# Patient Record
Sex: MALE | Race: WHITE | NOT HISPANIC OR LATINO | Employment: UNEMPLOYED | URBAN - METROPOLITAN AREA
[De-identification: names, ages, dates, MRNs, and addresses within clinical notes are randomized per-mention and may not be internally consistent; named-entity substitution may affect disease eponyms.]

---

## 2019-02-06 ENCOUNTER — OFFICE VISIT (OUTPATIENT)
Dept: FAMILY MEDICINE CLINIC | Facility: CLINIC | Age: 49
End: 2019-02-06

## 2019-02-06 VITALS
DIASTOLIC BLOOD PRESSURE: 80 MMHG | WEIGHT: 240 LBS | SYSTOLIC BLOOD PRESSURE: 142 MMHG | TEMPERATURE: 100.9 F | RESPIRATION RATE: 18 BRPM | HEART RATE: 80 BPM | BODY MASS INDEX: 37.67 KG/M2 | HEIGHT: 67 IN | OXYGEN SATURATION: 96 %

## 2019-02-06 DIAGNOSIS — R82.998 URINE COLOR APPEARS BRIGHT: ICD-10-CM

## 2019-02-06 DIAGNOSIS — R05.9 COUGH: Primary | ICD-10-CM

## 2019-02-06 DIAGNOSIS — A68.9 RECURRENT FEVER: ICD-10-CM

## 2019-02-06 DIAGNOSIS — R01.1 HEART MURMUR: ICD-10-CM

## 2019-02-06 LAB
BACTERIA UR QL AUTO: ABNORMAL /HPF
BILIRUB UR QL STRIP: NEGATIVE
CLARITY UR: ABNORMAL
COLOR UR: ABNORMAL
GLUCOSE UR STRIP-MCNC: NEGATIVE MG/DL
HGB UR QL STRIP.AUTO: NEGATIVE
KETONES UR STRIP-MCNC: ABNORMAL MG/DL
LEUKOCYTE ESTERASE UR QL STRIP: ABNORMAL
NITRITE UR QL STRIP: NEGATIVE
NON-SQ EPI CELLS URNS QL MICRO: ABNORMAL /HPF
PH UR STRIP.AUTO: 5.5 [PH] (ref 4.5–8)
PROT UR STRIP-MCNC: ABNORMAL MG/DL
RBC #/AREA URNS AUTO: ABNORMAL /HPF
SL AMB  POCT GLUCOSE, UA: NEGATIVE
SL AMB LEUKOCYTE ESTERASE,UA: NEGATIVE
SL AMB POCT BILIRUBIN,UA: NEGATIVE
SL AMB POCT BLOOD,UA: ABNORMAL
SL AMB POCT CLARITY,UA: ABNORMAL
SL AMB POCT COLOR,UA: ABNORMAL
SL AMB POCT KETONES,UA: NEGATIVE
SL AMB POCT NITRITE,UA: NEGATIVE
SL AMB POCT PH,UA: 6
SL AMB POCT SPECIFIC GRAVITY,UA: 1.02
SL AMB POCT URINE PROTEIN: ABNORMAL
SL AMB POCT UROBILINOGEN: 1
SP GR UR STRIP.AUTO: 1.03 (ref 1–1.03)
UROBILINOGEN UR QL STRIP.AUTO: 2 E.U./DL
WBC #/AREA URNS AUTO: ABNORMAL /HPF

## 2019-02-06 PROCEDURE — 87086 URINE CULTURE/COLONY COUNT: CPT | Performed by: FAMILY MEDICINE

## 2019-02-06 PROCEDURE — 81001 URINALYSIS AUTO W/SCOPE: CPT | Performed by: FAMILY MEDICINE

## 2019-02-06 NOTE — PROGRESS NOTES
Assessment/Plan:       Problem List Items Addressed This Visit        Other    Heart murmur    Relevant Orders    Echo complete with contrast if indicated    Cough - Primary     Check chest x-ray with persistent cough and mild shortness of breath         Relevant Orders    XR chest pa & lateral      Other Visit Diagnoses     Recurrent fever        Relevant Orders    CBC and differential    Comprehensive metabolic panel    Lyme Antibody Profile with reflex to WB    Urine color appears bright        Urine appears quite orange    Relevant Orders    POCT urine dip            Subjective:      Patient ID: Lorena Sanchez is a 53 y o  male  Patient presents today with chief complaint of not feeling well since Christmas Eve  He has had a intermittent cough  I actually called in a Z-Jer form on January 19th that seem to make him feel bit better  He has had some intermittent issues with upper gastric discomfort and a few episodes of diarrhea  He has felt fatigued and intermittently has had chills since December 24th  He has lost some weight but he is not sure how much  He notes his appetite has been poor  He is not currently experiencing nausea or vomiting but does still have a poor appetite  He has a small skin rash on his hand which he blames on washing glasses at work  The following portions of the patient's history were reviewed and updated as appropriate: allergies, current medications, past family history, past medical history, past social history, past surgical history and problem list     Review of Systems   Constitutional: Positive for appetite change, chills, fatigue, fever and unexpected weight change  Night sweats   HENT: Negative for sore throat and trouble swallowing  Eyes: Negative for visual disturbance  Respiratory: Positive for cough  Negative for chest tightness, shortness of breath and wheezing  Cardiovascular: Negative for chest pain     Gastrointestinal: Negative for abdominal distention, abdominal pain, blood in stool, constipation and diarrhea  Endocrine: Negative for polyuria  Genitourinary: Negative for difficulty urinating and flank pain  Musculoskeletal: Positive for myalgias  Negative for arthralgias, back pain and joint swelling  Skin: Negative for rash  Neurological: Negative for dizziness, tremors, syncope, weakness and light-headedness  Hematological: Negative for adenopathy  Does not bruise/bleed easily  Psychiatric/Behavioral: Negative for confusion, decreased concentration and sleep disturbance  Objective:      /80   Pulse 80   Temp (!) 100 9 °F (38 3 °C)   Resp 18   Ht 5' 7" (1 702 m)   Wt 109 kg (240 lb)   SpO2 96%   BMI 37 59 kg/m²          Physical Exam   Constitutional: He is oriented to person, place, and time  He appears well-developed and well-nourished  No distress  pale   HENT:   Head: Normocephalic  Eyes: Pupils are equal, round, and reactive to light  Right eye exhibits no discharge  Left eye exhibits no discharge  Neck: No tracheal deviation present  No thyromegaly present  Cardiovascular: Normal rate, regular rhythm and normal heart sounds  No murmur heard  Pulmonary/Chest: Effort normal  No respiratory distress  He has no wheezes  He has no rales  Abdominal: Soft  He exhibits no distension and no mass  There is no tenderness  There is no rebound and no guarding  Genitourinary: Rectum normal and prostate normal    Musculoskeletal: Normal range of motion  He exhibits no edema  Lymphadenopathy:     He has no cervical adenopathy  Neurological: He is alert and oriented to person, place, and time  No cranial nerve deficit  Skin: Skin is warm  He is not diaphoretic  No erythema  Psychiatric: He has a normal mood and affect   Judgment and thought content normal          Guillermo Collins MD

## 2019-02-07 LAB — BACTERIA UR CULT: NORMAL

## 2019-02-19 ENCOUNTER — APPOINTMENT (OUTPATIENT)
Dept: LAB | Facility: CLINIC | Age: 49
End: 2019-02-19
Payer: COMMERCIAL

## 2019-02-19 ENCOUNTER — APPOINTMENT (OUTPATIENT)
Dept: RADIOLOGY | Facility: MEDICAL CENTER | Age: 49
End: 2019-02-19
Payer: COMMERCIAL

## 2019-02-19 DIAGNOSIS — R05.9 COUGH: ICD-10-CM

## 2019-02-19 DIAGNOSIS — A68.9 RECURRENT FEVER: ICD-10-CM

## 2019-02-19 LAB
ALBUMIN SERPL BCP-MCNC: 2.4 G/DL (ref 3.5–5)
ALP SERPL-CCNC: 237 U/L (ref 46–116)
ALT SERPL W P-5'-P-CCNC: 25 U/L (ref 12–78)
ANION GAP SERPL CALCULATED.3IONS-SCNC: 8 MMOL/L (ref 4–13)
AST SERPL W P-5'-P-CCNC: 49 U/L (ref 5–45)
BASOPHILS # BLD MANUAL: 0 THOUSAND/UL (ref 0–0.1)
BASOPHILS NFR MAR MANUAL: 0 % (ref 0–1)
BILIRUB SERPL-MCNC: 2.03 MG/DL (ref 0.2–1)
BUN SERPL-MCNC: 13 MG/DL (ref 5–25)
CALCIUM SERPL-MCNC: 8.5 MG/DL (ref 8.3–10.1)
CHLORIDE SERPL-SCNC: 100 MMOL/L (ref 100–108)
CO2 SERPL-SCNC: 23 MMOL/L (ref 21–32)
CREAT SERPL-MCNC: 0.94 MG/DL (ref 0.6–1.3)
EOSINOPHIL # BLD MANUAL: 0 THOUSAND/UL (ref 0–0.4)
EOSINOPHIL NFR BLD MANUAL: 0 % (ref 0–6)
ERYTHROCYTE [DISTWIDTH] IN BLOOD BY AUTOMATED COUNT: 15.7 % (ref 11.6–15.1)
GFR SERPL CREATININE-BSD FRML MDRD: 96 ML/MIN/1.73SQ M
GLUCOSE SERPL-MCNC: 93 MG/DL (ref 65–140)
HCT VFR BLD AUTO: 32.5 % (ref 36.5–49.3)
HGB BLD-MCNC: 10.7 G/DL (ref 12–17)
LYMPHOCYTES # BLD AUTO: 0.18 THOUSAND/UL (ref 0.6–4.47)
LYMPHOCYTES # BLD AUTO: 2 % (ref 14–44)
MCH RBC QN AUTO: 30.3 PG (ref 26.8–34.3)
MCHC RBC AUTO-ENTMCNC: 32.9 G/DL (ref 31.4–37.4)
MCV RBC AUTO: 92 FL (ref 82–98)
METAMYELOCYTES NFR BLD MANUAL: 1 % (ref 0–1)
MONOCYTES # BLD AUTO: 0.36 THOUSAND/UL (ref 0–1.22)
MONOCYTES NFR BLD: 4 % (ref 4–12)
NEUTROPHILS # BLD MANUAL: 8.31 THOUSAND/UL (ref 1.85–7.62)
NEUTS BAND NFR BLD MANUAL: 5 % (ref 0–8)
NEUTS SEG NFR BLD AUTO: 87 % (ref 43–75)
NRBC BLD AUTO-RTO: 0 /100 WBCS
PLATELET # BLD AUTO: 136 THOUSANDS/UL (ref 149–390)
PLATELET BLD QL SMEAR: ABNORMAL
PMV BLD AUTO: 10.3 FL (ref 8.9–12.7)
POLYCHROMASIA BLD QL SMEAR: PRESENT
POTASSIUM SERPL-SCNC: 3.7 MMOL/L (ref 3.5–5.3)
PROT SERPL-MCNC: 9 G/DL (ref 6.4–8.2)
RBC # BLD AUTO: 3.53 MILLION/UL (ref 3.88–5.62)
RBC MORPH BLD: PRESENT
SODIUM SERPL-SCNC: 131 MMOL/L (ref 136–145)
VARIANT LYMPHS # BLD AUTO: 1 %
WBC # BLD AUTO: 9.03 THOUSAND/UL (ref 4.31–10.16)

## 2019-02-19 PROCEDURE — 36415 COLL VENOUS BLD VENIPUNCTURE: CPT

## 2019-02-19 PROCEDURE — 80053 COMPREHEN METABOLIC PANEL: CPT

## 2019-02-19 PROCEDURE — 85027 COMPLETE CBC AUTOMATED: CPT

## 2019-02-19 PROCEDURE — 86618 LYME DISEASE ANTIBODY: CPT

## 2019-02-19 PROCEDURE — 85007 BL SMEAR W/DIFF WBC COUNT: CPT

## 2019-02-19 PROCEDURE — 71046 X-RAY EXAM CHEST 2 VIEWS: CPT

## 2019-02-20 ENCOUNTER — TELEPHONE (OUTPATIENT)
Dept: FAMILY MEDICINE CLINIC | Facility: CLINIC | Age: 49
End: 2019-02-20

## 2019-02-20 DIAGNOSIS — R05.9 COUGH: ICD-10-CM

## 2019-02-20 DIAGNOSIS — D64.9 ANEMIA, UNSPECIFIED TYPE: Primary | ICD-10-CM

## 2019-02-20 DIAGNOSIS — K29.30 CHRONIC SUPERFICIAL GASTRITIS WITHOUT BLEEDING: ICD-10-CM

## 2019-02-20 PROBLEM — E80.6 HYPERBILIRUBINEMIA: Status: ACTIVE | Noted: 2019-02-20

## 2019-02-20 RX ORDER — PREDNISONE 20 MG/1
20 TABLET ORAL DAILY
Qty: 5 TABLET | Refills: 0 | Status: SHIPPED | OUTPATIENT
Start: 2019-02-20 | End: 2019-02-25 | Stop reason: ALTCHOICE

## 2019-02-20 RX ORDER — OMEPRAZOLE 40 MG/1
CAPSULE, DELAYED RELEASE ORAL
Qty: 60 CAPSULE | Refills: 1 | Status: SHIPPED | OUTPATIENT
Start: 2019-02-20 | End: 2019-02-21 | Stop reason: SDUPTHER

## 2019-02-20 NOTE — TELEPHONE ENCOUNTER
PER DMD pt is rather ill and will need several studies  Has no ins   What can be done to help him out? I called Charline Auguste, HOME Finacial counselor and lmovm for her to call me back

## 2019-02-20 NOTE — PROGRESS NOTES
I spoke with Dr Kelvin Jaramillo who recommends we try to get the patient in with the [facility] where he could be seen by 1 of the fellows  [facility]  He also should have iron studies

## 2019-02-20 NOTE — PROGRESS NOTES
I discussed the patient's have normal hemoglobin as well as his elevated bilirubin and alkaline phosphatase  He persists in having a cough, but his chest x-ray was clear  He will need a GI follow-up as well as an abdominal ultrasound which we will attempt to arrange  Unfortunately, he has no insurance  Our office has already reached out to our financial services people to see we can do for him  He has not set up his echocardiogram yet but I would like him to get that set up in the near future in light of his heart murmur at his previous visit

## 2019-02-21 DIAGNOSIS — K29.30 CHRONIC SUPERFICIAL GASTRITIS WITHOUT BLEEDING: ICD-10-CM

## 2019-02-21 LAB
B BURGDOR IGG SER IA-ACNC: 0.13
B BURGDOR IGM SER IA-ACNC: 0.21

## 2019-02-21 RX ORDER — OMEPRAZOLE 40 MG/1
CAPSULE, DELAYED RELEASE ORAL
Qty: 60 CAPSULE | Refills: 1 | Status: SHIPPED | OUTPATIENT
Start: 2019-02-21 | End: 2019-03-22 | Stop reason: HOSPADM

## 2019-02-21 NOTE — TELEPHONE ENCOUNTER
Called asher to f/u on this - she is going to call pt to get a little more information regarding why he doesn't have insurance and then based on that information, she will either help him apply for medical assistance, review  to see if prices are something he can afford, and if not, work on payment plan/financial assistance

## 2019-02-25 ENCOUNTER — OFFICE VISIT (OUTPATIENT)
Dept: FAMILY MEDICINE CLINIC | Facility: CLINIC | Age: 49
End: 2019-02-25

## 2019-02-25 ENCOUNTER — APPOINTMENT (INPATIENT)
Dept: CT IMAGING | Facility: HOSPITAL | Age: 49
DRG: 441 | End: 2019-02-25
Payer: COMMERCIAL

## 2019-02-25 ENCOUNTER — HOSPITAL ENCOUNTER (INPATIENT)
Facility: HOSPITAL | Age: 49
LOS: 25 days | DRG: 441 | End: 2019-03-22
Attending: INTERNAL MEDICINE | Admitting: INTERNAL MEDICINE
Payer: COMMERCIAL

## 2019-02-25 VITALS
RESPIRATION RATE: 18 BRPM | HEIGHT: 67 IN | OXYGEN SATURATION: 98 % | TEMPERATURE: 97.6 F | SYSTOLIC BLOOD PRESSURE: 104 MMHG | HEART RATE: 68 BPM | DIASTOLIC BLOOD PRESSURE: 70 MMHG | WEIGHT: 226 LBS | BODY MASS INDEX: 35.47 KG/M2

## 2019-02-25 DIAGNOSIS — J86.9 EMPYEMA (HCC): ICD-10-CM

## 2019-02-25 DIAGNOSIS — A41.9 SEPSIS, DUE TO UNSPECIFIED ORGANISM: ICD-10-CM

## 2019-02-25 DIAGNOSIS — E80.6 HYPERBILIRUBINEMIA: ICD-10-CM

## 2019-02-25 DIAGNOSIS — J96.90 RESPIRATORY FAILURE (HCC): ICD-10-CM

## 2019-02-25 DIAGNOSIS — J90 PLEURAL EFFUSION: ICD-10-CM

## 2019-02-25 DIAGNOSIS — D64.9 ANEMIA, UNSPECIFIED TYPE: ICD-10-CM

## 2019-02-25 DIAGNOSIS — N36.5 URETHRAL FALSE PASSAGE: ICD-10-CM

## 2019-02-25 DIAGNOSIS — K29.30 CHRONIC SUPERFICIAL GASTRITIS WITHOUT BLEEDING: Primary | ICD-10-CM

## 2019-02-25 DIAGNOSIS — K35.890 OTHER ACUTE APPENDICITIS: ICD-10-CM

## 2019-02-25 DIAGNOSIS — F10.10 ALCOHOL ABUSE: ICD-10-CM

## 2019-02-25 DIAGNOSIS — R05.9 COUGH: ICD-10-CM

## 2019-02-25 DIAGNOSIS — I95.9 HYPOTENSION, UNSPECIFIED HYPOTENSION TYPE: ICD-10-CM

## 2019-02-25 DIAGNOSIS — S01.91XA LACERATION OF HEAD WITHOUT FOREIGN BODY, UNSPECIFIED PART OF HEAD, INITIAL ENCOUNTER: Primary | ICD-10-CM

## 2019-02-25 DIAGNOSIS — R31.0 GROSS HEMATURIA: ICD-10-CM

## 2019-02-25 PROBLEM — K21.9 GERD (GASTROESOPHAGEAL REFLUX DISEASE): Status: ACTIVE | Noted: 2019-02-25

## 2019-02-25 PROBLEM — R63.4 WEIGHT LOSS: Status: ACTIVE | Noted: 2019-02-25

## 2019-02-25 PROBLEM — R74.8 ELEVATED ALKALINE PHOSPHATASE LEVEL: Status: ACTIVE | Noted: 2019-02-25

## 2019-02-25 PROBLEM — R62.7 FAILURE TO THRIVE IN ADULT: Status: ACTIVE | Noted: 2019-02-25

## 2019-02-25 LAB
ALBUMIN SERPL BCP-MCNC: 2.1 G/DL (ref 3.5–5)
ALP SERPL-CCNC: 201 U/L (ref 46–116)
ALT SERPL W P-5'-P-CCNC: 43 U/L (ref 12–78)
ANION GAP SERPL CALCULATED.3IONS-SCNC: 9 MMOL/L (ref 4–13)
AST SERPL W P-5'-P-CCNC: 66 U/L (ref 5–45)
BILIRUB SERPL-MCNC: 1.84 MG/DL (ref 0.2–1)
BUN SERPL-MCNC: 12 MG/DL (ref 5–25)
CALCIUM SERPL-MCNC: 8.4 MG/DL (ref 8.3–10.1)
CHLORIDE SERPL-SCNC: 101 MMOL/L (ref 100–108)
CO2 SERPL-SCNC: 24 MMOL/L (ref 21–32)
CREAT SERPL-MCNC: 0.78 MG/DL (ref 0.6–1.3)
ERYTHROCYTE [DISTWIDTH] IN BLOOD BY AUTOMATED COUNT: 15.8 % (ref 11.6–15.1)
GFR SERPL CREATININE-BSD FRML MDRD: 107 ML/MIN/1.73SQ M
GLUCOSE SERPL-MCNC: 96 MG/DL (ref 65–140)
HCT VFR BLD AUTO: 31.3 % (ref 36.5–49.3)
HGB BLD-MCNC: 10 G/DL (ref 12–17)
INR PPP: 1.52 (ref 0.86–1.17)
LACTATE SERPL-SCNC: 1.9 MMOL/L (ref 0.5–2)
LACTATE SERPL-SCNC: 2.2 MMOL/L (ref 0.5–2)
MCH RBC QN AUTO: 30.5 PG (ref 26.8–34.3)
MCHC RBC AUTO-ENTMCNC: 31.9 G/DL (ref 31.4–37.4)
MCV RBC AUTO: 95 FL (ref 82–98)
PLATELET # BLD AUTO: 129 THOUSANDS/UL (ref 149–390)
PMV BLD AUTO: 9.9 FL (ref 8.9–12.7)
POTASSIUM SERPL-SCNC: 4.1 MMOL/L (ref 3.5–5.3)
PROT SERPL-MCNC: 8.5 G/DL (ref 6.4–8.2)
PROTHROMBIN TIME: 18.4 SECONDS (ref 11.8–14.2)
RBC # BLD AUTO: 3.28 MILLION/UL (ref 3.88–5.62)
SODIUM SERPL-SCNC: 134 MMOL/L (ref 136–145)
TROPONIN I SERPL-MCNC: <0.02 NG/ML
TSH SERPL DL<=0.05 MIU/L-ACNC: 2.31 UIU/ML (ref 0.36–3.74)
WBC # BLD AUTO: 11.78 THOUSAND/UL (ref 4.31–10.16)

## 2019-02-25 PROCEDURE — 74177 CT ABD & PELVIS W/CONTRAST: CPT

## 2019-02-25 PROCEDURE — 82728 ASSAY OF FERRITIN: CPT | Performed by: PHYSICIAN ASSISTANT

## 2019-02-25 PROCEDURE — 85610 PROTHROMBIN TIME: CPT | Performed by: PHYSICIAN ASSISTANT

## 2019-02-25 PROCEDURE — 87040 BLOOD CULTURE FOR BACTERIA: CPT | Performed by: PHYSICIAN ASSISTANT

## 2019-02-25 PROCEDURE — 83550 IRON BINDING TEST: CPT | Performed by: PHYSICIAN ASSISTANT

## 2019-02-25 PROCEDURE — 93005 ELECTROCARDIOGRAM TRACING: CPT

## 2019-02-25 PROCEDURE — 99223 1ST HOSP IP/OBS HIGH 75: CPT | Performed by: PHYSICIAN ASSISTANT

## 2019-02-25 PROCEDURE — 71260 CT THORAX DX C+: CPT

## 2019-02-25 PROCEDURE — 80053 COMPREHEN METABOLIC PANEL: CPT | Performed by: PHYSICIAN ASSISTANT

## 2019-02-25 PROCEDURE — 83605 ASSAY OF LACTIC ACID: CPT | Performed by: PHYSICIAN ASSISTANT

## 2019-02-25 PROCEDURE — 85027 COMPLETE CBC AUTOMATED: CPT | Performed by: PHYSICIAN ASSISTANT

## 2019-02-25 PROCEDURE — 84484 ASSAY OF TROPONIN QUANT: CPT | Performed by: PHYSICIAN ASSISTANT

## 2019-02-25 PROCEDURE — 83540 ASSAY OF IRON: CPT | Performed by: PHYSICIAN ASSISTANT

## 2019-02-25 PROCEDURE — 84443 ASSAY THYROID STIM HORMONE: CPT | Performed by: PHYSICIAN ASSISTANT

## 2019-02-25 RX ORDER — PANTOPRAZOLE SODIUM 40 MG/1
40 TABLET, DELAYED RELEASE ORAL
Status: DISCONTINUED | OUTPATIENT
Start: 2019-02-26 | End: 2019-02-28

## 2019-02-25 RX ORDER — SODIUM CHLORIDE 9 MG/ML
75 INJECTION, SOLUTION INTRAVENOUS CONTINUOUS
Status: DISCONTINUED | OUTPATIENT
Start: 2019-02-25 | End: 2019-02-28

## 2019-02-25 RX ORDER — ONDANSETRON 2 MG/ML
4 INJECTION INTRAMUSCULAR; INTRAVENOUS EVERY 8 HOURS PRN
Status: DISCONTINUED | OUTPATIENT
Start: 2019-02-25 | End: 2019-03-04

## 2019-02-25 RX ORDER — ACETAMINOPHEN 325 MG/1
650 TABLET ORAL EVERY 6 HOURS PRN
Status: DISCONTINUED | OUTPATIENT
Start: 2019-02-25 | End: 2019-02-26

## 2019-02-25 RX ADMIN — IOHEXOL 100 ML: 350 INJECTION, SOLUTION INTRAVENOUS at 22:57

## 2019-02-25 RX ADMIN — IOHEXOL 50 ML: 240 INJECTION, SOLUTION INTRATHECAL; INTRAVASCULAR; INTRAVENOUS; ORAL at 22:56

## 2019-02-25 RX ADMIN — SODIUM CHLORIDE 75 ML/HR: 0.9 INJECTION, SOLUTION INTRAVENOUS at 22:30

## 2019-02-25 NOTE — TELEPHONE ENCOUNTER
I spoke jonh Vasquez   She has not heard back from pt  He has 2 choices at this time  [facility] -which is a clinic and he will need to pay a flat rate copay and they will help him apply for Med Assistance  He must then go to clinic location, cannot come here  OR he can apply for medical assistance through the [facility] for pt to call me back

## 2019-02-25 NOTE — ASSESSMENT & PLAN NOTE
He just started Prilosec on the weekend my advice for persistent upper abdominal pain  He will need a GI consultation in light of his anemia

## 2019-02-25 NOTE — ASSESSMENT & PLAN NOTE
Hemoglobin of 10 7  In light of his failure to thrive and significant weight loss, I have arranged to have him directly admitted    I spoke

## 2019-02-25 NOTE — ASSESSMENT & PLAN NOTE
He had some improvement on the prednisone that I had given him last week  I hope to come down his cough to give him some relief all we are moving forward with his workup  Recent chest x-ray was read as clear

## 2019-02-25 NOTE — PROGRESS NOTES
Assessment/Plan:       Problem List Items Addressed This Visit        Digestive    Chronic superficial gastritis without bleeding - Primary     He just started Prilosec on the weekend my advice for persistent upper abdominal pain  He will need a GI consultation in light of his anemia  Other    Cough     He had some improvement on the prednisone that I had given him last week  I hope to come down his cough to give him some relief all we are moving forward with his workup  Recent chest x-ray was read as clear  Anemia     Hemoglobin of 10 7  In light of his failure to thrive and significant weight loss, I have arranged to have him directly admitted  I spoke         Relevant Orders    Transfer to other facility    Hyperbilirubinemia     He is going to be admitted for further evaluation  Relevant Orders    Transfer to other facility            Subjective:      Patient ID: Olman Romano is a 53 y o  male  He presents today with persistent weight loss, poor appetite and intermittent diarrhea as well as right upper quadrant pain  He notes he started feeling sick about 2 months ago with a cough  He took a Z-Jer about a month ago which helped him feel just a bit better but for only about 48 hours  He continues to have severe fatigue, persistent poor appetite and significant weight loss  He has been hesitant to seek out healthcare in light of his abscess of insurance  I did convince her to get some blood work done on February 19th which revealed hemoglobin of 10 7 with a relative microcytosis  The patient has not noted any melena or bright red blood per rectum  He also had a decreased sodium of 131, elevated protein of 9, low albumin of 2 4 and an elevated alkaline phosphatase of 237  He has been having intermittent right upper quadrant pain which seems to be worse over the past 48 hours        The following portions of the patient's history were reviewed and updated as appropriate: allergies, current medications, past family history, past medical history, past social history, past surgical history and problem list     Review of Systems   Constitutional: Positive for activity change, appetite change, fatigue and unexpected weight change  Negative for chills and fever  HENT: Negative for trouble swallowing  Eyes: Negative for visual disturbance  Respiratory: Negative for cough, chest tightness, shortness of breath and wheezing  Cardiovascular: Negative for chest pain  Gastrointestinal: Positive for abdominal pain, diarrhea and nausea  Negative for abdominal distention, blood in stool, constipation, rectal pain and vomiting  Endocrine: Negative for polyuria  Genitourinary: Negative for difficulty urinating, dysuria and flank pain  Musculoskeletal: Negative for arthralgias and myalgias  Skin: Negative for rash  Neurological: Negative for dizziness and light-headedness  Hematological: Negative for adenopathy  Does not bruise/bleed easily  Psychiatric/Behavioral: Negative for sleep disturbance  Objective:      /70   Pulse 68   Temp 97 6 °F (36 4 °C)   Resp 18   Ht 5' 7" (1 702 m)   Wt 103 kg (226 lb)   SpO2 98%   BMI 35 40 kg/m²          Physical Exam   Constitutional: He is oriented to person, place, and time  No distress  He appears quite pale and moderately ill  HENT:   Head: Normocephalic  Eyes: Pupils are equal, round, and reactive to light  Right eye exhibits no discharge  Left eye exhibits no discharge  Neck: No tracheal deviation present  No thyromegaly present  Cardiovascular: Normal rate, regular rhythm and normal heart sounds  No murmur heard  Pulmonary/Chest: Effort normal  No respiratory distress  He has no wheezes  He has no rales  Abdominal: Soft  Bowel sounds are normal  He exhibits distension  He exhibits no mass  There is no tenderness  There is no rebound and no guarding  No hernia     Musculoskeletal: Normal range of motion  He exhibits no edema  Lymphadenopathy:     He has no cervical adenopathy  Neurological: He is alert and oriented to person, place, and time  No cranial nerve deficit  Skin: Skin is warm  No rash noted  He is not diaphoretic  No erythema  Psychiatric: He has a normal mood and affect   Judgment and thought content normal          Mame Rodriguez MD

## 2019-02-25 NOTE — ASSESSMENT & PLAN NOTE
This is not as obvious his that was at his previous visit on February 6, but is still present    I certainly would consider echocardiogram

## 2019-02-26 ENCOUNTER — APPOINTMENT (INPATIENT)
Dept: NON INVASIVE DIAGNOSTICS | Facility: HOSPITAL | Age: 49
DRG: 441 | End: 2019-02-26
Payer: COMMERCIAL

## 2019-02-26 PROBLEM — F10.10 ALCOHOL ABUSE: Status: ACTIVE | Noted: 2019-02-26

## 2019-02-26 PROBLEM — R16.0 LIVER MASS: Status: ACTIVE | Noted: 2019-02-26

## 2019-02-26 PROBLEM — K37 APPENDICITIS: Status: ACTIVE | Noted: 2019-02-26

## 2019-02-26 LAB
AFP-TM SERPL-MCNC: 1.3 NG/ML (ref 0.5–8)
ALBUMIN SERPL BCP-MCNC: 1.9 G/DL (ref 3.5–5)
ALP SERPL-CCNC: 189 U/L (ref 46–116)
ALT SERPL W P-5'-P-CCNC: 39 U/L (ref 12–78)
ANION GAP SERPL CALCULATED.3IONS-SCNC: 9 MMOL/L (ref 4–13)
AST SERPL W P-5'-P-CCNC: 62 U/L (ref 5–45)
ATRIAL RATE: 58 BPM
BILIRUB SERPL-MCNC: 2.22 MG/DL (ref 0.2–1)
BUN SERPL-MCNC: 10 MG/DL (ref 5–25)
CALCIUM SERPL-MCNC: 8.1 MG/DL (ref 8.3–10.1)
CEA SERPL-MCNC: 0.8 NG/ML (ref 0–3)
CHLORIDE SERPL-SCNC: 98 MMOL/L (ref 100–108)
CO2 SERPL-SCNC: 23 MMOL/L (ref 21–32)
CREAT SERPL-MCNC: 0.81 MG/DL (ref 0.6–1.3)
ERYTHROCYTE [DISTWIDTH] IN BLOOD BY AUTOMATED COUNT: 15.8 % (ref 11.6–15.1)
FERRITIN SERPL-MCNC: 856 NG/ML (ref 8–388)
FOLATE SERPL-MCNC: 10.1 NG/ML (ref 3.1–17.5)
GFR SERPL CREATININE-BSD FRML MDRD: 106 ML/MIN/1.73SQ M
GLUCOSE SERPL-MCNC: 93 MG/DL (ref 65–140)
HAV IGM SER QL: NORMAL
HBV CORE IGM SER QL: NORMAL
HBV SURFACE AG SER QL: NORMAL
HCT VFR BLD AUTO: 30.8 % (ref 36.5–49.3)
HCV AB SER QL: NORMAL
HEMOCCULT STL QL: NEGATIVE
HGB BLD-MCNC: 9.8 G/DL (ref 12–17)
IRON SATN MFR SERPL: 27 %
IRON SERPL-MCNC: 47 UG/DL (ref 65–175)
LACTATE SERPL-SCNC: 1.8 MMOL/L (ref 0.5–2)
MCH RBC QN AUTO: 30.4 PG (ref 26.8–34.3)
MCHC RBC AUTO-ENTMCNC: 31.8 G/DL (ref 31.4–37.4)
MCV RBC AUTO: 96 FL (ref 82–98)
P AXIS: 49 DEGREES
PLATELET # BLD AUTO: 125 THOUSANDS/UL (ref 149–390)
PMV BLD AUTO: 10.1 FL (ref 8.9–12.7)
POTASSIUM SERPL-SCNC: 4.2 MMOL/L (ref 3.5–5.3)
PR INTERVAL: 184 MS
PROCALCITONIN SERPL-MCNC: 0.26 NG/ML
PROT SERPL-MCNC: 7.8 G/DL (ref 6.4–8.2)
QRS AXIS: 16 DEGREES
QRSD INTERVAL: 114 MS
QT INTERVAL: 474 MS
QTC INTERVAL: 465 MS
RBC # BLD AUTO: 3.22 MILLION/UL (ref 3.88–5.62)
SODIUM SERPL-SCNC: 130 MMOL/L (ref 136–145)
T WAVE AXIS: 36 DEGREES
TIBC SERPL-MCNC: 175 UG/DL (ref 250–450)
VENTRICULAR RATE: 58 BPM
VIT B12 SERPL-MCNC: 1554 PG/ML (ref 100–900)
WBC # BLD AUTO: 15.19 THOUSAND/UL (ref 4.31–10.16)

## 2019-02-26 PROCEDURE — 93306 TTE W/DOPPLER COMPLETE: CPT

## 2019-02-26 PROCEDURE — 82784 ASSAY IGA/IGD/IGG/IGM EACH: CPT | Performed by: PHYSICIAN ASSISTANT

## 2019-02-26 PROCEDURE — 84145 PROCALCITONIN (PCT): CPT | Performed by: PHYSICIAN ASSISTANT

## 2019-02-26 PROCEDURE — 86255 FLUORESCENT ANTIBODY SCREEN: CPT | Performed by: PHYSICIAN ASSISTANT

## 2019-02-26 PROCEDURE — 93306 TTE W/DOPPLER COMPLETE: CPT | Performed by: INTERNAL MEDICINE

## 2019-02-26 PROCEDURE — 83605 ASSAY OF LACTIC ACID: CPT | Performed by: PHYSICIAN ASSISTANT

## 2019-02-26 PROCEDURE — 80074 ACUTE HEPATITIS PANEL: CPT | Performed by: HOSPITALIST

## 2019-02-26 PROCEDURE — 82272 OCCULT BLD FECES 1-3 TESTS: CPT | Performed by: PHYSICIAN ASSISTANT

## 2019-02-26 PROCEDURE — 83516 IMMUNOASSAY NONANTIBODY: CPT | Performed by: PHYSICIAN ASSISTANT

## 2019-02-26 PROCEDURE — 99232 SBSQ HOSP IP/OBS MODERATE 35: CPT | Performed by: HOSPITALIST

## 2019-02-26 PROCEDURE — 82746 ASSAY OF FOLIC ACID SERUM: CPT | Performed by: PHYSICIAN ASSISTANT

## 2019-02-26 PROCEDURE — 82607 VITAMIN B-12: CPT | Performed by: PHYSICIAN ASSISTANT

## 2019-02-26 PROCEDURE — 89055 LEUKOCYTE ASSESSMENT FECAL: CPT | Performed by: PHYSICIAN ASSISTANT

## 2019-02-26 PROCEDURE — 82390 ASSAY OF CERULOPLASMIN: CPT | Performed by: PHYSICIAN ASSISTANT

## 2019-02-26 PROCEDURE — 99254 IP/OBS CNSLTJ NEW/EST MOD 60: CPT | Performed by: INTERNAL MEDICINE

## 2019-02-26 PROCEDURE — 82105 ALPHA-FETOPROTEIN SERUM: CPT | Performed by: PHYSICIAN ASSISTANT

## 2019-02-26 PROCEDURE — 82103 ALPHA-1-ANTITRYPSIN TOTAL: CPT | Performed by: PHYSICIAN ASSISTANT

## 2019-02-26 PROCEDURE — 93010 ELECTROCARDIOGRAM REPORT: CPT | Performed by: INTERNAL MEDICINE

## 2019-02-26 PROCEDURE — 85027 COMPLETE CBC AUTOMATED: CPT | Performed by: PHYSICIAN ASSISTANT

## 2019-02-26 PROCEDURE — 82104 ALPHA-1-ANTITRYPSIN PHENO: CPT | Performed by: PHYSICIAN ASSISTANT

## 2019-02-26 PROCEDURE — 86235 NUCLEAR ANTIGEN ANTIBODY: CPT | Performed by: PHYSICIAN ASSISTANT

## 2019-02-26 PROCEDURE — 82378 CARCINOEMBRYONIC ANTIGEN: CPT | Performed by: INTERNAL MEDICINE

## 2019-02-26 PROCEDURE — 87493 C DIFF AMPLIFIED PROBE: CPT | Performed by: PHYSICIAN ASSISTANT

## 2019-02-26 PROCEDURE — 86038 ANTINUCLEAR ANTIBODIES: CPT | Performed by: PHYSICIAN ASSISTANT

## 2019-02-26 PROCEDURE — 87505 NFCT AGENT DETECTION GI: CPT | Performed by: PHYSICIAN ASSISTANT

## 2019-02-26 PROCEDURE — 80053 COMPREHEN METABOLIC PANEL: CPT | Performed by: PHYSICIAN ASSISTANT

## 2019-02-26 RX ORDER — OXYCODONE HYDROCHLORIDE 5 MG/1
5 TABLET ORAL EVERY 4 HOURS PRN
Status: DISCONTINUED | OUTPATIENT
Start: 2019-02-26 | End: 2019-02-28

## 2019-02-26 RX ORDER — OXYCODONE HYDROCHLORIDE 10 MG/1
10 TABLET ORAL EVERY 4 HOURS PRN
Status: DISCONTINUED | OUTPATIENT
Start: 2019-02-26 | End: 2019-02-28

## 2019-02-26 RX ORDER — IBUPROFEN 600 MG/1
600 TABLET ORAL EVERY 6 HOURS PRN
Status: DISCONTINUED | OUTPATIENT
Start: 2019-02-26 | End: 2019-02-28

## 2019-02-26 RX ADMIN — OXYCODONE HYDROCHLORIDE 5 MG: 5 TABLET ORAL at 03:54

## 2019-02-26 RX ADMIN — PANTOPRAZOLE SODIUM 40 MG: 40 TABLET, DELAYED RELEASE ORAL at 05:26

## 2019-02-26 RX ADMIN — IBUPROFEN 600 MG: 600 TABLET ORAL at 08:38

## 2019-02-26 RX ADMIN — ENOXAPARIN SODIUM 40 MG: 40 INJECTION SUBCUTANEOUS at 08:31

## 2019-02-26 RX ADMIN — IBUPROFEN 600 MG: 600 TABLET ORAL at 23:53

## 2019-02-26 NOTE — ASSESSMENT & PLAN NOTE
On CT abd, they see inflammation of appendix suspicious for acute appendicitits  He is not tender in the RLQ and has no rebound nor guarding  If anything, it is subacute  I spoke with General surgery who will see the patient in consultation  Will keep him NPO for now

## 2019-02-26 NOTE — PROGRESS NOTES
02/26/19 1500   Clinical Encounter Type   Visited With Patient; Family   Routine Visit Introduction   Patient Spiritual Encounters   Coping 4

## 2019-02-26 NOTE — PROGRESS NOTES
Progress Note - Olman Romano 1970, 53 y o  male MRN: [id number]    Unit/Bed#: [address]01 Encounter: [id number]    Primary Care Provider: Barby Moss MD   Date and time admitted to hospital: 2019  7:56 PM        * Liver mass  Assessment & Plan  2 months of weight loss, night sweats  He was drinking at least 4 alcoholic drinks per day until 2 months ago when he stopped because he was not feeling well  May have a liver abscess, malnignancy  Will ask GI to see  Anticipate we will need a biopsy and or drainage of this mass by IR    Alcohol abuse  Assessment & Plan  Says that he quit 2 months ago  Likely has cirrhosis  GI to eval    Appendicitis  Assessment & Plan  On CT abd, they see inflammation of appendix suspicious for acute appendicitits  He is not tender in the RLQ and has no rebound nor guarding  If anything, it is subacute  I spoke with General surgery who will see the patient in consultation  Will keep him NPO for now  Anemia  Assessment & Plan  Likely due to alcoholism          Subjective:   Biggest complaint is a dry cough  Has 20 pound weight loss in last two months  Has night sweats for the last two months  Felt better a few weeks ago when he was on Zithromax for this cough  No abdominal pain, but he does feel like his abdomen is "bigger" than usual   Quit alcohol two months ago because he was feeling poorly  He was been drinking at least 4 alcoholic drinks per day for "many years"  No blood nor blackness in stool  No history of IV drug use  Objective:     Vitals:   Temp (24hrs), Av 2 °F (36 8 °C), Min:97 6 °F (36 4 °C), Max:98 8 °F (37 1 °C)    Temp:  [97 6 °F (36 4 °C)-98 8 °F (37 1 °C)] (P) 98 5 °F (36 9 °C)  HR:  [68-85] (P) 85  Resp:  [18] (P) 18  BP: (104-145)/(70-81) (P) 125/75  SpO2:  [97 %-100 %] (P) 97 %  There is no height or weight on file to calculate BMI       Input and Output Summary (last 24 hours):     No intake or output data in the 24 hours ending 02/26/19 1010    Physical Exam:     Physical Exam   HENT:   Head: Normocephalic and atraumatic  Eyes: Pupils are equal, round, and reactive to light  EOM are normal    Cardiovascular: Normal rate and regular rhythm  Exam reveals no gallop and no friction rub  No murmur heard  Pulmonary/Chest: Effort normal and breath sounds normal  He has no wheezes  He has no rales  Abdominal: Soft  Bowel sounds are normal  He exhibits distension (mildly distended)  There is no tenderness  There is no rebound and no guarding  I do not feel a mass in RUQ  Musculoskeletal: He exhibits no edema  Nursing note and vitals reviewed          Additional Data:     Labs:    Results from last 7 days   Lab Units 02/26/19  0551  02/19/19  1412   WBC Thousand/uL 15 19*   < > 9 03   HEMOGLOBIN g/dL 9 8*   < > 10 7*   HEMATOCRIT % 30 8*   < > 32 5*   PLATELETS Thousands/uL 125*   < > 136*   LYMPHO PCT %  --   --  2*   MONO PCT %  --   --  4   EOS PCT %  --   --  0    < > = values in this interval not displayed       Results from last 7 days   Lab Units 02/26/19  0551   POTASSIUM mmol/L 4 2   CHLORIDE mmol/L 98*   CO2 mmol/L 23   BUN mg/dL 10   CREATININE mg/dL 0 81   CALCIUM mg/dL 8 1*   ALK PHOS U/L 189*   ALT U/L 39   AST U/L 62*     Results from last 7 days   Lab Units 02/25/19 2037   INR  1 52*                   * I Have Reviewed All Lab Data     Recent Cultures (last 7 days):             Last 24 Hours Medication List:     Current Facility-Administered Medications:  enoxaparin 40 mg Subcutaneous Daily Juli Rodriguez PA-C    ibuprofen 600 mg Oral Q6H PRN Vicky Ynig V, KIM    ondansetron 4 mg Intravenous Q8H PRN Juli Rodriguez PA-C    oxyCODONE 10 mg Oral Q4H PRN Vicky Ying V, PA-MEGAN    oxyCODONE 5 mg Oral Q4H PRN Vicky Ying V, PA-MEGAN    pantoprazole 40 mg Oral Early Morning Juli Rodriguez PA-C    sodium chloride 75 mL/hr Intravenous Continuous Celina Park PA-C Last Rate: 75 mL/hr (02/25/19 2230)         VTE Pharmacologic Prophylaxis:   Pharmacologic: Enoxaparin (Lovenox)      Current Length of Stay: 1 day(s)    Current Patient Status: Inpatient       Discharge Plan:   Code Status: Level 1 - Full Code           Today, Patient Was Seen By: Trinidad Dick DO    ** Please Note: Dictation voice to text software may have been used in the creation of this document   **

## 2019-02-26 NOTE — CONSULTS
Patient MRN: [id number]  Date of Service: 2/26/2019  Referring Provider: Abhishek Russell PA-C  Provider Creating Note: Anabela Worley PA-C  PCP: Santi Godoy    Reason for Consult: anemia     HISTORY OF PRESENT ILLNESS:  Catalina Wood is a 53 y o  male who was directly admitted from a PCP office with failure to thrive, losing 30 lb over the course of the past 2 months  He admits to lack medical care in the past with no significant past medical history or medication use due to insurance issues  He recently had some outpatient lab work performed revealing a hemoglobin 10 7, MCV 92 thrombocytopenia, hyponatremia, hyperbilirubinemia  He also has AST>ALT and elevated ALP  He admits to a history of heavy alcohol use, 4 drinks/day typically rum  He has significantly decreased use since New Year's Kassy  He states he has not been feeling well since the end of December with a lingering cough  He had been treated with a Z-Jer and steroids without improvement  He does complain bloating and gas with intermittent diarrhea  He denies any melena or hematochezia  No nausea or vomiting  Denies abdominal pain  He was started on a PPI last week  He also complains of intermittent fevers and night sweats  He has an uncle with history of unspecified Hepatitis  No family history of colon cancer or other gi problems  He has never had an EGD or colonoscopy  Review of Systems:    General:   +fever and significant weight loss  +fatigue   EENT:   No ear pain, facial swelling; No sneezing, sore throat  Skin:   No rashes, color changes  Respiratory:     No shortness of breath, cough, wheezing, stridor  Cardiovascular:     No chest pain, palpitations  Gastrointestinal:    As per HPI  Musculoskeletal:     No arthralgias, +myalgias   Neurologic:   No dizziness, numbness, +weakness  No speech difficulties     Psych:   No agitation, suicidal ideations    Otherwise, All other twelve-point review of systems normal      No past medical history on file  No past surgical history on file  No Known Allergies    Medications:  Home Medications  Prior to Admission medications    Medication Sig Start Date End Date Taking? Authorizing Provider   omeprazole (PriLOSEC) 40 MG capsule Take 1 tablet twice daily for 1 week then one tablet daily 2/21/19   Zahida Virk MD       Inhouse Medications    Current Facility-Administered Medications:     enoxaparin (LOVENOX) subcutaneous injection 40 mg, 40 mg, Subcutaneous, Daily, 40 mg at 02/26/19 0831    ibuprofen (MOTRIN) tablet 600 mg, 600 mg, Oral, Q6H PRN, 600 mg at 02/26/19 0838    ondansetron (ZOFRAN) injection 4 mg, 4 mg, Intravenous, Q8H PRN    oxyCODONE (ROXICODONE) immediate release tablet 10 mg, 10 mg, Oral, Q4H PRN    oxyCODONE (ROXICODONE) IR tablet 5 mg, 5 mg, Oral, Q4H PRN, 5 mg at 02/26/19 0354    pantoprazole (PROTONIX) EC tablet 40 mg, 40 mg, Oral, Early Morning, 40 mg at 02/26/19 0526    sodium chloride 0 9 % infusion, 75 mL/hr, Intravenous, Continuous, 75 mL/hr at 02/25/19 2230      Social History   reports that he has quit smoking  He has never used smokeless tobacco  He reports that he drinks alcohol  Family History  No family history on file  OBJECTIVE:    BP (P) 125/75 (BP Location: Left arm)   Pulse (P) 85   Temp (P) 98 5 °F (36 9 °C)   Resp (P) 18   SpO2 (P) 97%   Physical Exam:     General Appearance:    Awake, alert, oriented x3, no distress, well developed, well    nourished   Head:    Normocephalic without obvious abnormality   Eyes:    PERRL, conjunctiva/corneas clear, EOM's intact        Neck:   Supple, no adenopathy   Throat:   Mucous membranes moist   Lungs:     Clear to auscultation bilaterally, no wheezing or rhonchi   Heart:    Regular rate and rhythm, S1 and S2 normal, no murmur   Abdomen:     Soft, protuberant non-tender, non-distended/no fluid wave  bowel sounds active  No  masses, rebound or guarding      Extremities:   Extremities without edema   Psych  Derm:   Normal affect    No jaundice  Moist skin  Neurologic:   CNII-XII grossly intact  Speech intact         Laboratory Studies:  Results from last 7 days   Lab Units 02/26/19  0551 02/25/19 2037 02/19/19  1412   WBC Thousand/uL 15 19* 11 78* 9 03   HEMOGLOBIN g/dL 9 8* 10 0* 10 7*   HEMATOCRIT % 30 8* 31 3* 32 5*   MCV fL 96 95 92   PLATELETS Thousands/uL 125* 129* 136*   INR   --  1 52*  --      Results from last 7 days   Lab Units 02/26/19  0551 02/25/19 2037 02/19/19  1412   SODIUM mmol/L 130* 134* 131*   POTASSIUM mmol/L 4 2 4 1 3 7   CHLORIDE mmol/L 98* 101 100   CO2 mmol/L 23 24 23   BUN mg/dL 10 12 13   CREATININE mg/dL 0 81 0 78 0 94   CALCIUM mg/dL 8 1* 8 4 8 5   ALBUMIN g/dL 1 9* 2 1* 2 4*   TOTAL BILIRUBIN mg/dL 2 22* 1 84* 2 03*   ALK PHOS U/L 189* 201* 237*   ALT U/L 39 43 25   AST U/L 62* 66* 49*     Iron 47  Iron Sat 27  TIBC 175  Ferritin 856   TSH 2 310    Imaging and Other Studies:  Xr Chest Pa & Lateral    Result Date: 2/20/2019  Narrative: CHEST INDICATION:   R05: Cough  dry cough 12/25/18, within the last two weeks, cough has become productive, intermittent fever, shortness of breath, loss of energy, fatigue and slight chest tightness, no sx COMPARISON:  None EXAM PERFORMED/VIEWS:  XR CHEST PA & LATERAL FINDINGS: Cardiomediastinal silhouette appears unremarkable  The lungs are clear  No pneumothorax or pleural effusion  Osseous structures appear within normal limits for patient age  Impression: No acute cardiopulmonary disease  Workstation performed: [id number]     Ct Chest Abdomen Pelvis W Contrast    Result Date: 2/25/2019  Narrative: CT CHEST, ABDOMEN AND PELVIS WITH IV CONTRAST INDICATION:   failiure to thrive, rule out malignancy, acute processes  COMPARISON:  Chest x-ray 2/19/2019 TECHNIQUE: CT examination of the chest, abdomen and pelvis was performed   Axial, sagittal, and coronal 2D reformatted images were created from the source data and submitted for interpretation  Radiation dose length product (DLP) for this visit:  1151 mGy-cm   This examination, like all CT scans performed in the [facility], was performed utilizing techniques to minimize radiation dose exposure, including the use of iterative reconstruction and automated exposure control  IV Contrast:  50 mL of iohexol (OMNIPAQUE) 100 mL of iohexol (OMNIPAQUE) Enteric Contrast: Enteric contrast was administered  FINDINGS: CHEST LUNGS:  With the exception of some mild atelectasis dependently and in the bilateral lung bases, the lungs appear grossly clear  PLEURA:  Unremarkable  HEART/GREAT VESSELS: Minimal coronary atherosclerosis  The heart appears normal in size  No aortic aneurysm  MEDIASTINUM AND ILDA:  Grossly unremarkable CHEST WALL AND LOWER NECK:   Unremarkable  ABDOMEN LIVER/BILIARY TREE:  Subtle nodular contour and heterogeneity of the liver suspicious for hepatic cirrhosis  There is enlargement of the caudate lobe which contains an ill-defined and heterogeneous mass lesion with large internal areas of hypodensity and internal septations  Some rim enhancement is suspected this measures approximately 10 5 x 9 4 x 11 8 cm in size (axial image 55, series 2)  There is some associated mass effect on the IVC, although this remains patent  GALLBLADDER:  Physiologically distended  Some gallbladder wall thickening is noted, probably secondary to chronic liver disease  SPLEEN:  Enlarged  Splenic volume is estimated at 1530 mL  No discrete splenic lesion is seen  PANCREAS:  Unremarkable  ADRENAL GLANDS:  Unremarkable  KIDNEYS/URETERS:  Grossly unremarkable; no hydronephrosis  STOMACH AND BOWEL:  Grossly unremarkable; no evidence of bowel obstruction  APPENDIX:  Fluid-filled and dilated (~1 7 cm) with thickening of the appendiceal wall as well as some periappendiceal fat stranding and fluid, suspicious for acute appendicitis in the appropriate clinical setting   ABDOMINOPELVIC CAVITY: Small amount of fluid tracks into the right paracolic gutter  No discrete intraperitoneal free air  Some prominent lymph nodes are seen in the upper abdomen and retroperitoneum, largest measuring approximately 1 4 cm in size  VESSELS:  Some mesenteric and perisplenic collateral vessels/varices are noted  No aortic aneurysm  The splenic vein, portal vein, and SMV are patent  PELVIS REPRODUCTIVE ORGANS:  Unremarkable for patient's age  URINARY BLADDER:  Bladder is partially distended  Some mild circumferential bladder wall thickening is noted, probably exaggerated by underdistention  ABDOMINAL WALL/INGUINAL REGIONS:  Unremarkable  OSSEOUS STRUCTURES:  No acute fracture or destructive osseous lesion  Impression: Fluid-filled and dilated appendix with appendiceal wall thickening and periappendiceal fat stranding and fluid, suspicious for acute appendicitis in the appropriate clinical setting  Hepatic cirrhosis and splenomegaly, with associated perisplenic and mesenteric varices suggesting a component of portal hypertension  Enlargement of the caudate lobe which contains an ill-defined and heterogeneous mass lesion with large internal areas of hypodensity and internal septations as described, this measures approximately 10 5 x 9 4 x 11 8 cm in size (axial image 55, series 2)  Primary differential consideration is hepatic abscess in the appropriate clinical setting, although hepatic neoplasm such as hepatocellular carcinoma is also considered  Correlation with the patient's symptoms and laboratory values recommended  Prominent lymph nodes in the upper abdomen and retroperitoneum, largest measures approximately 1 4 cm in size, possibly reactive  Partially distended bladder  Mild circumferential bladder wall thickening noted, probably exaggerated by underdistention  Superimposed cystitis could be considered in the appropriate clinical setting  Other findings as above   Findings discussed with Dr Bishop Webber by  Alonso Isra at 11:45 PM on 2/25/2019  Workstation performed: [id number]         ASSESSMENT AND PLAN:  3 53year old male admitted with failure to thrive, 30lb weight loss over past 2 months  Patient reports poor appetite and intermittent diarrhea  Stool studies pending  Would benefit from upper and lower endoscopy given weight loss and change in bowel habits  Will await surgical evaluation/clearance prior to scheduling  2  New diagnosis of cirrhosis on CT, with synthetic dysfunction based on thrombocytopenia, low albumin, coagulopathy, anemia  There is a 10 5 x 9 5 x 11 8cm liver mass noted on CT suspicious for abcess vs neoplasm  Suggest liver biospy and check AFP  Retroperitoneal/upper abdominal lymph nodes noted, possibly reactive  Suspect liver disease related to chronic alcohol use, but will work-up for other etiologies for completeness  Hepatitis panel already ordered  3  Possible appendicitis on CT  No clinical evidence  Await surgical evaluation  4  Normocytic anemia without overt gi bleed  Iron panel mixed  Suspect bone marrow suppression from EtOH contributing  Monitor h/h, transfuse prn  Endoscopy recommended for evaluation  Check B12/folate/celiac  5  ETOH abuse  Recommend ETOH abstinence/rehab  6  New Murmur with ongoing workup noted by PCP  Awaiting ECHO        Gabrielle Yee PA-C

## 2019-02-26 NOTE — SOCIAL WORK
CM met with patient at bedside to address discharge needs  CM pointed out name/number on the white board and communicated she was that individual      Patient lives with his SO, Darlin Romberg, in a house  Patient denies difficulty with steps  Patient is independent with ADLs and functional mobility  Food shopping & meal prep is done by patient & SO   Patient does not use any DMEs  Patient is able to ambulate without assistance from a seated or laying position  No hx of VNA reported  Patient is not involved in any community activities  Patient is not employed currently  PCP identified Dr Maddie Keane  No POA identified, healthcare representative or spokesperson for the family  SO is designated caregiver if/when needed  [facility]; patient made aware of [address] in house  Patient does drive; reports SO is available to transport home  Is this not a readmission within the last 30 days  CM will remain available and follow as needed  CM also encouraged patient to follow up with all/any recommended appointments after discharge  Patient advised of importance for patient and family to participate in managing patients medical well being

## 2019-02-26 NOTE — ASSESSMENT & PLAN NOTE
2 months of weight loss, night sweats  He was drinking at least 4 alcoholic drinks per day until 2 months ago when he stopped because he was not feeling well  May have a liver abscess, malnignancy  Will ask GI to see    Anticipate we will need a biopsy and or drainage of this mass by IR

## 2019-02-26 NOTE — UTILIZATION REVIEW
Initial Clinical Review    Admission: Date/Time/Statement: 2/25/19 @ 1956 direct admission  Orders Placed This Encounter   Procedures    Inpatient Admission     Standing Status:   Standing     Number of Occurrences:   1     Order Specific Question:   Admitting Physician     Answer:   Hanh Ramos [[id number]]     Order Specific Question:   Level of Care     Answer:   Med Surg [[id number]]     Order Specific Question:   Estimated length of stay     Answer:   More than 2 Midnights     Order Specific Question:   Certification     Answer:   I certify that inpatient services are medically necessary for this patient for a duration of greater than two midnights  See H&P and MD Progress Notes for additional information about the patient's course of treatment  Chief Complaint: failure to thrive   Assessment/Plan: This is a 53year old male who is a direct admission per request of PCP for failure to thrive, has no significant past medical history  Patient has lost 30 lbs since preethi with decreased appetite, diarrhea and abdominal pain, PCP did lab work on 2/19 that revealed anemia with hgb of 10 7, MCV of 92 and platelets of 547  Symptoms started at [address] with cough, treated with Z khadra and steroids  He feels bloated, had intermittent diarrhea over preethi for week then again last week and has intermittent fevers, max of 100 9  Patient stopped drinking alcohol in December prior he drank 4 drinks a day  Blood work revealed a leukocytosis of 11 78 and elevated lactic acid of 2 2 with increased bilirubin of 1 84 and ast of 66  Ct showed possible appendicitis, hepatic cirrhosis and splenomegaly, liver mass   Patient is admitted inpatient with  failure to thrive, may be infectious etiology, vs liver abscess vs malignancy, Plan includes: Follow up on cultures, consult GI, ct abdomen, continue IVF iron panel     After review of CT, concern is for liver abscess vs malignancy, surgery consulted for possible appendicitis and patient made NPO  ED Vital Signs:   ED Triage Vitals   Temperature Pulse Respirations Blood Pressure SpO2   02/25/19 1900 02/25/19 1900 02/25/19 1900 02/25/19 1900 02/25/19 1900   97 7 °F (36 5 °C) 72 18 145/81 98 %      Temp Source Heart Rate Source Patient Position - Orthostatic VS BP Location FiO2 (%)   02/25/19 1900 -- 02/25/19 1900 02/25/19 1900 --   Temporal  Sitting Left arm       Pain Score       02/25/19 2014       No Pain        Wt Readings from Last 1 Encounters:   02/25/19 103 kg (226 lb)     Vital Signs (abnormal): none    Pertinent Labs/Diagnostic Test Results:   Lactic acid 2 2  INR 1 52  Na 134   ast 66  Alkaline phosphatase 201  Total protein 8 5  Albumin 2 1  Total bilirubin 1 84   Wbc 11 78, hfgb 10, hct 31 3  Platelets 927    1252- ferritin 856, iron saturation 27  TIBC 175, iron 47    Ct abdomen - Fluid-filled and dilated appendix with appendiceal wall thickening and periappendiceal fat stranding and fluid, suspicious for acute appendicitis in the appropriate clinical setting  Hepatic cirrhosis and splenomegaly, with associated perisplenic and mesenteric varices suggesting a component of portal hypertension  Enlargement of the caudate lobe which contains an ill-defined and heterogeneous mass lesion with large internal areas of hypodensity and internal septations as described, this measures approximately 10 5 x 9 4 x 11 8 cm in size (axial image 55, series 2)   Primary differential consideration is hepatic abscess in the appropriate clinical setting, although hepatic neoplasm such as hepatocellular carcinoma is also considered   Correlation with the patient's symptoms and laboratory values recommended  Prominent lymph nodes in the upper abdomen and retroperitoneum, largest measures approximately 1 4 cm in size, possibly reactive    Partially distended bladder   Mild circumferential bladder wall thickening noted, probably exaggerated by underdistention   Superimposed cystitis could be considered in the appropriate clinical setting     2/26/2019-  Wbc 15 19, hgb 9 8, hct 30 8  Na 130  Cl 98  Calcium 8 1  ast 62  Alkaline phosphatase 189  Albumin 1 9  Total bilirubin 2 22      ED Treatment:   Medication Administration - No Administrations Displayed (No Start Event Found)     None        Past Medical/Surgical History: Active Ambulatory Problems     Diagnosis Date Noted    Heart murmur 02/06/2019    Cough 02/06/2019    Chronic superficial gastritis without bleeding 02/20/2019    Anemia 02/20/2019    Hyperbilirubinemia 02/20/2019       Admitting Diagnosis: Chronic superficial gastritis without bleeding [K29 30]  Age/Sex: 53 y o  male      Admission Orders: 2/25/2019 2004 INPATIENT   Scheduled Meds:   Current Facility-Administered Medications:  enoxaparin 40 mg Subcutaneous Daily    ibuprofen 600 mg Oral Q6H PRN    ondansetron 4 mg Intravenous Q8H PRN    oxyCODONE 10 mg Oral Q4H PRN    oxyCODONE 5 mg Oral Q4H PRN    pantoprazole 40 mg Oral Early Morning    sodium chloride 75 mL/hr Intravenous Continuous Last Rate: 75 mL/hr (02/25/19 2230)     Continuous Infusions:   sodium chloride 75 mL/hr Last Rate: 75 mL/hr (02/25/19 2230)     PRN Meds: ibuprofen - used x 1      oxyCODONE IR 5 mg - used x 1      scds  Stool enteric bacterial panel, fecal leukocytes, clostridium difficile     Consult surgery, GI

## 2019-02-26 NOTE — PLAN OF CARE
Problem: Nutrition/Hydration-ADULT  Goal: Nutrient/Hydration intake appropriate for improving, restoring or maintaining nutritional needs  Description  Monitor and assess patient's nutrition/hydration status for malnutrition (ex- brittle hair, bruises, dry skin, pale skin and conjunctiva, muscle wasting, smooth red tongue, and disorientation)  Collaborate with interdisciplinary team and initiate plan and interventions as ordered  Monitor patient's weight and dietary intake as ordered or per policy  Utilize nutrition screening tool and intervene per policy  Determine patient's food preferences and provide high-protein, high-caloric foods as appropriate       INTERVENTIONS:  - Monitor oral intake, urinary output, labs, and treatment plans  - Assess nutrition and hydration status and recommend course of action  - Evaluate amount of meals eaten  - Assist patient with eating if necessary   - Allow adequate time for meals  - Recommend/ encourage appropriate diets, oral nutritional supplements, and vitamin/mineral supplements  - Order, calculate, and assess calorie counts as needed  - Recommend, monitor, and adjust tube feedings and TPN/PPN based on assessed needs  - Assess need for intravenous fluids  - Provide specific nutrition/hydration education as appropriate  - Include patient/family/caregiver in decisions related to nutrition  Outcome: Progressing     Problem: PAIN - ADULT  Goal: Verbalizes/displays adequate comfort level or baseline comfort level  Description  Interventions:  - Encourage patient to monitor pain and request assistance  - Assess pain using appropriate pain scale  - Administer analgesics based on type and severity of pain and evaluate response  - Implement non-pharmacological measures as appropriate and evaluate response  - Consider cultural and social influences on pain and pain management  - Notify physician/advanced practitioner if interventions unsuccessful or patient reports new pain  Outcome: Progressing     Problem: INFECTION - ADULT  Goal: Absence or prevention of progression during hospitalization  Description  INTERVENTIONS:  - Assess and monitor for signs and symptoms of infection  - Monitor lab/diagnostic results  - Monitor all insertion sites, i e  indwelling lines, tubes, and drains  - Monitor endotracheal (as able) and nasal secretions for changes in amount and color  - [place] appropriate cooling/warming therapies per order  - Administer medications as ordered  - Instruct and encourage patient and family to use good hand hygiene technique  - Identify and instruct in appropriate isolation precautions for identified infection/condition  Outcome: Progressing  Goal: Absence of fever/infection during neutropenic period  Description  INTERVENTIONS:  - Monitor WBC  - Implement neutropenic guidelines  Outcome: Progressing     Problem: SAFETY ADULT  Goal: Patient will remain free of falls  Description  INTERVENTIONS:  - Assess patient frequently for physical needs  -  Identify cognitive and physical deficits and behaviors that affect risk of falls    -  [place] fall precautions as indicated by assessment   - Educate patient/family on patient safety including physical limitations  - Instruct patient to call for assistance with activity based on assessment  - Modify environment to reduce risk of injury  - Consider OT/PT consult to assist with strengthening/mobility  Outcome: Progressing  Goal: Maintain or return to baseline ADL function  Description  INTERVENTIONS:  -  Assess patient's ability to carry out ADLs; assess patient's baseline for ADL function and identify physical deficits which impact ability to perform ADLs (bathing, care of mouth/teeth, toileting, grooming, dressing, etc )  - Assess/evaluate cause of self-care deficits   - Assess range of motion  - Assess patient's mobility; develop plan if impaired  - Assess patient's need for assistive devices and provide as appropriate  - Encourage maximum independence but intervene and supervise when necessary  ¯ Involve family in performance of ADLs  ¯ Assess for home care needs following discharge   ¯ Request OT consult to assist with ADL evaluation and planning for discharge  ¯ Provide patient education as appropriate  Outcome: Progressing  Goal: Maintain or return mobility status to optimal level  Description  INTERVENTIONS:  - Assess patient's baseline mobility status (ambulation, transfers, stairs, etc )    - Identify cognitive and physical deficits and behaviors that affect mobility  - Identify mobility aids required to assist with transfers and/or ambulation (gait belt, sit-to-stand, lift, walker, cane, etc )  - [place] fall precautions as indicated by assessment  - Record patient progress and toleration of activity level on Mobility SBAR; progress patient to next Phase/Stage  - Instruct patient to call for assistance with activity based on assessment  - Request Rehabilitation consult to assist with strengthening/weightbearing, etc   Outcome: Progressing     Problem: DISCHARGE PLANNING  Goal: Discharge to home or other facility with appropriate resources  Description  INTERVENTIONS:  - Identify barriers to discharge w/patient and caregiver  - Arrange for needed discharge resources and transportation as appropriate  - Identify discharge learning needs (meds, wound care, etc )  - Arrange for interpretive services to assist at discharge as needed  - Refer to Case Management Department for coordinating discharge planning if the patient needs post-hospital services based on physician/advanced practitioner order or complex needs related to functional status, cognitive ability, or social support system  Outcome: Progressing     Problem: Knowledge Deficit  Goal: Patient/family/caregiver demonstrates understanding of disease process, treatment plan, medications, and discharge instructions  Description  Complete learning assessment and assess knowledge base   Interventions:  - Provide teaching at level of understanding  - Provide teaching via preferred learning methods  Outcome: Progressing

## 2019-02-26 NOTE — SOCIAL WORK
CM sent email to both [address] [place] [address] and [employer] Verification Department in re: pt does not have any insurance listed and request was made for an MA application to be completed with them prior to d/c

## 2019-02-26 NOTE — PROGRESS NOTES
02/26/19 1500   Orthodoxy Information   Current [employer] Involvement Patient not active with [facility]   Spiritual Beliefs/Perceptions   Concept of God Nonexistent   Support Systems Spouse/significant other  (Daya and lots of brothers)   Stress Factors   Patient Stress Factors Lack of knowledge   Family Stress Factors Lack of knowledge   Plan of Care   Comments patient was with his girlfriend and they were in good spirits even though they were waiting for answers to why patient has felt so bad since December  Patient does not believe in God     Assessment Completed by: Unit visit

## 2019-02-26 NOTE — CONSULTS
Consultation - [address]  53 y o  male MRN: [id number]  Unit/Bed#: [facility] 2 -01 Encounter: [id number]    Assessment/Plan     Assessment:  16-year-old male with past medical history of GERD and alcohol abuse who presented with unintentional weight loss of 30 lb and failure to thrive  Surgery consulted for incidental finding of appendicitis on CT scan  Plan:  1  Appendicits  -Patient without significant physical exam findings, benign abdominal exam with no complaints of abdominal pain recently    -Abnormal appendix finding on CT scan likely in the setting of chronic appendicitis  -Elevated WBC count of 15 19, monitor leukocytosis, will discuss starting antibiotics  -After discussion with Dr Andrea Vargas and based on patient's benign abdominal exam and in the clinical setting, will plan for an elective appendectomy during this admission or as an outpatient based on patient's clinical course    -Will hold off on surgical management and await liver evaluation per GI    2  Liver Mass  -CT scan with evidence of liver abscess vs  mass  -GI workup in process with AFP and CEA level, if negative leaning towards MRI vs biopsy  -Management per GI team    3  Alcohol Abuse  -Recently quit prior to [place]  -Recommend outpatient rehab    4  Anemia  -Normocytic anemia secondary to chronic alcohol abuse  -Monitor H&H  -Management per primary team    History of Present Illness   HPI:  Gen Garcia is a 53 y o  male with past medical history of GERD and alcohol abuse who presented with unintentional weight loss of 30 lb and failure to thrive  Surgery consulted for incidental finding of appendicitis on CT scan  Patient admits to having no abdominal pain recently  He states his abdomen feels bloated, however he does not feel that his abdomen appears any larger  He admits to having intermittent fevers and chills, but he associates this with his increasing night sweats that he has been experiencing since December  He has been experiencing diarrhea more frequently  He denies any nausea, vomiting, chest pain, shortness of breath, change in bladder habits, or  blood in his stool  CT scan with evidence of fluid-filled and dilated appendix with appendiceal wall thickening and periappendiceal fat stranding  He has no previous surgical history  Inpatient consult to Acute Care Surgery  Consult performed by: Sylvester Lackey PA-C  Consult ordered by: Angie Gaston DO          Review of Systems   Constitutional: Positive for appetite change (Decreased appetite), chills, diaphoresis (Night sweats), fever and unexpected weight change (Lost 30 lbs since Christmas)  Respiratory: Negative for shortness of breath and wheezing  Cardiovascular: Negative for chest pain and palpitations  Gastrointestinal: Positive for diarrhea (x1week)  Negative for abdominal distention, abdominal pain, blood in stool, constipation, nausea and vomiting  Genitourinary: Negative for difficulty urinating, flank pain and frequency  Musculoskeletal: Negative for back pain  Skin: Negative for color change and rash  Neurological: Negative for dizziness, light-headedness and numbness  Psychiatric/Behavioral: Negative for agitation and behavioral problems  Historical Information   No past medical history on file  No past surgical history on file  Social History   Social History     Substance and Sexual Activity   Alcohol Use Yes     Social History     Substance and Sexual Activity   Drug Use Not on file     Social History     Tobacco Use   Smoking Status Former Smoker   Smokeless Tobacco Never Used     Family History: No family history on file      Meds/Allergies   all current active meds have been reviewed  No Known Allergies    Objective   First Vitals:   Blood Pressure: 145/81 (02/25/19 1900)  Pulse: 72 (02/25/19 1900)  Temperature: 97 7 °F (36 5 °C) (02/25/19 1900)  Temp Source: Temporal (02/25/19 1900)  Respirations: 18 (02/25/19 1900)  SpO2: 98 % (02/25/19 1900)    Current Vitals:   Blood Pressure: 125/75 (02/26/19 0831)  Pulse: 85 (02/26/19 0831)  Temperature: 98 5 °F (36 9 °C) (02/26/19 0831)  Temp Source: Temporal (02/25/19 2258)  Respirations: 18 (02/26/19 0831)  SpO2: 97 % (02/26/19 0831)    No intake or output data in the 24 hours ending 02/26/19 1219    Invasive Devices     Peripheral Intravenous Line            Peripheral IV 02/25/19 Right Forearm less than 1 day                Physical Exam   General: Pt is AAOx3, mildly diaphoretic, lying down in bed in NAD  HEENT: Pupils equal and reactive to light, normocephalic, no scleral icterus,  moist mucous membranes   CV: RRR, no murmurs, gallops, rubs  S1 and S2  Resp: Lung sounds clear to auscultation B/L, normal respiratory effort no  wheezes, rhonchi, rhales   Abd: Soft, rotund abdomen, with no tenderness, non-distended, non-tympanitic  Normoactive bowelsounds all 4 quadrants  No rebound or guarding  Ext: Warm with no cyanosis, no edema, no deformities  ROM intact   Skin: No rashes, bruises, ulcers  Lab Results:   CBC:   Lab Results   Component Value Date    WBC 15 19 (H) 02/26/2019    HGB 9 8 (L) 02/26/2019    HCT 30 8 (L) 02/26/2019    MCV 96 02/26/2019     (L) 02/26/2019    MCH 30 4 02/26/2019    MCHC 31 8 02/26/2019    RDW 15 8 (H) 02/26/2019    MPV 10 1 02/26/2019   , CMP:   Lab Results   Component Value Date    SODIUM 130 (L) 02/26/2019    K 4 2 02/26/2019    CL 98 (L) 02/26/2019    CO2 23 02/26/2019    BUN 10 02/26/2019    CREATININE 0 81 02/26/2019    CALCIUM 8 1 (L) 02/26/2019    AST 62 (H) 02/26/2019    ALT 39 02/26/2019    ALKPHOS 189 (H) 02/26/2019    EGFR 106 02/26/2019   , Coagulation:   Lab Results   Component Value Date    INR 1 52 (H) 02/25/2019   Imaging: I have personally reviewed pertinent reports  EKG, Pathology, and Other Studies: I have personally reviewed pertinent reports        Counseling / Coordination of Care  Total floor / unit time spent today 30 minutes  Greater than 50% of total time was spent with the patient and / or family counseling and / or coordination of care    A description of the counseling / coordination of care:    María Bauer PA-C

## 2019-02-27 LAB
ACTIN IGG SERPL-ACNC: 22 UNITS (ref 0–19)
ALBUMIN SERPL BCP-MCNC: 1.8 G/DL (ref 3.5–5)
ALP SERPL-CCNC: 178 U/L (ref 46–116)
ALT SERPL W P-5'-P-CCNC: 28 U/L (ref 12–78)
ANION GAP SERPL CALCULATED.3IONS-SCNC: 7 MMOL/L (ref 4–13)
AST SERPL W P-5'-P-CCNC: 42 U/L (ref 5–45)
BASOPHILS # BLD AUTO: 0.01 THOUSANDS/ΜL (ref 0–0.1)
BASOPHILS NFR BLD AUTO: 0 % (ref 0–1)
BILIRUB SERPL-MCNC: 2.4 MG/DL (ref 0.2–1)
BUN SERPL-MCNC: 10 MG/DL (ref 5–25)
C DIFF TOX GENS STL QL NAA+PROBE: NORMAL
CALCIUM SERPL-MCNC: 8.1 MG/DL (ref 8.3–10.1)
CAMPYLOBACTER DNA SPEC NAA+PROBE: NORMAL
CERULOPLASMIN SERPL-MCNC: 33.8 MG/DL (ref 16–31)
CHLORIDE SERPL-SCNC: 101 MMOL/L (ref 100–108)
CO2 SERPL-SCNC: 24 MMOL/L (ref 21–32)
CREAT SERPL-MCNC: 0.77 MG/DL (ref 0.6–1.3)
ENDOMYSIUM IGA SER QL: NEGATIVE
EOSINOPHIL # BLD AUTO: 0.06 THOUSAND/ΜL (ref 0–0.61)
EOSINOPHIL NFR BLD AUTO: 1 % (ref 0–6)
ERYTHROCYTE [DISTWIDTH] IN BLOOD BY AUTOMATED COUNT: 16.3 % (ref 11.6–15.1)
GFR SERPL CREATININE-BSD FRML MDRD: 108 ML/MIN/1.73SQ M
GLIADIN PEPTIDE IGA SER-ACNC: 5 UNITS (ref 0–19)
GLIADIN PEPTIDE IGG SER-ACNC: 2 UNITS (ref 0–19)
GLUCOSE SERPL-MCNC: 93 MG/DL (ref 65–140)
HCT VFR BLD AUTO: 29.4 % (ref 36.5–49.3)
HGB BLD-MCNC: 9.6 G/DL (ref 12–17)
IGA SERPL-MCNC: 524 MG/DL (ref 90–386)
IMM GRANULOCYTES # BLD AUTO: 0.16 THOUSAND/UL (ref 0–0.2)
IMM GRANULOCYTES NFR BLD AUTO: 1 % (ref 0–2)
LYMPHOCYTES # BLD AUTO: 0.85 THOUSANDS/ΜL (ref 0.6–4.47)
LYMPHOCYTES NFR BLD AUTO: 7 % (ref 14–44)
MAGNESIUM SERPL-MCNC: 1.7 MG/DL (ref 1.6–2.6)
MCH RBC QN AUTO: 31.6 PG (ref 26.8–34.3)
MCHC RBC AUTO-ENTMCNC: 32.7 G/DL (ref 31.4–37.4)
MCV RBC AUTO: 97 FL (ref 82–98)
MONOCYTES # BLD AUTO: 1.01 THOUSAND/ΜL (ref 0.17–1.22)
MONOCYTES NFR BLD AUTO: 9 % (ref 4–12)
NEUTROPHILS # BLD AUTO: 9.4 THOUSANDS/ΜL (ref 1.85–7.62)
NEUTS SEG NFR BLD AUTO: 82 % (ref 43–75)
NRBC BLD AUTO-RTO: 0 /100 WBCS
PLATELET # BLD AUTO: 97 THOUSANDS/UL (ref 149–390)
PMV BLD AUTO: 10 FL (ref 8.9–12.7)
POTASSIUM SERPL-SCNC: 3.8 MMOL/L (ref 3.5–5.3)
PROT SERPL-MCNC: 7.4 G/DL (ref 6.4–8.2)
RBC # BLD AUTO: 3.04 MILLION/UL (ref 3.88–5.62)
RYE IGE QN: NEGATIVE
SALMONELLA DNA SPEC QL NAA+PROBE: NORMAL
SHIGA TOXIN STX GENE SPEC NAA+PROBE: NORMAL
SHIGELLA DNA SPEC QL NAA+PROBE: NORMAL
SODIUM SERPL-SCNC: 132 MMOL/L (ref 136–145)
TTG IGA SER-ACNC: <2 U/ML (ref 0–3)
TTG IGG SER-ACNC: <2 U/ML (ref 0–5)
WBC # BLD AUTO: 11.49 THOUSAND/UL (ref 4.31–10.16)

## 2019-02-27 PROCEDURE — 85025 COMPLETE CBC W/AUTO DIFF WBC: CPT | Performed by: HOSPITALIST

## 2019-02-27 PROCEDURE — 83735 ASSAY OF MAGNESIUM: CPT | Performed by: HOSPITALIST

## 2019-02-27 PROCEDURE — 99232 SBSQ HOSP IP/OBS MODERATE 35: CPT | Performed by: HOSPITALIST

## 2019-02-27 PROCEDURE — 80053 COMPREHEN METABOLIC PANEL: CPT | Performed by: HOSPITALIST

## 2019-02-27 RX ORDER — ACETAMINOPHEN 325 MG/1
650 TABLET ORAL EVERY 6 HOURS PRN
Status: DISCONTINUED | OUTPATIENT
Start: 2019-02-27 | End: 2019-03-06

## 2019-02-27 RX ORDER — BENZONATATE 100 MG/1
100 CAPSULE ORAL 3 TIMES DAILY PRN
Status: DISCONTINUED | OUTPATIENT
Start: 2019-02-27 | End: 2019-02-28

## 2019-02-27 RX ADMIN — PANTOPRAZOLE SODIUM 40 MG: 40 TABLET, DELAYED RELEASE ORAL at 05:59

## 2019-02-27 RX ADMIN — OXYCODONE HYDROCHLORIDE 10 MG: 10 TABLET ORAL at 13:55

## 2019-02-27 RX ADMIN — SODIUM CHLORIDE 75 ML/HR: 0.9 INJECTION, SOLUTION INTRAVENOUS at 14:14

## 2019-02-27 RX ADMIN — SODIUM CHLORIDE 75 ML/HR: 0.9 INJECTION, SOLUTION INTRAVENOUS at 00:59

## 2019-02-27 RX ADMIN — BENZONATATE 100 MG: 100 CAPSULE ORAL at 23:29

## 2019-02-27 RX ADMIN — OXYCODONE HYDROCHLORIDE 5 MG: 5 TABLET ORAL at 23:14

## 2019-02-27 NOTE — PROGRESS NOTES
Progress Note - Cecy Smith 1970, 53 y o  male MRN: [id number]    [address] Encounter: [id number]    Primary Care Provider: Malina Camacho MD   Date and time admitted to hospital: 2019  7:56 PM        * Liver mass  Assessment & Plan  2 months of weight loss, night sweats  Unclear what this is  Blood testing for malignancy was negative  Hepatitis panel negative  Appreciate GI help  For MRI  Then likely biopsy    Alcohol abuse  Assessment & Plan  Says that he quit 2 months ago  Likely has cirrhosis  GI to eval    Failure to thrive in adult  Assessment & Plan  Due to whatever is causing this liver mass          Subjective:   Having some intermittant right flank tenderness  Not associated with eating  Feels weak  Objective:     Vitals:   Temp (24hrs), Av 7 °F (37 6 °C), Min:97 5 °F (36 4 °C), Max:102 4 °F (39 1 °C)    Temp:  [97 5 °F (36 4 °C)-102 4 °F (39 1 °C)] 98 3 °F (36 8 °C)  HR:  [63-92] 63  Resp:  [16-20] 16  BP: (110-133)/(57-78) 133/65  SpO2:  [96 %-99 %] 96 %  Body mass index is 35 39 kg/m²  Input and Output Summary (last 24 hours): Intake/Output Summary (Last 24 hours) at 2019 1205  Last data filed at 2019 0058  Gross per 24 hour   Intake 980 ml   Output    Net 980 ml       Physical Exam:     Physical Exam   HENT:   Head: Normocephalic and atraumatic  Eyes: Pupils are equal, round, and reactive to light  EOM are normal    Cardiovascular: Normal rate and regular rhythm  Exam reveals no gallop and no friction rub  No murmur heard  Pulmonary/Chest: Effort normal and breath sounds normal  He has no wheezes  He has no rales  Abdominal: Soft  Bowel sounds are normal  He exhibits no mass  There is no tenderness  Musculoskeletal: He exhibits no edema  Nursing note and vitals reviewed              Additional Data:     Labs:    Results from last 7 days   Lab Units 19  0517   WBC Thousand/uL 11 49*   HEMOGLOBIN g/dL 9 6*   HEMATOCRIT % 29 4*   PLATELETS Thousands/uL 97*   NEUTROS PCT % 82*   LYMPHS PCT % 7*   MONOS PCT % 9   EOS PCT % 1     Results from last 7 days   Lab Units 02/27/19  0517   POTASSIUM mmol/L 3 8   CHLORIDE mmol/L 101   CO2 mmol/L 24   BUN mg/dL 10   CREATININE mg/dL 0 77   CALCIUM mg/dL 8 1*   ALK PHOS U/L 178*   ALT U/L 28   AST U/L 42     Results from last 7 days   Lab Units 02/25/19 2037   INR  1 52*                   * I Have Reviewed All Lab Data     Recent Cultures (last 7 days):     Results from last 7 days   Lab Units 02/26/19  1041 02/25/19 2055 02/25/19 2046   BLOOD CULTURE   --  No Growth at 24 hrs  No Growth at 24 hrs  C DIFF TOXIN B  NEGATIVE for C difficle toxin by PCR    --   --          Last 24 Hours Medication List:     Current Facility-Administered Medications:  enoxaparin 40 mg Subcutaneous Daily Arna [place], PA-C    ibuprofen 600 mg Oral Q6H PRN Vicky Skwirut V, PA-C    ondansetron 4 mg Intravenous Q8H PRN Arna [place], PA-C    oxyCODONE 10 mg Oral Q4H PRN Vicky Skwirut V, PA-C    oxyCODONE 5 mg Oral Q4H PRN Vicky Skwirut V, PA-C    pantoprazole 40 mg Oral Early Morning Arna [place], PA-C    sodium chloride 75 mL/hr Intravenous Continuous Celina Piger, PA-C Last Rate: 75 mL/hr (02/27/19 0059)         VTE Pharmacologic Prophylaxis:   Pharmacologic: Enoxaparin (Lovenox)      Current Length of Stay: 2 day(s)    Current Patient Status: Inpatient       Discharge Plan:   Code Status: Level 1 - Full Code           Today, Patient Was Seen By: Obie Castleman, DO    ** Please Note: Dictation voice to text software may have been used in the creation of this document   **

## 2019-02-27 NOTE — ASSESSMENT & PLAN NOTE
2 months of weight loss, night sweats  Unclear what this is  Blood testing for malignancy was negative  Hepatitis panel negative  Appreciate GI help  For MRI  Then likely biopsy

## 2019-02-27 NOTE — PHYSICIAN ADVISOR
Current patient class: Inpatient  The patient is currently on Hospital Day: 3 at [facility]      The patient was admitted to the hospital at [facility] on 2/25/19 for the following diagnosis:  Chronic superficial gastritis without bleeding [K29 30]       There is documentation in the medical record of an expected length of stay of at least 2 midnights  The patient is therefore expected to satisfy the 2 midnight benchmark and given the 2 midnight presumption is appropriate for INPATIENT ADMISSION  Given this expectation of a satisfying stay, CMS instructs us that the patient is most often appropriate for inpatient admission under part A provided medical necessity is documented in the chart  After review of the relevant documentation, labs, vital signs and test results, the patient is appropriate for INPATIENT ADMISSION  Admission to the hospital as an inpatient is a complex decision making process which requires the practitioner to consider the patients presenting complaint, history and physical examination and all relevant testing  With this in mind, in this case, the patient was deemed appropriate for INPATIENT ADMISSION  After review of the documentation and testing available at the time of the admission I concur with this clinical determination of medical necessity  Rationale is as follows: The patient is a 53 yrs old Male who presented to the ED at 2/25/2019  7:56 PM with a chief complaint of appendicitis  Given the need for further hospitalization, and along with the documentation of medical necessity present in the chart, the patient is appropriate for inpatient admission  The patient is expected to satisfy the 2 midnight benchmark, and will require further acute medical care  The patient does have comorbid conditions which increases the risk for significant adverse outcome  Given this the patient is appropriate for inpatient admission        The patients vitals on arrival were ED Triage Vitals   Temperature Pulse Respirations Blood Pressure SpO2   02/25/19 1900 02/25/19 1900 02/25/19 1900 02/25/19 1900 02/25/19 1900   97 7 °F (36 5 °C) 72 18 145/81 98 %      Temp Source Heart Rate Source Patient Position - Orthostatic VS BP Location FiO2 (%)   02/25/19 1900 02/27/19 0002 02/25/19 1900 02/25/19 1900 --   Temporal Monitor Sitting Left arm       Pain Score       02/25/19 2014       No Pain           No past medical history on file  No past surgical history on file          Consults have been placed to:   IP CONSULT TO GASTROENTEROLOGY  IP CONSULT TO ACUTE CARE SURGERY    Vitals:    02/26/19 0831 02/26/19 1355 02/26/19 1536 02/27/19 0002   BP: 125/75  110/57 132/78   BP Location: Left arm  Left arm Left arm   Pulse: 85  73 92   Resp: 18  19 20   Temp: 98 5 °F (36 9 °C)  97 5 °F (36 4 °C) (!) 102 4 °F (39 1 °C)   TempSrc:   Temporal Temporal   SpO2: 97%  99% 96%   Height:  5' 7 01" (1 702 m)         Most recent labs:    Recent Labs     02/25/19 2037 02/26/19  0551   WBC 11 78* 15 19*   HGB 10 0* 9 8*   HCT 31 3* 30 8*   * 125*   K 4 1 4 2   CALCIUM 8 4 8 1*   BUN 12 10   CREATININE 0 78 0 81   INR 1 52*  --    TROPONINI <0 02  --    AST 66* 62*   ALT 43 39   ALKPHOS 201* 189*       Scheduled Meds:  Current Facility-Administered Medications:  enoxaparin 40 mg Subcutaneous Daily Marcmadeleinea Edda, PA-C    ibuprofen 600 mg Oral Q6H PRN Vicky Ying V, PA-C    ondansetron 4 mg Intravenous Q8H PRN Marcmadeleinea Edda, PA-C    oxyCODONE 10 mg Oral Q4H PRN Vicky Nealt V, PA-C    oxyCODONE 5 mg Oral Q4H PRN Vicky Ying V, PA-C    pantoprazole 40 mg Oral Early Morning Kolby Bañuelos, PA-C    sodium chloride 75 mL/hr Intravenous Continuous Celina Park PA-C Last Rate: 75 mL/hr (02/25/19 2230)     Continuous Infusions:  sodium chloride 75 mL/hr Last Rate: 75 mL/hr (02/25/19 2230)     PRN Meds: ibuprofen    ondansetron    oxyCODONE    oxyCODONE    Surgical procedures (if appropriate):

## 2019-02-27 NOTE — PLAN OF CARE
Problem: Nutrition/Hydration-ADULT  Goal: Nutrient/Hydration intake appropriate for improving, restoring or maintaining nutritional needs  Description  Monitor and assess patient's nutrition/hydration status for malnutrition (ex- brittle hair, bruises, dry skin, pale skin and conjunctiva, muscle wasting, smooth red tongue, and disorientation)  Collaborate with interdisciplinary team and initiate plan and interventions as ordered  Monitor patient's weight and dietary intake as ordered or per policy  Utilize nutrition screening tool and intervene per policy  Determine patient's food preferences and provide high-protein, high-caloric foods as appropriate       INTERVENTIONS:  - Monitor oral intake, urinary output, labs, and treatment plans  - Assess nutrition and hydration status and recommend course of action  - Evaluate amount of meals eaten  - Assist patient with eating if necessary   - Allow adequate time for meals  - Recommend/ encourage appropriate diets, oral nutritional supplements, and vitamin/mineral supplements  - Order, calculate, and assess calorie counts as needed  - Recommend, monitor, and adjust tube feedings and TPN/PPN based on assessed needs  - Assess need for intravenous fluids  - Provide specific nutrition/hydration education as appropriate  - Include patient/family/caregiver in decisions related to nutrition  Outcome: Progressing     Problem: PAIN - ADULT  Goal: Verbalizes/displays adequate comfort level or baseline comfort level  Description  Interventions:  - Encourage patient to monitor pain and request assistance  - Assess pain using appropriate pain scale  - Administer analgesics based on type and severity of pain and evaluate response  - Implement non-pharmacological measures as appropriate and evaluate response  - Consider cultural and social influences on pain and pain management  - Notify physician/advanced practitioner if interventions unsuccessful or patient reports new pain  Outcome: Progressing     Problem: INFECTION - ADULT  Goal: Absence or prevention of progression during hospitalization  Description  INTERVENTIONS:  - Assess and monitor for signs and symptoms of infection  - Monitor lab/diagnostic results  - Monitor all insertion sites, i e  indwelling lines, tubes, and drains  - Monitor endotracheal (as able) and nasal secretions for changes in amount and color  - [place] appropriate cooling/warming therapies per order  - Administer medications as ordered  - Instruct and encourage patient and family to use good hand hygiene technique  - Identify and instruct in appropriate isolation precautions for identified infection/condition  Outcome: Progressing  Goal: Absence of fever/infection during neutropenic period  Description  INTERVENTIONS:  - Monitor WBC  - Implement neutropenic guidelines  Outcome: Progressing     Problem: SAFETY ADULT  Goal: Patient will remain free of falls  Description  INTERVENTIONS:  - Assess patient frequently for physical needs  -  Identify cognitive and physical deficits and behaviors that affect risk of falls    -  [place] fall precautions as indicated by assessment   - Educate patient/family on patient safety including physical limitations  - Instruct patient to call for assistance with activity based on assessment  - Modify environment to reduce risk of injury  - Consider OT/PT consult to assist with strengthening/mobility  Outcome: Progressing  Goal: Maintain or return to baseline ADL function  Description  INTERVENTIONS:  -  Assess patient's ability to carry out ADLs; assess patient's baseline for ADL function and identify physical deficits which impact ability to perform ADLs (bathing, care of mouth/teeth, toileting, grooming, dressing, etc )  - Assess/evaluate cause of self-care deficits   - Assess range of motion  - Assess patient's mobility; develop plan if impaired  - Assess patient's need for assistive devices and provide as appropriate  - Encourage maximum independence but intervene and supervise when necessary  ¯ Involve family in performance of ADLs  ¯ Assess for home care needs following discharge   ¯ Request OT consult to assist with ADL evaluation and planning for discharge  ¯ Provide patient education as appropriate  Outcome: Progressing  Goal: Maintain or return mobility status to optimal level  Description  INTERVENTIONS:  - Assess patient's baseline mobility status (ambulation, transfers, stairs, etc )    - Identify cognitive and physical deficits and behaviors that affect mobility  - Identify mobility aids required to assist with transfers and/or ambulation (gait belt, sit-to-stand, lift, walker, cane, etc )  - [place] fall precautions as indicated by assessment  - Record patient progress and toleration of activity level on Mobility SBAR; progress patient to next Phase/Stage  - Instruct patient to call for assistance with activity based on assessment  - Request Rehabilitation consult to assist with strengthening/weightbearing, etc   Outcome: Progressing     Problem: DISCHARGE PLANNING  Goal: Discharge to home or other facility with appropriate resources  Description  INTERVENTIONS:  - Identify barriers to discharge w/patient and caregiver  - Arrange for needed discharge resources and transportation as appropriate  - Identify discharge learning needs (meds, wound care, etc )  - Arrange for interpretive services to assist at discharge as needed  - Refer to Case Management Department for coordinating discharge planning if the patient needs post-hospital services based on physician/advanced practitioner order or complex needs related to functional status, cognitive ability, or social support system  Outcome: Progressing     Problem: Knowledge Deficit  Goal: Patient/family/caregiver demonstrates understanding of disease process, treatment plan, medications, and discharge instructions  Description  Complete learning assessment and assess knowledge base   Interventions:  - Provide teaching at level of understanding  - Provide teaching via preferred learning methods  Outcome: Progressing     Problem: Prexisting or High Potential for Compromised Skin Integrity  Goal: Skin integrity is maintained or improved  Description  INTERVENTIONS:  - Identify patients at risk for skin breakdown  - Assess and monitor skin integrity  - Assess and monitor nutrition and hydration status  - Monitor labs (i e  albumin)  - Assess for incontinence   - Turn and reposition patient  - Assist with mobility/ambulation  - Relieve pressure over bony prominences  - Avoid friction and shearing  - Provide appropriate hygiene as needed including keeping skin clean and dry  - Evaluate need for skin moisturizer/barrier cream  - Collaborate with interdisciplinary team (i e  Nutrition, Rehabilitation, etc )   - Patient/family teaching  Outcome: Progressing     Problem: DISCHARGE PLANNING - CARE MANAGEMENT  Goal: Discharge to post-acute care or home with appropriate resources  Description  INTERVENTIONS:  - Conduct assessment to determine patient/family and health care team treatment goals, and need for post-acute services based on payer coverage, community resources, and patient preferences, and barriers to discharge  - Address psychosocial, clinical, and financial barriers to discharge as identified in assessment in conjunction with the patient/family and health care team  - Arrange appropriate level of post-acute services according to patient?s   needs and preference and payer coverage in collaboration with the physician and health care team  - Communicate with and update the patient/family, physician, and health care team regarding progress on the discharge plan  - Arrange appropriate transportation to post-acute venues  Outcome: Progressing     Problem: Nutrition/Hydration-ADULT  Goal: Nutrient/Hydration intake appropriate for improving, restoring or maintaining nutritional needs  Description  Monitor and assess patient's nutrition/hydration status for malnutrition (ex- brittle hair, bruises, dry skin, pale skin and conjunctiva, muscle wasting, smooth red tongue, and disorientation)  Collaborate with interdisciplinary team and initiate plan and interventions as ordered  Monitor patient's weight and dietary intake as ordered or per policy  Utilize nutrition screening tool and intervene per policy  Determine patient's food preferences and provide high-protein, high-caloric foods as appropriate       INTERVENTIONS:  - Monitor oral intake, urinary output, labs, and treatment plans  - Assess nutrition and hydration status and recommend course of action  - Evaluate amount of meals eaten  - Assist patient with eating if necessary   - Allow adequate time for meals  - Recommend/ encourage appropriate diets, oral nutritional supplements, and vitamin/mineral supplements  - Order, calculate, and assess calorie counts as needed  - Recommend, monitor, and adjust tube feedings and TPN/PPN based on assessed needs  - Assess need for intravenous fluids  - Provide specific nutrition/hydration education as appropriate  - Include patient/family/caregiver in decisions related to nutrition  Outcome: Progressing     Problem: PAIN - ADULT  Goal: Verbalizes/displays adequate comfort level or baseline comfort level  Description  Interventions:  - Encourage patient to monitor pain and request assistance  - Assess pain using appropriate pain scale  - Administer analgesics based on type and severity of pain and evaluate response  - Implement non-pharmacological measures as appropriate and evaluate response  - Consider cultural and social influences on pain and pain management  - Notify physician/advanced practitioner if interventions unsuccessful or patient reports new pain  Outcome: Progressing     Problem: INFECTION - ADULT  Goal: Absence or prevention of progression during hospitalization  Description  INTERVENTIONS:  - Assess and monitor for signs and symptoms of infection  - Monitor lab/diagnostic results  - Monitor all insertion sites, i e  indwelling lines, tubes, and drains  - Monitor endotracheal (as able) and nasal secretions for changes in amount and color  - [place] appropriate cooling/warming therapies per order  - Administer medications as ordered  - Instruct and encourage patient and family to use good hand hygiene technique  - Identify and instruct in appropriate isolation precautions for identified infection/condition  Outcome: Progressing  Goal: Absence of fever/infection during neutropenic period  Description  INTERVENTIONS:  - Monitor WBC  - Implement neutropenic guidelines  Outcome: Progressing     Problem: SAFETY ADULT  Goal: Patient will remain free of falls  Description  INTERVENTIONS:  - Assess patient frequently for physical needs  -  Identify cognitive and physical deficits and behaviors that affect risk of falls    -  [place] fall precautions as indicated by assessment   - Educate patient/family on patient safety including physical limitations  - Instruct patient to call for assistance with activity based on assessment  - Modify environment to reduce risk of injury  - Consider OT/PT consult to assist with strengthening/mobility  Outcome: Progressing  Goal: Maintain or return to baseline ADL function  Description  INTERVENTIONS:  -  Assess patient's ability to carry out ADLs; assess patient's baseline for ADL function and identify physical deficits which impact ability to perform ADLs (bathing, care of mouth/teeth, toileting, grooming, dressing, etc )  - Assess/evaluate cause of self-care deficits   - Assess range of motion  - Assess patient's mobility; develop plan if impaired  - Assess patient's need for assistive devices and provide as appropriate  - Encourage maximum independence but intervene and supervise when necessary  ¯ Involve family in performance of ADLs  ¯ Assess for home care needs following discharge   ¯ Request OT consult to assist with ADL evaluation and planning for discharge  ¯ Provide patient education as appropriate  Outcome: Progressing  Goal: Maintain or return mobility status to optimal level  Description  INTERVENTIONS:  - Assess patient's baseline mobility status (ambulation, transfers, stairs, etc )    - Identify cognitive and physical deficits and behaviors that affect mobility  - Identify mobility aids required to assist with transfers and/or ambulation (gait belt, sit-to-stand, lift, walker, cane, etc )  - [place] fall precautions as indicated by assessment  - Record patient progress and toleration of activity level on Mobility SBAR; progress patient to next Phase/Stage  - Instruct patient to call for assistance with activity based on assessment  - Request Rehabilitation consult to assist with strengthening/weightbearing, etc   Outcome: Progressing     Problem: DISCHARGE PLANNING  Goal: Discharge to home or other facility with appropriate resources  Description  INTERVENTIONS:  - Identify barriers to discharge w/patient and caregiver  - Arrange for needed discharge resources and transportation as appropriate  - Identify discharge learning needs (meds, wound care, etc )  - Arrange for interpretive services to assist at discharge as needed  - Refer to Case Management Department for coordinating discharge planning if the patient needs post-hospital services based on physician/advanced practitioner order or complex needs related to functional status, cognitive ability, or social support system  Outcome: Progressing     Problem: Knowledge Deficit  Goal: Patient/family/caregiver demonstrates understanding of disease process, treatment plan, medications, and discharge instructions  Description  Complete learning assessment and assess knowledge base    Interventions:  - Provide teaching at level of understanding  - Provide teaching via preferred learning methods  Outcome: Progressing     Problem: Prexisting or High Potential for Compromised Skin Integrity  Goal: Skin integrity is maintained or improved  Description  INTERVENTIONS:  - Identify patients at risk for skin breakdown  - Assess and monitor skin integrity  - Assess and monitor nutrition and hydration status  - Monitor labs (i e  albumin)  - Assess for incontinence   - Turn and reposition patient  - Assist with mobility/ambulation  - Relieve pressure over bony prominences  - Avoid friction and shearing  - Provide appropriate hygiene as needed including keeping skin clean and dry  - Evaluate need for skin moisturizer/barrier cream  - Collaborate with interdisciplinary team (i e  Nutrition, Rehabilitation, etc )   - Patient/family teaching  Outcome: Progressing     Problem: DISCHARGE PLANNING - CARE MANAGEMENT  Goal: Discharge to post-acute care or home with appropriate resources  Description  INTERVENTIONS:  - Conduct assessment to determine patient/family and health care team treatment goals, and need for post-acute services based on payer coverage, community resources, and patient preferences, and barriers to discharge  - Address psychosocial, clinical, and financial barriers to discharge as identified in assessment in conjunction with the patient/family and health care team  - Arrange appropriate level of post-acute services according to patient?s   needs and preference and payer coverage in collaboration with the physician and health care team  - Communicate with and update the patient/family, physician, and health care team regarding progress on the discharge plan  - Arrange appropriate transportation to post-acute venues  Outcome: Progressing

## 2019-02-27 NOTE — PROGRESS NOTES
Patient Name: Catherine Linn  Patient MRN: [id number]  Date: 02/27/19  Service: [facility]    Subjective   Catherine Linn is a 53 y o  male who was admitted with Liver mass  He is stable today  Patient denies: Chest pain, shortness of breath, fever, weight loss, rash, adenopathy  All others negative except as noted in HPI  Objective     Vitals  Blood pressure 133/65, pulse 63, temperature 98 3 °F (36 8 °C), temperature source Temporal, resp  rate 16, height 5' 7 01" (1 702 m), SpO2 96 %  ROS:  Patient denies: Chest pain, shortness of breath, fever, weight loss, rash, adenopathy  All others negative except as noted in HPI  General: Alert, no apparent distress  Eyes: No scleral icterus  ENT: MMM  Card: RRR no murmur  Lungs: Clear to ascultation b/l  No wheezes, rales, rhonchi  Abdomen: Soft  Nontender  Nondistended  Bowel sounds present and normoactive  No hepatosplenomegaly    Skin: No jaundice  Neuro: Alert and oriented x3    Laboratory Studies  Lab Results   Component Value Date    CREATININE 0 77 02/27/2019    BUN 10 02/27/2019    SODIUM 132 (L) 02/27/2019    K 3 8 02/27/2019     02/27/2019    CO2 24 02/27/2019    CALCIUM 8 1 (L) 02/27/2019    ALKPHOS 178 (H) 02/27/2019    AST 42 02/27/2019    ALT 28 02/27/2019     Lab Results   Component Value Date    WBC 11 49 (H) 02/27/2019    HGB 9 6 (L) 02/27/2019    HCT 29 4 (L) 02/27/2019    PLT 97 (L) 02/27/2019    MCV 97 02/27/2019     Lab Results   Component Value Date    PROTIME 18 4 (H) 02/25/2019    INR 1 52 (H) 02/25/2019       Inhouse Medications       Current Facility-Administered Medications:     enoxaparin (LOVENOX) subcutaneous injection 40 mg, 40 mg, Subcutaneous, Daily, 40 mg at 02/26/19 0831    ibuprofen (MOTRIN) tablet 600 mg, 600 mg, Oral, Q6H PRN, 600 mg at 02/26/19 7517    ondansetron (ZOFRAN) injection 4 mg, 4 mg, Intravenous, Q8H PRN    oxyCODONE (ROXICODONE) immediate release tablet 10 mg, 10 mg, Oral, Q4H PRN   oxyCODONE (ROXICODONE) IR tablet 5 mg, 5 mg, Oral, Q4H PRN, 5 mg at 02/26/19 0354    pantoprazole (PROTONIX) EC tablet 40 mg, 40 mg, Oral, Early Morning, 40 mg at 02/27/19 0559    sodium chloride 0 9 % infusion, 75 mL/hr, Intravenous, Continuous, 75 mL/hr at 02/27/19 0059      Assessment/Plan:    With negative alpha-fetoprotein and CEA levels, will not go directly to biopsy but will do MRI next      Jud Arenas MD

## 2019-02-28 ENCOUNTER — APPOINTMENT (INPATIENT)
Dept: RADIOLOGY | Facility: HOSPITAL | Age: 49
DRG: 441 | End: 2019-02-28
Payer: COMMERCIAL

## 2019-02-28 ENCOUNTER — APPOINTMENT (INPATIENT)
Dept: CT IMAGING | Facility: HOSPITAL | Age: 49
DRG: 441 | End: 2019-02-28
Attending: HOSPITALIST
Payer: COMMERCIAL

## 2019-02-28 LAB
A1AT PHENOTYP SERPL IFE: ABNORMAL
A1AT SERPL-MCNC: 230 MG/DL (ref 90–200)
ANION GAP SERPL CALCULATED.3IONS-SCNC: 13 MMOL/L (ref 4–13)
ANION GAP SERPL CALCULATED.3IONS-SCNC: 13 MMOL/L (ref 4–13)
ANION GAP SERPL CALCULATED.3IONS-SCNC: 15 MMOL/L (ref 4–13)
ANISOCYTOSIS BLD QL SMEAR: PRESENT
ARTERIAL PATENCY WRIST A: YES
ARTERIAL PATENCY WRIST A: YES
ATRIAL RATE: 117 BPM
BASE EXCESS BLDA CALC-SCNC: -6.9 MMOL/L
BASE EXCESS BLDA CALC-SCNC: -7.1 MMOL/L
BASE EXCESS BLDA CALC-SCNC: -7.6 MMOL/L
BASOPHILS # BLD MANUAL: 0 THOUSAND/UL (ref 0–0.1)
BASOPHILS NFR MAR MANUAL: 0 % (ref 0–1)
BUN SERPL-MCNC: 11 MG/DL (ref 5–25)
BUN SERPL-MCNC: 16 MG/DL (ref 5–25)
BUN SERPL-MCNC: 20 MG/DL (ref 5–25)
CA-I BLD-SCNC: 0.99 MMOL/L (ref 1.12–1.32)
CALCIUM SERPL-MCNC: 7.5 MG/DL (ref 8.3–10.1)
CALCIUM SERPL-MCNC: 8.1 MG/DL (ref 8.3–10.1)
CALCIUM SERPL-MCNC: 8.2 MG/DL (ref 8.3–10.1)
CHLORIDE SERPL-SCNC: 96 MMOL/L (ref 100–108)
CHLORIDE SERPL-SCNC: 97 MMOL/L (ref 100–108)
CHLORIDE SERPL-SCNC: 97 MMOL/L (ref 100–108)
CO2 SERPL-SCNC: 17 MMOL/L (ref 21–32)
CO2 SERPL-SCNC: 17 MMOL/L (ref 21–32)
CO2 SERPL-SCNC: 20 MMOL/L (ref 21–32)
CREAT SERPL-MCNC: 1.19 MG/DL (ref 0.6–1.3)
CREAT SERPL-MCNC: 2 MG/DL (ref 0.6–1.3)
CREAT SERPL-MCNC: 2.02 MG/DL (ref 0.6–1.3)
EOSINOPHIL # BLD MANUAL: 0 THOUSAND/UL (ref 0–0.4)
EOSINOPHIL NFR BLD MANUAL: 0 % (ref 0–6)
ERYTHROCYTE [DISTWIDTH] IN BLOOD BY AUTOMATED COUNT: 16.9 % (ref 11.6–15.1)
ERYTHROCYTE [DISTWIDTH] IN BLOOD BY AUTOMATED COUNT: 17.2 % (ref 11.6–15.1)
GFR SERPL CREATININE-BSD FRML MDRD: 38 ML/MIN/1.73SQ M
GFR SERPL CREATININE-BSD FRML MDRD: 39 ML/MIN/1.73SQ M
GFR SERPL CREATININE-BSD FRML MDRD: 72 ML/MIN/1.73SQ M
GLUCOSE SERPL-MCNC: 104 MG/DL (ref 65–140)
GLUCOSE SERPL-MCNC: 89 MG/DL (ref 65–140)
GLUCOSE SERPL-MCNC: 95 MG/DL (ref 65–140)
GLUCOSE SERPL-MCNC: 97 MG/DL (ref 65–140)
HCO3 BLDA-SCNC: 14.7 MMOL/L (ref 22–28)
HCO3 BLDA-SCNC: 15.3 MMOL/L (ref 22–28)
HCO3 BLDA-SCNC: 18.7 MMOL/L (ref 22–28)
HCT VFR BLD AUTO: 32 % (ref 36.5–49.3)
HCT VFR BLD AUTO: 38.1 % (ref 36.5–49.3)
HGB BLD-MCNC: 10.4 G/DL (ref 12–17)
HGB BLD-MCNC: 12.1 G/DL (ref 12–17)
HOROWITZ INDEX BLDA+IHG-RTO: 100 MM[HG]
LACTATE SERPL-SCNC: 4.7 MMOL/L (ref 0.5–2)
LACTATE SERPL-SCNC: 4.8 MMOL/L (ref 0.5–2)
LACTATE SERPL-SCNC: 4.9 MMOL/L (ref 0.5–2)
LACTATE SERPL-SCNC: 5.5 MMOL/L (ref 0.5–2)
LDH FLD L TO P-CCNC: 6434 U/L
LYMPHOCYTES # BLD AUTO: 1.14 THOUSAND/UL (ref 0.6–4.47)
LYMPHOCYTES # BLD AUTO: 6 % (ref 14–44)
MAGNESIUM SERPL-MCNC: 1.4 MG/DL (ref 1.6–2.6)
MCH RBC QN AUTO: 30.3 PG (ref 26.8–34.3)
MCH RBC QN AUTO: 31.1 PG (ref 26.8–34.3)
MCHC RBC AUTO-ENTMCNC: 31.8 G/DL (ref 31.4–37.4)
MCHC RBC AUTO-ENTMCNC: 32.5 G/DL (ref 31.4–37.4)
MCV RBC AUTO: 96 FL (ref 82–98)
MCV RBC AUTO: 96 FL (ref 82–98)
METAMYELOCYTES NFR BLD MANUAL: 5 % (ref 0–1)
MONOCYTES # BLD AUTO: 0.38 THOUSAND/UL (ref 0–1.22)
MONOCYTES NFR BLD: 2 % (ref 4–12)
MYELOCYTES NFR BLD MANUAL: 3 % (ref 0–1)
NASAL CANNULA: 4
NEUTROPHILS # BLD MANUAL: 16 THOUSAND/UL (ref 1.85–7.62)
NEUTS BAND NFR BLD MANUAL: 26 % (ref 0–8)
NEUTS SEG NFR BLD AUTO: 58 % (ref 43–75)
NRBC BLD AUTO-RTO: 0 /100 WBCS
O2 CT BLDA-SCNC: 16.7 ML/DL (ref 16–23)
O2 CT BLDA-SCNC: 17 ML/DL (ref 16–23)
O2 CT BLDA-SCNC: 17.8 ML/DL (ref 16–23)
OXYHGB MFR BLDA: 90 % (ref 94–97)
OXYHGB MFR BLDA: 92.4 % (ref 94–97)
OXYHGB MFR BLDA: 99.2 % (ref 94–97)
P AXIS: 10 DEGREES
PCO2 BLDA: 23 MM HG (ref 36–44)
PCO2 BLDA: 23.5 MM HG (ref 36–44)
PCO2 BLDA: 37.8 MM HG (ref 36–44)
PEEP RESPIRATORY: 5 CM[H2O]
PH BLDA: 7.31 [PH] (ref 7.35–7.45)
PH BLDA: 7.42 [PH] (ref 7.35–7.45)
PH BLDA: 7.43 [PH] (ref 7.35–7.45)
PH BODY FLUID: 7.4
PLATELET # BLD AUTO: 275 THOUSANDS/UL (ref 149–390)
PLATELET # BLD AUTO: 281 THOUSANDS/UL (ref 149–390)
PLATELET BLD QL SMEAR: ADEQUATE
PMV BLD AUTO: 11 FL (ref 8.9–12.7)
PMV BLD AUTO: 11 FL (ref 8.9–12.7)
PO2 BLDA: 214 MM HG (ref 75–129)
PO2 BLDA: 59.6 MM HG (ref 75–129)
PO2 BLDA: 66.6 MM HG (ref 75–129)
POTASSIUM SERPL-SCNC: 3.9 MMOL/L (ref 3.5–5.3)
PR INTERVAL: 134 MS
PROCALCITONIN SERPL-MCNC: 46.99 NG/ML
PROCALCITONIN SERPL-MCNC: 56.12 NG/ML
PROT FLD-MCNC: 5.2 G/DL
QRS AXIS: 179 DEGREES
QRSD INTERVAL: 96 MS
QT INTERVAL: 354 MS
QTC INTERVAL: 493 MS
RBC # BLD AUTO: 3.34 MILLION/UL (ref 3.88–5.62)
RBC # BLD AUTO: 3.99 MILLION/UL (ref 3.88–5.62)
SODIUM SERPL-SCNC: 127 MMOL/L (ref 136–145)
SODIUM SERPL-SCNC: 128 MMOL/L (ref 136–145)
SODIUM SERPL-SCNC: 130 MMOL/L (ref 136–145)
SPECIMEN SOURCE: ABNORMAL
T WAVE AXIS: 14 DEGREES
TOTAL CELLS COUNTED SPEC: 100
TROPONIN I SERPL-MCNC: <0.02 NG/ML
VENT AC: 12
VENT- AC: AC
VENTRICULAR RATE: 117 BPM
VT SETTING VENT: 400 ML
WBC # BLD AUTO: 19.05 THOUSAND/UL (ref 4.31–10.16)
WBC # BLD AUTO: 29.66 THOUSAND/UL (ref 4.31–10.16)
WBC SPEC QL GRAM STN: ABNORMAL

## 2019-02-28 PROCEDURE — 4A133J1 MONITORING OF ARTERIAL PULSE, PERIPHERAL, PERCUTANEOUS APPROACH: ICD-10-PCS | Performed by: INTERNAL MEDICINE

## 2019-02-28 PROCEDURE — 88112 CYTOPATH CELL ENHANCE TECH: CPT | Performed by: PATHOLOGY

## 2019-02-28 PROCEDURE — 87070 CULTURE OTHR SPECIMN AEROBIC: CPT | Performed by: HOSPITALIST

## 2019-02-28 PROCEDURE — 87205 SMEAR GRAM STAIN: CPT | Performed by: NURSE PRACTITIONER

## 2019-02-28 PROCEDURE — 83735 ASSAY OF MAGNESIUM: CPT | Performed by: NURSE PRACTITIONER

## 2019-02-28 PROCEDURE — 80048 BASIC METABOLIC PNL TOTAL CA: CPT | Performed by: NURSE PRACTITIONER

## 2019-02-28 PROCEDURE — 36620 INSERTION CATHETER ARTERY: CPT | Performed by: NURSE PRACTITIONER

## 2019-02-28 PROCEDURE — 88305 TISSUE EXAM BY PATHOLOGIST: CPT | Performed by: PATHOLOGY

## 2019-02-28 PROCEDURE — 85027 COMPLETE CBC AUTOMATED: CPT | Performed by: NURSE PRACTITIONER

## 2019-02-28 PROCEDURE — 87102 FUNGUS ISOLATION CULTURE: CPT | Performed by: NURSE PRACTITIONER

## 2019-02-28 PROCEDURE — 87205 SMEAR GRAM STAIN: CPT | Performed by: HOSPITALIST

## 2019-02-28 PROCEDURE — 71045 X-RAY EXAM CHEST 1 VIEW: CPT

## 2019-02-28 PROCEDURE — 99233 SBSQ HOSP IP/OBS HIGH 50: CPT | Performed by: HOSPITALIST

## 2019-02-28 PROCEDURE — 85007 BL SMEAR W/DIFF WBC COUNT: CPT | Performed by: HOSPITALIST

## 2019-02-28 PROCEDURE — C1729 CATH, DRAINAGE: HCPCS

## 2019-02-28 PROCEDURE — 82948 REAGENT STRIP/BLOOD GLUCOSE: CPT

## 2019-02-28 PROCEDURE — 93005 ELECTROCARDIOGRAM TRACING: CPT

## 2019-02-28 PROCEDURE — 89051 BODY FLUID CELL COUNT: CPT | Performed by: HOSPITALIST

## 2019-02-28 PROCEDURE — 83986 ASSAY PH BODY FLUID NOS: CPT | Performed by: NURSE PRACTITIONER

## 2019-02-28 PROCEDURE — 87116 MYCOBACTERIA CULTURE: CPT | Performed by: NURSE PRACTITIONER

## 2019-02-28 PROCEDURE — 84484 ASSAY OF TROPONIN QUANT: CPT | Performed by: NURSE PRACTITIONER

## 2019-02-28 PROCEDURE — 87040 BLOOD CULTURE FOR BACTERIA: CPT | Performed by: HOSPITALIST

## 2019-02-28 PROCEDURE — 0BH18EZ INSERTION OF ENDOTRACHEAL AIRWAY INTO TRACHEA, VIA NATURAL OR ARTIFICIAL OPENING ENDOSCOPIC: ICD-10-PCS | Performed by: INTERNAL MEDICINE

## 2019-02-28 PROCEDURE — 87206 SMEAR FLUORESCENT/ACID STAI: CPT | Performed by: NURSE PRACTITIONER

## 2019-02-28 PROCEDURE — 93010 ELECTROCARDIOGRAM REPORT: CPT | Performed by: INTERNAL MEDICINE

## 2019-02-28 PROCEDURE — 89051 BODY FLUID CELL COUNT: CPT | Performed by: NURSE PRACTITIONER

## 2019-02-28 PROCEDURE — 99291 CRITICAL CARE FIRST HOUR: CPT | Performed by: INTERNAL MEDICINE

## 2019-02-28 PROCEDURE — 84145 PROCALCITONIN (PCT): CPT | Performed by: HOSPITALIST

## 2019-02-28 PROCEDURE — 94660 CPAP INITIATION&MGMT: CPT

## 2019-02-28 PROCEDURE — 85027 COMPLETE CBC AUTOMATED: CPT | Performed by: HOSPITALIST

## 2019-02-28 PROCEDURE — 82945 GLUCOSE OTHER FLUID: CPT | Performed by: NURSE PRACTITIONER

## 2019-02-28 PROCEDURE — 03HY32Z INSERTION OF MONITORING DEVICE INTO UPPER ARTERY, PERCUTANEOUS APPROACH: ICD-10-PCS | Performed by: INTERNAL MEDICINE

## 2019-02-28 PROCEDURE — 82805 BLOOD GASES W/O2 SATURATION: CPT | Performed by: NURSE PRACTITIONER

## 2019-02-28 PROCEDURE — C1769 GUIDE WIRE: HCPCS

## 2019-02-28 PROCEDURE — 94002 VENT MGMT INPAT INIT DAY: CPT

## 2019-02-28 PROCEDURE — 83615 LACTATE (LD) (LDH) ENZYME: CPT | Performed by: NURSE PRACTITIONER

## 2019-02-28 PROCEDURE — 83605 ASSAY OF LACTIC ACID: CPT | Performed by: HOSPITALIST

## 2019-02-28 PROCEDURE — 31500 INSERT EMERGENCY AIRWAY: CPT | Performed by: NURSE PRACTITIONER

## 2019-02-28 PROCEDURE — 5A1955Z RESPIRATORY VENTILATION, GREATER THAN 96 CONSECUTIVE HOURS: ICD-10-PCS | Performed by: INTERNAL MEDICINE

## 2019-02-28 PROCEDURE — 49405 IMAGE CATH FLUID COLXN VISC: CPT | Performed by: RADIOLOGY

## 2019-02-28 PROCEDURE — 82805 BLOOD GASES W/O2 SATURATION: CPT | Performed by: HOSPITALIST

## 2019-02-28 PROCEDURE — 99292 CRITICAL CARE ADDL 30 MIN: CPT | Performed by: INTERNAL MEDICINE

## 2019-02-28 PROCEDURE — 94640 AIRWAY INHALATION TREATMENT: CPT

## 2019-02-28 PROCEDURE — 0W9930Z DRAINAGE OF RIGHT PLEURAL CAVITY WITH DRAINAGE DEVICE, PERCUTANEOUS APPROACH: ICD-10-PCS | Performed by: INTERNAL MEDICINE

## 2019-02-28 PROCEDURE — 99254 IP/OBS CNSLTJ NEW/EST MOD 60: CPT | Performed by: INTERNAL MEDICINE

## 2019-02-28 PROCEDURE — 83605 ASSAY OF LACTIC ACID: CPT | Performed by: NURSE PRACTITIONER

## 2019-02-28 PROCEDURE — 4A133B1 MONITORING OF ARTERIAL PRESSURE, PERIPHERAL, PERCUTANEOUS APPROACH: ICD-10-PCS | Performed by: INTERNAL MEDICINE

## 2019-02-28 PROCEDURE — 84157 ASSAY OF PROTEIN OTHER: CPT | Performed by: NURSE PRACTITIONER

## 2019-02-28 PROCEDURE — 32551 INSERTION OF CHEST TUBE: CPT | Performed by: INTERNAL MEDICINE

## 2019-02-28 PROCEDURE — 82330 ASSAY OF CALCIUM: CPT | Performed by: NURSE PRACTITIONER

## 2019-02-28 PROCEDURE — 36600 WITHDRAWAL OF ARTERIAL BLOOD: CPT

## 2019-02-28 PROCEDURE — 45000 DRAINAGE OF PELVIC ABSCESS: CPT

## 2019-02-28 PROCEDURE — 87070 CULTURE OTHR SPECIMN AEROBIC: CPT | Performed by: NURSE PRACTITIONER

## 2019-02-28 PROCEDURE — 0F9030Z DRAINAGE OF LIVER WITH DRAINAGE DEVICE, PERCUTANEOUS APPROACH: ICD-10-PCS | Performed by: RADIOLOGY

## 2019-02-28 RX ORDER — HYDROCODONE POLISTIREX AND CHLORPHENIRAMINE POLISTIREX 10; 8 MG/5ML; MG/5ML
5 SUSPENSION, EXTENDED RELEASE ORAL EVERY 12 HOURS PRN
Status: DISCONTINUED | OUTPATIENT
Start: 2019-02-28 | End: 2019-02-28

## 2019-02-28 RX ORDER — SODIUM CHLORIDE, SODIUM LACTATE, POTASSIUM CHLORIDE, CALCIUM CHLORIDE 600; 310; 30; 20 MG/100ML; MG/100ML; MG/100ML; MG/100ML
125 INJECTION, SOLUTION INTRAVENOUS CONTINUOUS
Status: DISCONTINUED | OUTPATIENT
Start: 2019-02-28 | End: 2019-03-01

## 2019-02-28 RX ORDER — FENTANYL CITRATE 50 UG/ML
INJECTION, SOLUTION INTRAMUSCULAR; INTRAVENOUS
Status: COMPLETED
Start: 2019-02-28 | End: 2019-02-28

## 2019-02-28 RX ORDER — ETOMIDATE 2 MG/ML
20 INJECTION INTRAVENOUS ONCE
Status: COMPLETED | OUTPATIENT
Start: 2019-02-28 | End: 2019-02-28

## 2019-02-28 RX ORDER — ALBUMIN, HUMAN INJ 5% 5 %
12.5 SOLUTION INTRAVENOUS ONCE
Status: COMPLETED | OUTPATIENT
Start: 2019-02-28 | End: 2019-02-28

## 2019-02-28 RX ORDER — LORAZEPAM 2 MG/ML
1 INJECTION INTRAMUSCULAR EVERY 6 HOURS PRN
Status: DISCONTINUED | OUTPATIENT
Start: 2019-02-28 | End: 2019-02-28

## 2019-02-28 RX ORDER — PANTOPRAZOLE SODIUM 40 MG/1
40 INJECTION, POWDER, FOR SOLUTION INTRAVENOUS
Status: DISCONTINUED | OUTPATIENT
Start: 2019-03-01 | End: 2019-03-05

## 2019-02-28 RX ORDER — IPRATROPIUM BROMIDE AND ALBUTEROL SULFATE 2.5; .5 MG/3ML; MG/3ML
3 SOLUTION RESPIRATORY (INHALATION) EVERY 6 HOURS PRN
Status: DISCONTINUED | OUTPATIENT
Start: 2019-02-28 | End: 2019-03-02

## 2019-02-28 RX ORDER — PROPOFOL 10 MG/ML
5-50 INJECTION, EMULSION INTRAVENOUS
Status: DISCONTINUED | OUTPATIENT
Start: 2019-02-28 | End: 2019-03-02

## 2019-02-28 RX ORDER — SODIUM CHLORIDE 9 MG/ML
100 INJECTION, SOLUTION INTRAVENOUS CONTINUOUS
Status: DISCONTINUED | OUTPATIENT
Start: 2019-02-28 | End: 2019-03-03

## 2019-02-28 RX ORDER — LORAZEPAM 2 MG/ML
2 INJECTION INTRAMUSCULAR ONCE
Status: COMPLETED | OUTPATIENT
Start: 2019-02-28 | End: 2019-02-28

## 2019-02-28 RX ORDER — CHLORHEXIDINE GLUCONATE 0.12 MG/ML
15 RINSE ORAL EVERY 12 HOURS SCHEDULED
Status: DISCONTINUED | OUTPATIENT
Start: 2019-02-28 | End: 2019-03-12

## 2019-02-28 RX ORDER — FOLIC ACID 5 MG/ML
1 INJECTION, SOLUTION INTRAMUSCULAR; INTRAVENOUS; SUBCUTANEOUS DAILY
Status: DISCONTINUED | OUTPATIENT
Start: 2019-03-01 | End: 2019-02-28 | Stop reason: CLARIF

## 2019-02-28 RX ORDER — LORAZEPAM 2 MG/ML
1 INJECTION INTRAMUSCULAR ONCE
Status: COMPLETED | OUTPATIENT
Start: 2019-02-28 | End: 2019-02-28

## 2019-02-28 RX ORDER — IPRATROPIUM BROMIDE AND ALBUTEROL SULFATE 2.5; .5 MG/3ML; MG/3ML
3 SOLUTION RESPIRATORY (INHALATION) ONCE
Status: COMPLETED | OUTPATIENT
Start: 2019-02-28 | End: 2019-02-28

## 2019-02-28 RX ORDER — MAGNESIUM SULFATE HEPTAHYDRATE 40 MG/ML
2 INJECTION, SOLUTION INTRAVENOUS ONCE
Status: COMPLETED | OUTPATIENT
Start: 2019-02-28 | End: 2019-03-01

## 2019-02-28 RX ORDER — ALBUMIN, HUMAN INJ 5% 5 %
25 SOLUTION INTRAVENOUS ONCE
Status: COMPLETED | OUTPATIENT
Start: 2019-02-28 | End: 2019-03-01

## 2019-02-28 RX ORDER — PROPOFOL 10 MG/ML
INJECTION, EMULSION INTRAVENOUS
Status: COMPLETED
Start: 2019-02-28 | End: 2019-02-28

## 2019-02-28 RX ADMIN — SODIUM CHLORIDE 1.5 G: 9 INJECTION, SOLUTION INTRAVENOUS at 09:18

## 2019-02-28 RX ADMIN — ALBUMIN (HUMAN) 12.5 G: 12.5 SOLUTION INTRAVENOUS at 13:46

## 2019-02-28 RX ADMIN — PIPERACILLIN SODIUM AND TAZOBACTAM SODIUM 4.5 G: 36; 4.5 INJECTION, POWDER, FOR SOLUTION INTRAVENOUS at 12:43

## 2019-02-28 RX ADMIN — OXYCODONE HYDROCHLORIDE 10 MG: 10 TABLET ORAL at 10:20

## 2019-02-28 RX ADMIN — SODIUM CHLORIDE 100 ML/HR: 0.9 INJECTION, SOLUTION INTRAVENOUS at 23:00

## 2019-02-28 RX ADMIN — PROPOFOL 20 MCG/KG/MIN: 10 INJECTION, EMULSION INTRAVENOUS at 16:25

## 2019-02-28 RX ADMIN — SODIUM CHLORIDE 75 ML/HR: 0.9 INJECTION, SOLUTION INTRAVENOUS at 03:58

## 2019-02-28 RX ADMIN — SODIUM CHLORIDE, SODIUM LACTATE, POTASSIUM CHLORIDE, AND CALCIUM CHLORIDE 125 ML/HR: .6; .31; .03; .02 INJECTION, SOLUTION INTRAVENOUS at 10:17

## 2019-02-28 RX ADMIN — LORAZEPAM 2 MG: 2 INJECTION INTRAMUSCULAR; INTRAVENOUS at 16:26

## 2019-02-28 RX ADMIN — BENZONATATE 100 MG: 100 CAPSULE ORAL at 09:17

## 2019-02-28 RX ADMIN — PROPOFOL 30 MCG/KG/MIN: 10 INJECTION, EMULSION INTRAVENOUS at 21:41

## 2019-02-28 RX ADMIN — SODIUM CHLORIDE, SODIUM LACTATE, POTASSIUM CHLORIDE, AND CALCIUM CHLORIDE 1000 ML: .6; .31; .03; .02 INJECTION, SOLUTION INTRAVENOUS at 12:20

## 2019-02-28 RX ADMIN — SODIUM CHLORIDE 1000 ML: 0.9 INJECTION, SOLUTION INTRAVENOUS at 13:24

## 2019-02-28 RX ADMIN — NOREPINEPHRINE BITARTRATE 2 MCG/MIN: 1 INJECTION INTRAVENOUS at 16:24

## 2019-02-28 RX ADMIN — VANCOMYCIN HYDROCHLORIDE 1500 MG: 1 INJECTION, POWDER, LYOPHILIZED, FOR SOLUTION INTRAVENOUS at 21:35

## 2019-02-28 RX ADMIN — SODIUM BICARBONATE 100 ML/HR: 84 INJECTION, SOLUTION INTRAVENOUS at 06:58

## 2019-02-28 RX ADMIN — METRONIDAZOLE 500 MG: 500 INJECTION, SOLUTION INTRAVENOUS at 23:45

## 2019-02-28 RX ADMIN — PANTOPRAZOLE SODIUM 40 MG: 40 TABLET, DELAYED RELEASE ORAL at 07:00

## 2019-02-28 RX ADMIN — ENOXAPARIN SODIUM 40 MG: 40 INJECTION SUBCUTANEOUS at 08:37

## 2019-02-28 RX ADMIN — IPRATROPIUM BROMIDE AND ALBUTEROL SULFATE 3 ML: 2.5; .5 SOLUTION RESPIRATORY (INHALATION) at 01:33

## 2019-02-28 RX ADMIN — LORAZEPAM 1 MG: 2 INJECTION INTRAMUSCULAR; INTRAVENOUS at 02:35

## 2019-02-28 RX ADMIN — CEFEPIME HYDROCHLORIDE 2000 MG: 2 INJECTION, POWDER, FOR SOLUTION INTRAVENOUS at 23:29

## 2019-02-28 RX ADMIN — HYDROCODONE POLISTIREX AND CHLORPHENIRAMINE POLISTIREX 5 ML: 10; 8 SUSPENSION, EXTENDED RELEASE ORAL at 03:42

## 2019-02-28 RX ADMIN — FENTANYL CITRATE 100 MCG: 50 INJECTION, SOLUTION INTRAMUSCULAR; INTRAVENOUS at 21:33

## 2019-02-28 RX ADMIN — SODIUM CHLORIDE 1000 ML: 0.9 INJECTION, SOLUTION INTRAVENOUS at 21:32

## 2019-02-28 RX ADMIN — ALBUMIN (HUMAN) 25 G: 12.5 SOLUTION INTRAVENOUS at 21:42

## 2019-02-28 RX ADMIN — CHLORHEXIDINE GLUCONATE 0.12% ORAL RINSE 15 ML: 1.2 LIQUID ORAL at 23:35

## 2019-02-28 RX ADMIN — OXYCODONE HYDROCHLORIDE 5 MG: 5 TABLET ORAL at 04:17

## 2019-02-28 RX ADMIN — ACETAMINOPHEN 650 MG: 325 TABLET, FILM COATED ORAL at 03:33

## 2019-02-28 RX ADMIN — ETOMIDATE 20 MG: 2 INJECTION, SOLUTION INTRAVENOUS at 16:26

## 2019-02-28 NOTE — PROGRESS NOTES
H and P from admission reviewed  Condition has deteriorated now in ICU  Large complex deep liver collection suspected source of severe sepsis  Considered drain placement at bedside with US though no suitable route could be seen given location of collection and intervening structures  Will proceed with CT guided drain but respiratory stable was too unstable to lay flat for prolonged time and has required intubation  Procedure had been discussed with him prior to intubation and written consent had been obtained  I also discussed procedure with his family in detail  Questions answered  May need to traverse left lobe fissure for placement  Only small window seen through right lobe with long tract between portal veins and IVC  Prior imaging was reviewed  Informed written consent was obtained      Angela Daigle MD  02/28/19  4:59 PM

## 2019-02-28 NOTE — BRIEF OP NOTE (RAD/CATH)
Drainage Procedure Note    PATIENT NAME: Lola Alfaro  : 1970  MRN: [id number]     Pre-op Diagnosis:   1  Anemia, unspecified type    2  Other acute appendicitis    3  Sepsis, due to unspecified organism [facility])      Post-op Diagnosis:   1  Anemia, unspecified type    2  Other acute appendicitis    3  Sepsis, due to unspecified organism [facility])        Surgeon:   Sammy Moritz, MD    Estimated Blood Loss:  Minimal  Findings:  Bloody foul-smelling fluid with debris aspirated from liver abscess  Drain was placed through left lobe and fissure  10 [place] drain was placed with CT guidance    Discussed with ICU team     Specimens:  Culture    Complications:  None    Anesthesia: Liban Hernandez MD     Date: 2019  Time: 6:11 PM

## 2019-02-28 NOTE — PROCEDURES
Arterial Line Insertion  Date/Time: 2/28/2019 6:48 PM  Performed by: LINDSAY Headley  Authorized by: LINDSAY Headley     Patient location:  Bedside  Consent:     Consent obtained:  Emergent situation    Consent given by:  Patient    Risks discussed:  Bleeding and ischemia  Universal protocol:     Site/side marked: yes      Immediately prior to procedure a time out was called: yes      Patient identity confirmed:  Arm band and hospital-assigned identification number  Indications:     Indications: continuous blood pressure monitoring    Pre-procedure details:     Skin preparation:  Chlorhexidine    Preparation: Patient was prepped and draped in sterile fashion    Anesthesia (see MAR for exact dosages): Anesthesia method:  None  Procedure details:     Location / Tip of Catheter:  Radial    Laterality:  Right    Pepe's test performed: yes      Pepe's test abnormal: no      Needle gauge:  20 G    Placement technique:  Percutaneous    Number of attempts:  1    Successful placement: yes      Transducer: waveform confirmed    Post-procedure details:     Post-procedure:  Sutured    CMS:  Normal    Patient tolerance of procedure:   Tolerated well, no immediate complications

## 2019-02-28 NOTE — RAPID RESPONSE
Progress Note - Rapid Response   Bhavin Sheets 53 y o  male MRN: [id number]    Time Called ( Time): 0506  Date Called: 02/28/2019  Level of Care: MS  Room#: 222  MMGWDRO Time ( Time): 9929  Event End Time ( Time): 3785  MEWS score at time of Rapid Response: unknown  Primary reason for call: Acute change in RR  Interventions:  Airway/Breathing:  O2 Mask/Nasal  Circulation: EKG  Other Treatments: labs       Assessment:   1  Acute hypoxic respiratory failure  2  Metabolic acidosis  3  Fever with leukocytosis  4  Sinus tachycardia likely secondary to fever    Plan:   · Titrate oxygen to maintain oxygen saturation greater than or equal to 90%  · Chest xray concerning for right lower lobe opacity which may be pleural effusion verses infection  · Continue tessalon prn  · Start tussionex prn  · Encourage pulmonary toilet, cough and deep breathing, and incentive spirometety  · ABG revealed metabolic acidosis  · Will check BMP, assess anion gap and bicarb  · Monitor and trend WBC and temperature  · Treat fever with prn tylenol now and reassess  · Monitor vital signs frequently  · Monitor respiratory status         HPI/Chief Complaint (Background/Situation):   Bhavin Sheets is a 53y o  year old male who presents with respiratory distress and hypoxia  The patient presented with cough on admission but has had increased cough tonight, now with tachypnea, and now requiring 3 liters nasal cannula  Upon arrival the patient was awake, able to answer questions in few words, and appeared to be in respiratory distress  An EKG, troponin, and ABG were already ordered and being initiated  This patient was on 3 L nasal cannula with a pulse ox of 93%  He did have a weak and frequent non-productive cough  This patient was admitted to the hospital on 02/25/2018 with failure to thrive  He has a minimal past medical history of gastritis, anemia, and hyperbilirubinemia   He was recently seen by his PCP for c/o cough and weight loss  On admission, he reported 30 lb loss over 2 months  He also c/o poor appetite, diarrhea, night sweats, and cough  CT scan chest abdomen pelvis revealed possible acute appendicitis, hepatic cirrhosis, splenomegaly, perisplenic and mesenteric varices suggestive of portal hypertension  CT scan also revealed liver mass and prominent lymph nodes in the upper abdomen and retroperitoneum  Historical Information   No past medical history on file  No past surgical history on file  Social History   Social History     Substance and Sexual Activity   Alcohol Use Yes     Social History     Substance and Sexual Activity   Drug Use Not on file     Social History     Tobacco Use   Smoking Status Former Smoker   Smokeless Tobacco Never Used     Family History: No family history on file      Meds/Allergies     Current Facility-Administered Medications:  acetaminophen 650 mg Oral Q6H PRN Victorino Gilbert, DO    benzonatate 100 mg Oral TID PRN Nicole Gaston, PA-C    enoxaparin 40 mg Subcutaneous Daily Flint Challenger, PA-C    hydrocodone-chlorpheniramine polistirex 5 mL Oral Q12H PRN Zo Horton, SIVANP    ibuprofen 600 mg Oral Q6H PRN Vicky Skwirut V, PA-C    ipratropium-albuterol 3 mL Nebulization Q6H PRN Zo Horton, SIVANP    ondansetron 4 mg Intravenous Q8H PRN Flint Challenger, PA-C    oxyCODONE 10 mg Oral Q4H PRN Vicky Skwirut V, PA-C    oxyCODONE 5 mg Oral Q4H PRN Vicky Skwirut V, PA-C    pantoprazole 40 mg Oral Early Morning Michelet Challenger, PA-C    sodium chloride 75 mL/hr Intravenous Continuous Celina Piger, PA-C Last Rate: 75 mL/hr (02/27/19 1414)         sodium chloride 75 mL/hr Last Rate: 75 mL/hr (02/27/19 1414)       No Known Allergies    ROS: History obtained from chart review and the patient  General ROS: positive for  - fever, night sweats, weight loss and cough    Vitals:   Vitals:    02/28/19 0240 02/28/19 0300 02/28/19 0315 02/28/19 0425   BP:  115/83 133/97    BP Location:  Left arm Pulse:  (!) 124 (!) 112    Resp:       Temp:   (!) 102 4 °F (39 1 °C) 100 3 °F (37 9 °C)   TempSrc:   Temporal Tympanic   SpO2: 91% 90%     Height:           Physical Exam:  Gen: ill appearing, in respiratory distress  HEENT: AT/NC, PERRL, O/P clear and moist  Neck: supple, no cervical lymphadenopathy  Chest: clear lung sounds, tachypnea, on 3 L nasal cannula, nonproductive cough  Cor: S1 S2, regular rhythm, tachycardia  Abd: softly distended, non-tender, normoactive bowel sounds  Ext: SINGH, +1 lower extremity edema bilaterally, no clubbing or cyanosis  Neuro: Alert, oriented, following commands, no focal deficit  Skin: diarporetic, intact    No intake or output data in the 24 hours ending 02/28/19 0328    Respiratory    Lab Data (Last 4 hours)    None         O2/Vent Data (Last 4 hours)    None              Invasive Devices     Peripheral Intravenous Line            Peripheral IV 02/25/19 Right Forearm 2 days                DIAGNOSTIC DATA:    Lab: I have personally reviewed pertinent lab results     CBC:   Results from last 7 days   Lab Units 02/27/19  0517   WBC Thousand/uL 11 49*   HEMOGLOBIN g/dL 9 6*   HEMATOCRIT % 29 4*   PLATELETS Thousands/uL 97*     CMP:   Results from last 7 days   Lab Units 02/27/19  0517 02/26/19  0551 02/25/19  2037   POTASSIUM mmol/L 3 8 4 2 4 1   CHLORIDE mmol/L 101 98* 101   CO2 mmol/L 24 23 24   BUN mg/dL 10 10 12   CREATININE mg/dL 0 77 0 81 0 78   CALCIUM mg/dL 8 1* 8 1* 8 4   ALK PHOS U/L 178* 189* 201*   ALT U/L 28 39 43   AST U/L 42 62* 66*     PT/INR:   No results found for: PT, INR,   Magnesium: No components found for: MAG,   Phosphorous: No results found for: PHOS    Microbiology:  Lab Results   Component Value Date    BLOODCX No Growth at 48 hrs  02/25/2019    BLOODCX No Growth at 48 hrs  02/25/2019    URINECX No Growth <1000 cfu/mL 02/06/2019         OUTCOME:   Stayed in room   Family notified of transfer: yes  Family member contacted: Spouse at bedside  Code Status: Level 1 - Full Code  Critical Care Time: Total Critical Care time spent 32 minutes excluding procedures, teaching and family updates

## 2019-02-28 NOTE — PROGRESS NOTES
Pt continues to have constant non-productive cough and SOB  Tamra Gaston PA-C ordered neb tx  Pt could not inhale enough to get tx, resp stopped tx  Respirations remain upper 30's    Tiara Michelle PA-C added Ativan

## 2019-02-28 NOTE — PROGRESS NOTES
Pt has constant non-productive cough  Spoke with Meghan Night, KIM  She Ordered Tessalon Perles prn

## 2019-02-28 NOTE — PROGRESS NOTES
Pt  transferred to ICU Step Down  Report given to Sunnie Hammans  Pt  Transferred with all personal items  At time of transfer pt on oxygen at 4 L/M via N/C  Pt  With two IV infusing, one LR at 125 mL/hr and the second sodium bicarbonate at 100 mL/hr  Pt  With visitor who was assised to ICU waiting room while pt  Is assessed and settled into room

## 2019-02-28 NOTE — PLAN OF CARE
Problem: Nutrition/Hydration-ADULT  Goal: Nutrient/Hydration intake appropriate for improving, restoring or maintaining nutritional needs  Description  Monitor and assess patient's nutrition/hydration status for malnutrition (ex- brittle hair, bruises, dry skin, pale skin and conjunctiva, muscle wasting, smooth red tongue, and disorientation)  Collaborate with interdisciplinary team and initiate plan and interventions as ordered  Monitor patient's weight and dietary intake as ordered or per policy  Utilize nutrition screening tool and intervene per policy  Determine patient's food preferences and provide high-protein, high-caloric foods as appropriate       INTERVENTIONS:  - Monitor oral intake, urinary output, labs, and treatment plans  - Assess nutrition and hydration status and recommend course of action  - Evaluate amount of meals eaten  - Assist patient with eating if necessary   - Allow adequate time for meals  - Recommend/ encourage appropriate diets, oral nutritional supplements, and vitamin/mineral supplements  - Order, calculate, and assess calorie counts as needed  - Recommend, monitor, and adjust tube feedings and TPN/PPN based on assessed needs  - Assess need for intravenous fluids  - Provide specific nutrition/hydration education as appropriate  - Include patient/family/caregiver in decisions related to nutrition  Outcome: Progressing     Problem: PAIN - ADULT  Goal: Verbalizes/displays adequate comfort level or baseline comfort level  Description  Interventions:  - Encourage patient to monitor pain and request assistance  - Assess pain using appropriate pain scale  - Administer analgesics based on type and severity of pain and evaluate response  - Implement non-pharmacological measures as appropriate and evaluate response  - Consider cultural and social influences on pain and pain management  - Notify physician/advanced practitioner if interventions unsuccessful or patient reports new pain  Outcome: Progressing     Problem: INFECTION - ADULT  Goal: Absence or prevention of progression during hospitalization  Description  INTERVENTIONS:  - Assess and monitor for signs and symptoms of infection  - Monitor lab/diagnostic results  - Monitor all insertion sites, i e  indwelling lines, tubes, and drains  - Monitor endotracheal (as able) and nasal secretions for changes in amount and color  - [place] appropriate cooling/warming therapies per order  - Administer medications as ordered  - Instruct and encourage patient and family to use good hand hygiene technique  - Identify and instruct in appropriate isolation precautions for identified infection/condition  Outcome: Progressing  Goal: Absence of fever/infection during neutropenic period  Description  INTERVENTIONS:  - Monitor WBC  - Implement neutropenic guidelines  Outcome: Progressing     Problem: SAFETY ADULT  Goal: Patient will remain free of falls  Description  INTERVENTIONS:  - Assess patient frequently for physical needs  -  Identify cognitive and physical deficits and behaviors that affect risk of falls    -  [place] fall precautions as indicated by assessment   - Educate patient/family on patient safety including physical limitations  - Instruct patient to call for assistance with activity based on assessment  - Modify environment to reduce risk of injury  - Consider OT/PT consult to assist with strengthening/mobility  Outcome: Progressing  Goal: Maintain or return to baseline ADL function  Description  INTERVENTIONS:  -  Assess patient's ability to carry out ADLs; assess patient's baseline for ADL function and identify physical deficits which impact ability to perform ADLs (bathing, care of mouth/teeth, toileting, grooming, dressing, etc )  - Assess/evaluate cause of self-care deficits   - Assess range of motion  - Assess patient's mobility; develop plan if impaired  - Assess patient's need for assistive devices and provide as appropriate  - Encourage maximum independence but intervene and supervise when necessary  ¯ Involve family in performance of ADLs  ¯ Assess for home care needs following discharge   ¯ Request OT consult to assist with ADL evaluation and planning for discharge  ¯ Provide patient education as appropriate  Outcome: Progressing  Goal: Maintain or return mobility status to optimal level  Description  INTERVENTIONS:  - Assess patient's baseline mobility status (ambulation, transfers, stairs, etc )    - Identify cognitive and physical deficits and behaviors that affect mobility  - Identify mobility aids required to assist with transfers and/or ambulation (gait belt, sit-to-stand, lift, walker, cane, etc )  - [place] fall precautions as indicated by assessment  - Record patient progress and toleration of activity level on Mobility SBAR; progress patient to next Phase/Stage  - Instruct patient to call for assistance with activity based on assessment  - Request Rehabilitation consult to assist with strengthening/weightbearing, etc   Outcome: Progressing     Problem: DISCHARGE PLANNING  Goal: Discharge to home or other facility with appropriate resources  Description  INTERVENTIONS:  - Identify barriers to discharge w/patient and caregiver  - Arrange for needed discharge resources and transportation as appropriate  - Identify discharge learning needs (meds, wound care, etc )  - Arrange for interpretive services to assist at discharge as needed  - Refer to Case Management Department for coordinating discharge planning if the patient needs post-hospital services based on physician/advanced practitioner order or complex needs related to functional status, cognitive ability, or social support system  Outcome: Progressing     Problem: Knowledge Deficit  Goal: Patient/family/caregiver demonstrates understanding of disease process, treatment plan, medications, and discharge instructions  Description  Complete learning assessment and assess knowledge base   Interventions:  - Provide teaching at level of understanding  - Provide teaching via preferred learning methods  Outcome: Progressing     Problem: Prexisting or High Potential for Compromised Skin Integrity  Goal: Skin integrity is maintained or improved  Description  INTERVENTIONS:  - Identify patients at risk for skin breakdown  - Assess and monitor skin integrity  - Assess and monitor nutrition and hydration status  - Monitor labs (i e  albumin)  - Assess for incontinence   - Turn and reposition patient  - Assist with mobility/ambulation  - Relieve pressure over bony prominences  - Avoid friction and shearing  - Provide appropriate hygiene as needed including keeping skin clean and dry  - Evaluate need for skin moisturizer/barrier cream  - Collaborate with interdisciplinary team (i e  Nutrition, Rehabilitation, etc )   - Patient/family teaching  Outcome: Progressing     Problem: DISCHARGE PLANNING - CARE MANAGEMENT  Goal: Discharge to post-acute care or home with appropriate resources  Description  INTERVENTIONS:  - Conduct assessment to determine patient/family and health care team treatment goals, and need for post-acute services based on payer coverage, community resources, and patient preferences, and barriers to discharge  - Address psychosocial, clinical, and financial barriers to discharge as identified in assessment in conjunction with the patient/family and health care team  - Arrange appropriate level of post-acute services according to patient?s   needs and preference and payer coverage in collaboration with the physician and health care team  - Communicate with and update the patient/family, physician, and health care team regarding progress on the discharge plan  - Arrange appropriate transportation to post-acute venues  Outcome: Progressing

## 2019-02-28 NOTE — SEDATION DOCUMENTATION
CT guided abdominal abscess drainage tube placed in IR by Dr Adkins Males  Patient maintained on vent with resp therapy at bedside  IV drips adjusted by ICU RN

## 2019-02-28 NOTE — ASSESSMENT & PLAN NOTE
Long history of alcohol abuse  Says that he quit 2 months ago when he started not feeling well and losing weight  Likely has cirrhosis    GI on board

## 2019-02-28 NOTE — PROCEDURES
Intubation  Date/Time: 2/28/2019 4:50 PM  Performed by: LINDSAY Patino  Authorized by: LINDSAY Patino     Patient location:  Bedside  Consent:     Consent obtained:  Verbal    Consent given by:  Patient    Alternatives discussed:  Delayed treatment  Universal protocol:     Site/side marked: yes      Immediately prior to procedure, a time out was called: yes      Patient identity confirmed:  Arm band and hospital-assigned identification number  Pre-procedure details:     Patient status:  Awake    Mallampati score:  2    Pretreatment medications:  Etomidate    Paralytics:  None  Indications:     Indications for intubation: respiratory distress    Procedure details:     Preoxygenation:  Bag valve mask    CPR in progress: yes      Intubation method:  Oral    Oral intubation technique:  Direct    Laryngoscope blade: Mac 3    Tube size (mm):  8 0    Tube type:  Hi-lo    Number of attempts:  1  Placement assessment:     ETT to lip:  24    Tube secured with:  ETT emery    Breath sounds:  Equal    Placement verification: chest rise      CXR findings:  ETT in proper place  Post-procedure details:     Patient tolerance of procedure:   Tolerated well, no immediate complications

## 2019-02-28 NOTE — PROGRESS NOTES
Progress Note - Lorena Sanchez 1970, 53 y o  male MRN: [id number]    Unit/Bed#: [address]01 Encounter: [id number]    Primary Care Provider: Stan Gordillo MD   Date and time admitted to hospital: 2019  7:56 PM        * Liver mass  Assessment & Plan  Patient becoming septic overnight  Spiked a fever of 102  Now hyperventilating  Seen by stat response and started on a BICARB drip  I spoke with critical care, Dr Hiram Lockhart from ID and Dr Pamelia Dance  He has become unstabe, so we will have IR drain this liver mass as it is likely an abscess  Blood cultures from admission are negative  Will repeat blood cultures and urine cultures  Move to step down as I am concerned about his increased work of breathing  Keep npo  Ask critical care to assist as well  Start empirically on Unasyn    Alcohol abuse  Assessment & Plan  Long history of alcohol abuse  Says that he quit 2 months ago when he started not feeling well and losing weight  Likely has cirrhosis  GI on board    Appendicitis  Assessment & Plan  Possible subacute appendicitis  Surgery following  Does NOT examine as an acute abdomen  Failure to thrive in adult  Assessment & Plan  Due to whatever is causing this liver mass          Subjective:   Called to see patient  Doing poorly this morning  Stat response called overnight  Had a fever of 102  tachypneic  Has a dry, non productive cough  Has some RUQ abd pain when he coughs, but it is mild  No diarrhea  No chills  Having a hard time breathing  Objective:     Vitals:   Temp (24hrs), Av 4 °F (38 °C), Min:98 7 °F (37 1 °C), Max:102 4 °F (39 1 °C)    Temp:  [98 7 °F (37 1 °C)-102 4 °F (39 1 °C)] 100 3 °F (37 9 °C)  HR:  [] 100  Resp:  [16-44] 44  BP: ()/(55-97) 98/55  SpO2:  [89 %-96 %] 92 %  Body mass index is 35 39 kg/m²  Input and Output Summary (last 24 hours):        Intake/Output Summary (Last 24 hours) at 2019 0865  Last data filed at 2019 0358  Gross per 24 hour   Intake 2018 75 ml   Output    Net 2018 75 ml       Physical Exam:     Physical Exam   HENT:   Head: Normocephalic and atraumatic  Eyes: Pupils are equal, round, and reactive to light  EOM are normal    Cardiovascular: Normal rate and regular rhythm  Exam reveals no gallop and no friction rub  No murmur heard  Pulmonary/Chest: Effort normal and breath sounds normal  He has no wheezes  He has no rales  Abdominal: Soft  Bowel sounds are normal  There is tenderness (mild RUQ tenderness over liver)  There is no rebound and no guarding  Musculoskeletal: He exhibits no edema  Nursing note and vitals reviewed          Additional Data:     Labs:    Results from last 7 days   Lab Units 02/28/19  0445 02/27/19  0517   WBC Thousand/uL 19 05* 11 49*   HEMOGLOBIN g/dL 12 1 9 6*   HEMATOCRIT % 38 1 29 4*   PLATELETS Thousands/uL 275 97*   NEUTROS PCT %  --  82*   LYMPHS PCT %  --  7*   LYMPHO PCT % 6*  --    MONOS PCT %  --  9   MONO PCT % 2*  --    EOS PCT % 0 1     Results from last 7 days   Lab Units 02/28/19  0352 02/27/19  0517   POTASSIUM mmol/L 3 9 3 8   CHLORIDE mmol/L 96* 101   CO2 mmol/L 17* 24   BUN mg/dL 11 10   CREATININE mg/dL 1 19 0 77   CALCIUM mg/dL 8 1* 8 1*   ALK PHOS U/L  --  178*   ALT U/L  --  28   AST U/L  --  42     Results from last 7 days   Lab Units 02/25/19  2037   INR  1 52*     Results from last 7 days   Lab Units 02/28/19  0319   POC GLUCOSE mg/dl 89               * I Have Reviewed All Lab Data     Recent Cultures (last 7 days):     Results from last 7 days   Lab Units 02/26/19  1041 02/25/19  2055 02/25/19 2046   BLOOD CULTURE   --  No Growth at 48 hrs  No Growth at 48 hrs     C DIFF TOXIN B  NEGATIVE for C difficle toxin by PCR    --   --          Last 24 Hours Medication List:     Current Facility-Administered Medications:  acetaminophen 650 mg Oral Q6H PRN Victorino Prechtel, DO    ampicillin-sulbactam 1 5 g Intravenous Q6H Victorino Prechtel, DO    benzonatate 100 mg Oral TID PRN David Rosario, KIM    enoxaparin 40 mg Subcutaneous Daily Radha Jacobsen, KIM    hydrocodone-chlorpheniramine polistirex 5 mL Oral Q12H PRN LINDSAY Johnson    ibuprofen 600 mg Oral Q6H PRN Vicky Ying V, PA-MEGAN    ipratropium-albuterol 3 mL Nebulization Q6H PRN LINDSAY Johnson    ondansetron 4 mg Intravenous Q8H PRN Radha Jacobsen, KIM    oxyCODONE 10 mg Oral Q4H PRN iVcky Ying V, PA-MEGAN    oxyCODONE 5 mg Oral Q4H PRN Vicky Ying V, PA-MEGAN    pantoprazole 40 mg Oral Early Morning Radha Jacobsen PA-C    sodium bicarbonate infusion 100 mL/hr Intravenous Continuous Shellye LINDSAY Argueta Last Rate: 100 mL/hr (02/28/19 2271)         VTE Pharmacologic Prophylaxis:   Pharmacologic: Enoxaparin (Lovenox)      Current Length of Stay: 3 day(s)    Current Patient Status: Inpatient       Discharge Plan:      Greater than 46 minutes spent  Greater than 50% of time coordinating care  Case discussed with Dr Willem Tom, Dr Ella Canales, critical care    Code Status: Level 1 - Full Code           Today, Patient Was Seen By: Denzel Nails DO    ** Please Note: Dictation voice to text software may have been used in the creation of this document   **

## 2019-02-28 NOTE — ASSESSMENT & PLAN NOTE
Patient becoming septic overnight  Spiked a fever of 102  Now hyperventilating  Seen by stat response and started on a BICARB drip  I spoke with critical care, Dr Cecy Oliver from ID and Dr Tanya Toth  He has become unstabe, so we will have IR drain this liver mass as it is likely an abscess  Blood cultures from admission are negative  Will repeat blood cultures and urine cultures  Move to step down as I am concerned about his increased work of breathing  Keep npo  Ask critical care to assist as well    Start empirically on Unasyn

## 2019-02-28 NOTE — CONSULTS
[address] [place] [address] 53 y o  male MRN: [id number]  Unit/Bed#: ICU 06 Encounter: [id number]    Physician Requesting Consult: Poonam Sanchez  Reason for Consult: Severe sepsis, hypotension, respiratory distress    Assessment/Plan:  1  Severe sepsis with septic shock due to liver abscess  · Likely secondary to liver abscess  · ID following, currently on Zosyn  · Plan for IR drainage today  · hypotension responsive to fluid bolus, fluids @ 100 consider increase to 125, closely monitor consider arterial line  · Consider levophed gtt for when patient goes to IR  · Blood cultures negative at 48 hours  · Procalcitonin 47, check AM  · Monitor respiratory status for infection - pleural effusions with possible superimposed infection  2  Acute hypoxic respiratory failure  · Likely respiratory compensation to metabolic acidosis, lactic acid 4 8 with bicarb downtrending 14 7 from 24 yesterday  · Patient currently on oxygen mask 6, saturating 90% with increased work of breathing will likely need intubation  · Consider bipap to help with respiratory effort, intubation if does not improve  3  Hypotension due tp septic shock  · Likely distributary secondary to severe sepsis, with elevated WBC, lactic acidosis and fevers  · Responsive to fluid bolus and fluids @100  · Will give dose of albumin due to cirrhosis, will consider scheduled albumin x24 hours  · Will start vasopressors as necessary, MAPS <65 or symptomatic hypotension  · Consider stress dose steroids  4  Alcohol abuse  · Continue thiamine and folate  · Severe recently cirrhosis  · GI following  5  Appendicitis  · Surgery following  · Does not exam like acute abdomen  · Conservative treatment    Critical Care Time: 35  Documented critical care time excludes any procedures documented elsewhere   It also excludes any family updates    _____________________________________________________________________      HPI:    El Novoa is a 53 y o  male  who was recently admitted for failure to thrive at home  He was found upon admission to have a possible acute vs subacute appendicitis, alcoholic cirrhosis and splenomegaly, perisplenic and mesenteric varices, and hepatic mass later suspected abscess  Pt is being managed by the medicine team  Last night, patient went into respiratory distress but was responsive to O2 mask/nasal cannula  Prior to this he began showing signs of sepsis likely 2/2 to liver abscess, spiking fevers, tachycardia, and showing signs of hypotension  Patient responded to fluids but has been having persistent, increased work of breathing throughout the the morning  Patient had escalation of care by primary team to stepdown for concerns of respiratory status and low blood pressures       Upon arrival patient is in mild respiratory distress  Patient states that he is no currently in any pain  States that he feels his respiratory status has been persistent overnight into today  States that he feels mildly short of breath  No pain with breathing  No chest pain  No abdominal pain currently  No nausea or vomiting  No HA or dizziness currently  Patient states that he feels "warm" but is not having any chills  Review of Systems:  Review of Systems - History obtained from the patient  General ROS: positive for  - fever, hot flashes and weight loss  ENT ROS: negative  Respiratory ROS: positive for - shortness of breath  negative for - hemoptysis, stridor or wheezing  Cardiovascular ROS: no chest pain or dyspnea on exertion  Gastrointestinal ROS: no abdominal pain, change in bowel habits, or black or bloody stools  Musculoskeletal ROS: negative  Neurological ROS: no TIA or stroke symptoms     Full 12 point review of systems was performed  Aside from what was mentioned in the HPI, it is otherwise negative  Historical Information   No past medical history on file  No past surgical history on file    Social History   Social History     Substance and Sexual Activity   Alcohol Use Yes     Social History     Substance and Sexual Activity   Drug Use Not on file     Social History     Tobacco Use   Smoking Status Former Smoker   Smokeless Tobacco Never Used       Family History:   No family history on file  Medications:  Pertinent medications were reviewed    Current Facility-Administered Medications:  acetaminophen 650 mg Oral Q6H PRN Victorino Prechtel, DO    chlorhexidine 15 mL Swish & Spit Q12H [facility] LINDSAY Martinez    enoxaparin 40 mg Subcutaneous Daily Victorino Prechtel, DO    ipratropium-albuterol 3 mL Nebulization Q6H PRN Victorino Prechtel, DO    lactated ringers 125 mL/hr Intravenous Continuous Victorino Prechtel, DO Last Rate: 125 mL/hr (02/28/19 1424)   norepinephrine 1-30 mcg/min Intravenous Titrated Kathy Flores MD Last Rate: 4 mcg/min (02/28/19 1706)   ondansetron 4 mg Intravenous Q8H PRN Victorino Prechtel, DO    [START ON 3/1/2019] pantoprazole 40 mg Intravenous Q24H [facility] LINDSAY Martinez    piperacillin-tazobactam 4 5 g Intravenous Q6H Lynne Lagunas MD Last Rate: Stopped (02/28/19 1313)   propofol 5-50 mcg/kg/min Intravenous Titrated LINDSAY Arriaza Last Rate: 20 mcg/kg/min (02/28/19 1625)   sodium bicarbonate infusion 100 mL/hr Intravenous Continuous Victorino Prechtel, DO Last Rate: 100 mL/hr (02/28/19 3366)         No Known Allergies      Vitals:   /57   Pulse 96   Temp 98 7 °F (37 1 °C) (Temporal)   Resp (!) 28   Ht 5' 8" (1 727 m)   Wt 105 kg (231 lb 0 7 oz)   SpO2 91%   BMI 35 13 kg/m²   Body mass index is 35 13 kg/m²    SpO2: 91 %,   SpO2 Activity: At Rest,   O2 Device: Simple mask      Intake/Output Summary (Last 24 hours) at 2/28/2019 1724  Last data filed at 2/28/2019 1601  Gross per 24 hour   Intake 4114 17 ml   Output 50 ml   Net 4064 17 ml     Invasive Devices     Peripheral Intravenous Line            Peripheral IV 02/25/19 Right Forearm 2 days    Peripheral IV 02/28/19 Left Antecubital less than 1 day    Peripheral IV 02/28/19 Left Forearm less than 1 day          Drain            NG/OG/Enteral Tube Orogastric 16 Fr Center mouth less than 1 day                Physical Exam:  Gen: Mild respiratory distress, sweating noted  HEENT: Head atraumatic  Pupils equally round and reactive, nares clear, oropharynx clear  Neck: neck supple, no LAD  Chest: Right side chest with crackles, left side slightly diminished  Mild to moderate work of breathing  Cor: Heart reg rate and rhythym, no c/r/g  Abd:mild distension noted, nontender to palpation, slight grimace to RUQ deep palpation  Ext: No edema in ext, 2+ DP and brachial pulses  Neuro: GCS 15, no focal deficits, pupils equal and reactive  Skin: Warm and moist, slight yellow hue      Diagnostic Data:  Lab: I have personally reviewed pertinent lab results  ,   CBC:  Results from last 7 days   Lab Units 02/28/19  0445   WBC Thousand/uL 19 05*   HEMOGLOBIN g/dL 12 1   HEMATOCRIT % 38 1   PLATELETS Thousands/uL 275      CMP: Lab Results   Component Value Date    SODIUM 127 (L) 02/28/2019    K 3 9 02/28/2019    CL 97 (L) 02/28/2019    CO2 17 (L) 02/28/2019    BUN 16 02/28/2019    CREATININE 2 00 (H) 02/28/2019    CALCIUM 8 2 (L) 02/28/2019    EGFR 39 02/28/2019   ,   PT/INR: No results found for: PT, INR,   Magnesium: No components found for: MAG,  Phosphorous: No results found for: PHOS    ABG: Lab Results   Component Value Date    PHART 7 424 02/28/2019    OLM2HUV 23 0 (LL) 02/28/2019    PO2ART 59 6 (LL) 02/28/2019    POB9EIU 14 7 (L) 02/28/2019    BEART -7 6 02/28/2019    SOURCE Radial, Right 02/28/2019   ,     Microbiology:  Results from last 7 days   Lab Units 02/26/19  1041 02/25/19 2055 02/25/19 2046   BLOOD CULTURE   --  No Growth at 48 hrs  No Growth at 48 hrs     C DIFF TOXIN B  NEGATIVE for C difficle toxin by PCR    --   --        Imaging: I have personally reviewed the pertinent imaging studies on the PACS system    2/25 CT CAP w contrast - Fluid-filled and dilated appendix with appendiceal wall thickening and periappendiceal fat stranding and fluid, suspicious for acute appendicitis in the appropriate clinical setting    Hepatic cirrhosis and splenomegaly, with associated perisplenic and mesenteric varices suggesting a component of portal hypertension    Enlargement of the caudate lobe which contains an ill-defined and heterogeneous mass lesion with large internal areas of hypodensity and internal septations as described, this measures approximately 10 5 x 9 4 x 11 8 cm in size (axial image 55, series   2)   Primary differential consideration is hepatic abscess in the appropriate clinical setting, although hepatic neoplasm such as hepatocellular carcinoma is also considered   Correlation with the patient's symptoms and laboratory values recommended    Prominent lymph nodes in the upper abdomen and retroperitoneum, largest measures approximately 1 4 cm in size, possibly reactive    Partially distended bladder   Mild circumferential bladder wall thickening noted, probably exaggerated by underdistention   Superimposed cystitis could be considered in the appropriate clinical setting     2/28 CXR - Suspected moderate-sized right pleural effusion/atelectasis as described, possibly a hepatic hydrothorax   Superimposed infection could be considered in the appropriate clinical setting   2/28 CXR - Right-sided pleural effusion/atelectasis suspected   Superimposed infection could be considered in the appropriate clinical setting  Cardiac/EKG/telemetry/Echo:   Results from last 7 days   Lab Units 02/28/19  0403 02/25/19 2037   TROPONIN I ng/mL <0 02 <0 02        2/26 - ECHO EF 61%, normal diastolic funciton  0/17 EKG - No ST elevations/depression    VTE Prophylaxis: Enoxaparin (Lovenox)    Code Status: Level 1 - Full Code    LINDSAY Virgen    Portions of the record may have been created with voice recognition software   Occasional wrong word or "sound a like" substitutions may have occurred due to the inherent limitations of voice recognition software  Read the chart carefully and recognize, using context, where substitutions have occurred

## 2019-02-28 NOTE — PLAN OF CARE
Problem: Nutrition/Hydration-ADULT  Goal: Nutrient/Hydration intake appropriate for improving, restoring or maintaining nutritional needs  Description  Monitor and assess patient's nutrition/hydration status for malnutrition (ex- brittle hair, bruises, dry skin, pale skin and conjunctiva, muscle wasting, smooth red tongue, and disorientation)  Collaborate with interdisciplinary team and initiate plan and interventions as ordered  Monitor patient's weight and dietary intake as ordered or per policy  Utilize nutrition screening tool and intervene per policy  Determine patient's food preferences and provide high-protein, high-caloric foods as appropriate       INTERVENTIONS:  - Monitor oral intake, urinary output, labs, and treatment plans  - Assess nutrition and hydration status and recommend course of action  - Evaluate amount of meals eaten  - Assist patient with eating if necessary   - Allow adequate time for meals  - Recommend/ encourage appropriate diets, oral nutritional supplements, and vitamin/mineral supplements  - Order, calculate, and assess calorie counts as needed  - Recommend, monitor, and adjust tube feedings and TPN/PPN based on assessed needs  - Assess need for intravenous fluids  - Provide specific nutrition/hydration education as appropriate  - Include patient/family/caregiver in decisions related to nutrition  Outcome: Progressing     Problem: PAIN - ADULT  Goal: Verbalizes/displays adequate comfort level or baseline comfort level  Description  Interventions:  - Encourage patient to monitor pain and request assistance  - Assess pain using appropriate pain scale  - Administer analgesics based on type and severity of pain and evaluate response  - Implement non-pharmacological measures as appropriate and evaluate response  - Consider cultural and social influences on pain and pain management  - Notify physician/advanced practitioner if interventions unsuccessful or patient reports new pain  Outcome: Progressing     Problem: INFECTION - ADULT  Goal: Absence or prevention of progression during hospitalization  Description  INTERVENTIONS:  - Assess and monitor for signs and symptoms of infection  - Monitor lab/diagnostic results  - Monitor all insertion sites, i e  indwelling lines, tubes, and drains  - Monitor endotracheal (as able) and nasal secretions for changes in amount and color  - [place] appropriate cooling/warming therapies per order  - Administer medications as ordered  - Instruct and encourage patient and family to use good hand hygiene technique  - Identify and instruct in appropriate isolation precautions for identified infection/condition  Outcome: Progressing  Goal: Absence of fever/infection during neutropenic period  Description  INTERVENTIONS:  - Monitor WBC  - Implement neutropenic guidelines  Outcome: Progressing     Problem: SAFETY ADULT  Goal: Patient will remain free of falls  Description  INTERVENTIONS:  - Assess patient frequently for physical needs  -  Identify cognitive and physical deficits and behaviors that affect risk of falls    -  [place] fall precautions as indicated by assessment   - Educate patient/family on patient safety including physical limitations  - Instruct patient to call for assistance with activity based on assessment  - Modify environment to reduce risk of injury  - Consider OT/PT consult to assist with strengthening/mobility  Outcome: Progressing  Goal: Maintain or return to baseline ADL function  Description  INTERVENTIONS:  -  Assess patient's ability to carry out ADLs; assess patient's baseline for ADL function and identify physical deficits which impact ability to perform ADLs (bathing, care of mouth/teeth, toileting, grooming, dressing, etc )  - Assess/evaluate cause of self-care deficits   - Assess range of motion  - Assess patient's mobility; develop plan if impaired  - Assess patient's need for assistive devices and provide as appropriate  - Encourage maximum independence but intervene and supervise when necessary  ¯ Involve family in performance of ADLs  ¯ Assess for home care needs following discharge   ¯ Request OT consult to assist with ADL evaluation and planning for discharge  ¯ Provide patient education as appropriate  Outcome: Progressing  Goal: Maintain or return mobility status to optimal level  Description  INTERVENTIONS:  - Assess patient's baseline mobility status (ambulation, transfers, stairs, etc )    - Identify cognitive and physical deficits and behaviors that affect mobility  - Identify mobility aids required to assist with transfers and/or ambulation (gait belt, sit-to-stand, lift, walker, cane, etc )  - [place] fall precautions as indicated by assessment  - Record patient progress and toleration of activity level on Mobility SBAR; progress patient to next Phase/Stage  - Instruct patient to call for assistance with activity based on assessment  - Request Rehabilitation consult to assist with strengthening/weightbearing, etc   Outcome: Progressing     Problem: DISCHARGE PLANNING  Goal: Discharge to home or other facility with appropriate resources  Description  INTERVENTIONS:  - Identify barriers to discharge w/patient and caregiver  - Arrange for needed discharge resources and transportation as appropriate  - Identify discharge learning needs (meds, wound care, etc )  - Arrange for interpretive services to assist at discharge as needed  - Refer to Case Management Department for coordinating discharge planning if the patient needs post-hospital services based on physician/advanced practitioner order or complex needs related to functional status, cognitive ability, or social support system  Outcome: Progressing     Problem: Knowledge Deficit  Goal: Patient/family/caregiver demonstrates understanding of disease process, treatment plan, medications, and discharge instructions  Description  Complete learning assessment and assess knowledge base   Interventions:  - Provide teaching at level of understanding  - Provide teaching via preferred learning methods  Outcome: Progressing     Problem: Prexisting or High Potential for Compromised Skin Integrity  Goal: Skin integrity is maintained or improved  Description  INTERVENTIONS:  - Identify patients at risk for skin breakdown  - Assess and monitor skin integrity  - Assess and monitor nutrition and hydration status  - Monitor labs (i e  albumin)  - Assess for incontinence   - Turn and reposition patient  - Assist with mobility/ambulation  - Relieve pressure over bony prominences  - Avoid friction and shearing  - Provide appropriate hygiene as needed including keeping skin clean and dry  - Evaluate need for skin moisturizer/barrier cream  - Collaborate with interdisciplinary team (i e  Nutrition, Rehabilitation, etc )   - Patient/family teaching  Outcome: Progressing     Problem: DISCHARGE PLANNING - CARE MANAGEMENT  Goal: Discharge to post-acute care or home with appropriate resources  Description  INTERVENTIONS:  - Conduct assessment to determine patient/family and health care team treatment goals, and need for post-acute services based on payer coverage, community resources, and patient preferences, and barriers to discharge  - Address psychosocial, clinical, and financial barriers to discharge as identified in assessment in conjunction with the patient/family and health care team  - Arrange appropriate level of post-acute services according to patient?s   needs and preference and payer coverage in collaboration with the physician and health care team  - Communicate with and update the patient/family, physician, and health care team regarding progress on the discharge plan  - Arrange appropriate transportation to post-acute venues  Outcome: Progressing

## 2019-02-28 NOTE — PROGRESS NOTES
Progress Note - Infectious Disease   Phylicia Antunez 53 y o  male MRN: [id number]  Unit/Bed#: ICU 06 Encounter: [id number]      As the patient has had persistent hypotension, and wish to avoid the combination of vancomycin and Zosyn due to the increased risk of progressive acute kidney injury, will change the antibiotics to vancomycin 1500 mg IV q 12 hours, cefepime 2 g IV q 12 hours, and Flagyl 500 mg IV q 8 hours while waiting additional data  Will consult pharmacy for vancomycin trough management  Will likely need to dose adjust the cefepime and vancomycin tomorrow with the anticipated worsening renal function

## 2019-02-28 NOTE — CONSULTS
Consultation - Infectious Disease   Ying Snyder 53 y o  male MRN: [id number]  Unit/Bed#: ICU 06 Encounter: [id number]      IMPRESSION & RECOMMENDATIONS:   1  Severe sepsis-fever, leukocytosis, lactic acidosis  Suspect secondary to liver abscess formation  Consideration for the possibility of bacteremia although the initial blood cultures were negative  He has become much more ill in the last 12 hours  Patient seems to be tolerating the antibiotics without difficulty  At this point with the patient becoming much more ill it is reasonable to broaden the antibiotics while waiting additional data  -discontinue Unasyn  -will broaden the antibiotics to Zosyn 4 5 g IV q 6 hours  -recheck blood cultures x2 sets  -urgent IR drainage of the probable liver abscess  -monitor CBC with diff and creatinine  -supportive care  -critical care evaluation    2  Probable liver abscess-suspect a portal source of infection with the possible appendicitis  No perforation seen on initial CT the abdomen and pelvis  No clear biliary source   -urgent IR biopsy/drainage of the presumed liver abscess  -follow up cultures and adjust antibiotics as needed  -GI follow-up    3  Acute kidney injury-suspect multifactorial including pre renal issues and possibly sepsis  ATN may be playing a role  -monitor creatinine clearance closely  -volume management  -will likely need to dose down the Zosyn to 3 375 g IV q 6 hours after the initial dosing as I anticipate the renal function to worsen  -consider nephrology evaluation    4   Alcohol abuse-with probable cirrhosis based upon the CT findings   -continue absence  -GI follow-up    Discussed in detail with the primary service    HISTORY OF PRESENT ILLNESS:  Reason for Consult:  Sepsis  HPI: Ying Snyder is a 53y o  year old male admitted to [facility] in [facility] with failure to thrive type symptoms including weight loss, abdominal bloating, and intermittent diarrhea who I am asked to assist with management of high fever, and possible liver abscess  Since around F[place] Kassy the patient has been experiencing a chronic cough, progressive weight loss, malaise, and subjective fever and chills as well as some intermittent diarrhea  He was seen by his primary care provider and was found to have significant laboratory abnormalities and was referred for admission  He admits to heavy alcohol use in the past   He had received a course of azithromycin for possible pneumonia without improvement early in this illness  Because of these ongoing symptoms he came to the ER for further evaluation  On his initial evaluation the patient was afebrile but with leukocytosis  He had blood cultures obtained but was monitored off all antibiotics  CT of the abdomen and pelvis suggested a large liver mass and possible appendicitis  He has been undergoing workup with a liver mass, however during this hospital stay, he has developed high spiking fever  However he had remained stable  Overnight he had been developing some increasing shortness of breath and worsening abdominal pain  He therefore has now been transferred to the intensive care unit for supportive care  His blood pressure has become soft and he is receiving IV fluid resuscitation  The patient  does admit to having ongoing cough and has now developed some shortness of breath  He denies any chest pain  He is not having current diarrhea although he admits to having previous diarrhea intermittently  He denies having any vomiting  He denies any headache or stiff neck, denies any dysuria or hematuria  REVIEW OF SYSTEMS:  A complete 12 point system-based review of systems is negative other than that noted in the HPI  PAST MEDICAL HISTORY:  No past medical history on file  No past surgical history on file      FAMILY HISTORY:  Non-contributory    SOCIAL HISTORY:  Social History   Social History     Substance and Sexual Activity   Alcohol Use Yes Social History     Substance and Sexual Activity   Drug Use Not on file     Social History     Tobacco Use   Smoking Status Former Smoker   Smokeless Tobacco Never Used       ALLERGIES:  No Known Allergies    MEDICATIONS:  All current active medications have been reviewed  Antibiotics:  Unasyn 1    PHYSICAL EXAM:  Temp:  [98 7 °F (37 1 °C)-102 4 °F (39 1 °C)] 99 9 °F (37 7 °C)  HR:  [] 96  Resp:  [16-44] 36  BP: ()/(45-97) 78/45  SpO2:  [87 %-96 %] 89 %  Temp (24hrs), Av 4 °F (38 °C), Min:98 7 °F (37 1 °C), Max:102 4 °F (39 1 °C)  Current: Temperature: 99 9 °F (37 7 °C)(Tx from S2)    Intake/Output Summary (Last 24 hours) at 2019 1210  Last data filed at 2019 6452  Gross per 24 hour   Intake 2242 5 ml   Output    Net 2242 5 ml       General Appearance:  Appearing ill, uncomfortable, and mild respiratory distress   Head:  Normocephalic, without obvious abnormality, atraumatic   Eyes:  Conjunctiva pink and sclera anicteric, both eyes   Nose: Nares normal, mucosa normal, no drainage   Throat: Oropharynx moist without lesions   Neck: Supple, symmetrical, no adenopathy, no tenderness/mass/nodules   Back:   Symmetric, no curvature, ROM normal, no CVA tenderness   Lungs:   Decreased breath sounds bilaterally, respirations unlabored   Chest Wall:  No tenderness or deformity   Heart:  Tachycardic; no murmur, rub or gallop   Abdomen:   Soft, right-sided abdominal tenderness, mildly distended, positive bowel sounds    Extremities: No cyanosis, clubbing or edema   Skin: No rashes or lesions  No draining wounds noted  Lymph nodes: Cervical, supraclavicular nodes normal   Neurologic: Alert and oriented times 3, extremity strength 5/5 and symmetric       LABS, IMAGING, & OTHER STUDIES:  Lab Results:  I have personally reviewed pertinent labs    Results from last 7 days   Lab Units 19  0445 19  0517 19  0551   WBC Thousand/uL 19 05* 11 49* 15 19*   HEMOGLOBIN g/dL 12 1 9 6* 9 8* PLATELETS Thousands/uL 275 97* 125*     Results from last 7 days   Lab Units 02/28/19  0902 02/28/19  0352 02/27/19  0517 02/26/19  0551 02/25/19 2037   SODIUM mmol/L 127* 128* 132* 130* 134*   POTASSIUM mmol/L 3 9 3 9 3 8 4 2 4 1   CHLORIDE mmol/L 97* 96* 101 98* 101   CO2 mmol/L 17* 17* 24 23 24   BUN mg/dL 16 11 10 10 12   CREATININE mg/dL 2 00* 1 19 0 77 0 81 0 78   EGFR ml/min/1 73sq m 39 72 108 106 107   CALCIUM mg/dL 8 2* 8 1* 8 1* 8 1* 8 4   AST U/L  --   --  42 62* 66*   ALT U/L  --   --  28 39 43   ALK PHOS U/L  --   --  178* 189* 201*     Results from last 7 days   Lab Units 02/26/19  1041 02/25/19 2055 02/25/19 2046   BLOOD CULTURE   --  No Growth at 48 hrs  No Growth at 48 hrs  C DIFF TOXIN B  NEGATIVE for C difficle toxin by PCR    --   --        Imaging Studies:   I have personally reviewed pertinent imaging study reports and images in PACS  CT chest abdomen pelvis-Fluid-filled and dilated appendix with appendiceal wall thickening and periappendiceal fat stranding and fluid, suspicious for acute appendicitis in the appropriate clinical setting  Hepatic cirrhosis and splenomegaly, with associated perisplenic and mesenteric varices suggesting a component of portal hypertension  Enlargement of the caudate lobe which contains an ill-defined and heterogeneous mass lesion with large internal areas of hypodensity and internal septations as described, this measures approximately 10 5 x 9 4 x 11 8 cm in size (axial image 55, series   2)   Primary differential consideration is hepatic abscess in the appropriate clinical setting, although hepatic neoplasm such as hepatocellular carcinoma is also considered   Correlation with the patient's symptoms and laboratory values recommended  Prominent lymph nodes in the upper abdomen and retroperitoneum, largest measures approximately 1 4 cm in size, possibly reactive      Partially distended bladder   Mild circumferential bladder wall thickening noted, probably exaggerated by underdistention

## 2019-02-28 NOTE — PROGRESS NOTES
Pt continued to be SOB  Respirations remain >38  I increased O2 to 4L as per PA-C  Pt became pale and diaphoretic, continues to c/o of increasing SOB  Blood glocuse = 89  PA-C contacted the [address], CRNP  She instructed me to call a RR  Pt remains in room and did not require transfer to ICU

## 2019-02-28 NOTE — PROGRESS NOTES
Patient Name: Princess Campos  Patient MRN: [id number]  Date: 02/28/19  Service: [facility]    Subjective   Princess Campos is a 53 y o  male who was admitted with Liver mass  He is quite different today with lethargy fever and hyperventilation  Patient denies: Chest pain, shortness of breath, fever, weight loss, rash, adenopathy  All others negative except as noted in HPI  Objective     Vitals  Blood pressure 98/55, pulse 100, temperature 100 3 °F (37 9 °C), temperature source Temporal, resp  rate (!) 44, height 5' 7 01" (1 702 m), SpO2 92 %  ROS:  Patient denies: Chest pain, shortness of breath, fever, weight loss, rash, adenopathy  All others negative except as noted in HPI  General:  apparent distress  Eyes: No scleral icterus  ENT: MMM  Abdomen: Soft  Nontender  Nondistended  Bowel sounds present and normoactive  No hepatosplenomegaly    Skin: No jaundice    Laboratory Studies  Lab Results   Component Value Date    CREATININE 1 19 02/28/2019    BUN 11 02/28/2019    SODIUM 128 (L) 02/28/2019    K 3 9 02/28/2019    CL 96 (L) 02/28/2019    CO2 17 (L) 02/28/2019    CALCIUM 8 1 (L) 02/28/2019    ALKPHOS 178 (H) 02/27/2019    AST 42 02/27/2019    ALT 28 02/27/2019     Lab Results   Component Value Date    WBC 19 05 (H) 02/28/2019    HGB 12 1 02/28/2019    HCT 38 1 02/28/2019     02/28/2019    MCV 96 02/28/2019     Lab Results   Component Value Date    PROTIME 18 4 (H) 02/25/2019    INR 1 52 (H) 02/25/2019       Inhouse Medications       Current Facility-Administered Medications:     acetaminophen (TYLENOL) tablet 650 mg, 650 mg, Oral, Q6H PRN, 650 mg at 02/28/19 0333    ampicillin-sulbactam (UNASYN) 1 5 g in sodium chloride 0 9 % 50 mL IVPB, 1 5 g, Intravenous, Q6H    benzonatate (TESSALON PERLES) capsule 100 mg, 100 mg, Oral, TID PRN, 100 mg at 02/27/19 7921    enoxaparin (LOVENOX) subcutaneous injection 40 mg, 40 mg, Subcutaneous, Daily, 40 mg at 02/28/19 0866   hydrocodone-chlorpheniramine polistirex (TUSSIONEX) 10-8 mg/5 mL ER suspension 5 mL, 5 mL, Oral, Q12H PRN, 5 mL at 02/28/19 0342    ibuprofen (MOTRIN) tablet 600 mg, 600 mg, Oral, Q6H PRN, 600 mg at 02/26/19 5792    ipratropium-albuterol (DUO-NEB) 0 5-2 5 mg/3 mL inhalation solution 3 mL, 3 mL, Nebulization, Q6H PRN    ondansetron (ZOFRAN) injection 4 mg, 4 mg, Intravenous, Q8H PRN    oxyCODONE (ROXICODONE) immediate release tablet 10 mg, 10 mg, Oral, Q4H PRN, 10 mg at 02/27/19 1355    oxyCODONE (ROXICODONE) IR tablet 5 mg, 5 mg, Oral, Q4H PRN, 5 mg at 02/28/19 0417    pantoprazole (PROTONIX) EC tablet 40 mg, 40 mg, Oral, Early Morning, 40 mg at 02/28/19 0700    sodium bicarbonate 150 mEq in dextrose 5 % 1,000 mL infusion, 100 mL/hr, Intravenous, Continuous, 100 mL/hr at 02/28/19 0658      Assessment/Plan:    Discussed with Dr Eliezer Pulido and the patient's significant other  This sudden change in his clinical status suggest sepsis and brings to the fore the question of whether this is indeed a liver abscess    Infectious Disease to see with consideration of percutaneous drainage    Oriana Schaefer MD

## 2019-03-01 ENCOUNTER — APPOINTMENT (INPATIENT)
Dept: RADIOLOGY | Facility: HOSPITAL | Age: 49
DRG: 441 | End: 2019-03-01
Payer: COMMERCIAL

## 2019-03-01 PROBLEM — R33.8 ACUTE RETENTION OF URINE: Status: ACTIVE | Noted: 2019-03-01

## 2019-03-01 PROBLEM — J86.9 EMPYEMA (HCC): Status: ACTIVE | Noted: 2019-03-01

## 2019-03-01 PROBLEM — A41.9 SEVERE SEPSIS (HCC): Status: ACTIVE | Noted: 2019-03-01

## 2019-03-01 PROBLEM — N17.9 AKI (ACUTE KIDNEY INJURY) (HCC): Status: ACTIVE | Noted: 2019-03-01

## 2019-03-01 PROBLEM — R65.20 SEVERE SEPSIS (HCC): Status: ACTIVE | Noted: 2019-03-01

## 2019-03-01 LAB
ALBUMIN SERPL BCP-MCNC: 1.6 G/DL (ref 3.5–5)
ALP SERPL-CCNC: 104 U/L (ref 46–116)
ALT SERPL W P-5'-P-CCNC: 20 U/L (ref 12–78)
ANION GAP SERPL CALCULATED.3IONS-SCNC: 12 MMOL/L (ref 4–13)
ANION GAP SERPL CALCULATED.3IONS-SCNC: 12 MMOL/L (ref 4–13)
AST SERPL W P-5'-P-CCNC: 39 U/L (ref 5–45)
BASE EXCESS BLDA CALC-SCNC: -4.3 MMOL/L
BASE EXCESS BLDA CALC-SCNC: -5.7 MMOL/L
BASE EXCESS BLDA CALC-SCNC: -6.8 MMOL/L
BASOPHILS # BLD AUTO: 0.13 THOUSANDS/ΜL (ref 0–0.1)
BASOPHILS NFR BLD AUTO: 1 % (ref 0–1)
BILIRUB SERPL-MCNC: 4.62 MG/DL (ref 0.2–1)
BODY TEMPERATURE: 98.5 DEGREES FEHRENHEIT
BUN SERPL-MCNC: 19 MG/DL (ref 5–25)
BUN SERPL-MCNC: 20 MG/DL (ref 5–25)
CA-I BLD-SCNC: 1.05 MMOL/L (ref 1.12–1.32)
CALCIUM SERPL-MCNC: 7.3 MG/DL (ref 8.3–10.1)
CALCIUM SERPL-MCNC: 7.5 MG/DL (ref 8.3–10.1)
CHLORIDE SERPL-SCNC: 101 MMOL/L (ref 100–108)
CHLORIDE SERPL-SCNC: 99 MMOL/L (ref 100–108)
CO2 SERPL-SCNC: 21 MMOL/L (ref 21–32)
CO2 SERPL-SCNC: 22 MMOL/L (ref 21–32)
CREAT SERPL-MCNC: 1.51 MG/DL (ref 0.6–1.3)
CREAT SERPL-MCNC: 1.73 MG/DL (ref 0.6–1.3)
EOSINOPHIL # BLD AUTO: 0.01 THOUSAND/ΜL (ref 0–0.61)
EOSINOPHIL NFR BLD AUTO: 0 % (ref 0–6)
ERYTHROCYTE [DISTWIDTH] IN BLOOD BY AUTOMATED COUNT: 17 % (ref 11.6–15.1)
GFR SERPL CREATININE-BSD FRML MDRD: 46 ML/MIN/1.73SQ M
GFR SERPL CREATININE-BSD FRML MDRD: 54 ML/MIN/1.73SQ M
GLUCOSE FLD-MCNC: <1 MG/DL
GLUCOSE SERPL-MCNC: 103 MG/DL (ref 65–140)
GLUCOSE SERPL-MCNC: 106 MG/DL (ref 65–140)
GRAM STN SPEC: NORMAL
HCO3 BLDA-SCNC: 18.2 MMOL/L (ref 22–28)
HCO3 BLDA-SCNC: 19.4 MMOL/L (ref 22–28)
HCO3 BLDA-SCNC: 19.6 MMOL/L (ref 22–28)
HCT VFR BLD AUTO: 32.1 % (ref 36.5–49.3)
HGB BLD-MCNC: 10.3 G/DL (ref 12–17)
HOROWITZ INDEX BLDA+IHG-RTO: 60 MM[HG]
HOROWITZ INDEX BLDA+IHG-RTO: 70 MM[HG]
IMM GRANULOCYTES # BLD AUTO: >0.5 THOUSAND/UL (ref 0–0.2)
IMM GRANULOCYTES NFR BLD AUTO: 5 % (ref 0–2)
INR PPP: 2.07 (ref 0.86–1.17)
LACTATE SERPL-SCNC: 3.6 MMOL/L (ref 0.5–2)
LYMPHOCYTES # BLD AUTO: 1.54 THOUSANDS/ΜL (ref 0.6–4.47)
LYMPHOCYTES NFR BLD AUTO: 2 %
LYMPHOCYTES NFR BLD AUTO: 6 % (ref 14–44)
LYMPHOCYTES NFR BLD AUTO: 7 %
MAGNESIUM SERPL-MCNC: 1.8 MG/DL (ref 1.6–2.6)
MAGNESIUM SERPL-MCNC: 1.8 MG/DL (ref 1.6–2.6)
MCH RBC QN AUTO: 30.3 PG (ref 26.8–34.3)
MCHC RBC AUTO-ENTMCNC: 32.1 G/DL (ref 31.4–37.4)
MCV RBC AUTO: 94 FL (ref 82–98)
MONOCYTES # BLD AUTO: 1.37 THOUSAND/ΜL (ref 0.17–1.22)
MONOCYTES NFR BLD AUTO: 2 %
MONOCYTES NFR BLD AUTO: 3 %
MONOCYTES NFR BLD AUTO: 5 % (ref 4–12)
NEUTROPHILS # BLD AUTO: 21.76 THOUSANDS/ΜL (ref 1.85–7.62)
NEUTS BAND NFR FLD MANUAL: 33 %
NEUTS BAND NFR FLD MANUAL: 6 %
NEUTS SEG NFR BLD AUTO: 63 %
NEUTS SEG NFR BLD AUTO: 83 % (ref 43–75)
NEUTS SEG NFR BLD AUTO: 84 %
NRBC BLD AUTO-RTO: 0 /100 WBCS
O2 CT BLDA-SCNC: 14.8 ML/DL (ref 16–23)
O2 CT BLDA-SCNC: 15.4 ML/DL (ref 16–23)
O2 CT BLDA-SCNC: 15.8 ML/DL (ref 16–23)
OXYHGB MFR BLDA: 94.3 % (ref 94–97)
OXYHGB MFR BLDA: 97.5 % (ref 94–97)
OXYHGB MFR BLDA: 97.6 % (ref 94–97)
PCO2 BLDA: 32 MM HG (ref 36–44)
PCO2 BLDA: 34.5 MM HG (ref 36–44)
PCO2 BLDA: 36.4 MM HG (ref 36–44)
PCO2 TEMP ADJ BLDA: 31.9 MM HG (ref 36–44)
PEEP RESPIRATORY: 5 CM[H2O]
PEEP RESPIRATORY: 5 CM[H2O]
PH BLD: 7.41 [PH] (ref 7.35–7.45)
PH BLDA: 7.34 [PH] (ref 7.35–7.45)
PH BLDA: 7.34 [PH] (ref 7.35–7.45)
PH BLDA: 7.4 [PH] (ref 7.35–7.45)
PLATELET # BLD AUTO: 190 THOUSANDS/UL (ref 149–390)
PMV BLD AUTO: 10 FL (ref 8.9–12.7)
PO2 BLD: 70.4 MM HG (ref 75–129)
PO2 BLDA: 103.8 MM HG (ref 75–129)
PO2 BLDA: 121.1 MM HG (ref 75–129)
PO2 BLDA: 70.9 MM HG (ref 75–129)
POTASSIUM SERPL-SCNC: 3.8 MMOL/L (ref 3.5–5.3)
POTASSIUM SERPL-SCNC: 3.9 MMOL/L (ref 3.5–5.3)
PROT SERPL-MCNC: 5.8 G/DL (ref 6.4–8.2)
PROTHROMBIN TIME: 23.4 SECONDS (ref 11.8–14.2)
RBC # BLD AUTO: 3.4 MILLION/UL (ref 3.88–5.62)
SITE: NORMAL
SITE: NORMAL
SODIUM SERPL-SCNC: 133 MMOL/L (ref 136–145)
SODIUM SERPL-SCNC: 134 MMOL/L (ref 136–145)
SPECIMEN SOURCE: ABNORMAL
TOTAL CELLS COUNTED SPEC: 100
TOTAL CELLS COUNTED SPEC: 100
VENT AC: 12
VENT AC: 12
VENT- AC: AC
VENT- AC: AC
VT SETTING VENT: 400 ML
VT SETTING VENT: 400 ML
WBC # BLD AUTO: 26.09 THOUSAND/UL (ref 4.31–10.16)
WBC # FLD MANUAL: NORMAL /UL
WBC # FLD MANUAL: NORMAL /UL

## 2019-03-01 PROCEDURE — 0T9B70Z DRAINAGE OF BLADDER WITH DRAINAGE DEVICE, VIA NATURAL OR ARTIFICIAL OPENING: ICD-10-PCS | Performed by: UROLOGY

## 2019-03-01 PROCEDURE — 83735 ASSAY OF MAGNESIUM: CPT | Performed by: NURSE PRACTITIONER

## 2019-03-01 PROCEDURE — 80053 COMPREHEN METABOLIC PANEL: CPT | Performed by: HOSPITALIST

## 2019-03-01 PROCEDURE — 99291 CRITICAL CARE FIRST HOUR: CPT | Performed by: INTERNAL MEDICINE

## 2019-03-01 PROCEDURE — 94003 VENT MGMT INPAT SUBQ DAY: CPT

## 2019-03-01 PROCEDURE — 0T9B30Z DRAINAGE OF BLADDER WITH DRAINAGE DEVICE, PERCUTANEOUS APPROACH: ICD-10-PCS | Performed by: INTERNAL MEDICINE

## 2019-03-01 PROCEDURE — 71045 X-RAY EXAM CHEST 1 VIEW: CPT

## 2019-03-01 PROCEDURE — 99254 IP/OBS CNSLTJ NEW/EST MOD 60: CPT | Performed by: UROLOGY

## 2019-03-01 PROCEDURE — 02H633Z INSERTION OF INFUSION DEVICE INTO RIGHT ATRIUM, PERCUTANEOUS APPROACH: ICD-10-PCS | Performed by: INTERNAL MEDICINE

## 2019-03-01 PROCEDURE — 99233 SBSQ HOSP IP/OBS HIGH 50: CPT | Performed by: INTERNAL MEDICINE

## 2019-03-01 PROCEDURE — 83605 ASSAY OF LACTIC ACID: CPT | Performed by: NURSE PRACTITIONER

## 2019-03-01 PROCEDURE — 80048 BASIC METABOLIC PNL TOTAL CA: CPT | Performed by: NURSE PRACTITIONER

## 2019-03-01 PROCEDURE — 82805 BLOOD GASES W/O2 SATURATION: CPT | Performed by: NURSE PRACTITIONER

## 2019-03-01 PROCEDURE — 83735 ASSAY OF MAGNESIUM: CPT | Performed by: HOSPITALIST

## 2019-03-01 PROCEDURE — 85610 PROTHROMBIN TIME: CPT | Performed by: NURSE PRACTITIONER

## 2019-03-01 PROCEDURE — C9113 INJ PANTOPRAZOLE SODIUM, VIA: HCPCS | Performed by: NURSE PRACTITIONER

## 2019-03-01 PROCEDURE — 51102 DRAIN BL W/CATH INSERTION: CPT | Performed by: UROLOGY

## 2019-03-01 PROCEDURE — 94640 AIRWAY INHALATION TREATMENT: CPT

## 2019-03-01 PROCEDURE — 85025 COMPLETE CBC W/AUTO DIFF WBC: CPT | Performed by: HOSPITALIST

## 2019-03-01 PROCEDURE — 82330 ASSAY OF CALCIUM: CPT | Performed by: NURSE PRACTITIONER

## 2019-03-01 RX ORDER — LIDOCAINE HYDROCHLORIDE 20 MG/ML
JELLY TOPICAL ONCE
Status: DISCONTINUED | OUTPATIENT
Start: 2019-03-01 | End: 2019-03-04

## 2019-03-01 RX ORDER — SODIUM CHLORIDE, SODIUM LACTATE, POTASSIUM CHLORIDE, CALCIUM CHLORIDE 600; 310; 30; 20 MG/100ML; MG/100ML; MG/100ML; MG/100ML
100 INJECTION, SOLUTION INTRAVENOUS CONTINUOUS
Status: DISCONTINUED | OUTPATIENT
Start: 2019-03-01 | End: 2019-03-03

## 2019-03-01 RX ORDER — VANCOMYCIN HYDROCHLORIDE 1 G/200ML
1000 INJECTION, SOLUTION INTRAVENOUS EVERY 12 HOURS
Status: DISCONTINUED | OUTPATIENT
Start: 2019-03-01 | End: 2019-03-03

## 2019-03-01 RX ADMIN — PROPOFOL 35 MCG/KG/MIN: 10 INJECTION, EMULSION INTRAVENOUS at 17:46

## 2019-03-01 RX ADMIN — SODIUM CHLORIDE 100 ML/HR: 0.9 INJECTION, SOLUTION INTRAVENOUS at 03:31

## 2019-03-01 RX ADMIN — CEFEPIME HYDROCHLORIDE 2000 MG: 2 INJECTION, POWDER, FOR SOLUTION INTRAVENOUS at 11:47

## 2019-03-01 RX ADMIN — CHLORHEXIDINE GLUCONATE 0.12% ORAL RINSE 15 ML: 1.2 LIQUID ORAL at 08:14

## 2019-03-01 RX ADMIN — VASOPRESSIN 0.04 UNITS/MIN: 20 INJECTION INTRAVENOUS at 19:40

## 2019-03-01 RX ADMIN — METRONIDAZOLE 500 MG: 500 INJECTION, SOLUTION INTRAVENOUS at 17:21

## 2019-03-01 RX ADMIN — CEFEPIME HYDROCHLORIDE 2000 MG: 2 INJECTION, POWDER, FOR SOLUTION INTRAVENOUS at 23:23

## 2019-03-01 RX ADMIN — VANCOMYCIN HYDROCHLORIDE 1000 MG: 1 INJECTION, SOLUTION INTRAVENOUS at 21:59

## 2019-03-01 RX ADMIN — PROPOFOL 35 MCG/KG/MIN: 10 INJECTION, EMULSION INTRAVENOUS at 09:05

## 2019-03-01 RX ADMIN — MAGNESIUM SULFATE HEPTAHYDRATE 2 G: 40 INJECTION, SOLUTION INTRAVENOUS at 00:19

## 2019-03-01 RX ADMIN — METRONIDAZOLE 500 MG: 500 INJECTION, SOLUTION INTRAVENOUS at 08:12

## 2019-03-01 RX ADMIN — PROPOFOL 25 MCG/KG/MIN: 10 INJECTION, EMULSION INTRAVENOUS at 23:56

## 2019-03-01 RX ADMIN — PANTOPRAZOLE SODIUM 40 MG: 40 INJECTION, POWDER, FOR SOLUTION INTRAVENOUS at 08:14

## 2019-03-01 RX ADMIN — SODIUM CHLORIDE, SODIUM LACTATE, POTASSIUM CHLORIDE, AND CALCIUM CHLORIDE 125 ML/HR: .6; .31; .03; .02 INJECTION, SOLUTION INTRAVENOUS at 03:21

## 2019-03-01 RX ADMIN — CHLORHEXIDINE GLUCONATE 0.12% ORAL RINSE 15 ML: 1.2 LIQUID ORAL at 21:10

## 2019-03-01 RX ADMIN — NOREPINEPHRINE BITARTRATE 22 MCG/MIN: 1 INJECTION INTRAVENOUS at 14:55

## 2019-03-01 RX ADMIN — VASOPRESSIN 0.04 UNITS/MIN: 20 INJECTION INTRAVENOUS at 13:41

## 2019-03-01 RX ADMIN — PROPOFOL 40 MCG/KG/MIN: 10 INJECTION, EMULSION INTRAVENOUS at 04:09

## 2019-03-01 RX ADMIN — FOLIC ACID 1 MG: 5 INJECTION, SOLUTION INTRAMUSCULAR; INTRAVENOUS; SUBCUTANEOUS at 11:17

## 2019-03-01 RX ADMIN — THIAMINE HYDROCHLORIDE 100 MG: 100 INJECTION, SOLUTION INTRAMUSCULAR; INTRAVENOUS at 09:00

## 2019-03-01 RX ADMIN — NOREPINEPHRINE BITARTRATE 24 MCG/MIN: 1 INJECTION INTRAVENOUS at 02:26

## 2019-03-01 RX ADMIN — NOREPINEPHRINE BITARTRATE 20 MCG/MIN: 1 INJECTION INTRAVENOUS at 21:59

## 2019-03-01 RX ADMIN — NOREPINEPHRINE BITARTRATE 18 MCG/MIN: 1 INJECTION INTRAVENOUS at 18:16

## 2019-03-01 RX ADMIN — CALCIUM GLUCONATE 2 G: 98 INJECTION, SOLUTION INTRAVENOUS at 00:25

## 2019-03-01 RX ADMIN — ENOXAPARIN SODIUM 40 MG: 40 INJECTION SUBCUTANEOUS at 08:14

## 2019-03-01 RX ADMIN — NOREPINEPHRINE BITARTRATE 24 MCG/MIN: 1 INJECTION INTRAVENOUS at 12:06

## 2019-03-01 RX ADMIN — PROPOFOL 25 MCG/KG/MIN: 10 INJECTION, EMULSION INTRAVENOUS at 12:07

## 2019-03-01 RX ADMIN — IPRATROPIUM BROMIDE AND ALBUTEROL SULFATE 3 ML: 2.5; .5 SOLUTION RESPIRATORY (INHALATION) at 17:54

## 2019-03-01 RX ADMIN — SODIUM CHLORIDE, SODIUM LACTATE, POTASSIUM CHLORIDE, AND CALCIUM CHLORIDE 125 ML/HR: .6; .31; .03; .02 INJECTION, SOLUTION INTRAVENOUS at 13:45

## 2019-03-01 RX ADMIN — VANCOMYCIN HYDROCHLORIDE 1000 MG: 1 INJECTION, SOLUTION INTRAVENOUS at 10:01

## 2019-03-01 RX ADMIN — NOREPINEPHRINE BITARTRATE 22 MCG/MIN: 1 INJECTION INTRAVENOUS at 05:38

## 2019-03-01 RX ADMIN — SODIUM CHLORIDE, SODIUM LACTATE, POTASSIUM CHLORIDE, AND CALCIUM CHLORIDE 100 ML/HR: .6; .31; .03; .02 INJECTION, SOLUTION INTRAVENOUS at 22:33

## 2019-03-01 RX ADMIN — NOREPINEPHRINE BITARTRATE 22 MCG/MIN: 1 INJECTION INTRAVENOUS at 08:57

## 2019-03-01 NOTE — PROGRESS NOTES
Patient Name: Olman Romano  Patient MRN: [id number]  Date: 03/01/19  Service: [facility]    Subjective   Olman Romano is a 53 y o  male who was admitted with Liver mass  He had a liver abscess drain and IR yesterday they large amount of foul-smelling bloody fluid was significant debris  Culture is pending  Patient is currently on antibiotics  He is intubated and sedated  Alpha fetoprotein and CEA are both within normal limits  Objective     Vitals  Blood pressure (!) 65/33, pulse 78, temperature 98 5 °F (36 9 °C), temperature source Temporal, resp  rate (!) 9, height 5' 8" (1 727 m), weight 102 kg (225 lb 8 5 oz), SpO2 96 %  General:  Intubated and sedated  Eyes: No scleral icterus  ENT: MMM  Card: RRR no murmur  Lungs:  Chest tube in place  Coarse breath sounds  Abdomen: Soft, please  Bowel sounds present and normoactive    Skin: No jaundice  Neuro:  Sedated        Laboratory Studies  Lab Results   Component Value Date    CREATININE 1 51 (H) 03/01/2019    BUN 20 03/01/2019    SODIUM 134 (L) 03/01/2019    K 3 8 03/01/2019     03/01/2019    CO2 21 03/01/2019    CALCIUM 7 5 (L) 03/01/2019    ALKPHOS 104 03/01/2019    AST 39 03/01/2019    ALT 20 03/01/2019     Lab Results   Component Value Date    WBC 26 09 (H) 03/01/2019    HGB 10 3 (L) 03/01/2019    HCT 32 1 (L) 03/01/2019     03/01/2019    MCV 94 03/01/2019     Lab Results   Component Value Date    PROTIME 23 4 (H) 03/01/2019    INR 2 07 (H) 03/01/2019       Imaging and Other Studies    Inhouse Medications       Current Facility-Administered Medications:     acetaminophen (TYLENOL) tablet 650 mg, 650 mg, Oral, Q6H PRN, 650 mg at 02/28/19 0333    cefepime (MAXIPIME) 2,000 mg in dextrose 5 % 50 mL IVPB, 2,000 mg, Intravenous, Q12H, Stopped at 02/28/19 9905    chlorhexidine (PERIDEX) 0 12 % oral rinse 15 mL, 15 mL, Swish & Spit, Q12H [address], 15 mL at 03/01/19 0814    enoxaparin (LOVENOX) subcutaneous injection 40 mg, 40 mg, Subcutaneous, Daily, 40 mg at 17/77/65 2657    folic acid 1 mg in sodium chloride 0 9 % 50 mL IVPB, 1 mg, Intravenous, Daily    ipratropium-albuterol (DUO-NEB) 0 5-2 5 mg/3 mL inhalation solution 3 mL, 3 mL, Nebulization, Q6H PRN    lactated ringers infusion, 125 mL/hr, Intravenous, Continuous, 125 mL/hr at 03/01/19 0645    lidocaine (URO-JET) 2 % topical gel, , Topical, Once    metroNIDAZOLE (FLAGYL) IVPB (premix) 500 mg, 500 mg, Intravenous, Q8H, Stopped at 03/01/19 0842    norepinephrine (LEVOPHED) 4 mg (STANDARD CONCENTRATION) IV in sodium chloride 0 9% 250 mL, 1-30 mcg/min, Intravenous, Titrated, 24 mcg/min at 03/01/19 1008    ondansetron (ZOFRAN) injection 4 mg, 4 mg, Intravenous, Q8H PRN    pantoprazole (PROTONIX) injection 40 mg, 40 mg, Intravenous, Q24H ALBERTO, 40 mg at 03/01/19 0814    propofol (DIPRIVAN) 1000 mg in 100 mL infusion (premix), 5-50 mcg/kg/min, Intravenous, Titrated, 35 mcg/kg/min at 03/01/19 0905    sodium chloride 0 9 % infusion, 100 mL/hr, Intravenous, Continuous, Stopped at 03/01/19 0622    thiamine (VITAMIN B1) 100 mg in sodium chloride 0 9 % 50 mL IVPB, 100 mg, Intravenous, Daily, Stopped at 03/01/19 0930    vancomycin (VANCOCIN) IVPB (premix) 1,000 mg, 1,000 mg, Intravenous, Q12H, 1,000 mg at 03/01/19 1001      Assessment/Plan:  1  Sepsis with septic shock secondary to liver abscess  Status post drainage by IR on antibiotics id following  2  Cirrhosis with alcohol abuse  Total bili remains elevated  AST and ALT within normal limits    3  Acute hypoxic respiratory failure patient intubated on mechanical ventilation       Rip LINDSAY Lopez

## 2019-03-01 NOTE — CONSULTS
Consult - Urology   [place] 1970, 53 y o  male MRN: [id number]    Unit/Bed#: ICU 06 Encounter: [id number]    Acute retention of urine  Assessment & Plan  After attempted Raymundo catheter placement, bedside procedure performed in conjunction with Dr Remi Kruse with resultant placement of a 12 Slovenian suprapubic catheter with return of clear yellow urine  Bedside rounds performed with CARITO Morris  Seen and procedure done in conjunction with LINDSAY Isabel of  aware of placement of SPT  Maintain SPT to gravity, flush with 120cc PRN  Subjective/Objective     Subjective:   History is obtained from the patient and chart review  He is a 60-year-old male with no pertinent urologic surgical history  He has never had a Raymundo catheter other urological procedure  He is currently intubated sedated in the ICU after drainage of a liver abscess and chest tube placement  Earlier this evening nurses attempted to place a Raymundo catheter with multiple attempts without success, 16 [place] J[place]myn, 18 Western Jazmyn, 16 Western Jazmyn coude, 18 Western Jazmyn coude and a 20 [address] 3 way hematuria catheter were attempted without return of urine  There was significant bleeding from the urethra  Urologic consultation was requested relative to inability to pass Raymundo catheter  Recent bladder scan showed 750 cc  Patient unable to provide history given that he is sedated intubated in ICU  Review of Systems   Unable to perform ROS: Acuity of condition       Objective:  Vitals: Blood pressure (!) 65/33, pulse 64, temperature 99 9 °F (37 7 °C), temperature source Temporal, resp  rate (!) 23, height 5' 8" (1 727 m), weight 105 kg (231 lb 0 7 oz), SpO2 98 %  ,Body mass index is 35 13 kg/m²        Intake/Output Summary (Last 24 hours) at 3/1/2019 0350  Last data filed at 3/1/2019 0036  Gross per 24 hour   Intake 9631 55 ml   Output 2670 ml   Net 6961 55 ml       Invasive Devices     Central Venous Catheter Line            CVC Central Lines 03/01/19 Triple 16cm less than 1 day          Peripheral Intravenous Line            Peripheral IV 02/25/19 Right Forearm 3 days    Peripheral IV 02/28/19 Left Antecubital less than 1 day    Peripheral IV 02/28/19 Left Forearm less than 1 day          Arterial Line            Arterial Line 02/28/19 Radial less than 1 day          Drain            Chest Tube Right less than 1 day    Closed/Suction Drain Anterior Abdomen Bulb 10 Fr  less than 1 day    NG/OG/Enteral Tube Orogastric 16 Fr Center mouth less than 1 day          Airway            ETT  Hi-Lo 8 mm less than 1 day                Physical Exam   Constitutional: He is oriented to person, place, and time  He appears well-nourished  He is cooperative  He does not appear ill  Sedated intubated   HENT:   Head: Normocephalic and atraumatic  Moist mucous membranes  Eyes: Conjunctivae and EOM are normal    Neck: Normal range of motion  Neck supple  No tracheal deviation present  Cardiovascular:   No murmur heard  Pulmonary/Chest: No respiratory distress  He has no wheezes  Intubated   Abdominal: Soft  Bowel sounds are normal  He exhibits no distension and no mass  There is no tenderness  Genitourinary:   Genitourinary Comments: Circumcised penis  Normal testicles bilaterally  Musculoskeletal: Normal range of motion  He exhibits no edema  Neurological: He is alert and oriented to person, place, and time  Skin: Skin is warm and dry  No rash noted  He is not diaphoretic  No erythema  No pallor  Psychiatric: He has a normal mood and affect  His behavior is normal  Judgment and thought content normal    Nursing note and vitals reviewed  History:  No past medical history on file  No past surgical history on file  No family history on file    Social History     Socioeconomic History    Marital status: Single     Spouse name: Not on file    Number of children: Not on file    Years of education: Not on file    Highest education level: Not on file   Occupational History    Not on file   Social Needs    Financial resource strain: Not on file    Food insecurity:     Worry: Not on file     Inability: Not on file    Transportation needs:     Medical: Not on file     Non-medical: Not on file   Tobacco Use    Smoking status: Former Smoker    Smokeless tobacco: Never Used   Substance and Sexual Activity    Alcohol use: Yes    Drug use: Not on file    Sexual activity: Not on file   Lifestyle    Physical activity:     Days per week: Not on file     Minutes per session: Not on file    Stress: Not on file   Relationships    Social connections:     Talks on phone: Not on file     Gets together: Not on file     Attends Taoist service: Not on file     Active member of club or organization: Not on file     Attends meetings of clubs or organizations: Not on file     Relationship status: Not on file    Intimate partner violence:     Fear of current or ex partner: Not on file     Emotionally abused: Not on file     Physically abused: Not on file     Forced sexual activity: Not on file   Other Topics Concern    Not on file   Social History Narrative    Not on file       Imaging:  Last imaging 2/25/2019 with bladder wall thickening possibly secondary to under distention  Imaging reviewed - both report and images personally reviewed       Labs:  Recent Labs     02/26/19  0551 02/27/19  0517 02/28/19  0445 02/28/19 2034   WBC 15 19* 11 49* 19 05* 29 66*     Recent Labs     02/26/19  0551 02/27/19  0517 02/28/19  0445 02/28/19 2034   HGB 9 8* 9 6* 12 1 10 4*       Recent Labs     02/26/19  0551 02/27/19  0517 02/28/19  0352 02/28/19  0902 02/28/19 2034 03/01/19  0013   CREATININE 0 81 0 77 1 19 2 00* 2 02* 1 73*       Yobani Dukes PA-C  Date: 3/1/2019 Time: 3:50 AM

## 2019-03-01 NOTE — PROGRESS NOTES
Suprapubic Aspiration  Date/Time: 3/1/2019 3:48 AM  Performed by: Alex Hopkins MD  Authorized by: Alex Hopkins MD     Patient location:  Bedside  Consent:     Consent obtained:  Emergent situation and written    Consent given by:  Healthcare agent    Risks discussed:  Bleeding, infection, pain and bowel perforation    Alternatives discussed:  No treatment and delayed treatment  Universal protocol:     Procedure explained and questions answered to patient or proxy's satisfaction: yes      Immediately prior to the procedure a time out was called: yes      Patient identity confirmed:  Arm band  Anesthesia (see MAR for exact dosages): Anesthesia method:  None      After numerous unsuccessful attempts at Raymundo catheter placement and unsuccessful cystoscopy during which true lumen of the urethra could not be identified, decision was made to proceed with percutaneous suprapubic tube placement at the bedside urgently  Consent was obtained from the patient's brothers who are present  The abdomen was prepped with Betadine  Patient was placed in steep Trendelenburg position  Sterile ultrasound probe was utilized to identify the bladder  A small stab was made 2 fingerbreadths above the symphysis pubis in the midline  Finer needle was placed into the bladder under ultrasound guidance and urine was aspirated  The 12 Bulgarian Stamey suprapubic catheter was then placed under ultrasound guidance as well  Clear yellow urine was obtained  The catheter was advanced into the bladder and sutured into place with a drain stitch  This was connected to the adapter and drainage bag  Dressing was placed and the tube was secured in numerous locations to the patient's skin  The procedure was tolerated well

## 2019-03-01 NOTE — PROGRESS NOTES
If unable to extubate and able to use GI tract, initiate tube feedings  If propofol running:  Jevity 1 5 @ 45 ml/hr goal   2 packs prosource daily  Water flush: 150 ml every 4 hrs    If propofol discontinued:  Jevity 1 5 @ 60 ml/hr  1 pack prosource daily  Water flush: 100 ml every 4 hrs to start    We will continue to monitor po diet progression or need for nutrition support

## 2019-03-01 NOTE — PROGRESS NOTES
Vancomycin Assessment    Catherine Linn is a 53 y o  male who is currently receiving vancomycin 1500mg IV every 12 hours for severe sepsis  Relevant clinical data and objective history reviewed:  Creatinine   Date Value Ref Range Status   03/01/2019 1 51 (H) 0 60 - 1 30 mg/dL Final     Comment:     Specimen Icteric; Results May be Affected   03/01/2019 1 73 (H) 0 60 - 1 30 mg/dL Final     Comment:     Specimen Icteric; Results May be Affected   02/28/2019 2 02 (H) 0 60 - 1 30 mg/dL Final     Comment:     Specimen Icteric; Results May be Affected     BP (!) 65/33   Pulse 66   Temp 98 5 °F (36 9 °C) (Temporal)   Resp (!) 26   Ht 5' 8" (1 727 m)   Wt 102 kg (225 lb 8 5 oz)   SpO2 99%   BMI 34 29 kg/m²   I/O last 3 completed shifts: In: 49627 5 [I V :8114 5; IV PLOPBHOKH:5645]  Out: 3376 [Urine:1025; Drains:140; Blood:100; Chest Tube:2700]  Lab Results   Component Value Date/Time    BUN 20 03/01/2019 05:07 AM    WBC 26 09 (H) 03/01/2019 05:07 AM    HGB 10 3 (L) 03/01/2019 05:07 AM    HCT 32 1 (L) 03/01/2019 05:07 AM    MCV 94 03/01/2019 05:07 AM     03/01/2019 05:07 AM     Temp Readings from Last 3 Encounters:   03/01/19 98 5 °F (36 9 °C) (Temporal)   02/25/19 97 6 °F (36 4 °C)   02/06/19 (!) 100 9 °F (38 3 °C)     Vancomycin Days of Therapy: 2    Assessment/Plan  The patient is currently on vancomycin utilizing scheduled dosing based on adjusted body weight (due to obesity)  The patient is currently receiving vancomycin 1500mg IV every 12 hours and after clinical evaluation will be changed to vancomycin 1000mg IV every 12 hours  Pharmacy will also follow closely for s/sx of nephrotoxicity, infusion reactions and appropriateness of therapy  BMP and CBC will be ordered per protocol  Plan for trough as patient approaches steady state, prior to the 4th  dose at approximately 0900 on 3/2/19  Due to infection severity, will target a trough of 15-20    Pharmacy will continue to follow the patient?s culture results and clinical progress daily      Dayanna Correaers, Pharmacist

## 2019-03-01 NOTE — PROGRESS NOTES
Progress Note - Clark 53 y o  male MRN: [id number]  Unit/Bed#: ICU 06 Encounter: [id number]    Assessment/Plan:  1  Sepsis with septic shock secondary to liver abscess  · Blood cultures remain negative  · Continue cefepime and vancomycin  · Pleural fluid culture pending  · Pleural fluid LDH 6433  · Pleural fluid protein 5 2  · Pleural fluid Gram stain negative  2  Right-sided pleural effusion  · Status post chest tube continue to monitor output  3  Acute hypoxic respiratory failure  · Intubated on mechanical ventilation  · Attempt weaning today  4  Hypotension secondary to 1    · Continue Levophed attempt to wean down  5  Anion gap metabolic acidosis:  Resolved  6  Urinary retention:  Status post emergent suprapubic catheter placed  7  Liver abscess drained by IR  8  Alcoholic liver cirrhosis  9  ETOH dependence      Critical Care Time:   Documented critical care time excludes any procedures documented elsewhere  It also excludes any family updates    _____________________________________________________________________    HPI/24hr events:    Urinary retention overnight  Unable to place a Raymundo  Urology was consulted and placed a super pubic catheter      Medications:    Current Facility-Administered Medications:  acetaminophen 650 mg Oral Q6H PRN Lopezsonia Reed DO    cefepime 2,000 mg Intravenous Q12H Tre Good MD Last Rate: Stopped (02/28/19 0855)   chlorhexidine 15 mL Swish & Spit Q12H [facility] LINDSAY Martinez    enoxaparin 40 mg Subcutaneous Daily Victorino Prechtel, DO    folic acid IVPB 1 mg Intravenous Daily LINDSAY Martinez    ipratropium-albuterol 3 mL Nebulization Q6H PRN Victorinodl Phelpsel, DO    lactated ringers 125 mL/hr Intravenous Continuous Victorino Prechtel, DO Last Rate: 125 mL/hr (03/01/19 0321)   lidocaine  Topical Once LINDSAY Johnson    metroNIDAZOLE 500 mg Intravenous Q8H Tre Good MD Last Rate: 500 mg (03/01/19 0812)   norepinephrine 1-30 mcg/min Intravenous Titrated Kamlesh Norwood MD Last Rate: 24 mcg/min (03/01/19 0814)   ondansetron 4 mg Intravenous Q8H PRN Victorino Gilbert,     pantoprazole 40 mg Intravenous Q24H [facility] LINDSAY Martinez    propofol 5-50 mcg/kg/min Intravenous Titrated SIVA SalazarNP Last Rate: 35 mcg/kg/min (03/01/19 0526)   sodium chloride 100 mL/hr Intravenous Continuous LINDSAY Johnson Last Rate: Stopped (03/01/19 0622)   thiamine 100 mg Intravenous Daily LINDSAY Martinez    vancomycin 1,000 mg Intravenous Q12H Roxann De La Cruz MD          lactated ringers 125 mL/hr Last Rate: 125 mL/hr (03/01/19 0321)   norepinephrine 1-30 mcg/min Last Rate: 24 mcg/min (03/01/19 0814)   propofol 5-50 mcg/kg/min Last Rate: 35 mcg/kg/min (03/01/19 0526)   sodium chloride 100 mL/hr Last Rate: Stopped (03/01/19 9195)         Physical exam:  Vitals: Body mass index is 34 29 kg/m²  Blood pressure (!) 65/33, pulse 66, temperature 98 5 °F (36 9 °C), temperature source Temporal, resp  rate (!) 26, height 5' 8" (1 727 m), weight 102 kg (225 lb 8 5 oz), SpO2 99 %  ,  Temp  Min: 97 5 °F (36 4 °C)  Max: 102 4 °F (39 1 °C)  IBW: 68 4 kg    SpO2: 99 %  SpO2 Activity: At Rest  O2 Device: Simple mask      Intake/Output Summary (Last 24 hours) at 3/1/2019 0826  Last data filed at 3/1/2019 2017  Gross per 24 hour   Intake 9572 04 ml   Output 3965 ml   Net 5607 04 ml       Invasive/non-invasive ventilation settings:   Respiratory    Lab Data (Last 4 hours)    None         O2/Vent Data (Last 4 hours)    None              Invasive Devices     Central Venous Catheter Line            CVC Central Lines 03/01/19 Triple 16cm less than 1 day          Peripheral Intravenous Line            Peripheral IV 02/25/19 Right Forearm 3 days    Peripheral IV 02/28/19 Left Antecubital less than 1 day          Arterial Line            Arterial Line 02/28/19 Radial less than 1 day          Drain            Chest Tube Right less than 1 day    Closed/Suction Drain Anterior Abdomen Bulb 10 Fr  less than 1 day    NG/OG/Enteral Tube Orogastric 16 Fr Center mouth less than 1 day    Suprapubic Catheter less than 1 day          Airway            ETT  Hi-Lo 8 mm less than 1 day                  Physical Exam:  Gen:  Intubated sedated  HEENT:  Atraumatic normocephalic pupils equal round reactive to light extraocular muscles intact sclerae anicteric oral pharyngeal is intubated  Neck:  Supple no JVD no lymphadenopathy  Chest:  Wheezes bilaterally throughout all lung fields  Respirations per the vent  Cor:  Regular rate and rhythm no murmurs rubs or gallops appreciated  Abd:  Rigid mildly distended  Ext:  No clubbing cyanosis or edema  Neuro:  Intubated and sedated moves all extremities during sedation break  Skin:  Warm dry and intact      Diagnostic Data:  Lab: I have personally reviewed pertinent lab results  CBC:   Results from last 7 days   Lab Units 03/01/19  0507 02/28/19 2034 02/28/19  0445   WBC Thousand/uL 26 09* 29 66* 19 05*   HEMOGLOBIN g/dL 10 3* 10 4* 12 1   HEMATOCRIT % 32 1* 32 0* 38 1   PLATELETS Thousands/uL 190 281 275       CMP:   Results from last 7 days   Lab Units 03/01/19  0507 03/01/19  0013 02/28/19 2034 02/27/19  0517 02/26/19  0551   SODIUM mmol/L 134* 133* 130*   < > 132* 130*   POTASSIUM mmol/L 3 8 3 9 3 9   < > 3 8 4 2   CHLORIDE mmol/L 101 99* 97*   < > 101 98*   CO2 mmol/L 21 22 20*   < > 24 23   BUN mg/dL 20 19 20   < > 10 10   CREATININE mg/dL 1 51* 1 73* 2 02*   < > 0 77 0 81   CALCIUM mg/dL 7 5* 7 3* 7 5*   < > 8 1* 8 1*   ALK PHOS U/L 104  --   --   --  178* 189*   ALT U/L 20  --   --   --  28 39   AST U/L 39  --   --   --  42 62*    < > = values in this interval not displayed       PT/INR:   Lab Results   Component Value Date    INR 2 07 (H) 03/01/2019   ,   Magnesium:   Results from last 7 days   Lab Units 03/01/19  0507 03/01/19  0321 02/28/19 2034   MAGNESIUM mg/dL 1 8 1 8 1 4*     Phosphorous:       Microbiology:  Results from last 7 days   Lab Units 02/28/19  2206 02/26/19  1041 02/25/19 2055 02/25/19 2046   BLOOD CULTURE   --   --  No Growth at 72 hrs  No Growth at 72 hrs  GRAM STAIN RESULT  4+ Polys  1+ Mononuclear Cells  No No bacteria seen  --   --   --    C DIFF TOXIN B   --  NEGATIVE for C difficle toxin by PCR    --   --        Imaging:  Chest x-ray done 3/1/19    Cardiac lab/EKG/telemetry/ECHO:   Sinus tachycardia    VTE Prophylaxis:  Enoxaparin    Code Status: Level 1 - Full Code    LINDSAY Butterfield    Portions of the record may have been created with voice recognition software  Occasional wrong word or "sound a like" substitutions may have occurred due to the inherent limitations of voice recognition software  Read the chart carefully and recognize, using context, where substitutions have occurred

## 2019-03-01 NOTE — PROCEDURES
Central Line Insertion  Date/Time: 3/1/2019 12:06 AM  Performed by: LINDSAY Mujica  Authorized by: LINDSAY Mujica     Patient location:  Bedside  Procedure performed by consultant: Sylvia Kimble  Consent:     Consent obtained:  Verbal    Consent given by:  Healthcare agent    Risks discussed:  Arterial puncture, incorrect placement, nerve damage, bleeding, infection and pneumothorax    Alternatives discussed:  No treatment  Universal protocol:     Procedure explained and questions answered to patient or proxy's satisfaction: yes      Relevant documents present and verified: yes      Test results available and properly labeled: yes      Radiology Images displayed and confirmed  If images not available, report reviewed: yes      Required blood products, implants, devices, and special equipment available: yes      Site/side marked: yes      Immediately prior to procedure, a time out was called: yes      Patient identity confirmed:  Arm band  Pre-procedure details:     Hand hygiene: Hand hygiene performed prior to insertion      Sterile barrier technique: All elements of maximal sterile technique followed      Skin preparation:  2% chlorhexidine  Indications:     Central line indications: medications requiring central line    Anesthesia (see MAR for exact dosages):      Anesthesia method:  Local infiltration    Local anesthetic:  Lidocaine 1% w/o epi  Procedure details:     Location:  Right internal jugular    Vessel type: vein      Laterality:  Right    Approach: percutaneous technique used      Patient position:  Reverse Trendelenburg    Catheter type:  Triple lumen 16cm    Catheter size:  7 Fr    Landmarks identified: yes      Ultrasound guidance: yes      Sterile ultrasound techniques: Sterile gel and sterile probe covers were used      Number of attempts:  2    Successful placement: yes      Vessel of catheter tip end:  Right atrium  Post-procedure details:     Post-procedure:  Line sutured and dressing applied    Assessment:  Blood return through all ports, free fluid flow, placement verified by x-ray and no pneumothorax on x-ray    Post-procedure complications: none      Patient tolerance of procedure:   Tolerated well, no immediate complications

## 2019-03-01 NOTE — PROGRESS NOTES
Progress Note - General Surgery   Kaitlynn Gleason 53 y o  male MRN: [id number]  Unit/Bed#: ICU 06 Encounter: [id number]      Subjective/Objective     Subjective:  Patient intubated sedated    Objective:     BP (!) 65/33   Pulse 78   Temp 99 °F (37 2 °C) (Temporal)   Resp (!) 31   Ht 5' 8" (1 727 m)   Wt 102 kg (225 lb 8 5 oz)   SpO2 95%   BMI 34 29 kg/m²       Intake/Output Summary (Last 24 hours) at 3/1/2019 1540  Last data filed at 3/1/2019 1401  Gross per 24 hour   Intake 8084 79 ml   Output 5285 ml   Net 2799 79 ml       Invasive Devices     Central Venous Catheter Line            CVC Central Lines 03/01/19 Triple 16cm less than 1 day          Peripheral Intravenous Line            Peripheral IV 02/25/19 Right Forearm 3 days    Peripheral IV 02/28/19 Left Antecubital 1 day          Arterial Line            Arterial Line 02/28/19 Radial less than 1 day          Drain            Chest Tube Right less than 1 day    Closed/Suction Drain Anterior Abdomen Bulb 10 Fr  less than 1 day    NG/OG/Enteral Tube Orogastric 16 Fr Center mouth less than 1 day    Suprapubic Catheter less than 1 day          Airway            ETT  Hi-Lo 8 mm less than 1 day                Physical Exam:       General Appearance:    Sedated   Head:    Normocephalic, without obvious abnormality, atraumatic   Nose:   Nares normal   Throat:   Lips, mucosa normal, pharynx clear   Neck:   Supple, symmetrical   Chest/Breast:   Def   Lungs:     Intubated, chest tube on the right  Decreased breath sounds   Heart:    Regular rate and rhythm, S1 and S2 normal, no murmur, rub    or gallop   Abdomen:     Soft, minimal tenderness  Drain in place       ÅSBRO:    Def   Rectal:    Def   Extremities:   Extremities normal, atraumatic, no cyanosis or edema   Skin:   Skin color, texture, turgor normal, no rashes or lesions   Neurologic:                    Lab, Imaging and other studies:I have personally reviewed pertinent lab results      Labs in chart were reviewed  VTE Pharmacologic Prophylaxis: Enoxaparin (Lovenox)  VTE Mechanical Prophylaxis: sequential compression device    Assessment/Plan:  Severe sepsis with shock secondary to liver abscess  Status post percutaneous drainage  Continue antibiotics    Right-sided effusion status post chest tube placement    Acute hypoxic respiratory failure status post intubation    Chronic appendicitis will need appendectomy likely as outpatient once critical illness resolves      Patient Active Problem List   Diagnosis    Heart murmur    Cough    Chronic superficial gastritis without bleeding    Anemia    Hyperbilirubinemia    Failure to thrive in adult    Weight loss    Elevated alkaline phosphatase level    GERD (gastroesophageal reflux disease)    Liver mass    Appendicitis    Alcohol abuse    Acute retention of urine    Severe sepsis (HCC)    Empyema (HCC)    SHANDA (acute kidney injury) ([facility] )            This text is generated with voice recognition software  There may be translation, syntax,  or grammatical errors  If you have any questions, please contact the dictating provider

## 2019-03-01 NOTE — ASSESSMENT & PLAN NOTE
S/P Bedside SPT  Patient removed this himself  Unfortunately with the dislodging of the suprapubic tube, hematuria overnight, and PVR nearly 600 cc yesterday  PVR this morning 375 cc       Plan:  IR for S PT

## 2019-03-01 NOTE — PROGRESS NOTES
Progress Note - Infectious Disease   Tanya Burnham 53 y o  male MRN: [id number]  Unit/Bed#: ICU 06 Encounter: [id number]      IMPRESSION & RECOMMENDATIONS:   1  Severe sepsis/shock-fever, leukocytosis, lactic acidosis  Likely secondary to liver abscess and right-sided empyema  Admission blood cultures remain negative  Seems to be improving after placement of chest tube and IR drain  Will continue broad-spectrum antibiotic pending further culture data  -continue IV cefepime at current dose  -continue Flagyl at current dose  -continue vancomycin  Dosing recommendations per pharmacy  -monitor CBC with diff and creatinine  -follow up repeat blood cultures  -wean off pressors  -monitor temperatures and hemodynamics  -supportive care     2  Liver abscess-suspect a portal source of infection with the possible appendicitis  No perforation seen on initial CT the abdomen and pelvis  No clear biliary source  Now status post IR drain placement   -follow-up drain cultures and adjust antibiotics as needed  -monitor drain output  -GI follow-up    3  Right-sided empyema  Likely secondary to liver abscess  Now status post chest tube placement  Repeat x-ray shows much improved right hemithorax   -antibiotic plan as above  -follow-up pleural fluid cultures     4  Acute kidney injury-suspect multifactorial including pre renal issues and possibly sepsis  ATN may be playing a role  Creatinine now trending downward   -monitor creatinine clearance closely  -volume management     5  Alcohol abuse-with probable cirrhosis based upon the CT findings   -continue abstinence  -GI follow-up     Discussed in detail with critical care service, and with patient's brother at bedside  Subjective:  Patient remains sedated, intubated  He is on Levophed drip  Fever curve has improved  He underwent IR drain placement of liver abscess yesterday        Objective:  Vitals:  Temp:  [98 5 °F (36 9 °C)-99 9 °F (37 7 °C)] 98 5 °F (36 9 °C)  HR:  [] 74  Resp:  [4-51] 18  BP: ()/(30-93) 65/33  SpO2:  [87 %-99 %] 96 %  Temp (24hrs), Av °F (37 2 °C), Min:98 5 °F (36 9 °C), Max:99 9 °F (37 7 °C)  Current: Temperature: 98 5 °F (36 9 °C)    Physical Exam:   General:  Intubated, sedated  Eyes:  Conjunctive clear with no hemorrhages or effusions  Oropharynx:  ET tube in place  Neck:  Supple, no lymphadenopathy  Lungs:  Clear to auscultation bilaterally, no accessory muscle use, right chest tube in place  Cardiac:  Regular rate and rhythm, no murmurs  Abdomen:  Soft, non-tender, non-distented, right upper quadrant drain in place  Raymundo in place with clear yellow urine  Extremities:  No peripheral cyanosis, clubbing, or edema  Skin:  No rashes, no ulcers  Neurological:  Sedated      Lab Results:  I have personally reviewed pertinent labs  Results from last 7 days   Lab Units 19  0507 19  0013 19  0517 19  0551   POTASSIUM mmol/L 3 8 3 9 3 9   < > 3 8 4 2   CHLORIDE mmol/L 101 99* 97*   < > 101 98*   CO2 mmol/L 21 22 20*   < > 24 23   BUN mg/dL 20 19 20   < > 10 10   CREATININE mg/dL 1 51* 1 73* 2 02*   < > 0 77 0 81   EGFR ml/min/1 73sq m 54 46 38   < > 108 106   CALCIUM mg/dL 7 5* 7 3* 7 5*   < > 8 1* 8 1*   AST U/L 39  --   --   --  42 62*   ALT U/L 20  --   --   --  28 39   ALK PHOS U/L 104  --   --   --  178* 189*    < > = values in this interval not displayed  Results from last 7 days   Lab Units 19  0507 19  0445   WBC Thousand/uL 26 09* 29 66* 19 05*   HEMOGLOBIN g/dL 10 3* 10 4* 12 1   PLATELETS Thousands/uL 190 281 275     Results from last 7 days   Lab Units 19/19  1756 19  1041 19   BLOOD CULTURE   --   --   --  No Growth at 72 hrs  No Growth at 72 hrs     GRAM STAIN RESULT  4+ Polys  1+ Mononuclear Cells  No No bacteria seen 3+ Polys  No bacteria seen  --   --   --    BODY FLUID CULTURE, STERILE   --  No growth  --   --   --    C DIFF TOXIN B   --   --  NEGATIVE for C difficle toxin by PCR    --   --        Imaging Studies:   I have personally reviewed pertinent imaging study reports and images in PACS  Chest x-ray shows significant improvement in right hemithorax opacification  EKG, Pathology, and Other Studies:   I have personally reviewed pertinent reports  IR report reviewed

## 2019-03-01 NOTE — PROCEDURES
Chest Tube Insertion  Date/Time: 2/28/2019 8:13 PM  Performed by: Kamlesh Norwood MD  Authorized by: Kamlesh Norwood MD     Patient location:  Bedside  Procedure performed by consultant: Brandi Gleason  Consent:     Consent obtained:  Written    Consent given by:  Healthcare agent    Risks discussed:  Bleeding, infection, damage to surrounding structures and pain    Alternatives discussed:  No treatment and delayed treatment  Universal protocol:     Procedure explained and questions answered to patient or proxy's satisfaction: yes      Relevant documents present and verified: yes      Test results available and properly labeled: yes      Radiology Images displayed and confirmed  If images not available, report reviewed: yes      Required blood products, implants, devices, and special equipment available: yes      Site/side marked: yes      Immediately prior to procedure a time out was called: yes      Patient identity confirmed:  Hospital-assigned identification number and arm band  Pre-procedure details:     Skin preparation:  ChloraPrep  Indications:     Indications: pleural effusion    Sedation:     Sedation type: Patient is already on propofol and Ativan  Anesthesia (see MAR for exact dosages): Anesthesia method:  Local infiltration    Local anesthetic:  Lidocaine 2% w/o epi  Procedure details:     Placement location:  Lateral    Laterality:  Right    Approach:  Percutaneous    Thal-Quick Chest Tube Kit:  20FR    Ultrasound guidance: yes      Tension pneumothorax: no      Needle Decompression: no      Tube connected to:  Suction    Drainage characteristics:  Purulent and bloody    Suture material:  2-0 silk    Dressing:  4x4 sterile gauze  Post-procedure details:     Post-insertion x-ray findings comment:  Chest x-ray ordered    Patient tolerance of procedure:   Tolerated well, no immediate complications

## 2019-03-02 ENCOUNTER — TELEPHONE (OUTPATIENT)
Dept: UROLOGY | Facility: CLINIC | Age: 49
End: 2019-03-02

## 2019-03-02 DIAGNOSIS — Z87.448 PERSONAL HISTORY OF URETHRAL STRICTURE: ICD-10-CM

## 2019-03-02 DIAGNOSIS — T83.010D SUPRAPUBIC CATHETER DYSFUNCTION, SUBSEQUENT ENCOUNTER: Primary | ICD-10-CM

## 2019-03-02 LAB
ALBUMIN SERPL BCP-MCNC: 1.3 G/DL (ref 3.5–5)
ALP SERPL-CCNC: 103 U/L (ref 46–116)
ALT SERPL W P-5'-P-CCNC: 17 U/L (ref 12–78)
ANION GAP SERPL CALCULATED.3IONS-SCNC: 10 MMOL/L (ref 4–13)
AST SERPL W P-5'-P-CCNC: 39 U/L (ref 5–45)
BACTERIA BLD CULT: NORMAL
BACTERIA BLD CULT: NORMAL
BASOPHILS # BLD AUTO: 0.04 THOUSANDS/ΜL (ref 0–0.1)
BASOPHILS NFR BLD AUTO: 0 % (ref 0–1)
BILIRUB SERPL-MCNC: 4.13 MG/DL (ref 0.2–1)
BUN SERPL-MCNC: 19 MG/DL (ref 5–25)
CA-I BLD-SCNC: 1.02 MMOL/L (ref 1.12–1.32)
CALCIUM SERPL-MCNC: 7.2 MG/DL (ref 8.3–10.1)
CHLORIDE SERPL-SCNC: 103 MMOL/L (ref 100–108)
CO2 SERPL-SCNC: 22 MMOL/L (ref 21–32)
CREAT SERPL-MCNC: 1.06 MG/DL (ref 0.6–1.3)
EOSINOPHIL # BLD AUTO: 0.02 THOUSAND/ΜL (ref 0–0.61)
EOSINOPHIL NFR BLD AUTO: 0 % (ref 0–6)
ERYTHROCYTE [DISTWIDTH] IN BLOOD BY AUTOMATED COUNT: 17.2 % (ref 11.6–15.1)
GFR SERPL CREATININE-BSD FRML MDRD: 83 ML/MIN/1.73SQ M
GLUCOSE SERPL-MCNC: 118 MG/DL (ref 65–140)
GLUCOSE SERPL-MCNC: 119 MG/DL (ref 65–140)
HCT VFR BLD AUTO: 28.6 % (ref 36.5–49.3)
HGB BLD-MCNC: 9.4 G/DL (ref 12–17)
IMM GRANULOCYTES # BLD AUTO: 0.21 THOUSAND/UL (ref 0–0.2)
IMM GRANULOCYTES NFR BLD AUTO: 2 % (ref 0–2)
LYMPHOCYTES # BLD AUTO: 0.87 THOUSANDS/ΜL (ref 0.6–4.47)
LYMPHOCYTES NFR BLD AUTO: 7 % (ref 14–44)
MAGNESIUM SERPL-MCNC: 1.6 MG/DL (ref 1.6–2.6)
MCH RBC QN AUTO: 30.7 PG (ref 26.8–34.3)
MCHC RBC AUTO-ENTMCNC: 32.9 G/DL (ref 31.4–37.4)
MCV RBC AUTO: 94 FL (ref 82–98)
MONOCYTES # BLD AUTO: 0.72 THOUSAND/ΜL (ref 0.17–1.22)
MONOCYTES NFR BLD AUTO: 5 % (ref 4–12)
NEUTROPHILS # BLD AUTO: 11.43 THOUSANDS/ΜL (ref 1.85–7.62)
NEUTS SEG NFR BLD AUTO: 86 % (ref 43–75)
NRBC BLD AUTO-RTO: 0 /100 WBCS
PLATELET # BLD AUTO: 121 THOUSANDS/UL (ref 149–390)
PMV BLD AUTO: 10.1 FL (ref 8.9–12.7)
POTASSIUM SERPL-SCNC: 3.2 MMOL/L (ref 3.5–5.3)
PROT SERPL-MCNC: 5.5 G/DL (ref 6.4–8.2)
RBC # BLD AUTO: 3.06 MILLION/UL (ref 3.88–5.62)
SODIUM SERPL-SCNC: 135 MMOL/L (ref 136–145)
VANCOMYCIN TROUGH SERPL-MCNC: 9.2 UG/ML (ref 10–20)
WBC # BLD AUTO: 13.29 THOUSAND/UL (ref 4.31–10.16)

## 2019-03-02 PROCEDURE — 80053 COMPREHEN METABOLIC PANEL: CPT | Performed by: NURSE PRACTITIONER

## 2019-03-02 PROCEDURE — 82330 ASSAY OF CALCIUM: CPT | Performed by: NURSE PRACTITIONER

## 2019-03-02 PROCEDURE — 99291 CRITICAL CARE FIRST HOUR: CPT | Performed by: INTERNAL MEDICINE

## 2019-03-02 PROCEDURE — 85025 COMPLETE CBC W/AUTO DIFF WBC: CPT | Performed by: NURSE PRACTITIONER

## 2019-03-02 PROCEDURE — 82948 REAGENT STRIP/BLOOD GLUCOSE: CPT

## 2019-03-02 PROCEDURE — C9113 INJ PANTOPRAZOLE SODIUM, VIA: HCPCS | Performed by: NURSE PRACTITIONER

## 2019-03-02 PROCEDURE — 94640 AIRWAY INHALATION TREATMENT: CPT

## 2019-03-02 PROCEDURE — 94003 VENT MGMT INPAT SUBQ DAY: CPT

## 2019-03-02 PROCEDURE — 80202 ASSAY OF VANCOMYCIN: CPT | Performed by: INTERNAL MEDICINE

## 2019-03-02 PROCEDURE — 83735 ASSAY OF MAGNESIUM: CPT | Performed by: NURSE PRACTITIONER

## 2019-03-02 PROCEDURE — 99232 SBSQ HOSP IP/OBS MODERATE 35: CPT | Performed by: UROLOGY

## 2019-03-02 PROCEDURE — 99233 SBSQ HOSP IP/OBS HIGH 50: CPT | Performed by: INTERNAL MEDICINE

## 2019-03-02 RX ORDER — POTASSIUM CHLORIDE 29.8 MG/ML
40 INJECTION INTRAVENOUS ONCE
Status: COMPLETED | OUTPATIENT
Start: 2019-03-02 | End: 2019-03-02

## 2019-03-02 RX ORDER — HEPARIN SODIUM 5000 [USP'U]/ML
5000 INJECTION, SOLUTION INTRAVENOUS; SUBCUTANEOUS EVERY 8 HOURS SCHEDULED
Status: DISCONTINUED | OUTPATIENT
Start: 2019-03-02 | End: 2019-03-05

## 2019-03-02 RX ORDER — FENTANYL CITRATE 50 UG/ML
50 INJECTION, SOLUTION INTRAMUSCULAR; INTRAVENOUS EVERY 2 HOUR PRN
Status: DISCONTINUED | OUTPATIENT
Start: 2019-03-02 | End: 2019-03-12

## 2019-03-02 RX ORDER — MAGNESIUM SULFATE 1 G/100ML
1 INJECTION INTRAVENOUS ONCE
Status: COMPLETED | OUTPATIENT
Start: 2019-03-02 | End: 2019-03-02

## 2019-03-02 RX ORDER — LEVALBUTEROL 1.25 MG/.5ML
1.25 SOLUTION, CONCENTRATE RESPIRATORY (INHALATION)
Status: DISCONTINUED | OUTPATIENT
Start: 2019-03-02 | End: 2019-03-04

## 2019-03-02 RX ADMIN — LEVALBUTEROL 1.25 MG: 1.25 SOLUTION, CONCENTRATE RESPIRATORY (INHALATION) at 19:02

## 2019-03-02 RX ADMIN — FENTANYL CITRATE 50 MCG: 50 INJECTION, SOLUTION INTRAMUSCULAR; INTRAVENOUS at 16:24

## 2019-03-02 RX ADMIN — SODIUM CHLORIDE, SODIUM LACTATE, POTASSIUM CHLORIDE, AND CALCIUM CHLORIDE 100 ML/HR: .6; .31; .03; .02 INJECTION, SOLUTION INTRAVENOUS at 08:43

## 2019-03-02 RX ADMIN — CHLORHEXIDINE GLUCONATE 0.12% ORAL RINSE 15 ML: 1.2 LIQUID ORAL at 21:30

## 2019-03-02 RX ADMIN — FOLIC ACID 1 MG: 5 INJECTION, SOLUTION INTRAMUSCULAR; INTRAVENOUS; SUBCUTANEOUS at 10:10

## 2019-03-02 RX ADMIN — NOREPINEPHRINE BITARTRATE 18 MCG/MIN: 1 INJECTION INTRAVENOUS at 06:11

## 2019-03-02 RX ADMIN — NOREPINEPHRINE BITARTRATE 10 MCG/MIN: 1 INJECTION INTRAVENOUS at 11:17

## 2019-03-02 RX ADMIN — HEPARIN SODIUM 5000 UNITS: 5000 INJECTION INTRAVENOUS; SUBCUTANEOUS at 06:09

## 2019-03-02 RX ADMIN — CEFEPIME HYDROCHLORIDE 2000 MG: 2 INJECTION, POWDER, FOR SOLUTION INTRAVENOUS at 23:05

## 2019-03-02 RX ADMIN — VANCOMYCIN HYDROCHLORIDE 1000 MG: 1 INJECTION, SOLUTION INTRAVENOUS at 21:30

## 2019-03-02 RX ADMIN — METRONIDAZOLE 500 MG: 500 INJECTION, SOLUTION INTRAVENOUS at 08:33

## 2019-03-02 RX ADMIN — IPRATROPIUM BROMIDE 0.5 MG: 0.5 SOLUTION RESPIRATORY (INHALATION) at 19:02

## 2019-03-02 RX ADMIN — VASOPRESSIN 0.04 UNITS/MIN: 20 INJECTION INTRAVENOUS at 04:20

## 2019-03-02 RX ADMIN — NOREPINEPHRINE BITARTRATE 10 MCG/MIN: 1 INJECTION INTRAVENOUS at 17:15

## 2019-03-02 RX ADMIN — NOREPINEPHRINE BITARTRATE 18 MCG/MIN: 1 INJECTION INTRAVENOUS at 01:54

## 2019-03-02 RX ADMIN — CHLORHEXIDINE GLUCONATE 0.12% ORAL RINSE 15 ML: 1.2 LIQUID ORAL at 08:19

## 2019-03-02 RX ADMIN — IPRATROPIUM BROMIDE 0.5 MG: 0.5 SOLUTION RESPIRATORY (INHALATION) at 07:41

## 2019-03-02 RX ADMIN — LEVALBUTEROL 1.25 MG: 1.25 SOLUTION, CONCENTRATE RESPIRATORY (INHALATION) at 02:53

## 2019-03-02 RX ADMIN — IPRATROPIUM BROMIDE 0.5 MG: 0.5 SOLUTION RESPIRATORY (INHALATION) at 23:44

## 2019-03-02 RX ADMIN — METRONIDAZOLE 500 MG: 500 INJECTION, SOLUTION INTRAVENOUS at 01:46

## 2019-03-02 RX ADMIN — CEFEPIME HYDROCHLORIDE 2000 MG: 2 INJECTION, POWDER, FOR SOLUTION INTRAVENOUS at 10:59

## 2019-03-02 RX ADMIN — METRONIDAZOLE 500 MG: 500 INJECTION, SOLUTION INTRAVENOUS at 16:24

## 2019-03-02 RX ADMIN — VASOPRESSIN 0.04 UNITS/MIN: 20 INJECTION INTRAVENOUS at 22:27

## 2019-03-02 RX ADMIN — IPRATROPIUM BROMIDE 0.5 MG: 0.5 SOLUTION RESPIRATORY (INHALATION) at 13:34

## 2019-03-02 RX ADMIN — LEVALBUTEROL 1.25 MG: 1.25 SOLUTION, CONCENTRATE RESPIRATORY (INHALATION) at 23:44

## 2019-03-02 RX ADMIN — HEPARIN SODIUM 5000 UNITS: 5000 INJECTION INTRAVENOUS; SUBCUTANEOUS at 13:40

## 2019-03-02 RX ADMIN — IPRATROPIUM BROMIDE 0.5 MG: 0.5 SOLUTION RESPIRATORY (INHALATION) at 02:53

## 2019-03-02 RX ADMIN — DEXMEDETOMIDINE HYDROCHLORIDE 0.5 MCG/KG/HR: 100 INJECTION, SOLUTION INTRAVENOUS at 14:10

## 2019-03-02 RX ADMIN — PANTOPRAZOLE SODIUM 40 MG: 40 INJECTION, POWDER, FOR SOLUTION INTRAVENOUS at 08:19

## 2019-03-02 RX ADMIN — SODIUM CHLORIDE, SODIUM LACTATE, POTASSIUM CHLORIDE, AND CALCIUM CHLORIDE 100 ML/HR: .6; .31; .03; .02 INJECTION, SOLUTION INTRAVENOUS at 18:36

## 2019-03-02 RX ADMIN — VASOPRESSIN 0.04 UNITS/MIN: 20 INJECTION INTRAVENOUS at 13:39

## 2019-03-02 RX ADMIN — FENTANYL CITRATE 50 MCG: 50 INJECTION, SOLUTION INTRAMUSCULAR; INTRAVENOUS at 21:05

## 2019-03-02 RX ADMIN — DEXMEDETOMIDINE HYDROCHLORIDE 0.7 MCG/KG/HR: 100 INJECTION, SOLUTION INTRAVENOUS at 22:27

## 2019-03-02 RX ADMIN — FENTANYL CITRATE 50 MCG: 50 INJECTION, SOLUTION INTRAMUSCULAR; INTRAVENOUS at 12:07

## 2019-03-02 RX ADMIN — DEXMEDETOMIDINE HYDROCHLORIDE 0.5 MCG/KG/HR: 100 INJECTION, SOLUTION INTRAVENOUS at 18:34

## 2019-03-02 RX ADMIN — LEVALBUTEROL 1.25 MG: 1.25 SOLUTION, CONCENTRATE RESPIRATORY (INHALATION) at 13:34

## 2019-03-02 RX ADMIN — PROPOFOL 25 MCG/KG/MIN: 10 INJECTION, EMULSION INTRAVENOUS at 04:19

## 2019-03-02 RX ADMIN — VANCOMYCIN HYDROCHLORIDE 1000 MG: 1 INJECTION, SOLUTION INTRAVENOUS at 09:07

## 2019-03-02 RX ADMIN — THIAMINE HYDROCHLORIDE 100 MG: 100 INJECTION, SOLUTION INTRAMUSCULAR; INTRAVENOUS at 09:49

## 2019-03-02 RX ADMIN — POTASSIUM CHLORIDE 40 MEQ: 400 INJECTION, SOLUTION INTRAVENOUS at 06:09

## 2019-03-02 RX ADMIN — LEVALBUTEROL 1.25 MG: 1.25 SOLUTION, CONCENTRATE RESPIRATORY (INHALATION) at 07:41

## 2019-03-02 RX ADMIN — HEPARIN SODIUM 5000 UNITS: 5000 INJECTION INTRAVENOUS; SUBCUTANEOUS at 21:30

## 2019-03-02 RX ADMIN — DEXMEDETOMIDINE HYDROCHLORIDE 0.1 MCG/KG/HR: 100 INJECTION, SOLUTION INTRAVENOUS at 10:51

## 2019-03-02 RX ADMIN — MAGNESIUM SULFATE HEPTAHYDRATE 1 G: 1 INJECTION, SOLUTION INTRAVENOUS at 08:36

## 2019-03-02 NOTE — PROGRESS NOTES
UROLOGY PROGRESS NOTE   Patient Identifiers: Alexis Shields (MRN [id number])  Date of Service: 3/2/2019        Assessment:   64-year-old gentleman with chest tube and liver abscess status post drainage, also with difficult Raymundo catheter placement and postprocedure day 1  Status post bedside suprapubic catheter placement  He remains under the care of the critical care team, and intubated and sedated  His suprapubic catheter is in place, draining well, clear yellow urine  I reviewed with the patient and with his family members the plan for suprapubic catheter drainage of the bladder until such time as he can be further evaluated in the Urology Office/outpatient setting with a repeat cystoscopy as well as VCUG to assess urethra and for ability to void with his suprapubic catheter capped  Plan:   Continued care per primary team     Please re-consult as necessary  I will arrange for outpatient follow-up for this patient in the Urology Office      Subjective:     24 HR EVENTS:   no significant events        Patient has  No complaints as he is intubated and sedated      Objective:     VITALS:    Vitals:    03/02/19 0800   BP:    Pulse: 60   Resp: (!) 30   Temp:    SpO2: 97%       INS & OUTS:  [unfilled]  Reviewed pertinent values I's/O's      LABS:  Lab Results   Component Value Date    HGB 9 4 (L) 03/02/2019    HCT 28 6 (L) 03/02/2019    WBC 13 29 (H) 03/02/2019     (L) 03/02/2019   ]    Lab Results   Component Value Date    K 3 2 (L) 03/02/2019     03/02/2019    CO2 22 03/02/2019    BUN 19 03/02/2019    CREATININE 1 06 03/02/2019    CALCIUM 7 2 (L) 03/02/2019   ]    INPATIENT MEDS:    Current Facility-Administered Medications:     acetaminophen (TYLENOL) tablet 650 mg, 650 mg, Oral, Q6H PRN, Victorino Gilbert DO, 650 mg at 02/28/19 0333    cefepime (MAXIPIME) 2,000 mg in dextrose 5 % 50 mL IVPB, 2,000 mg, Intravenous, Q12H, Nicolas Escobedo MD, Stopped at 03/02/19 0001    chlorhexidine (PERIDEX) 0 12 % oral rinse 15 mL, 15 mL, Swish & Spit, Q12H [facility], LINDSAY Mccoy, 15 mL at 22/53/40 5611    folic acid 1 mg in sodium chloride 0 9 % 50 mL IVPB, 1 mg, Intravenous, Daily, LINDSAY Mccoy, Stopped at 03/01/19 1147    heparin (porcine) subcutaneous injection 5,000 Units, 5,000 Units, Subcutaneous, Q8H [facility], Pearl Pleas Spatzer, CRNP, 5,000 Units at 03/02/19 0609    ipratropium (ATROVENT) 0 02 % inhalation solution 0 5 mg, 0 5 mg, Nebulization, Q6H, Mustapha Forman MD, 0 5 mg at 03/02/19 0741    lactated ringers infusion, 100 mL/hr, Intravenous, Continuous, Glenn Pacheco, CRNP, Last Rate: 100 mL/hr at 03/02/19 0843, 100 mL/hr at 03/02/19 0843    levalbuterol (XOPENEX) inhalation solution 1 25 mg, 1 25 mg, Nebulization, Q6H, Pearl Pleas Spatzer, CRNP, 1 25 mg at 03/02/19 0741    lidocaine (URO-JET) 2 % topical gel, , Topical, Once, LINDSAY Johnson    magnesium sulfate IVPB (premix) SOLN 1 g, 1 g, Intravenous, Once, Nova Dural, CRNP, 1 g at 03/02/19 0836    metroNIDAZOLE (FLAGYL) IVPB (premix) 500 mg, 500 mg, Intravenous, Q8H, Montrell Suarez MD, Last Rate: 200 mL/hr at 03/02/19 0833, 500 mg at 03/02/19 0833    norepinephrine (LEVOPHED) 4 mg (STANDARD CONCENTRATION) IV in sodium chloride 0 9% 250 mL, 1-30 mcg/min, Intravenous, Titrated, Ricarda Fernandez MD, Last Rate: 37 5 mL/hr at 03/02/19 0845, 10 mcg/min at 03/02/19 0845    ondansetron (ZOFRAN) injection 4 mg, 4 mg, Intravenous, Q8H PRN, Victorino Gilbert DO    pantoprazole (PROTONIX) injection 40 mg, 40 mg, Intravenous, Q24H [facility], LINDSAY Martinez, 40 mg at 03/02/19 0819    propofol (DIPRIVAN) 1000 mg in 100 mL infusion (premix), 5-50 mcg/kg/min, Intravenous, Titrated, SIVA RizviNP, Stopped at 03/02/19 0755    sodium chloride 0 9 % infusion, 100 mL/hr, Intravenous, Continuous, SIVA JohnsonNP, Stopped at 03/01/19 0622    thiamine (VITAMIN B1) 100 mg in sodium chloride 0 9 % 50 mL IVPB, 100 mg, Intravenous, Daily, LINDSAY Rivera, Stopped at 03/01/19 0930    vancomycin (VANCOCIN) IVPB (premix) 1,000 mg, 1,000 mg, Intravenous, Q12H, Rachel Witt MD, Last Rate: 200 mL/hr at 03/02/19 0907, 1,000 mg at 03/02/19 0907    vasopressin (PITRESSIN) 20 Units in sodium chloride 0 9 % 100 mL infusion, 0 04 Units/min, Intravenous, Continuous, LINDSYA Butterfield, Last Rate: 12 mL/hr at 03/02/19 0420, 0 04 Units/min at 03/02/19 0420      Physical Exam:   BP (!) 65/33   Pulse 60   Temp 98 1 °F (36 7 °C) (Temporal)   Resp (!) 30   Ht 5' 8" (1 727 m)   Wt 108 kg (238 lb 8 6 oz)   SpO2 97%   BMI 36 27 kg/m²   GEN: Patient with poor color, endotracheal tube is in place, patient on ventilator    RESP: On ventilator    CARDIAC: peripheral pulses present    ABD: Suprapubic catheter is in good position, draining clear yellow urine, no signs of peritonitis or bowel injury from percutaneous suprapubic catheter placement yesterday and soft, non-tender, non-distended   INCISION: Not applicable   EXT: bilateral peripheral edema   NEURO: Unable to assess   PSYCHIATRIC: Unable to assess    GENITOURINARY:SP tube in place and no bladder tenderness    scrotum normal and without induration/swelling/cellulitis      VARELA: Suprapubic, clear yellow urine, no clots        RADIOLOGY:   There is no new urologic imaging for my review

## 2019-03-02 NOTE — PROGRESS NOTES
Progress Note - Infectious Disease   Princess Campos 53 y o  male MRN: [id number]  Unit/Bed#: ICU 06 Encounter: [id number]      IMPRESSION & RECOMMENDATIONS:   1  Severe sepsis/shock-fever, leukocytosis, lactic acidosis  Likely secondary to liver abscess and right-sided empyema  Admission and repeat blood cultures all remain negative  Continues to improve after placement of chest tube and IR drain  Vasopressor requirement improving  Fevers, WBC count improved  -continue IV cefepime at current dose  -continue Flagyl at current dose  -continue vancomycin  Dosing recommendations per pharmacy  -monitor CBC with diff and creatinine  -wean off pressors as able  -monitor temperatures and hemodynamics  -supportive care     2  Liver abscess-suspect a portal source of infection with the possible appendicitis   No perforation seen on initial CT the abdomen and pelvis   No clear biliary source  Now status post IR drain placement  Cultures so far shows no growth  -follow-up drain cultures and adjust antibiotics as needed  -if culture remains negative, will discontinue IV vancomycin   -monitor drain output  -GI follow-up     3  Right-sided empyema  Likely secondary to liver abscess  Now status post chest tube placement  Repeat x-ray shows much improved right hemithorax  Pleural fluid cultures show no growth thus far   -antibiotic plan as above  -follow-up pleural fluid cultures  -if cultures remain negative, will discontinue IV vancomycin   -monitor chest tube output  -possible repeat CT chest to reassess empyema     4  Acute kidney injury-suspect multifactorial including pre renal issues and possibly sepsis   ATN may be playing a role  Creatinine now much improved  -monitor creatinine clearance closely  -volume management     5   Alcohol abuse-with probable cirrhosis based upon the CT findings   -continue abstinence  -GI follow-up     Discussed in detail with critical care attending, and with patient's family at bedside  Subjective:  Fevers have improved  Patient remains on vasopressors, but are being titrated down  WBC count down to 13  Creatinine down to 1  Chest tube with lower output  Objective:  Vitals:  Temp:  [97 8 °F (36 6 °C)-99 8 °F (37 7 °C)] 97 8 °F (36 6 °C)  HR:  [58-86] 76  Resp:  [20-37] 33  SpO2:  [92 %-98 %] 94 %  Temp (24hrs), Av 5 °F (36 9 °C), Min:97 8 °F (36 6 °C), Max:99 8 °F (37 7 °C)  Current: Temperature: 97 8 °F (36 6 °C)    Physical Exam:   General:  Awake on ventilator  Eyes:  Conjunctive clear with no hemorrhages or effusions  Oropharynx:  ET tube in place  Neck:  Supple, no lymphadenopathy  Lungs:  Clear to auscultation bilaterally, no accessory muscle use  Cardiac:  Regular rate and rhythm, no murmurs  Abdomen:  Soft, non-tender, non-distented, suprapubic catheter in place with clear yellow urine  Right upper quadrant drain in place  Right chest tube in place  Extremities:  No peripheral cyanosis, clubbing, or edema  Skin:  No rashes, no ulcers  Neurological:  Awake on ventilator, moves all extremities    Lab Results:  I have personally reviewed pertinent labs  Results from last 7 days   Lab Units 19  0438 19  0507 19  0013  19  0517   POTASSIUM mmol/L 3 2* 3 8 3 9   < > 3 8   CHLORIDE mmol/L 103 101 99*   < > 101   CO2 mmol/L 22 21 22   < > 24   BUN mg/dL 19 20 19   < > 10   CREATININE mg/dL 1 06 1 51* 1 73*   < > 0 77   EGFR ml/min/1 73sq m 83 54 46   < > 108   CALCIUM mg/dL 7 2* 7 5* 7 3*   < > 8 1*   AST U/L 39 39  --   --  42   ALT U/L 17 20  --   --  28   ALK PHOS U/L 103 104  --   --  178*    < > = values in this interval not displayed       Results from last 7 days   Lab Units 19  0420 19  0507 19  2034   WBC Thousand/uL 13 29* 26 09* 29 66*   HEMOGLOBIN g/dL 9 4* 10 3* 10 4*   PLATELETS Thousands/uL 121* 190 281     Results from last 7 days   Lab Units 19  2207 19  2206 19  1756 19  8911 19  2940 02/26/19  1041 02/25/19 2055 02/25/19 2046   BLOOD CULTURE   --   --   --  No Growth at 48 hrs  No Growth at 48 hrs  --  No Growth After 4 Days  No Growth After 4 Days  GRAM STAIN RESULT  4+ Polys  No organisms seen 4+ Polys  1+ Mononuclear Cells  No No bacteria seen 3+ Polys  No bacteria seen  --   --   --   --   --    BODY FLUID CULTURE, STERILE  No growth  --  No growth  --   --   --   --   --    C DIFF TOXIN B   --   --   --   --   --  NEGATIVE for C difficle toxin by PCR    --   --        Imaging Studies:   I have personally reviewed pertinent imaging study reports and images in PACS  EKG, Pathology, and Other Studies:   I have personally reviewed pertinent reports

## 2019-03-02 NOTE — PLAN OF CARE
Problem: Nutrition/Hydration-ADULT  Goal: Nutrient/Hydration intake appropriate for improving, restoring or maintaining nutritional needs  Description  Monitor and assess patient's nutrition/hydration status for malnutrition (ex- brittle hair, bruises, dry skin, pale skin and conjunctiva, muscle wasting, smooth red tongue, and disorientation)  Collaborate with interdisciplinary team and initiate plan and interventions as ordered  Monitor patient's weight and dietary intake as ordered or per policy  Utilize nutrition screening tool and intervene per policy  Determine patient's food preferences and provide high-protein, high-caloric foods as appropriate       INTERVENTIONS:  - Monitor oral intake, urinary output, labs, and treatment plans  - Assess nutrition and hydration status and recommend course of action  - Evaluate amount of meals eaten  - Assist patient with eating if necessary   - Allow adequate time for meals  - Recommend/ encourage appropriate diets, oral nutritional supplements, and vitamin/mineral supplements  - Order, calculate, and assess calorie counts as needed  - Recommend, monitor, and adjust tube feedings and TPN/PPN based on assessed needs  - Assess need for intravenous fluids  - Provide specific nutrition/hydration education as appropriate  - Include patient/family/caregiver in decisions related to nutrition  Outcome: Progressing     Problem: PAIN - ADULT  Goal: Verbalizes/displays adequate comfort level or baseline comfort level  Description  Interventions:  - Encourage patient to monitor pain and request assistance  - Assess pain using appropriate pain scale  - Administer analgesics based on type and severity of pain and evaluate response  - Implement non-pharmacological measures as appropriate and evaluate response  - Consider cultural and social influences on pain and pain management  - Notify physician/advanced practitioner if interventions unsuccessful or patient reports new pain  Outcome: Progressing     Problem: INFECTION - ADULT  Goal: Absence or prevention of progression during hospitalization  Description  INTERVENTIONS:  - Assess and monitor for signs and symptoms of infection  - Monitor lab/diagnostic results  - Monitor all insertion sites, i e  indwelling lines, tubes, and drains  - Monitor endotracheal (as able) and nasal secretions for changes in amount and color  - [place] appropriate cooling/warming therapies per order  - Administer medications as ordered  - Instruct and encourage patient and family to use good hand hygiene technique  - Identify and instruct in appropriate isolation precautions for identified infection/condition  Outcome: Progressing     Problem: SAFETY ADULT  Goal: Patient will remain free of falls  Description  INTERVENTIONS:  - Assess patient frequently for physical needs  -  Identify cognitive and physical deficits and behaviors that affect risk of falls    -  [place] fall precautions as indicated by assessment   - Educate patient/family on patient safety including physical limitations  - Instruct patient to call for assistance with activity based on assessment  - Modify environment to reduce risk of injury  - Consider OT/PT consult to assist with strengthening/mobility  Outcome: Progressing  Goal: Maintain or return to baseline ADL function  Description  INTERVENTIONS:  -  Assess patient's ability to carry out ADLs; assess patient's baseline for ADL function and identify physical deficits which impact ability to perform ADLs (bathing, care of mouth/teeth, toileting, grooming, dressing, etc )  - Assess/evaluate cause of self-care deficits   - Assess range of motion  - Assess patient's mobility; develop plan if impaired  - Assess patient's need for assistive devices and provide as appropriate  - Encourage maximum independence but intervene and supervise when necessary  ¯ Involve family in performance of ADLs  ¯ Assess for home care needs following discharge ¯ Request OT consult to assist with ADL evaluation and planning for discharge  ¯ Provide patient education as appropriate  Outcome: Progressing  Goal: Maintain or return mobility status to optimal level  Description  INTERVENTIONS:  - Assess patient's baseline mobility status (ambulation, transfers, stairs, etc )    - Identify cognitive and physical deficits and behaviors that affect mobility  - Identify mobility aids required to assist with transfers and/or ambulation (gait belt, sit-to-stand, lift, walker, cane, etc )  - [place] fall precautions as indicated by assessment  - Record patient progress and toleration of activity level on Mobility SBAR; progress patient to next Phase/Stage  - Instruct patient to call for assistance with activity based on assessment  - Request Rehabilitation consult to assist with strengthening/weightbearing, etc   Outcome: Progressing     Problem: DISCHARGE PLANNING  Goal: Discharge to home or other facility with appropriate resources  Description  INTERVENTIONS:  - Identify barriers to discharge w/patient and caregiver  - Arrange for needed discharge resources and transportation as appropriate  - Identify discharge learning needs (meds, wound care, etc )  - Arrange for interpretive services to assist at discharge as needed  - Refer to Case Management Department for coordinating discharge planning if the patient needs post-hospital services based on physician/advanced practitioner order or complex needs related to functional status, cognitive ability, or social support system  Outcome: Progressing     Problem: Knowledge Deficit  Goal: Patient/family/caregiver demonstrates understanding of disease process, treatment plan, medications, and discharge instructions  Description  Complete learning assessment and assess knowledge base    Interventions:  - Provide teaching at level of understanding  - Provide teaching via preferred learning methods  Outcome: Progressing     Problem: Prexisting or High Potential for Compromised Skin Integrity  Goal: Skin integrity is maintained or improved  Description  INTERVENTIONS:  - Identify patients at risk for skin breakdown  - Assess and monitor skin integrity  - Assess and monitor nutrition and hydration status  - Monitor labs (i e  albumin)  - Assess for incontinence   - Turn and reposition patient  - Assist with mobility/ambulation  - Relieve pressure over bony prominences  - Avoid friction and shearing  - Provide appropriate hygiene as needed including keeping skin clean and dry  - Evaluate need for skin moisturizer/barrier cream  - Collaborate with interdisciplinary team (i e  Nutrition, Rehabilitation, etc )   - Patient/family teaching  Outcome: Progressing     Problem: DISCHARGE PLANNING - CARE MANAGEMENT  Goal: Discharge to post-acute care or home with appropriate resources  Description  INTERVENTIONS:  - Conduct assessment to determine patient/family and health care team treatment goals, and need for post-acute services based on payer coverage, community resources, and patient preferences, and barriers to discharge  - Address psychosocial, clinical, and financial barriers to discharge as identified in assessment in conjunction with the patient/family and health care team  - Arrange appropriate level of post-acute services according to patient's   needs and preference and payer coverage in collaboration with the physician and health care team  - Communicate with and update the patient/family, physician, and health care team regarding progress on the discharge plan  - Arrange appropriate transportation to post-acute venues   Outcome: Progressing     Problem: Potential for Falls  Goal: Patient will remain free of falls  Description  INTERVENTIONS:  - Assess patient frequently for physical needs  -  Identify cognitive and physical deficits and behaviors that affect risk of falls    -  [place] fall precautions as indicated by assessment   - Educate patient/family on patient safety including physical limitations  - Instruct patient to call for assistance with activity based on assessment  - Modify environment to reduce risk of injury  - Consider OT/PT consult to assist with strengthening/mobility  Outcome: Progressing     Problem: CARDIOVASCULAR - ADULT  Goal: Maintains optimal cardiac output and hemodynamic stability  Description  INTERVENTIONS:  - Monitor I/O, vital signs and rhythm  - Monitor for S/S and trends of decreased cardiac output i e  bleeding, hypotension  - Administer and titrate ordered vasoactive medications to optimize hemodynamic stability  - Assess quality of pulses, skin color and temperature  - Assess for signs of decreased coronary artery perfusion - ex   Angina  - Instruct patient to report change in severity of symptoms  Outcome: Progressing     Problem: RESPIRATORY - ADULT  Goal: Achieves optimal ventilation and oxygenation  Description  INTERVENTIONS:  - Assess for changes in respiratory status  - Assess for changes in mentation and behavior  - Position to facilitate oxygenation and minimize respiratory effort  - Oxygen administration by appropriate delivery method based on oxygen saturation (per order) or ABGs  - Initiate smoking cessation education as indicated  - Encourage broncho-pulmonary hygiene including cough, deep breathe, Incentive Spirometry  - Assess the need for suctioning and aspirate as needed  - Assess and instruct to report SOB or any respiratory difficulty  - Respiratory Therapy support as indicated  Outcome: Progressing     Problem: GENITOURINARY - ADULT  Goal: Maintains or returns to baseline urinary function  Description  INTERVENTIONS:  - Assess urinary function  - Encourage oral fluids to ensure adequate hydration  - Administer IV fluids as ordered to ensure adequate hydration  - Administer ordered medications as needed  - Offer frequent toileting  - Follow urinary retention protocol if ordered  Outcome: Progressing  Goal: Urinary catheter remains patent  Description  INTERVENTIONS:  - Assess patency of urinary catheter  - If patient has a chronic rendon, consider changing catheter if non-functioning  - Follow guidelines for intermittent irrigation of non-functioning urinary catheter  Outcome: Progressing     Problem: METABOLIC, FLUID AND ELECTROLYTES - ADULT  Goal: Electrolytes maintained within normal limits  Description  INTERVENTIONS:  - Monitor labs and assess patient for signs and symptoms of electrolyte imbalances  - Administer electrolyte replacement as ordered  - Monitor response to electrolyte replacements, including repeat lab results as appropriate  - Instruct patient on fluid and nutrition as appropriate  Outcome: Progressing     Problem: SKIN/TISSUE INTEGRITY - ADULT  Goal: Skin integrity remains intact  Description  INTERVENTIONS  - Identify patients at risk for skin breakdown  - Assess and monitor skin integrity  - Assess and monitor nutrition and hydration status  - Monitor labs (i e  albumin)  - Assess for incontinence   - Turn and reposition patient  - Assist with mobility/ambulation  - Relieve pressure over bony prominences  - Avoid friction and shearing  - Provide appropriate hygiene as needed including keeping skin clean and dry  - Evaluate need for skin moisturizer/barrier cream  - Collaborate with interdisciplinary team (i e  Nutrition, Rehabilitation, etc )   - Patient/family teaching  Outcome: Progressing  Goal: Incision(s), wounds(s) or drain site(s) healing without S/S of infection  Description  INTERVENTIONS  - Assess and document risk factors for skin impairment   - Assess and document dressing, incision, wound bed, drain sites and surrounding tissue  - Initiate Nutrition services consult and/or wound management as needed  Outcome: Progressing  Goal: Oral mucous membranes remain intact  Description  INTERVENTIONS  - Assess oral mucosa and hygiene practices  - Implement preventative oral hygiene regimen  - Implement oral medicated treatments as ordered  - Initiate Nutrition services referral as needed  Outcome: Progressing

## 2019-03-02 NOTE — TELEPHONE ENCOUNTER
Patient is status post bedside suprapubic catheter placement in the intensive care unit at the [facility], he will require repeat cystoscopy in the office as well as a voiding cystourethrogram for evaluation of his prostatic urethra and pendulous urethra  This has been ordered

## 2019-03-02 NOTE — PROGRESS NOTES
Vancomycin IV Pharmacy-to-Dose Consultation    Kelsey Or is a 53 y o  male who is currently receiving Vancomycin IV with management by the Pharmacy Consult service  Assessment/Plan:  The patient was reviewed  Renal function is stable and no signs or symptoms of nephrotoxicity and/or infusion reactions were documented in the chart  Based on today?s assessment, continue current vancomycin (day # 3) dosing of 100 mg IV every 12 hours,  Trough originally scheduled for 0900 3/2/19  Dose given before trough drawn, therefore rescheduled to be drawn at 2100 on 03/02/19  We will continue to follow the patient?s culture results and clinical progress daily      Sylvester Moreno, Pharmacist

## 2019-03-02 NOTE — PROGRESS NOTES
Progress Note - Clark 53 y o  male MRN: [id number]  Unit/Bed#: ICU 06 Encounter: [id number]    Assessment/Plan:  1  Severe sepsis with shock, multiple possible sources including liver abscess and right-sided empyema  · Infectious Disease is following  Continue cefepime Flagyl and vancomycin per their recommendations  Pharmacy is dosing the vancomycin  Initial and repeat blood cultures remain negative  · Still requiring Levophed and vasopressin to maintain blood pressure  Wean as tolerated to maintain map greater than 65    2  Acute hypoxic respiratory failure, multifactorial secondary to sepsis and right sided effusion  · Continue vent support with daily weaning trials  · If unable to extubate will start TF if ok with GI   3  Right-sided empyema, status post chest tube placement  · Chest tube is draining only serous fluid at this point  Pleural fluid cultures show no growth to date  Continue to monitor output  4  Liver abscess status post IR drain placement  · Monitor drainage from OLIVIA  Antibiotics as above  5  Alcoholic cirrhosis  · GI is following  · Continue daily thiamine and folic acid  6  Acute kidney injury likely secondary to ATN in the setting of sepsis  · Creatinine is improved today  Urine output is adequate  Continue to trend renal indices and monitor intake and output  Continue IV fluid hydration  7  Chronic appendicitis  · Patient evaluated by surgery  Will likely need appendectomy as an outpatient  8  Urinary retention s/p suprapubic catheter placement after failed attempts at rendon placement  · Urology following  Maintain suprapubic catheter until patient is more stable and can attempt a voiding trial  9  Hypokalemia  · Will replete    _____________________________________________________________________    HPI/24hr events:   Afebrile  No acute events overnight      Medications:    Current Facility-Administered Medications:  acetaminophen 650 mg Oral Q6H PRN Levell Eis Ofelia,     cefepime 2,000 mg Intravenous Q12H Dawna Santana MD Last Rate: Stopped (03/02/19 0001)   chlorhexidine 15 mL Swish & Spit Q12H [facility] LINDSAY Martinez    folic acid IVPB 1 mg Intravenous Daily LINDSAY Britton Last Rate: Stopped (03/01/19 1147)   heparin (porcine) 5,000 Units Subcutaneous Q8H [facility] Robert W Spatzer, CRNP    ipratropium 0 5 mg Nebulization Q6H [facility] Ari Forman MD    lactated ringers 100 mL/hr Intravenous Continuous LINDSAY Villalobos Last Rate: 100 mL/hr (03/01/19 2233)   levalbuterol 1 25 mg Nebulization Q6H [place] Spatzer, CRNP    lidocaine  Topical Once [employer], LINDSAY    magnesium sulfate 1 g Intravenous Once Robert W Spatzer, CRNP    metroNIDAZOLE 500 mg Intravenous Q8H Dawna Santana MD Last Rate: Stopped (03/02/19 0246)   norepinephrine 1-30 mcg/min Intravenous Titrated Ginny Alcantara MD Last Rate: 18 mcg/min (03/02/19 0611)   ondansetron 4 mg Intravenous Q8H PRN Victorino Gilbert DO    pantoprazole 40 mg Intravenous Q24H [facility] LINDSAY Martinez    potassium chloride 40 mEq Intravenous Once Willistine Certain, CRNP Last Rate: 40 mEq (03/02/19 0609)   propofol 5-50 mcg/kg/min Intravenous Titrated Marella SaleLINDSAY Last Rate: 25 mcg/kg/min (03/02/19 0419)   sodium chloride 100 mL/hr Intravenous Continuous LINDSAY Johnson Last Rate: Stopped (03/01/19 0622)   thiamine 100 mg Intravenous Daily LINDSAY Steven Last Rate: Stopped (03/01/19 0930)   vancomycin 1,000 mg Intravenous Q12H Dawna Santana MD Last Rate: Stopped (03/02/19 0001)   vasopressin (PITRESSIN) in 0 9 % sodium chloride 100 mL 0 04 Units/min Intravenous Continuous LINDSAY Villalobos Last Rate: 0 04 Units/min (03/02/19 0420)         lactated ringers 100 mL/hr Last Rate: 100 mL/hr (03/01/19 9429)   norepinephrine 1-30 mcg/min Last Rate: 18 mcg/min (03/02/19 0611)   propofol 5-50 mcg/kg/min Last Rate: 25 mcg/kg/min (03/02/19 3439)   sodium chloride 100 mL/hr Last Rate: Stopped (03/01/19 0622) vasopressin (PITRESSIN) in 0 9 % sodium chloride 100 mL 0 04 Units/min Last Rate: 0 04 Units/min (03/02/19 0420)         Physical exam:  Vitals: Body mass index is 36 27 kg/m²  Blood pressure (!) 65/33, pulse 58, temperature 98 3 °F (36 8 °C), temperature source Temporal, resp  rate (!) 26, height 5' 8" (1 727 m), weight 108 kg (238 lb 8 6 oz), SpO2 97 %  ,  Temp  Min: 97 5 °F (36 4 °C)  Max: 102 4 °F (39 1 °C)  IBW: 68 4 kg    SpO2: 97 %  SpO2 Activity: At Rest  O2 Device: Simple mask      Intake/Output Summary (Last 24 hours) at 3/2/2019 0622  Last data filed at 3/2/2019 0401  Gross per 24 hour   Intake 5602 58 ml   Output 2525 ml   Net 3077 58 ml       Invasive/non-invasive ventilation settings:   Respiratory    Lab Data (Last 4 hours)    None         O2/Vent Data (Last 4 hours)      03/02 0303           Vent Mode AC/VC       Resp Rate (BPM) (BPM) 12       Vt (mL) (mL) 400       FIO2 (%) (%) 50       PEEP (cmH2O) (cmH2O) 5       MV 12 1                 Invasive Devices     Central Venous Catheter Line            CVC Central Lines 03/01/19 Triple 16cm 1 day          Peripheral Intravenous Line            Peripheral IV 02/25/19 Right Forearm 4 days    Peripheral IV 02/28/19 Left Antecubital 1 day          Arterial Line            Arterial Line 02/28/19 Radial 1 day          Drain            Chest Tube Right 1 day    Closed/Suction Drain Anterior Abdomen Bulb 10 Fr  1 day    NG/OG/Enteral Tube Orogastric 16 Fr Center mouth 1 day    Suprapubic Catheter 1 day          Airway            ETT  Hi-Lo 8 mm 1 day                  Physical Exam:  Gen: Intubated, sedated  HEENT:  Atraumatic, normocephalic, extraocular movements intact, pupils 4 mm equal and reactive, sclerae anicteric, oropharynx intubated  Neck:  Supple, trachea midline, no JVD, no lymphadenopathy  Chest:  Lungs clear anteriorly, no wheeze  Cor:  Single S1/S2, no murmurs, rubs, gallops, regular rate and rhythm  Abd:  Soft, nondistended, no guarding, bowel sounds hypoactive  Ext:  Trace edema x4 extremities, no clubbing or cyanosis  Neuro: Moves all extremities, withdraws to pain, grossly nonfocal  Skin:  Warm, dry, mild jaundice      Diagnostic Data:  Lab: I have personally reviewed pertinent lab results  CBC:   Results from last 7 days   Lab Units 03/02/19  0420 03/01/19  0507 02/28/19  2034   WBC Thousand/uL 13 29* 26 09* 29 66*   HEMOGLOBIN g/dL 9 4* 10 3* 10 4*   HEMATOCRIT % 28 6* 32 1* 32 0*   PLATELETS Thousands/uL 121* 190 281       CMP:   Results from last 7 days   Lab Units 03/02/19  0438 03/01/19  0507 03/01/19  0013  02/27/19  0517   SODIUM mmol/L 135* 134* 133*   < > 132*   POTASSIUM mmol/L 3 2* 3 8 3 9   < > 3 8   CHLORIDE mmol/L 103 101 99*   < > 101   CO2 mmol/L 22 21 22   < > 24   BUN mg/dL 19 20 19   < > 10   CREATININE mg/dL 1 06 1 51* 1 73*   < > 0 77   CALCIUM mg/dL 7 2* 7 5* 7 3*   < > 8 1*   ALK PHOS U/L 103 104  --   --  178*   ALT U/L 17 20  --   --  28   AST U/L 39 39  --   --  42    < > = values in this interval not displayed  PT/INR:   No results found for: PT, INR,   Magnesium:   Results from last 7 days   Lab Units 03/02/19  0438 03/01/19  0507 03/01/19  0321   MAGNESIUM mg/dL 1 6 1 8 1 8     Phosphorous:       Microbiology:  Results from last 7 days   Lab Units 02/28/19  2207 02/28/19  2206 02/28/19  1756 02/28/19  0903 02/28/19  0902 02/26/19  1041 02/25/19  2055 02/25/19 2046   BLOOD CULTURE   --   --   --  No Growth at 24 hrs  No Growth at 24 hrs   --  No Growth After 4 Days  No Growth After 4 Days     GRAM STAIN RESULT  4+ Polys  No organisms seen 4+ Polys  1+ Mononuclear Cells  No No bacteria seen 3+ Polys  No bacteria seen  --   --   --   --   --    BODY FLUID CULTURE, STERILE   --   --  No growth  --   --   --   --   --    C DIFF TOXIN B   --   --   --   --   --  NEGATIVE for C difficle toxin by PCR    --   --        Imaging:  No new imaging    Cardiac lab/EKG/telemetry/ECHO:   Sinus rhythm to sinus bradycardia on telemetry    VTE Prophylaxis: Heparin, SCDs    Code Status: Level 1 - Full Code    [place]ia Mars Spatzer, CRNP    Portions of the record may have been created with voice recognition software  Occasional wrong word or "sound a like" substitutions may have occurred due to the inherent limitations of voice recognition software  Read the chart carefully and recognize, using context, where substitutions have occurred

## 2019-03-03 LAB
ANION GAP SERPL CALCULATED.3IONS-SCNC: 10 MMOL/L (ref 4–13)
BACTERIA SPEC BFLD CULT: NO GROWTH
BASE EXCESS BLDA CALC-SCNC: -0.7 MMOL/L
BODY TEMPERATURE: 98.7 DEGREES FEHRENHEIT
BUN SERPL-MCNC: 14 MG/DL (ref 5–25)
CALCIUM SERPL-MCNC: 7.3 MG/DL (ref 8.3–10.1)
CHLORIDE SERPL-SCNC: 105 MMOL/L (ref 100–108)
CO2 SERPL-SCNC: 23 MMOL/L (ref 21–32)
CREAT SERPL-MCNC: 0.83 MG/DL (ref 0.6–1.3)
GFR SERPL CREATININE-BSD FRML MDRD: 105 ML/MIN/1.73SQ M
GLUCOSE SERPL-MCNC: 109 MG/DL (ref 65–140)
GRAM STN SPEC: NORMAL
GRAM STN SPEC: NORMAL
HCO3 BLDA-SCNC: 21.2 MMOL/L (ref 22–28)
HOROWITZ INDEX BLDA+IHG-RTO: 50 MM[HG]
MAGNESIUM SERPL-MCNC: 1.5 MG/DL (ref 1.6–2.6)
O2 CT BLDA-SCNC: 13.4 ML/DL (ref 16–23)
OXYHGB MFR BLDA: 95.2 % (ref 94–97)
PCO2 BLDA: 26.1 MM HG (ref 36–44)
PCO2 TEMP ADJ BLDA: 26.2 MM HG (ref 36–44)
PEEP RESPIRATORY: 5 CM[H2O]
PH BLD: 7.53 [PH] (ref 7.35–7.45)
PH BLDA: 7.53 [PH] (ref 7.35–7.45)
PO2 BLD: 82.2 MM HG (ref 75–129)
PO2 BLDA: 81.7 MM HG (ref 75–129)
POTASSIUM SERPL-SCNC: 3 MMOL/L (ref 3.5–5.3)
SODIUM SERPL-SCNC: 138 MMOL/L (ref 136–145)
SPECIMEN SOURCE: ABNORMAL
VENT AC: 16
VENT- AC: AC
VT SETTING VENT: 400 ML

## 2019-03-03 PROCEDURE — 94640 AIRWAY INHALATION TREATMENT: CPT

## 2019-03-03 PROCEDURE — C9113 INJ PANTOPRAZOLE SODIUM, VIA: HCPCS | Performed by: NURSE PRACTITIONER

## 2019-03-03 PROCEDURE — 99232 SBSQ HOSP IP/OBS MODERATE 35: CPT | Performed by: INTERNAL MEDICINE

## 2019-03-03 PROCEDURE — 82805 BLOOD GASES W/O2 SATURATION: CPT | Performed by: NURSE PRACTITIONER

## 2019-03-03 PROCEDURE — 80048 BASIC METABOLIC PNL TOTAL CA: CPT | Performed by: NURSE PRACTITIONER

## 2019-03-03 PROCEDURE — 99291 CRITICAL CARE FIRST HOUR: CPT | Performed by: INTERNAL MEDICINE

## 2019-03-03 PROCEDURE — 83735 ASSAY OF MAGNESIUM: CPT | Performed by: NURSE PRACTITIONER

## 2019-03-03 PROCEDURE — 94003 VENT MGMT INPAT SUBQ DAY: CPT

## 2019-03-03 RX ORDER — POTASSIUM CHLORIDE 14.9 MG/ML
20 INJECTION INTRAVENOUS ONCE
Status: COMPLETED | OUTPATIENT
Start: 2019-03-03 | End: 2019-03-03

## 2019-03-03 RX ORDER — MAGNESIUM SULFATE 1 G/100ML
1 INJECTION INTRAVENOUS ONCE
Status: COMPLETED | OUTPATIENT
Start: 2019-03-03 | End: 2019-03-03

## 2019-03-03 RX ORDER — FUROSEMIDE 10 MG/ML
20 INJECTION INTRAMUSCULAR; INTRAVENOUS
Status: DISCONTINUED | OUTPATIENT
Start: 2019-03-03 | End: 2019-03-06

## 2019-03-03 RX ORDER — PROPOFOL 10 MG/ML
5-50 INJECTION, EMULSION INTRAVENOUS
Status: DISCONTINUED | OUTPATIENT
Start: 2019-03-03 | End: 2019-03-12

## 2019-03-03 RX ORDER — POTASSIUM CHLORIDE 20MEQ/15ML
40 LIQUID (ML) ORAL ONCE
Status: COMPLETED | OUTPATIENT
Start: 2019-03-03 | End: 2019-03-03

## 2019-03-03 RX ADMIN — CEFEPIME HYDROCHLORIDE 2000 MG: 2 INJECTION, POWDER, FOR SOLUTION INTRAVENOUS at 10:30

## 2019-03-03 RX ADMIN — MAGNESIUM SULFATE HEPTAHYDRATE 1 G: 1 INJECTION, SOLUTION INTRAVENOUS at 07:24

## 2019-03-03 RX ADMIN — FENTANYL CITRATE 50 MCG: 50 INJECTION, SOLUTION INTRAMUSCULAR; INTRAVENOUS at 14:48

## 2019-03-03 RX ADMIN — FOLIC ACID 1 MG: 5 INJECTION, SOLUTION INTRAMUSCULAR; INTRAVENOUS; SUBCUTANEOUS at 08:17

## 2019-03-03 RX ADMIN — LEVALBUTEROL 1.25 MG: 1.25 SOLUTION, CONCENTRATE RESPIRATORY (INHALATION) at 23:46

## 2019-03-03 RX ADMIN — PANTOPRAZOLE SODIUM 40 MG: 40 INJECTION, POWDER, FOR SOLUTION INTRAVENOUS at 08:16

## 2019-03-03 RX ADMIN — METRONIDAZOLE 500 MG: 500 INJECTION, SOLUTION INTRAVENOUS at 15:48

## 2019-03-03 RX ADMIN — PROPOFOL 15 MCG/KG/MIN: 10 INJECTION, EMULSION INTRAVENOUS at 10:22

## 2019-03-03 RX ADMIN — FUROSEMIDE 20 MG: 10 INJECTION, SOLUTION INTRAMUSCULAR; INTRAVENOUS at 17:02

## 2019-03-03 RX ADMIN — DEXMEDETOMIDINE HYDROCHLORIDE 0.5 MCG/KG/HR: 100 INJECTION, SOLUTION INTRAVENOUS at 09:55

## 2019-03-03 RX ADMIN — THIAMINE HYDROCHLORIDE 100 MG: 100 INJECTION, SOLUTION INTRAMUSCULAR; INTRAVENOUS at 08:17

## 2019-03-03 RX ADMIN — CHLORHEXIDINE GLUCONATE 0.12% ORAL RINSE 15 ML: 1.2 LIQUID ORAL at 20:08

## 2019-03-03 RX ADMIN — VANCOMYCIN HYDROCHLORIDE 1750 MG: 1 INJECTION, POWDER, LYOPHILIZED, FOR SOLUTION INTRAVENOUS at 04:07

## 2019-03-03 RX ADMIN — VASOPRESSIN 0.04 UNITS/MIN: 20 INJECTION INTRAVENOUS at 08:21

## 2019-03-03 RX ADMIN — DEXMEDETOMIDINE HYDROCHLORIDE 0.7 MCG/KG/HR: 100 INJECTION, SOLUTION INTRAVENOUS at 01:50

## 2019-03-03 RX ADMIN — NOREPINEPHRINE BITARTRATE 9 MCG/MIN: 1 INJECTION INTRAVENOUS at 22:50

## 2019-03-03 RX ADMIN — SODIUM CHLORIDE, SODIUM LACTATE, POTASSIUM CHLORIDE, AND CALCIUM CHLORIDE 100 ML/HR: .6; .31; .03; .02 INJECTION, SOLUTION INTRAVENOUS at 05:26

## 2019-03-03 RX ADMIN — DEXMEDETOMIDINE HYDROCHLORIDE 0.7 MCG/KG/HR: 100 INJECTION, SOLUTION INTRAVENOUS at 06:00

## 2019-03-03 RX ADMIN — CHLORHEXIDINE GLUCONATE 0.12% ORAL RINSE 15 ML: 1.2 LIQUID ORAL at 08:16

## 2019-03-03 RX ADMIN — HEPARIN SODIUM 5000 UNITS: 5000 INJECTION INTRAVENOUS; SUBCUTANEOUS at 20:09

## 2019-03-03 RX ADMIN — PROPOFOL 25 MCG/KG/MIN: 10 INJECTION, EMULSION INTRAVENOUS at 23:43

## 2019-03-03 RX ADMIN — POTASSIUM CHLORIDE 20 MEQ: 200 INJECTION, SOLUTION INTRAVENOUS at 09:05

## 2019-03-03 RX ADMIN — LEVALBUTEROL 1.25 MG: 1.25 SOLUTION, CONCENTRATE RESPIRATORY (INHALATION) at 19:01

## 2019-03-03 RX ADMIN — LEVALBUTEROL 1.25 MG: 1.25 SOLUTION, CONCENTRATE RESPIRATORY (INHALATION) at 07:39

## 2019-03-03 RX ADMIN — HEPARIN SODIUM 5000 UNITS: 5000 INJECTION INTRAVENOUS; SUBCUTANEOUS at 06:11

## 2019-03-03 RX ADMIN — NOREPINEPHRINE BITARTRATE 7 MCG/MIN: 1 INJECTION INTRAVENOUS at 01:51

## 2019-03-03 RX ADMIN — POTASSIUM CHLORIDE 40 MEQ: 20 SOLUTION ORAL at 08:16

## 2019-03-03 RX ADMIN — CEFEPIME HYDROCHLORIDE 2000 MG: 2 INJECTION, POWDER, FOR SOLUTION INTRAVENOUS at 22:43

## 2019-03-03 RX ADMIN — LEVALBUTEROL 1.25 MG: 1.25 SOLUTION, CONCENTRATE RESPIRATORY (INHALATION) at 13:34

## 2019-03-03 RX ADMIN — IPRATROPIUM BROMIDE 0.5 MG: 0.5 SOLUTION RESPIRATORY (INHALATION) at 07:39

## 2019-03-03 RX ADMIN — METRONIDAZOLE 500 MG: 500 INJECTION, SOLUTION INTRAVENOUS at 07:34

## 2019-03-03 RX ADMIN — HEPARIN SODIUM 5000 UNITS: 5000 INJECTION INTRAVENOUS; SUBCUTANEOUS at 13:52

## 2019-03-03 RX ADMIN — IPRATROPIUM BROMIDE 0.5 MG: 0.5 SOLUTION RESPIRATORY (INHALATION) at 23:46

## 2019-03-03 RX ADMIN — VASOPRESSIN 0.04 UNITS/MIN: 20 INJECTION INTRAVENOUS at 17:01

## 2019-03-03 RX ADMIN — METRONIDAZOLE 500 MG: 500 INJECTION, SOLUTION INTRAVENOUS at 00:09

## 2019-03-03 RX ADMIN — FUROSEMIDE 20 MG: 10 INJECTION, SOLUTION INTRAMUSCULAR; INTRAVENOUS at 10:07

## 2019-03-03 RX ADMIN — ACETAMINOPHEN 650 MG: 325 TABLET, FILM COATED ORAL at 20:08

## 2019-03-03 RX ADMIN — IPRATROPIUM BROMIDE 0.5 MG: 0.5 SOLUTION RESPIRATORY (INHALATION) at 19:01

## 2019-03-03 RX ADMIN — PROPOFOL 25 MCG/KG/MIN: 10 INJECTION, EMULSION INTRAVENOUS at 17:01

## 2019-03-03 RX ADMIN — IPRATROPIUM BROMIDE 0.5 MG: 0.5 SOLUTION RESPIRATORY (INHALATION) at 13:34

## 2019-03-03 NOTE — PROGRESS NOTES
Progress Note - Infectious Disease   Theone Jose 53 y o  male MRN: [id number]  Unit/Bed#: ICU 06 Encounter: [id number]         IMPRESSION & RECOMMENDATIONS:   1  Severe sepsis/shock-fever, leukocytosis, lactic acidosis   Likely secondary to liver abscess and right-sided empyema   Admission and repeat blood cultures all remain negative  Continues to improve after placement of chest tube and IR drain  Vasopressor requirement continues to improve  Patient remains afebrile   -antibiotic plan as below  -monitor CBC with diff and creatinine  -wean off pressors as able  -monitor temperatures and hemodynamics  -supportive care     2  Liver abscess-suspect a portal source of infection with the possible appendicitis   No perforation seen on initial CT the abdomen and pelvis   No clear biliary source   Now status post IR drain placement  Cultures all remain negative   -continue IV cefepime and Flagyl  -hold additional vancomycin as all cultures remain negative and low suspicion for Staph infection  -monitor drain output  -GI follow-up     3  Right-sided empyema   Likely secondary to liver abscess   Now status post chest tube placement   Repeat x-ray shows much improved right hemithorax  Pleural fluid cultures show no growth thus far   -antibiotic plan as above  -follow-up pleural fluid culture until finalized  -monitor chest tube output  -possible repeat CT chest to reassess empyema     4  Acute kidney injury-suspect multifactorial including pre renal issues and possibly sepsis   ATN may be playing a role   Creatinine has improved  -monitor creatinine clearance closely  -volume management     5  Alcohol abuse-with probable cirrhosis based upon the CT findings   -continue abstinence  -GI follow-up     Discussed with patient's family at bedside  Subjective:  Patient unable to be weaned from vent today  Now receiving diuresis  Fevers improved    Still on vasopressors with improving requirements  Objective:  Vitals:  Temp:  [98 3 °F (36 8 °C)-99 3 °F (37 4 °C)] 98 5 °F (36 9 °C)  HR:  [50-68] 50  Resp:  [23-41] 30  SpO2:  [95 %-98 %] 97 %  Temp (24hrs), Av 8 °F (37 1 °C), Min:98 3 °F (36 8 °C), Max:99 3 °F (37 4 °C)  Current: Temperature: 98 5 °F (36 9 °C)    Physical Exam:   General:  Sedated, intubated  Eyes:  Conjunctive clear with no hemorrhages or effusions  Oropharynx:  ET tube in place  Neck:  Supple, no lymphadenopathy  Lungs:  Clear to auscultation bilaterally, no accessory muscle use  Cardiac:  Regular rate and rhythm, no murmurs  Abdomen:  Soft, non-tender, non-distented, suprapubic cath in place  Right upper quadrant drain  Right chest tube in place  Extremities:  No peripheral cyanosis, clubbing, or edema  Skin:  No rashes, no ulcers  Neurological:  Sedated    Lab Results:  I have personally reviewed pertinent labs  Results from last 7 days   Lab Units 19  0415 19  0438 19  0507  19  0517   POTASSIUM mmol/L 3 0* 3 2* 3 8   < > 3 8   CHLORIDE mmol/L 105 103 101   < > 101   CO2 mmol/L 23 22 21   < > 24   BUN mg/dL 14 19 20   < > 10   CREATININE mg/dL 0 83 1 06 1 51*   < > 0 77   EGFR ml/min/1 73sq m 105 83 54   < > 108   CALCIUM mg/dL 7 3* 7 2* 7 5*   < > 8 1*   AST U/L  --  39 39  --  42   ALT U/L  --  17 20  --  28   ALK PHOS U/L  --  103 104  --  178*    < > = values in this interval not displayed  Results from last 7 days   Lab Units 19  0420 19  0507 19  2034   WBC Thousand/uL 13 29* 26 09* 29 66*   HEMOGLOBIN g/dL 9 4* 10 3* 10 4*   PLATELETS Thousands/uL 121* 190 281     Results from last 7 days   Lab Units 19  2207 19  2206 19  1756 19  0903 19  0902 19  1041 19   BLOOD CULTURE   --   --   --  No Growth at 72 hrs  No Growth at 72 hrs   --  No Growth After 5 Days  No Growth After 5 Days     GRAM STAIN RESULT  4+ Polys  No organisms seen 4+ Polys  1+ Mononuclear Cells  No No bacteria seen 3+ Polys  No bacteria seen  --   --   --   --   --    BODY FLUID CULTURE, STERILE  No growth  --  No growth  --   --   --   --   --    C DIFF TOXIN B   --   --   --   --   --  NEGATIVE for C difficle toxin by PCR    --   --        Imaging Studies:   I have personally reviewed pertinent imaging study reports and images in PACS  EKG, Pathology, and Other Studies:   I have personally reviewed pertinent reports

## 2019-03-03 NOTE — PROGRESS NOTES
Trough of 9 2 on 3/2   Change of dose to 1750mg IV q12h  continue to monitor improving Renal Function   redraw trough prior to 4th dose at 1600 on 3/4

## 2019-03-03 NOTE — PROGRESS NOTES
Progress Note - Clark 53 y o  male MRN: [id number]  Unit/Bed#: [facility] 06 Encounter: [id number]    Assessment/Plan:  1  Severe sepsis for shock, multiple possible sources including liver abscess and right-sided empyema  · Infectious Disease is following  All cultures negative so far  Continue cefepime, Flagyl and vancomycin per ID recommendations  Pharmacy is dosing the vancomycin  · Hemodynamics are slowly improving  Patient remains on norepinephrine and vasopressin by pressor requirement is decreasing  Continue to wean as tolerated to maintain map greater than 65  · Remains afebrile  2  Acute hypoxic respiratory failure, multifactorial secondary to sepsis and right-sided effusion  · Tolerated pressure support wean of 5/5 for 2-3 hours yesterday  Continue vent support with daily weaning trials  Possible extubation today  · Xopenex/Atrovent nebs q 6 hours  3  Right-sided empyema, status post chest tube placement  · Chest tube continues to drain serous slow without evidence of purulent material   4  Liver abscess status post IR drain placement  · Drainage through OLIVIA is scant  Consider repeat CT to assess abscess  5  Alcoholic cirrhosis  · Continue daily thiamine and folic acid  GI following  Trend LFTs  6  Acute kidney injury secondary to ATN in the setting of sepsis  · Resolved  Creatinine received returned to baseline and urine output is adequate  Trend renal indices and monitor intake and output  7  Urinary retention status post suprapubic catheter placement after failed attempts at [place]  · Maintain suprapubic catheter per Urology recommendations  Patient will follow up as an outpatient for repeat cystoscopy  8  Chronic appendicitis  · Not a candidate for surgery at this time  Likely outpatient appendectomy    _____________________________________________________________________    HPI/24hr events:   Afebrile  More alert on the ventilator    No acute events overnight      Medications:    Current Facility-Administered Medications:  acetaminophen 650 mg Oral Q6H PRN Victorino Prechtel, DO    cefepime 2,000 mg Intravenous Q12H Reyna Pena MD Last Rate: Stopped (03/03/19 0009)   chlorhexidine 15 mL Swish & Spit Q12H [facility] LINDSAY Murray    dexmedetomidine 0 1-0 7 mcg/kg/hr Intravenous Titrated LINDSAY Bonner Last Rate: 0 7 mcg/kg/hr (03/03/19 0600)   fentanyl citrate (PF) 50 mcg Intravenous Q2H PRN LINDSAY Bonner    folic acid IVPB 1 mg Intravenous Daily Erven Primus, CRNP Last Rate: Stopped (03/02/19 1040)   heparin (porcine) 5,000 Units Subcutaneous Q8H [facility] Robert W Spatzer, CRNP    ipratropium 0 5 mg Nebulization Q6H Alphonse Chamorro MD    lactated ringers 100 mL/hr Intravenous Continuous LINDSAY Bonner Last Rate: 100 mL/hr (03/03/19 0526)   levalbuterol 1 25 mg Nebulization Q6H Alinda Earnest Spatzer, CRNP    lidocaine  Topical Once [employer], LINDSAY    magnesium sulfate 1 g Intravenous Once Alinda Earnest Spatzer, CRNP    metroNIDAZOLE 500 mg Intravenous Q8H Reyna Pena MD Last Rate: Stopped (03/03/19 0201)   norepinephrine 1-30 mcg/min Intravenous Titrated Alphonse Chamorro MD Last Rate: 7 mcg/min (03/03/19 0151)   ondansetron 4 mg Intravenous Q8H PRN Victorino Prechtel, DO    pantoprazole 40 mg Intravenous Q24H [facility] LINDSAY Martinez    potassium chloride 40 mEq Oral Once Alinda Earnest Spatzer, CRNP    potassium chloride 20 mEq Intravenous Once Alinda Earnest Spatzer, CRNP    sodium chloride 100 mL/hr Intravenous Continuous LINDSAY Johnson Last Rate: Stopped (03/01/19 0622)   thiamine 100 mg Intravenous Daily LINDSAY Murray Last Rate: Stopped (03/02/19 1019)   vancomycin 1,750 mg Intravenous Q12H Reyna Pena MD Last Rate: 1,750 mg (03/03/19 0407)   vasopressin (PITRESSIN) in 0 9 % sodium chloride 100 mL 0 04 Units/min Intravenous Continuous LINDSAY Bonner Last Rate: 0 04 Units/min (03/02/19 6627)         dexmedetomidine 0 1-0 7 mcg/kg/hr Last Rate: 0 7 mcg/kg/hr (03/03/19 0600)   lactated ringers 100 mL/hr Last Rate: 100 mL/hr (03/03/19 0526)   norepinephrine 1-30 mcg/min Last Rate: 7 mcg/min (03/03/19 0151)   sodium chloride 100 mL/hr Last Rate: Stopped (03/01/19 0622)   vasopressin (PITRESSIN) in 0 9 % sodium chloride 100 mL 0 04 Units/min Last Rate: 0 04 Units/min (03/02/19 2227)         Physical exam:  Vitals: Body mass index is 36 27 kg/m²  Blood pressure (!) 65/33, pulse (!) 52, temperature 99 3 °F (37 4 °C), temperature source Temporal, resp   rate (!) 30, height 5' 8" (1 727 m), weight 108 kg (238 lb 8 6 oz), SpO2 98 % ,  Temp  Min: 97 5 °F (36 4 °C)  Max: 102 4 °F (39 1 °C)  IBW: 68 4 kg    SpO2: 98 %  SpO2 Activity: At Rest  O2 Device: Other (comment)(vent)      Intake/Output Summary (Last 24 hours) at 3/3/2019 0641  Last data filed at 3/3/2019 0526  Gross per 24 hour   Intake 4894 89 ml   Output 2680 ml   Net 2214 89 ml       Invasive/non-invasive ventilation settings:   Respiratory    Lab Data (Last 4 hours)    None         O2/Vent Data (Last 4 hours)      03/03 0332           Vent Mode AC/VC       Resp Rate (BPM) (BPM) 12       Vt (mL) (mL) 400       FIO2 (%) (%) 50       PEEP (cmH2O) (cmH2O) 5       MV 12 9                 Invasive Devices     Central Venous Catheter Line            CVC Central Lines 03/01/19 Triple 16cm 2 days          Peripheral Intravenous Line            Peripheral IV 02/25/19 Right Forearm 5 days    Peripheral IV 02/28/19 Left Antecubital 2 days          Arterial Line            Arterial Line 02/28/19 Radial 2 days          Drain            Chest Tube Right 2 days    Closed/Suction Drain Anterior Abdomen Bulb 10 Fr  2 days    NG/OG/Enteral Tube Orogastric 16 Fr Center mouth 2 days    Suprapubic Catheter 2 days          Airway            ETT  Hi-Lo 8 mm 2 days                  Physical Exam:    Gen:  Intubated, sedated  HEENT:  Atraumatic, normocephalic, extraocular movements intact, pupils 3 mm equal and reactive, oropharynx intubated  Neck: Supple, trachea midline, no JVD, no lymphadenopathy  Chest:  Clear to auscultation  Cor:  Single S1/S2, no murmurs, rubs, gallops, regular rate and rhythm  Abd:  Soft, Slightly distended, non tender, bowel sounds normoactive  Ext:  Nonpitting extremity edema x4, no clubbing or cyanosis  Neuro:  Alert on the ventilator, moves all extremities, follows commands, grossly nonfocal  Skin:  Warm, dry      Diagnostic Data:  Lab: I have personally reviewed pertinent lab results  CBC:   Results from last 7 days   Lab Units 03/02/19  0420 03/01/19  0507 02/28/19  2034   WBC Thousand/uL 13 29* 26 09* 29 66*   HEMOGLOBIN g/dL 9 4* 10 3* 10 4*   HEMATOCRIT % 28 6* 32 1* 32 0*   PLATELETS Thousands/uL 121* 190 281       CMP:   Results from last 7 days   Lab Units 03/03/19  0415 03/02/19  0438 03/01/19  0507  02/27/19  0517   SODIUM mmol/L 138 135* 134*   < > 132*   POTASSIUM mmol/L 3 0* 3 2* 3 8   < > 3 8   CHLORIDE mmol/L 105 103 101   < > 101   CO2 mmol/L 23 22 21   < > 24   BUN mg/dL 14 19 20   < > 10   CREATININE mg/dL 0 83 1 06 1 51*   < > 0 77   CALCIUM mg/dL 7 3* 7 2* 7 5*   < > 8 1*   ALK PHOS U/L  --  103 104  --  178*   ALT U/L  --  17 20  --  28   AST U/L  --  39 39  --  42    < > = values in this interval not displayed  PT/INR:   No results found for: PT, INR,   Magnesium:   Results from last 7 days   Lab Units 03/03/19  0415 03/02/19  0438 03/01/19  0507   MAGNESIUM mg/dL 1 5* 1 6 1 8     Phosphorous:       Microbiology:  Results from last 7 days   Lab Units 02/28/19 2207 02/28/19  2206 02/28/19  1756 02/28/19  0903 02/28/19  0902 02/26/19  1041 02/25/19  2055 02/25/19  2046   BLOOD CULTURE   --   --   --  No Growth at 48 hrs  No Growth at 48 hrs  --  No Growth After 5 Days  No Growth After 5 Days     GRAM STAIN RESULT  4+ Polys  No organisms seen 4+ Polys  1+ Mononuclear Cells  No No bacteria seen 3+ Polys  No bacteria seen  --   --   --   --   --    BODY FLUID CULTURE, STERILE  No growth  --  No growth  --   --   --   --   --    C DIFF TOXIN B   --   --   --   --   --  NEGATIVE for C difficle toxin by PCR    --   --        Imaging:  No new imaging    Cardiac lab/EKG/telemetry/ECHO:   Sinus rhythm to sinus bradycardia on telemetry    VTE Prophylaxis:  Heparin, SCDs    Code Status: Level 1 - Full Code    Alcus Samples Spatzer, CRNP    Portions of the record may have been created with voice recognition software  Occasional wrong word or "sound a like" substitutions may have occurred due to the inherent limitations of voice recognition software  Read the chart carefully and recognize, using context, where substitutions have occurred

## 2019-03-04 ENCOUNTER — APPOINTMENT (INPATIENT)
Dept: CT IMAGING | Facility: HOSPITAL | Age: 49
DRG: 441 | End: 2019-03-04
Payer: COMMERCIAL

## 2019-03-04 LAB
ALBUMIN SERPL BCP-MCNC: 1.3 G/DL (ref 3.5–5)
ALP SERPL-CCNC: 94 U/L (ref 46–116)
ALT SERPL W P-5'-P-CCNC: 11 U/L (ref 12–78)
ANION GAP SERPL CALCULATED.3IONS-SCNC: 9 MMOL/L (ref 4–13)
AST SERPL W P-5'-P-CCNC: 27 U/L (ref 5–45)
BACTERIA SPEC BFLD CULT: NO GROWTH
BASE EXCESS BLDA CALC-SCNC: 0.4 MMOL/L
BILIRUB DIRECT SERPL-MCNC: 3.39 MG/DL (ref 0–0.2)
BILIRUB SERPL-MCNC: 3.8 MG/DL (ref 0.2–1)
BUN SERPL-MCNC: 15 MG/DL (ref 5–25)
CALCIUM SERPL-MCNC: 7.2 MG/DL (ref 8.3–10.1)
CHLORIDE SERPL-SCNC: 105 MMOL/L (ref 100–108)
CO2 SERPL-SCNC: 25 MMOL/L (ref 21–32)
CREAT SERPL-MCNC: 0.73 MG/DL (ref 0.6–1.3)
ERYTHROCYTE [DISTWIDTH] IN BLOOD BY AUTOMATED COUNT: 17.4 % (ref 11.6–15.1)
GFR SERPL CREATININE-BSD FRML MDRD: 110 ML/MIN/1.73SQ M
GLUCOSE SERPL-MCNC: 113 MG/DL (ref 65–140)
GRAM STN SPEC: NORMAL
GRAM STN SPEC: NORMAL
HCO3 BLDA-SCNC: 23.2 MMOL/L (ref 22–28)
HCT VFR BLD AUTO: 27.2 % (ref 36.5–49.3)
HGB BLD-MCNC: 8.9 G/DL (ref 12–17)
HOROWITZ INDEX BLDA+IHG-RTO: 50 MM[HG]
MAGNESIUM SERPL-MCNC: 1.5 MG/DL (ref 1.6–2.6)
MCH RBC QN AUTO: 30.5 PG (ref 26.8–34.3)
MCHC RBC AUTO-ENTMCNC: 32.7 G/DL (ref 31.4–37.4)
MCV RBC AUTO: 93 FL (ref 82–98)
O2 CT BLDA-SCNC: 13.2 ML/DL (ref 16–23)
OXYHGB MFR BLDA: 97.3 % (ref 94–97)
PCO2 BLDA: 30.9 MM HG (ref 36–44)
PEEP RESPIRATORY: 5 CM[H2O]
PH BLDA: 7.49 [PH] (ref 7.35–7.45)
PLATELET # BLD AUTO: 115 THOUSANDS/UL (ref 149–390)
PMV BLD AUTO: 9.9 FL (ref 8.9–12.7)
PO2 BLDA: 105.6 MM HG (ref 75–129)
POTASSIUM SERPL-SCNC: 2.5 MMOL/L (ref 3.5–5.3)
PROT SERPL-MCNC: 5.8 G/DL (ref 6.4–8.2)
RBC # BLD AUTO: 2.92 MILLION/UL (ref 3.88–5.62)
SODIUM SERPL-SCNC: 139 MMOL/L (ref 136–145)
SPECIMEN SOURCE: ABNORMAL
VENT AC: 16
VENT- AC: AC
VT SETTING VENT: 360 ML
WBC # BLD AUTO: 14.03 THOUSAND/UL (ref 4.31–10.16)

## 2019-03-04 PROCEDURE — 99291 CRITICAL CARE FIRST HOUR: CPT | Performed by: INTERNAL MEDICINE

## 2019-03-04 PROCEDURE — 94640 AIRWAY INHALATION TREATMENT: CPT

## 2019-03-04 PROCEDURE — 80076 HEPATIC FUNCTION PANEL: CPT | Performed by: NURSE PRACTITIONER

## 2019-03-04 PROCEDURE — 74177 CT ABD & PELVIS W/CONTRAST: CPT

## 2019-03-04 PROCEDURE — 94003 VENT MGMT INPAT SUBQ DAY: CPT

## 2019-03-04 PROCEDURE — 82805 BLOOD GASES W/O2 SATURATION: CPT | Performed by: NURSE PRACTITIONER

## 2019-03-04 PROCEDURE — 99233 SBSQ HOSP IP/OBS HIGH 50: CPT | Performed by: INTERNAL MEDICINE

## 2019-03-04 PROCEDURE — 99232 SBSQ HOSP IP/OBS MODERATE 35: CPT | Performed by: INTERNAL MEDICINE

## 2019-03-04 PROCEDURE — 80048 BASIC METABOLIC PNL TOTAL CA: CPT | Performed by: NURSE PRACTITIONER

## 2019-03-04 PROCEDURE — C9113 INJ PANTOPRAZOLE SODIUM, VIA: HCPCS | Performed by: NURSE PRACTITIONER

## 2019-03-04 PROCEDURE — 85027 COMPLETE CBC AUTOMATED: CPT | Performed by: NURSE PRACTITIONER

## 2019-03-04 PROCEDURE — 83735 ASSAY OF MAGNESIUM: CPT | Performed by: NURSE PRACTITIONER

## 2019-03-04 RX ORDER — POTASSIUM CHLORIDE 20MEQ/15ML
40 LIQUID (ML) ORAL ONCE
Status: COMPLETED | OUTPATIENT
Start: 2019-03-04 | End: 2019-03-04

## 2019-03-04 RX ORDER — MAGNESIUM SULFATE HEPTAHYDRATE 40 MG/ML
2 INJECTION, SOLUTION INTRAVENOUS ONCE
Status: COMPLETED | OUTPATIENT
Start: 2019-03-04 | End: 2019-03-04

## 2019-03-04 RX ADMIN — PROPOFOL 30 MCG/KG/MIN: 10 INJECTION, EMULSION INTRAVENOUS at 07:48

## 2019-03-04 RX ADMIN — PROPOFOL 40 MCG/KG/MIN: 10 INJECTION, EMULSION INTRAVENOUS at 19:22

## 2019-03-04 RX ADMIN — PROPOFOL 25 MCG/KG/MIN: 10 INJECTION, EMULSION INTRAVENOUS at 03:02

## 2019-03-04 RX ADMIN — FOLIC ACID 1 MG: 5 INJECTION, SOLUTION INTRAMUSCULAR; INTRAVENOUS; SUBCUTANEOUS at 08:11

## 2019-03-04 RX ADMIN — PROPOFOL 25 MCG/KG/MIN: 10 INJECTION, EMULSION INTRAVENOUS at 16:17

## 2019-03-04 RX ADMIN — POTASSIUM CHLORIDE 40 MEQ: 20 SOLUTION ORAL at 06:04

## 2019-03-04 RX ADMIN — NOREPINEPHRINE BITARTRATE 9 MCG/MIN: 1 INJECTION INTRAVENOUS at 06:09

## 2019-03-04 RX ADMIN — CEFEPIME HYDROCHLORIDE 2000 MG: 2 INJECTION, POWDER, FOR SOLUTION INTRAVENOUS at 22:46

## 2019-03-04 RX ADMIN — POTASSIUM CHLORIDE 40 MEQ: 20 SOLUTION ORAL at 11:20

## 2019-03-04 RX ADMIN — CEFEPIME HYDROCHLORIDE 2000 MG: 2 INJECTION, POWDER, FOR SOLUTION INTRAVENOUS at 10:53

## 2019-03-04 RX ADMIN — METRONIDAZOLE 500 MG: 500 INJECTION, SOLUTION INTRAVENOUS at 10:02

## 2019-03-04 RX ADMIN — FENTANYL CITRATE 50 MCG: 50 INJECTION, SOLUTION INTRAMUSCULAR; INTRAVENOUS at 19:16

## 2019-03-04 RX ADMIN — HEPARIN SODIUM 5000 UNITS: 5000 INJECTION INTRAVENOUS; SUBCUTANEOUS at 20:44

## 2019-03-04 RX ADMIN — VASOPRESSIN 0.04 UNITS/MIN: 20 INJECTION INTRAVENOUS at 03:28

## 2019-03-04 RX ADMIN — CHLORHEXIDINE GLUCONATE 0.12% ORAL RINSE 15 ML: 1.2 LIQUID ORAL at 08:05

## 2019-03-04 RX ADMIN — FENTANYL CITRATE 50 MCG: 50 INJECTION, SOLUTION INTRAMUSCULAR; INTRAVENOUS at 07:49

## 2019-03-04 RX ADMIN — PANTOPRAZOLE SODIUM 40 MG: 40 INJECTION, POWDER, FOR SOLUTION INTRAVENOUS at 08:05

## 2019-03-04 RX ADMIN — VASOPRESSIN 0.04 UNITS/MIN: 20 INJECTION INTRAVENOUS at 22:06

## 2019-03-04 RX ADMIN — VASOPRESSIN 0.04 UNITS/MIN: 20 INJECTION INTRAVENOUS at 12:52

## 2019-03-04 RX ADMIN — FUROSEMIDE 20 MG: 10 INJECTION, SOLUTION INTRAMUSCULAR; INTRAVENOUS at 17:55

## 2019-03-04 RX ADMIN — FUROSEMIDE 20 MG: 10 INJECTION, SOLUTION INTRAMUSCULAR; INTRAVENOUS at 07:48

## 2019-03-04 RX ADMIN — NOREPINEPHRINE BITARTRATE 5 MCG/MIN: 1 INJECTION INTRAVENOUS at 16:18

## 2019-03-04 RX ADMIN — PROPOFOL 40 MCG/KG/MIN: 10 INJECTION, EMULSION INTRAVENOUS at 22:46

## 2019-03-04 RX ADMIN — FENTANYL CITRATE 50 MCG: 50 INJECTION, SOLUTION INTRAMUSCULAR; INTRAVENOUS at 05:42

## 2019-03-04 RX ADMIN — METRONIDAZOLE 500 MG: 500 INJECTION, SOLUTION INTRAVENOUS at 17:55

## 2019-03-04 RX ADMIN — CHLORHEXIDINE GLUCONATE 0.12% ORAL RINSE 15 ML: 1.2 LIQUID ORAL at 20:44

## 2019-03-04 RX ADMIN — HEPARIN SODIUM 5000 UNITS: 5000 INJECTION INTRAVENOUS; SUBCUTANEOUS at 05:19

## 2019-03-04 RX ADMIN — THIAMINE HYDROCHLORIDE 100 MG: 100 INJECTION, SOLUTION INTRAMUSCULAR; INTRAVENOUS at 08:11

## 2019-03-04 RX ADMIN — PROPOFOL 40 MCG/KG/MIN: 10 INJECTION, EMULSION INTRAVENOUS at 19:16

## 2019-03-04 RX ADMIN — HEPARIN SODIUM 5000 UNITS: 5000 INJECTION INTRAVENOUS; SUBCUTANEOUS at 13:31

## 2019-03-04 RX ADMIN — IPRATROPIUM BROMIDE 0.5 MG: 0.5 SOLUTION RESPIRATORY (INHALATION) at 07:18

## 2019-03-04 RX ADMIN — METRONIDAZOLE 500 MG: 500 INJECTION, SOLUTION INTRAVENOUS at 01:00

## 2019-03-04 RX ADMIN — IOHEXOL 100 ML: 350 INJECTION, SOLUTION INTRAVENOUS at 09:42

## 2019-03-04 RX ADMIN — MAGNESIUM SULFATE HEPTAHYDRATE 2 G: 40 INJECTION, SOLUTION INTRAVENOUS at 06:26

## 2019-03-04 RX ADMIN — LEVALBUTEROL 1.25 MG: 1.25 SOLUTION, CONCENTRATE RESPIRATORY (INHALATION) at 07:18

## 2019-03-04 NOTE — PROGRESS NOTES
Progress Note - Infectious Disease   Jazmyne Montana 53 y o  male MRN: [id number]  Unit/Bed#: ICU 06 Encounter: [id number]      IMPRESSION & RECOMMENDATIONS:   1  Severe sepsis/shock-fever, leukocytosis, lactic acidosis   Likely secondary to liver abscess and right-sided empyema   Admission and repeat blood cultures all remain negative   Continues to improve after placement of chest tube and IR drain  Still requiring vasopressors, but requirement remains stable  Patient remains afebrile   -antibiotic plan as below  -monitor CBC with diff and creatinine  -wean off pressors as able  -monitor temperatures and hemodynamics  -supportive care     2  Liver abscess-suspect a portal source of infection with the possible appendicitis   No perforation seen on initial CT the abdomen and pelvis   No clear biliary source   Now status post IR drain placement  Cultures all remain negative  Repeat CT abdomen shows significantly decreased size of abscess   -continue IV cefepime and Flagyl  -monitor drain output  -GI follow-up     3  Right-sided empyema   Likely secondary to liver abscess   Now status post chest tube placement   Repeat x-ray shows much improved right hemithorax   Pleural fluid cultures were negative   -antibiotic plan as above  -chest tube management per critical care service  -monitor chest tube output     4  Acute kidney injury-suspect multifactorial including pre renal issues and possibly sepsis  Carter Allis may be playing a role   Creatinine has improved and remains normal   -monitor creatinine clearance closely  -volume management     5  Alcohol abuse-with probable cirrhosis, portal hypertension based upon the CT findings   -continue abstinence  -GI follow-up     6  Probable chronic appendicitis  This may be the etiology for development of liver abscess/empyema  Recent surgery evaluation noted    Patient will ultimately require appendectomy   -surgery follow-up    Discussed with patient's family at bedside, and with critical care service  Subjective:  Patient currently off sedation and is awake and communicative with his family  Remains intubated  Remains on Levophed at 8 an hour and vasopressin  T-max 100 2°  Objective:  Vitals:  Temp:  [97 7 °F (36 5 °C)-100 2 °F (37 9 °C)] 98 8 °F (37 1 °C)  HR:  [50-72] 72  Resp:  [20-37] 34  SpO2:  [94 %-100 %] 95 %  Temp (24hrs), Av 9 °F (37 2 °C), Min:97 7 °F (36 5 °C), Max:100 2 °F (37 9 °C)  Current: Temperature: 98 8 °F (37 1 °C)    Physical Exam:   General:  Awake on ventilator  Eyes:  Conjunctive clear with no hemorrhages or effusions  Oropharynx:  ET tube  Neck:  Supple, no lymphadenopathy  Lungs:  Clear to auscultation bilaterally, no accessory muscle use  Cardiac:  Regular rate and rhythm, no murmurs  Right chest tube in place  Abdomen:  Soft, non-tender, non-distented, suprapubic catheter in place  Right upper quadrant drain in place  Extremities:  Anasarca  Skin:  No rashes, no ulcers  Neurological:  Moves all four extremities spontaneously    Lab Results:  I have personally reviewed pertinent labs  Results from last 7 days   Lab Units 19  0415 19  0438 19  0507   POTASSIUM mmol/L 2 5* 3 0* 3 2* 3 8   CHLORIDE mmol/L 105 105 103 101   CO2 mmol/L 25 23 22 21   BUN mg/dL 15 14 19 20   CREATININE mg/dL 0 73 0 83 1 06 1 51*   EGFR ml/min/1 73sq m 110 105 83 54   CALCIUM mg/dL 7 2* 7 3* 7 2* 7 5*   AST U/L 27  --  39 39   ALT U/L 11*  --  17 20   ALK PHOS U/L 94  --  103 104     Results from last 7 days   Lab Units 19  0420 19  0507   WBC Thousand/uL 14 03* 13 29* 26 09*   HEMOGLOBIN g/dL 8 9* 9 4* 10 3*   PLATELETS Thousands/uL 115* 121* 190     Results from last 7 days   Lab Units 1928/19  1756 19  0903 19  0902 19  1041 19   BLOOD CULTURE   --   --   --  No Growth at 72 hrs  No Growth at 72 hrs   --  No Growth After 5 Days   No Growth After 5 Days  GRAM STAIN RESULT  4+ Polys  No organisms seen 4+ Polys  1+ Mononuclear Cells  No No bacteria seen 3+ Polys  No bacteria seen  --   --   --   --   --    BODY FLUID CULTURE, STERILE  No growth  --  No growth  --   --   --   --   --    C DIFF TOXIN B   --   --   --   --   --  NEGATIVE for C difficle toxin by PCR    --   --        Imaging Studies:   I have personally reviewed pertinent imaging study reports and images in PACS  Repeat CT abdomen/pelvis shows multiloculated hepatic abscess significantly decreased in size compared to prior imaging  Hepatic cirrhosis with portal hypertension  Development of ascites  Loculated hydropneumothorax in the base of the right hemithorax with chest tube in place  Thickened appendix  EKG, Pathology, and Other Studies:   I have personally reviewed pertinent reports

## 2019-03-04 NOTE — PROGRESS NOTES
Progress Note - Clark 53 y o  male MRN: [id number]  Unit/Bed#: [facility] 06 Encounter: [id number]    Assessment/Plan:  1  Sepsis with septic shock likely related to a liver abscess, right-sided empyema secondary to possible appendicitis  · Will continue antibiotics per Infectious Disease recommendations  · Will continue vasopressors therapy for now with a goal to maintain his mean arterial pressure greater than 65  2  Acute hypoxic respiratory failure likely multifactorial related to sepsis and his right-sided empyema status post chest tube placement  · Will continue with daily weaning trials and hopes for extubation today  · Will continue the chest tube for now until the drainage has completely resolved  3  Liver abscess status post IR drain placement  · Repeat CT scan is pending for today to evaluate for complete evacuation of the liver abscess  4  Acute kidney injury with possible ATN in the setting of 1-improved  · Will continue to monitor his renal indices and urine output closely  5  Iatrogenic urethral injury status post Raymundo catheter placement now status post suprapubic catheter  · Management per Urology is recommendations  6  Right large complicated pleural effusion/empyema status post chest tube placement  · Will continue to monitor the output closely  7  Hypokalemia and hypomagnesemia-this will be repleted    Critical Care Time:   Documented critical care time excludes any procedures documented elsewhere  It also excludes any family updates    _____________________________________________________________________    HPI/24hr events:   No events overnight    The patient remains on vasopressor therapy    Medications:    Current Facility-Administered Medications:  acetaminophen 650 mg Oral Q6H PRN Victorino Gilbert DO    cefepime 2,000 mg Intravenous Q12H Tre Good MD Last Rate: Stopped (03/03/19 5641)   chlorhexidine 15 mL Swish & Spit Q12H [address] LINDSAY Martinez    fentanyl citrate (PF) 50 mcg Intravenous Q2H PRN Peggy Fleischer, CRNP    folic acid IVPB 1 mg Intravenous Daily LINDSAY Burns Last Rate: Stopped (03/03/19 0847)   furosemide 20 mg Intravenous BID (diuretic) Peggy Fleischer, CRNP    heparin (porcine) 5,000 Units Subcutaneous [facility] Failing Spatzer, CRNP    ipratropium 0 5 mg Nebulization Q6H Maxmie Warren MD    levalbuterol 1 25 mg Nebulization Q6H Patel Failing Spatzer, CRNP    lidocaine  Topical Once [employer]Y    magnesium sulfate 2 g Intravenous Once LINDSAY Nicolas    metroNIDAZOLE 500 mg Intravenous Q8H Alyssa Wang MD Last Rate: Stopped (03/04/19 0302)   norepinephrine 1-30 mcg/min Intravenous Titrated Maxime Warren MD Last Rate: 7 mcg/min (03/04/19 0500)   ondansetron 4 mg Intravenous Q8H PRN Victorino Prechtel, DO    pantoprazole 40 mg Intravenous Q24H [address] [place] LINDSAY Aceves    potassium chloride 40 mEq Oral Once LINDSAY Nicolas    propofol 5-50 mcg/kg/min Intravenous Titrated Peggy Fleischer, CRNP Last Rate: 25 mcg/kg/min (03/04/19 0302)   thiamine 100 mg Intravenous Daily LINDSAY Thompson Last Rate: Stopped (03/03/19 0847)   vasopressin (PITRESSIN) in 0 9 % sodium chloride 100 mL 0 04 Units/min Intravenous Continuous Peggy Fleischer, CRNP Last Rate: 0 04 Units/min (03/04/19 0328)         norepinephrine 1-30 mcg/min Last Rate: 7 mcg/min (03/04/19 0500)   propofol 5-50 mcg/kg/min Last Rate: 25 mcg/kg/min (03/04/19 0302)   vasopressin (PITRESSIN) in 0 9 % sodium chloride 100 mL 0 04 Units/min Last Rate: 0 04 Units/min (03/04/19 0328)         Physical exam:  Vitals: Body mass index is 36 27 kg/m²  Blood pressure (!) 65/33, pulse 62, temperature 98 2 °F (36 8 °C), temperature source Temporal, resp  rate (!) 30, height 5' 8" (1 727 m), weight 108 kg (238 lb 8 6 oz), SpO2 96 %  ,  Temp  Min: 97 5 °F (36 4 °C)  Max: 102 4 °F (39 1 °C)  IBW: 68 4 kg    SpO2: 96 %  SpO2 Activity: At Rest  O2 Device: Other (comment)(vent)      Intake/Output Summary (Last 24 hours) at 3/4/2019 0550  Last data filed at 3/4/2019 0302  Gross per 24 hour   Intake 2888 86 ml   Output 4640 ml   Net -1751 14 ml       Invasive/non-invasive ventilation settings:   Respiratory    Lab Data (Last 4 hours)      03/04 0428            pH, Arterial       7 494           pCO2, Arterial       30 9           pO2, Arterial       105 6           HCO3, Arterial       23 2           Base Excess, Arterial       0 4                O2/Vent Data       03/04 0308   Most Recent         Vent Mode AC/VC  AC/VC      Resp Rate (BPM) (BPM) 16  16      Vt (mL) (mL) 360  360      FIO2 (%) (%) 50  50      PEEP (cmH2O) (cmH2O) 5  5      MV 10 9  10 9                Invasive Devices     Central Venous Catheter Line            CVC Central Lines 03/01/19 Triple 16cm 3 days          Peripheral Intravenous Line            Peripheral IV 02/28/19 Left Antecubital 3 days          Arterial Line            Arterial Line 02/28/19 Radial 3 days          Drain            Chest Tube Right 3 days    Closed/Suction Drain Anterior Abdomen Bulb 10 Fr  3 days    NG/OG/Enteral Tube Orogastric 16 Fr Center mouth 3 days    Suprapubic Catheter 3 days          Airway            ETT  Hi-Lo 8 mm 3 days                  Physical Exam:  Gen:  Sedated but easily arousable  HEENT:  Pupils are equal round and reactive to light  The oropharynx is intubated but otherwise clear  Neck:  Supple negative for lymphadenopathy  Chest:  Coarse in the bases bilaterally  Cor:  Regular rate and rhythm  Abd:  Distended but without significant tenderness or guarding  Ext:  There is edema in the bilateral lower extremities  Neuro:  Sedated but arousable, able to move his extremities but weak  Skin:  Warm and dry      Diagnostic Data:  Lab: I have personally reviewed pertinent lab results     CBC:   Results from last 7 days   Lab Units 03/04/19  0428 03/02/19  0420 03/01/19  0507   WBC Thousand/uL 14 03* 13 29* 26 09*   HEMOGLOBIN g/dL 8 9* 9 4* 10 3* HEMATOCRIT % 27 2* 28 6* 32 1*   PLATELETS Thousands/uL 115* 121* 190       CMP:   Results from last 7 days   Lab Units 03/04/19  0428 03/03/19  0415 03/02/19  0438 03/01/19  0507   SODIUM mmol/L 139 138 135* 134*   POTASSIUM mmol/L 2 5* 3 0* 3 2* 3 8   CHLORIDE mmol/L 105 105 103 101   CO2 mmol/L 25 23 22 21   BUN mg/dL 15 14 19 20   CREATININE mg/dL 0 73 0 83 1 06 1 51*   CALCIUM mg/dL 7 2* 7 3* 7 2* 7 5*   ALK PHOS U/L 94  --  103 104   ALT U/L 11*  --  17 20   AST U/L 27  --  39 39     PT/INR:   No results found for: PT, INR,   Magnesium:   Results from last 7 days   Lab Units 03/04/19 0428 03/03/19 0415 03/02/19  0438   MAGNESIUM mg/dL 1 5* 1 5* 1 6     Phosphorous:       Microbiology:  Results from last 7 days   Lab Units 02/28/19  2207 02/28/19  2206 02/28/19  1756 02/28/19  0903 02/28/19  0902 02/26/19  1041 02/25/19  2055 02/25/19 2046   BLOOD CULTURE   --   --   --  No Growth at 72 hrs  No Growth at 72 hrs   --  No Growth After 5 Days  No Growth After 5 Days  GRAM STAIN RESULT  4+ Polys  No organisms seen 4+ Polys  1+ Mononuclear Cells  No No bacteria seen 3+ Polys  No bacteria seen  --   --   --   --   --    BODY FLUID CULTURE, STERILE  No growth  --  No growth  --   --   --   --   --    C DIFF TOXIN B   --   --   --   --   --  NEGATIVE for C difficle toxin by PCR    --   --        Imaging:      Cardiac lab/EKG/telemetry/ECHO:       VTE Prophylaxis:  Heparin    Code Status: Level 1 - Full Code    LINDSAY Britton    Portions of the record may have been created with voice recognition software  Occasional wrong word or "sound a like" substitutions may have occurred due to the inherent limitations of voice recognition software  Read the chart carefully and recognize, using context, where substitutions have occurred

## 2019-03-04 NOTE — PROGRESS NOTES
[facility]  Progress Note - Andres Soto 53 y o  male MRN: [id number]    Unit/Bed#: ICU 06 Encounter: [id number]    Assessment/Plan:  72-year-old male with past medical history of cirrhosis and alcohol abuse is admitted with severe sepsis and liver abscess    1  Severe sepsis and septic shock secondary to liver abscess  2  Right pleural effusion/empyema  3  Chronic appendicitis  4  History of alcohol abuse  5  Cirrhosis  6  Ascites   -status post percutaneous drainage the of liver abscess by IR and chest tube placement for empyema that continues to drain  -repeat CT showed multiloculated hepatic abscess with appropriate hepatic drain placement with interval decrease in abscess size but not complete resolution  Again showed possible appendicitis versus neoplasm  New interval development of mesenteric and body wall edema as well as small to moderate volume of ascites  -he remains intubated, he awakens momentarily but does not follow commands  He remains on vasopressors  -WBC elevated at 14, 13 yesterday  T-max overnight 100 2   -gentle diuresis with IV Lasix 20 mg b i d  Was started    -his total bilirubin is decreased today from 4 13-3 8   -infectious Disease is following for antibiotic management   -he will need long-term outpatient follow-up for his cirrhosis, he has evidence of portal hypertension on CT  7  Anemia   -his hemoglobin is low but stable overall at 8 9   -stool was heme-negative   -iron panel was more consistent with chronic disease   -per nursing, he has not had any overt GI bleeding    -continue to monitor hemoglobin and transfuse as necessary, continue to monitor bowel movements for color and consistency   -would recommend endoscopic evaluation when his clinical status has significantly improved, possibly even as an outpatient given stable hemoglobin without overt bleeding  Subjective:   He awakens momentarily, he remains intubated on propofol    His family is with him at the bedside  Per nursing, he has not had any melena or hematochezia  Objective:     Vitals: Blood pressure (!) 65/33, pulse 66, temperature 99 2 °F (37 3 °C), temperature source Temporal, resp  rate (!) 33, height 5' 8" (1 727 m), weight 108 kg (238 lb 8 6 oz), SpO2 96 %  ,Body mass index is 36 27 kg/m²  Intake/Output Summary (Last 24 hours) at 3/4/2019 1059  Last data filed at 3/4/2019 1057  Gross per 24 hour   Intake 2173 73 ml   Output 6155 ml   Net -3981 27 ml       Review of Systems: as per HPI  Review of Systems   Unable to perform ROS: Mental status change       Physical Exam:     Physical Exam   Constitutional: No distress  HENT:   Head: Normocephalic and atraumatic  Eyes: Right eye exhibits no discharge  Left eye exhibits no discharge  Scleral icterus is present  Neck: Neck supple  No tracheal deviation present  Cardiovascular: Normal rate, regular rhythm, normal heart sounds and intact distal pulses  Exam reveals no gallop and no friction rub  No murmur heard  Pulmonary/Chest: He has no wheezes  He has no rales  He exhibits no tenderness  On vent   Abdominal: Bowel sounds are normal  He exhibits distension  He exhibits no mass  There is no tenderness  There is no rebound and no guarding  Neurological:   Awakens only momentarily, does not follow commands   Skin: Skin is warm and dry     Global jaundice   Psychiatric:   Unable to assess given mental status         Invasive Devices     Central Venous Catheter Line            CVC Central Lines 03/01/19 Triple 16cm 3 days          Peripheral Intravenous Line            Peripheral IV 02/28/19 Left Antecubital 4 days          Arterial Line            Arterial Line 02/28/19 Radial 3 days          Drain            Chest Tube Right 3 days    Closed/Suction Drain Anterior Abdomen Bulb 10 Fr  3 days    NG/OG/Enteral Tube Orogastric 16 Fr Center mouth 3 days    Suprapubic Catheter 3 days          Airway            ETT  Hi-Lo 8 mm 3 days CBC:   Lab Results   Component Value Date    WBC 14 03 (H) 03/04/2019    HGB 8 9 (L) 03/04/2019    HCT 27 2 (L) 03/04/2019    MCV 93 03/04/2019     (L) 03/04/2019    MCH 30 5 03/04/2019    MCHC 32 7 03/04/2019    RDW 17 4 (H) 03/04/2019    MPV 9 9 03/04/2019   ,   CMP:   Lab Results   Component Value Date    K 2 5 (LL) 03/04/2019     03/04/2019    CO2 25 03/04/2019    BUN 15 03/04/2019    CREATININE 0 73 03/04/2019    CALCIUM 7 2 (L) 03/04/2019    AST 27 03/04/2019    ALT 11 (L) 03/04/2019    ALKPHOS 94 03/04/2019    EGFR 110 03/04/2019   ,   Lipase: No results found for: LIPASE,  PT/INR: No results found for: PT, INR,   Troponin: No results found for: TROPONINI,   Magnesium: No components found for: MAG,   Phosphorous: No results found for: PHOS  Imaging Studies: I have personally reviewed pertinent reports  CT ABDOMEN AND PELVIS WITH IV CONTRAST     INDICATION:   Liver abscess  Abnormal ERCP      COMPARISON:  February 25, 2019     TECHNIQUE:  CT examination of the abdomen and pelvis was performed  Scanning through the abdomen was performed in arterial, venous and delayed phases according a protocol spefically designed to evaluate upper abdominal viscera  Axial, sagittal, and   coronal 2D reformatted images were created from the source data and submitted for interpretation      Radiation dose length product (DLP) for this visit:  2386 mGy-cm     This examination, like all CT scans performed in the [facility], was performed utilizing techniques to minimize radiation dose exposure, including the use of iterative   reconstruction and automated exposure control      IV Contrast:  100 mL of iohexol (OMNIPAQUE)  Enteric Contrast:  Enteric contrast was administered      FINDINGS:     ABDOMEN     LOWER CHEST:  Chest tube is noted in the posterior lower right pleural space, and there is pneumothorax, loculated in the subpulmonic space in the lower right chest   Also noted is a loculated right pleural effusion with overlying compressive   atelectasis, and dependent atelectasis noted at left lung base      LIVER/BILIARY TREE:  Liver again demonstrates cirrhotic morphology  There is a pigtail drainage catheter traversing the left hepatic lobe with the pigtail drain coiling in the center of the loculated abscess with its epicenter in the caudate lobe of the   liver  The abscess cavity again demonstrates a loculated appearance but the overall size of the cavity is markedly diminished when compared to the examination of February 25, 2019  Overall, the abscess is estimated at approximately 6 9 x 6 7 cm on   image 26 of series 6 as compared with approximately 10 6 x 9 4 cm when measured using similar technique on the February 25, 2019 examination      GALLBLADDER:  No calcified gallstones  No pericholecystic inflammatory change      SPLEEN:  The spleen is enlarged measuring up to 18 cm  There are perisplenic varices  Splenic vein is patent  There is heterogeneous enhancement within the periphery of the spleen on arterial phase imaging  Findings are consistent with portal   hypertension  No discrete splenic mass or splenic infarction noted      PANCREAS:  Unremarkable      ADRENAL GLANDS:  Unremarkable      KIDNEYS/URETERS:  Unremarkable  No hydronephrosis      STOMACH AND BOWEL:  There is a nasogastric tube passing through the stomach with its tip in the 3rd portion of the duodenum  The gastroesophageal junction is inseparable from the left superior margin of the hepatic abscess, as on previous examination  No abnormal bowel wall thickening or pneumatosis  No bowel obstruction      APPENDIX:  There is abnormal thickening and enhancement of the appendix which measures up to 14 cm and is unchanged when compared to the February 25, 2019 examination  This is a nonspecific nonspecific finding    Although there is edema in the   periappendiceal peritoneal fat, the edema in the periappendiceal fat is no more significant than the mesenteric edema throughout the abdomen and pelvis in this patient with cirrhosis and ascites  However, there was more severe periappendiceal edema on   examination of February 25, 2019      ABDOMINOPELVIC CAVITY:  Diffuse mesenteric edema with moderate volume of lower abdomen and pelvis ascites  Superior mesenteric vein appears patent  There is reactive periportal and portacaval lymph nodes  No retroperitoneal brittany adenopathy      VESSELS:  Portal hypertension as evidenced by perisplenic and periceliac varices  No abdominal aortic aneurysm      PELVIS     REPRODUCTIVE ORGANS:  Unremarkable for patient's age      URINARY BLADDER:  Bubble of gas is noted within the lumen of the urinary bladder      ABDOMINAL WALL/INGUINAL REGIONS:  Small fat-containing umbilical hernia  Extensive body wall edema is new finding when compared to February 25, 2019      OSSEOUS STRUCTURES:  No acute fracture or destructive osseous lesion      IMPRESSION:     Multiloculated hepatic abscess, significantly decreased in size since February 25, 2019 but not completely collapsed  A pigtail drainage catheter is seen within the central portion of the abscess, the epicenter of which is in the caudate lobe of the   liver      Hepatic cirrhosis  Portal hypertension as evidenced by splenomegaly as well as perisplenic and periceliac varices  Main portal vein, splenic vein, and superior mesenteric veins are patent  Spontaneous splenorenal shunt is again noted      Interval development of extensive mesenteric edema and body wall edema as well as small to moderate volume of ascites, predominantly in the lower abdomen and pelvis      A right-sided chest tube is present and there is loculated hydropneumothorax in the base of the right hemithorax  Compressive atelectasis is noted at right lung base and dependent atelectasis noted left lung base      Enteric tube is noted within the stomach  The region of the gastroesophageal junction is inseparable from the abscess along the undersurface of the caudate lobe of the liver      As on previous examination there is abnormal thickening of the appendix  Although there is periappendiceal edema, this is no more significant than the mesenteric edema seen throughout the abdomen in this patient with interval development of ascites      The possibility that this represents acute appendicitis or possibly mucinous appendiceal neoplasm cannot be excluded and interval clinical and imaging follow-up of this finding is recommended

## 2019-03-05 LAB
BACTERIA BLD CULT: NORMAL
BACTERIA BLD CULT: NORMAL
MAGNESIUM SERPL-MCNC: 1.7 MG/DL (ref 1.6–2.6)
POTASSIUM SERPL-SCNC: 2.7 MMOL/L (ref 3.5–5.3)

## 2019-03-05 PROCEDURE — 83735 ASSAY OF MAGNESIUM: CPT | Performed by: NURSE PRACTITIONER

## 2019-03-05 PROCEDURE — 94003 VENT MGMT INPAT SUBQ DAY: CPT

## 2019-03-05 PROCEDURE — 99232 SBSQ HOSP IP/OBS MODERATE 35: CPT | Performed by: INTERNAL MEDICINE

## 2019-03-05 PROCEDURE — C9113 INJ PANTOPRAZOLE SODIUM, VIA: HCPCS | Performed by: NURSE PRACTITIONER

## 2019-03-05 PROCEDURE — 84132 ASSAY OF SERUM POTASSIUM: CPT | Performed by: NURSE PRACTITIONER

## 2019-03-05 PROCEDURE — 99291 CRITICAL CARE FIRST HOUR: CPT | Performed by: INTERNAL MEDICINE

## 2019-03-05 RX ORDER — POTASSIUM CHLORIDE 29.8 MG/ML
40 INJECTION INTRAVENOUS
Status: COMPLETED | OUTPATIENT
Start: 2019-03-05 | End: 2019-03-05

## 2019-03-05 RX ORDER — POLYETHYLENE GLYCOL 3350 17 G/17G
17 POWDER, FOR SOLUTION ORAL DAILY PRN
Status: DISCONTINUED | OUTPATIENT
Start: 2019-03-05 | End: 2019-03-13

## 2019-03-05 RX ORDER — FOLIC ACID 1 MG/1
1 TABLET ORAL DAILY
Status: DISCONTINUED | OUTPATIENT
Start: 2019-03-06 | End: 2019-03-22 | Stop reason: HOSPADM

## 2019-03-05 RX ORDER — THIAMINE MONONITRATE (VIT B1) 100 MG
100 TABLET ORAL DAILY
Status: DISCONTINUED | OUTPATIENT
Start: 2019-03-06 | End: 2019-03-22 | Stop reason: HOSPADM

## 2019-03-05 RX ORDER — POTASSIUM CHLORIDE 29.8 MG/ML
40 INJECTION INTRAVENOUS ONCE
Status: DISCONTINUED | OUTPATIENT
Start: 2019-03-05 | End: 2019-03-05

## 2019-03-05 RX ORDER — MIDODRINE HYDROCHLORIDE 5 MG/1
10 TABLET ORAL
Status: DISCONTINUED | OUTPATIENT
Start: 2019-03-05 | End: 2019-03-12

## 2019-03-05 RX ADMIN — CHLORHEXIDINE GLUCONATE 0.12% ORAL RINSE 15 ML: 1.2 LIQUID ORAL at 21:15

## 2019-03-05 RX ADMIN — PROPOFOL 20 MCG/KG/MIN: 10 INJECTION, EMULSION INTRAVENOUS at 21:14

## 2019-03-05 RX ADMIN — HEPARIN SODIUM 5000 UNITS: 5000 INJECTION INTRAVENOUS; SUBCUTANEOUS at 13:18

## 2019-03-05 RX ADMIN — PROPOFOL 25 MCG/KG/MIN: 10 INJECTION, EMULSION INTRAVENOUS at 13:19

## 2019-03-05 RX ADMIN — POTASSIUM CHLORIDE 40 MEQ: 400 INJECTION, SOLUTION INTRAVENOUS at 07:40

## 2019-03-05 RX ADMIN — PROPOFOL 40 MCG/KG/MIN: 10 INJECTION, EMULSION INTRAVENOUS at 01:15

## 2019-03-05 RX ADMIN — NOREPINEPHRINE BITARTRATE 8 MCG/MIN: 1 INJECTION INTRAVENOUS at 03:21

## 2019-03-05 RX ADMIN — HEPARIN SODIUM 5000 UNITS: 5000 INJECTION INTRAVENOUS; SUBCUTANEOUS at 21:15

## 2019-03-05 RX ADMIN — MIDODRINE HYDROCHLORIDE 10 MG: 5 TABLET ORAL at 11:42

## 2019-03-05 RX ADMIN — VASOPRESSIN 0.04 UNITS/MIN: 20 INJECTION INTRAVENOUS at 18:11

## 2019-03-05 RX ADMIN — PROPOFOL 25 MCG/KG/MIN: 10 INJECTION, EMULSION INTRAVENOUS at 18:11

## 2019-03-05 RX ADMIN — THIAMINE HYDROCHLORIDE 100 MG: 100 INJECTION, SOLUTION INTRAMUSCULAR; INTRAVENOUS at 08:00

## 2019-03-05 RX ADMIN — METRONIDAZOLE 500 MG: 500 INJECTION, SOLUTION INTRAVENOUS at 16:31

## 2019-03-05 RX ADMIN — METRONIDAZOLE 500 MG: 500 INJECTION, SOLUTION INTRAVENOUS at 01:14

## 2019-03-05 RX ADMIN — PROPOFOL 35 MCG/KG/MIN: 10 INJECTION, EMULSION INTRAVENOUS at 07:57

## 2019-03-05 RX ADMIN — METRONIDAZOLE 500 MG: 500 INJECTION, SOLUTION INTRAVENOUS at 08:58

## 2019-03-05 RX ADMIN — CEFEPIME HYDROCHLORIDE 2000 MG: 2 INJECTION, POWDER, FOR SOLUTION INTRAVENOUS at 23:48

## 2019-03-05 RX ADMIN — NOREPINEPHRINE BITARTRATE 6 MCG/MIN: 1 INJECTION INTRAVENOUS at 14:26

## 2019-03-05 RX ADMIN — CHLORHEXIDINE GLUCONATE 0.12% ORAL RINSE 15 ML: 1.2 LIQUID ORAL at 08:00

## 2019-03-05 RX ADMIN — MIDODRINE HYDROCHLORIDE 10 MG: 5 TABLET ORAL at 16:31

## 2019-03-05 RX ADMIN — CEFEPIME HYDROCHLORIDE 2000 MG: 2 INJECTION, POWDER, FOR SOLUTION INTRAVENOUS at 10:45

## 2019-03-05 RX ADMIN — FUROSEMIDE 20 MG: 10 INJECTION, SOLUTION INTRAMUSCULAR; INTRAVENOUS at 07:54

## 2019-03-05 RX ADMIN — FENTANYL CITRATE 50 MCG: 50 INJECTION, SOLUTION INTRAMUSCULAR; INTRAVENOUS at 19:33

## 2019-03-05 RX ADMIN — HEPARIN SODIUM 5000 UNITS: 5000 INJECTION INTRAVENOUS; SUBCUTANEOUS at 05:31

## 2019-03-05 RX ADMIN — PROPOFOL 40 MCG/KG/MIN: 10 INJECTION, EMULSION INTRAVENOUS at 05:31

## 2019-03-05 RX ADMIN — FUROSEMIDE 20 MG: 10 INJECTION, SOLUTION INTRAMUSCULAR; INTRAVENOUS at 16:31

## 2019-03-05 RX ADMIN — PANTOPRAZOLE SODIUM 40 MG: 40 INJECTION, POWDER, FOR SOLUTION INTRAVENOUS at 08:00

## 2019-03-05 RX ADMIN — VASOPRESSIN 0.04 UNITS/MIN: 20 INJECTION INTRAVENOUS at 07:29

## 2019-03-05 RX ADMIN — POTASSIUM CHLORIDE 40 MEQ: 400 INJECTION, SOLUTION INTRAVENOUS at 09:49

## 2019-03-05 RX ADMIN — FOLIC ACID 1 MG: 5 INJECTION, SOLUTION INTRAMUSCULAR; INTRAVENOUS; SUBCUTANEOUS at 08:00

## 2019-03-05 NOTE — PROGRESS NOTES
Progress Note - Infectious Disease   Tristatatiana Dubon 53 y o  male MRN: [id number]  Unit/Bed#: ICU 06 Encounter: [id number]      IMPRESSION & RECOMMENDATIONS:   1  Severe sepsis/shock-fever, leukocytosis, lactic acidosis   Likely secondary to liver abscess and right-sided empyema   Admission and repeat blood cultures are all negative   Continues to improve after placement of chest tube and IR drain  Still requiring vasopressors with slowly improving requirements  Remains afebrile   -antibiotic plan as below  -monitor CBC with diff and creatinine  -wean off pressors as able  -monitor temperatures and hemodynamics  -supportive care     2  Liver abscess-suspect a portal source of infection with the possible appendicitis   No perforation seen on initial CT the abdomen and pelvis   No clear biliary source   Now status post IR drain placement   Cultures all remain negative  Repeat CT abdomen shows significantly decreased size of abscess  However, low output from OLIVIA drain   -continue IV cefepime and Flagyl for now  -plan for IR repositioning of OLIVIA drain tomorrow  -monitor drain output  -GI follow-up     3  Right-sided empyema  Rachelle Gondola secondary to liver abscess   Now status post chest tube placement   Repeat x-ray shows much improved right hemithorax   Pleural fluid cultures were negative  Repeat CT shows right loculated effusion   -antibiotic plan as above  -chest tube management per critical care service  -possible thoracentesis tomorrow for residual right loculated effusion  -monitor chest tube output     4  Acute kidney injury-suspect multifactorial including pre renal issues and possibly sepsis   ATN may be playing a role   Creatinine has improved and remains normal   Urine output is good  -monitor creatinine clearance closely  -volume management     5  Alcohol abuse-with probable cirrhosis, portal hypertension based upon the CT findings   -continue abstinence  -GI follow-up     6  Probable chronic appendicitis    This may be the etiology for development of liver abscess/empyema  Recent surgery evaluation noted  Patient will ultimately require appendectomy   -surgery follow-up     Discussed with critical care service            Subjective:  Remains intubated, sedated  Remains on vasopressors  Levophed down to 7 an hour  No fevers  Good urine output  Low output from OLIVIA drain  Objective:  Vitals:  Temp:  [97 °F (36 1 °C)-98 8 °F (37 1 °C)] 98 1 °F (36 7 °C)  HR:  [52-72] 58  Resp:  [23-38] 30  SpO2:  [93 %-99 %] 97 %  Temp (24hrs), Av 1 °F (36 7 °C), Min:97 °F (36 1 °C), Max:98 8 °F (37 1 °C)  Current: Temperature: 98 1 °F (36 7 °C)    Physical Exam:   General:  Sedated, intubated  Eyes:  Conjunctive clear with no hemorrhages or effusions  Oropharynx:  ET tube in place  Neck:  Supple, no lymphadenopathy  Lungs:  Clear to auscultation bilaterally, no accessory muscle use  Right chest tube in place  Cardiac:  Regular rate and rhythm, no murmurs  Abdomen:  Soft, non-tender, non-distented, suprapubic catheterization, right upper quadrant drain  Extremities:  Mild edema  Skin:  No rashes, no ulcers  Neurological:  Sedated    Lab Results:  I have personally reviewed pertinent labs    Results from last 7 days   Lab Units 19  04219  0428 19  0415 19  0438 19  0507   POTASSIUM mmol/L 2 7* 2 5* 3 0* 3 2* 3 8   CHLORIDE mmol/L  --  105 105 103 101   CO2 mmol/L  --  25 23 22 21   BUN mg/dL  --  15 14 19 20   CREATININE mg/dL  --  0 73 0 83 1 06 1 51*   EGFR ml/min/1 73sq m  --  110 105 83 54   CALCIUM mg/dL  --  7 2* 7 3* 7 2* 7 5*   AST U/L  --  27  --  39 39   ALT U/L  --  11*  --  17 20   ALK PHOS U/L  --  94  --  103 104     Results from last 7 days   Lab Units 19  0428 19  0420 19  0507   WBC Thousand/uL 14 03* 13 29* 26 09*   HEMOGLOBIN g/dL 8 9* 9 4* 10 3*   PLATELETS Thousands/uL 115* 121* 190     Results from last 7 days   Lab Units 19 02/28/19  1756 02/28/19  0903 02/28/19  0902   BLOOD CULTURE   --   --   --  No Growth After 5 Days  No Growth After 5 Days  GRAM STAIN RESULT  4+ Polys  No organisms seen 4+ Polys  1+ Mononuclear Cells  No No bacteria seen 3+ Polys  No bacteria seen  --   --    BODY FLUID CULTURE, STERILE  No growth  --  No growth  --   --        Imaging Studies:   I have personally reviewed pertinent imaging study reports and images in PACS  EKG, Pathology, and Other Studies:   I have personally reviewed pertinent reports

## 2019-03-05 NOTE — SOCIAL WORK
CM met briefly at bedside with significant other, Joao Porras and brother, Holli Danielle  Introduced self and role to family  Answered any questions  Will f/u with PATHs office in regards to the patients MA Pending status  CM following for discharge needs

## 2019-03-05 NOTE — PROGRESS NOTES
Progress Note - Clark 53 y o  male MRN: [id number]  Unit/Bed#: ICU 06 Encounter: [id number]    Assessment/Plan:  1  Severe sepsis with septic shock   · Due to #2  · Levophed currently @ 6mcg/min and vasopressin @ 0 04 mcg/min for hemodynamic support   · Currently on Cefepime and Flagyl per ID recommendation, input much appreciated  · Continue to wean levophed as possible  · Not on chronic steroids at home that would lead to relative adrenal crisis  · Consider double concentrating levophed and running patient more neutral per I/O  · Albumin prn for bolus  2  Multiloculated hepatic abscesses s/p IR drainage with drain placement  · Drain in place, however not at epicenter of abscess, pending IR reeval  · Continue abx per infectious disease  · No growth on fluid culture  · Drain put out 100cc over the past 24 hours  3  Hepatic cirrhosis   · Continue to monitor   · Monitor for ascites and signs of GI bleed  4  Anasarca  · Likely 2/2 to #3  · Continue MF, goal 45  5  Acute hypoxic respiratory failure secondary to sepsis  · Likely secondary to acidosis due to sepsis  · Currently on [facility] 16/360/50/5  · Continue to wean FiO2  · Continue daily SBT  6  Right sided empyema  · Likely secondary to #2  · Fluid culture with no growth so far  · Chest tube with 200 CC ouput over the past 24 hours  · Suspect will continue to accumulate until abscess resolves  7  Hypokalemia  · Continue to monitor and replete  8  Hypomagnesemia   · Monitor and replete  9  Abnormal appendicitis   · Acute appendicits vs neoplasm  · Surgery following  · No acute mangement while patient is critical, will need follow up   10  Urethra trauma  · S/p suprapubic catheter  · Urology following for management, participation in care much appreciated  · Will need outpatient follow up  11   Chronic alcohol dependence  · Continue to monitor for signs of withdraw  · Ciwa once appropriate      Critical Care Time: 35 minutes  Documented critical care time excludes any procedures documented elsewhere  It also excludes any family updates  _____________________________________________________________________    HPI/24hr events:  Failed weaning trial yesterday morning  Patient with repeat CT yesterday that showed drain in place however not at epicenter of abscess, which is still present  IR to be reconsulted for either additional drain or repositioning  No acute events overnight         Medications:    Current Facility-Administered Medications:  acetaminophen 650 mg Oral Q6H PRN Marycarmen Leslie DO    cefepime 2,000 mg Intravenous Q12H Ana Maria Tobias MD Last Rate: Stopped (03/05/19 0114)   chlorhexidine 15 mL Swish & Spit Q12H [facility] LINDSAY Martinez    fentanyl citrate (PF) 50 mcg Intravenous Q2H PRN LINDSAY Mir    folic acid IVPB 1 mg Intravenous Daily LINDSAY Brand Last Rate: Stopped (03/04/19 0841)   furosemide 20 mg Intravenous BID (diuretic) LINDSAY Mir    heparin (porcine) 5,000 Units Subcutaneous [facility] Eustacio Learned Spatzer, CRNP    metroNIDAZOLE 500 mg Intravenous Q8H Ana Maria Tobias MD Last Rate: Stopped (03/05/19 0150)   norepinephrine 1-30 mcg/min Intravenous Titrated Roger Simms MD Last Rate: 6 mcg/min (03/04/19 2209)   pantoprazole 40 mg Intravenous Q24H [facility] LINDSAY Martinez    propofol 5-50 mcg/kg/min Intravenous Titrated LINDSAY Mir Last Rate: 40 mcg/kg/min (03/05/19 0115)   thiamine 100 mg Intravenous Daily Rogers CivLINDSAY Conway Last Rate: Stopped (03/04/19 0841)   vasopressin (PITRESSIN) in 0 9 % sodium chloride 100 mL 0 04 Units/min Intravenous Continuous LINDSAY Mir Last Rate: 0 04 Units/min (03/04/19 2206)         norepinephrine 1-30 mcg/min Last Rate: 6 mcg/min (03/04/19 2209)   propofol 5-50 mcg/kg/min Last Rate: 40 mcg/kg/min (03/05/19 0115)   vasopressin (PITRESSIN) in 0 9 % sodium chloride 100 mL 0 04 Units/min Last Rate: 0 04 Units/min (03/04/19 2206)         Physical exam:    Vitals: Body mass index is 36 27 kg/m²  Blood pressure (!) 65/33, pulse (!) 52, temperature 98 5 °F (36 9 °C), temperature source Temporal, resp  rate (!) 25, height 5' 8" (1 727 m), weight 108 kg (238 lb 8 6 oz), SpO2 99 %  ,  Temp  Min: 97 5 °F (36 4 °C)  Max: 102 4 °F (39 1 °C)  IBW: 68 4 kg    SpO2: 99 %  SpO2 Activity: At Rest  O2 Device: Other (comment)(vent)      Intake/Output Summary (Last 24 hours) at 3/5/2019 0209  Last data filed at 3/5/2019 0150  Gross per 24 hour   Intake 2403 06 ml   Output 4697 ml   Net -2293 94 ml       Invasive/non-invasive ventilation settings:   Respiratory    Lab Data (Last 4 hours)    None         O2/Vent Data (Last 4 hours)      03/04 2349           Vent Mode AC/VC       Resp Rate (BPM) (BPM) 16       Vt (mL) (mL) 360       FIO2 (%) (%) 50       PEEP (cmH2O) (cmH2O) 5       MV 9 14                 Invasive Devices     Central Venous Catheter Line            CVC Central Lines 03/01/19 Triple 16cm 4 days          Peripheral Intravenous Line            Peripheral IV 02/28/19 Left Antecubital 4 days          Arterial Line            Arterial Line 02/28/19 Radial 4 days          Drain            Chest Tube Right 4 days    Closed/Suction Drain Anterior Abdomen Bulb 10 Fr  4 days    NG/OG/Enteral Tube Orogastric 16 Fr Center mouth 4 days    Suprapubic Catheter 3 days          Airway            ETT  Hi-Lo 8 mm 4 days                  Physical Exam:  Gen: Patient is sedated and resting comfortably on ventilator, no acute distress  HEENT: No signs of trauma  Ears without injury  Pupils 2+ PERRL  Eyes open to voice  Nares patent  ET tube noted 22-23 at the lip  Neck: Neck supple, no LAD  Chest: Lungs with rales noted to right lung base  Left lung with faint coarse breath sounds   No accessory muscle use  Cor:  Heart RRR no m/c/r/g, strong 2+ distal thoughout  Abd: Soft, non distended, hypoactive bowel sounds  Ext: No mottling or injuries  Neuro: GCS 10T(E3, VT,M6) PERRL, no facial droop noted  Patient moving all four extremities  Withdraws from pain in all 4 extremities  Skin: No acute findings or skin break down      Diagnostic Data:  Lab: I have personally reviewed pertinent lab results  CBC:   Results from last 7 days   Lab Units 03/04/19 0428 03/02/19  0420 03/01/19  0507   WBC Thousand/uL 14 03* 13 29* 26 09*   HEMOGLOBIN g/dL 8 9* 9 4* 10 3*   HEMATOCRIT % 27 2* 28 6* 32 1*   PLATELETS Thousands/uL 115* 121* 190       CMP:   Results from last 7 days   Lab Units 03/04/19  0428 03/03/19  0415 03/02/19  0438 03/01/19  0507   SODIUM mmol/L 139 138 135* 134*   POTASSIUM mmol/L 2 5* 3 0* 3 2* 3 8   CHLORIDE mmol/L 105 105 103 101   CO2 mmol/L 25 23 22 21   BUN mg/dL 15 14 19 20   CREATININE mg/dL 0 73 0 83 1 06 1 51*   CALCIUM mg/dL 7 2* 7 3* 7 2* 7 5*   ALK PHOS U/L 94  --  103 104   ALT U/L 11*  --  17 20   AST U/L 27  --  39 39     PT/INR:   No results found for: PT, INR,   Magnesium:   Results from last 7 days   Lab Units 03/04/19 0428 03/03/19 0415 03/02/19  0438   MAGNESIUM mg/dL 1 5* 1 5* 1 6     Phosphorous:       Microbiology:  Results from last 7 days   Lab Units 02/28/19  2207 02/28/19  2206 02/28/19  1756 02/28/19  0903 02/28/19  0902 02/26/19  1041   BLOOD CULTURE   --   --   --  No Growth After 4 Days  No Growth After 4 Days  --    GRAM STAIN RESULT  4+ Polys  No organisms seen 4+ Polys  1+ Mononuclear Cells  No No bacteria seen 3+ Polys  No bacteria seen  --   --   --    BODY FLUID CULTURE, STERILE  No growth  --  No growth  --   --   --    C DIFF TOXIN B   --   --   --   --   --  NEGATIVE for C difficle toxin by PCR  Imaging:  3/4/19 - Multiloculated hepatic abscess, significantly decreased in size since February 25, 2019 but not completely collapsed  A pigtail drainage catheter is seen within the central portion of the abscess, the epicenter of which is in the caudate lobe of the   liver      Hepatic cirrhosis    Portal hypertension as evidenced by splenomegaly as well as perisplenic and periceliac varices  Main portal vein, splenic vein, and superior mesenteric veins are patent  Spontaneous splenorenal shunt is again noted      Interval development of extensive mesenteric edema and body wall edema as well as small to moderate volume of ascites, predominantly in the lower abdomen and pelvis      A right-sided chest tube is present and there is loculated hydropneumothorax in the base of the right hemithorax  Compressive atelectasis is noted at right lung base and dependent atelectasis noted left lung base      Enteric tube is noted within the stomach  The region of the gastroesophageal junction is inseparable from the abscess along the undersurface of the caudate lobe of the liver      As on previous examination there is abnormal thickening of the appendix  Although there is periappendiceal edema, this is no more significant than the mesenteric edema seen throughout the abdomen in this patient with interval development of ascites  The possibility that this represents acute appendicitis or possibly mucinous appendiceal neoplasm cannot be excluded and interval clinical and imaging follow-up of this finding is recommended  Cardiac lab/EKG/telemetry/ECHO:   No new studies    VTE Prophylaxis: Heparin + SCDs    Code Status: Level 1 - Full Code    LINDSAY Moreno    Portions of the record may have been created with voice recognition software  Occasional wrong word or "sound a like" substitutions may have occurred due to the inherent limitations of voice recognition software  Read the chart carefully and recognize, using context, where substitutions have occurred

## 2019-03-06 ENCOUNTER — APPOINTMENT (INPATIENT)
Dept: RADIOLOGY | Facility: HOSPITAL | Age: 49
DRG: 441 | End: 2019-03-06
Payer: COMMERCIAL

## 2019-03-06 PROBLEM — R65.21 SEVERE SEPSIS WITH SEPTIC SHOCK (HCC): Status: ACTIVE | Noted: 2019-03-01

## 2019-03-06 LAB
ANION GAP SERPL CALCULATED.3IONS-SCNC: 8 MMOL/L (ref 4–13)
BUN SERPL-MCNC: 15 MG/DL (ref 5–25)
CALCIUM SERPL-MCNC: 7.4 MG/DL (ref 8.3–10.1)
CHLORIDE SERPL-SCNC: 107 MMOL/L (ref 100–108)
CO2 SERPL-SCNC: 26 MMOL/L (ref 21–32)
CREAT SERPL-MCNC: 0.58 MG/DL (ref 0.6–1.3)
ERYTHROCYTE [DISTWIDTH] IN BLOOD BY AUTOMATED COUNT: 18.3 % (ref 11.6–15.1)
GFR SERPL CREATININE-BSD FRML MDRD: 121 ML/MIN/1.73SQ M
GLUCOSE SERPL-MCNC: 103 MG/DL (ref 65–140)
HCT VFR BLD AUTO: 26.9 % (ref 36.5–49.3)
HGB BLD-MCNC: 8.6 G/DL (ref 12–17)
INR PPP: 1.46 (ref 0.86–1.17)
MAGNESIUM SERPL-MCNC: 1.6 MG/DL (ref 1.6–2.6)
MCH RBC QN AUTO: 30.9 PG (ref 26.8–34.3)
MCHC RBC AUTO-ENTMCNC: 32 G/DL (ref 31.4–37.4)
MCV RBC AUTO: 97 FL (ref 82–98)
PLATELET # BLD AUTO: 107 THOUSANDS/UL (ref 149–390)
PMV BLD AUTO: 10.2 FL (ref 8.9–12.7)
POTASSIUM SERPL-SCNC: 3.2 MMOL/L (ref 3.5–5.3)
PROTHROMBIN TIME: 17.8 SECONDS (ref 11.8–14.2)
RBC # BLD AUTO: 2.78 MILLION/UL (ref 3.88–5.62)
SODIUM SERPL-SCNC: 141 MMOL/L (ref 136–145)
WBC # BLD AUTO: 12.74 THOUSAND/UL (ref 4.31–10.16)

## 2019-03-06 PROCEDURE — C1729 CATH, DRAINAGE: HCPCS

## 2019-03-06 PROCEDURE — 85027 COMPLETE CBC AUTOMATED: CPT | Performed by: NURSE PRACTITIONER

## 2019-03-06 PROCEDURE — 76080 X-RAY EXAM OF FISTULA: CPT

## 2019-03-06 PROCEDURE — 32557 INSERT CATH PLEURA W/ IMAGE: CPT | Performed by: RADIOLOGY

## 2019-03-06 PROCEDURE — 85610 PROTHROMBIN TIME: CPT | Performed by: NURSE PRACTITIONER

## 2019-03-06 PROCEDURE — 87205 SMEAR GRAM STAIN: CPT | Performed by: NURSE PRACTITIONER

## 2019-03-06 PROCEDURE — 49423 EXCHANGE DRAINAGE CATHETER: CPT

## 2019-03-06 PROCEDURE — 0F20X0Z CHANGE DRAINAGE DEVICE IN LIVER, EXTERNAL APPROACH: ICD-10-PCS | Performed by: RADIOLOGY

## 2019-03-06 PROCEDURE — C1894 INTRO/SHEATH, NON-LASER: HCPCS

## 2019-03-06 PROCEDURE — C1769 GUIDE WIRE: HCPCS

## 2019-03-06 PROCEDURE — 94003 VENT MGMT INPAT SUBQ DAY: CPT

## 2019-03-06 PROCEDURE — 0W9930Z DRAINAGE OF RIGHT PLEURAL CAVITY WITH DRAINAGE DEVICE, PERCUTANEOUS APPROACH: ICD-10-PCS | Performed by: RADIOLOGY

## 2019-03-06 PROCEDURE — 49424 ASSESS CYST CONTRAST INJECT: CPT

## 2019-03-06 PROCEDURE — 75984 XRAY CONTROL CATHETER CHANGE: CPT | Performed by: RADIOLOGY

## 2019-03-06 PROCEDURE — 87070 CULTURE OTHR SPECIMN AEROBIC: CPT | Performed by: NURSE PRACTITIONER

## 2019-03-06 PROCEDURE — 32557 INSERT CATH PLEURA W/ IMAGE: CPT

## 2019-03-06 PROCEDURE — 99233 SBSQ HOSP IP/OBS HIGH 50: CPT | Performed by: INTERNAL MEDICINE

## 2019-03-06 PROCEDURE — 49423 EXCHANGE DRAINAGE CATHETER: CPT | Performed by: RADIOLOGY

## 2019-03-06 PROCEDURE — 75984 XRAY CONTROL CATHETER CHANGE: CPT

## 2019-03-06 PROCEDURE — 83735 ASSAY OF MAGNESIUM: CPT | Performed by: NURSE PRACTITIONER

## 2019-03-06 PROCEDURE — 71045 X-RAY EXAM CHEST 1 VIEW: CPT

## 2019-03-06 PROCEDURE — 80048 BASIC METABOLIC PNL TOTAL CA: CPT | Performed by: NURSE PRACTITIONER

## 2019-03-06 RX ORDER — MAGNESIUM SULFATE 1 G/100ML
1 INJECTION INTRAVENOUS ONCE
Status: COMPLETED | OUTPATIENT
Start: 2019-03-06 | End: 2019-03-06

## 2019-03-06 RX ORDER — POTASSIUM CHLORIDE 14.9 MG/ML
20 INJECTION INTRAVENOUS ONCE
Status: COMPLETED | OUTPATIENT
Start: 2019-03-06 | End: 2019-03-06

## 2019-03-06 RX ORDER — FENTANYL CITRATE 50 UG/ML
INJECTION, SOLUTION INTRAMUSCULAR; INTRAVENOUS CODE/TRAUMA/SEDATION MEDICATION
Status: COMPLETED | OUTPATIENT
Start: 2019-03-06 | End: 2019-03-06

## 2019-03-06 RX ORDER — ACETAMINOPHEN 160 MG/5ML
650 SUSPENSION, ORAL (FINAL DOSE FORM) ORAL EVERY 6 HOURS PRN
Status: DISCONTINUED | OUTPATIENT
Start: 2019-03-06 | End: 2019-03-12

## 2019-03-06 RX ORDER — LIDOCAINE HYDROCHLORIDE 10 MG/ML
INJECTION, SOLUTION INFILTRATION; PERINEURAL CODE/TRAUMA/SEDATION MEDICATION
Status: COMPLETED | OUTPATIENT
Start: 2019-03-06 | End: 2019-03-06

## 2019-03-06 RX ORDER — SODIUM CHLORIDE 9 MG/ML
INJECTION, SOLUTION INTRAVENOUS
Status: COMPLETED | OUTPATIENT
Start: 2019-03-06 | End: 2019-03-06

## 2019-03-06 RX ORDER — POTASSIUM CHLORIDE 14.9 MG/ML
20 INJECTION INTRAVENOUS 3 TIMES DAILY
Status: DISCONTINUED | OUTPATIENT
Start: 2019-03-06 | End: 2019-03-06

## 2019-03-06 RX ORDER — FUROSEMIDE 10 MG/ML
20 INJECTION INTRAMUSCULAR; INTRAVENOUS EVERY 8 HOURS
Status: DISCONTINUED | OUTPATIENT
Start: 2019-03-06 | End: 2019-03-12

## 2019-03-06 RX ADMIN — VASOPRESSIN 0.04 UNITS/MIN: 20 INJECTION INTRAVENOUS at 10:27

## 2019-03-06 RX ADMIN — PROPOFOL 25 MCG/KG/MIN: 10 INJECTION, EMULSION INTRAVENOUS at 12:41

## 2019-03-06 RX ADMIN — POTASSIUM CHLORIDE 20 MEQ: 200 INJECTION, SOLUTION INTRAVENOUS at 06:36

## 2019-03-06 RX ADMIN — METRONIDAZOLE 500 MG: 500 INJECTION, SOLUTION INTRAVENOUS at 23:43

## 2019-03-06 RX ADMIN — METRONIDAZOLE 500 MG: 500 INJECTION, SOLUTION INTRAVENOUS at 00:54

## 2019-03-06 RX ADMIN — POLYETHYLENE GLYCOL (3350) 17 G: 17 POWDER, FOR SOLUTION ORAL at 15:54

## 2019-03-06 RX ADMIN — NOREPINEPHRINE BITARTRATE 7 MCG/MIN: 1 INJECTION INTRAVENOUS at 01:34

## 2019-03-06 RX ADMIN — CHLORHEXIDINE GLUCONATE 0.12% ORAL RINSE 15 ML: 1.2 LIQUID ORAL at 20:40

## 2019-03-06 RX ADMIN — DORNASE ALFA 5 MG: 1 SOLUTION RESPIRATORY (INHALATION) at 16:38

## 2019-03-06 RX ADMIN — NOREPINEPHRINE BITARTRATE 7 MCG/MIN: 1 INJECTION INTRAVENOUS at 12:39

## 2019-03-06 RX ADMIN — ALTEPLASE 10 MG: 2.2 INJECTION, POWDER, LYOPHILIZED, FOR SOLUTION INTRAVENOUS at 16:38

## 2019-03-06 RX ADMIN — Medication 100 MG: at 08:13

## 2019-03-06 RX ADMIN — METRONIDAZOLE 500 MG: 500 INJECTION, SOLUTION INTRAVENOUS at 08:14

## 2019-03-06 RX ADMIN — FENTANYL CITRATE 50 MCG: 50 INJECTION, SOLUTION INTRAMUSCULAR; INTRAVENOUS at 13:30

## 2019-03-06 RX ADMIN — SODIUM CHLORIDE 25 ML/HR: 9 INJECTION, SOLUTION INTRAVENOUS at 13:05

## 2019-03-06 RX ADMIN — CEFEPIME HYDROCHLORIDE 2000 MG: 2 INJECTION, POWDER, FOR SOLUTION INTRAVENOUS at 11:09

## 2019-03-06 RX ADMIN — MIDODRINE HYDROCHLORIDE 10 MG: 5 TABLET ORAL at 04:46

## 2019-03-06 RX ADMIN — METRONIDAZOLE 500 MG: 500 INJECTION, SOLUTION INTRAVENOUS at 15:54

## 2019-03-06 RX ADMIN — POTASSIUM CHLORIDE 20 MEQ: 200 INJECTION, SOLUTION INTRAVENOUS at 09:05

## 2019-03-06 RX ADMIN — FENTANYL CITRATE 50 MCG: 50 INJECTION, SOLUTION INTRAMUSCULAR; INTRAVENOUS at 14:31

## 2019-03-06 RX ADMIN — FENTANYL CITRATE 50 MCG: 50 INJECTION, SOLUTION INTRAMUSCULAR; INTRAVENOUS at 20:40

## 2019-03-06 RX ADMIN — MIDODRINE HYDROCHLORIDE 10 MG: 5 TABLET ORAL at 15:54

## 2019-03-06 RX ADMIN — FUROSEMIDE 20 MG: 10 INJECTION, SOLUTION INTRAMUSCULAR; INTRAVENOUS at 23:46

## 2019-03-06 RX ADMIN — FENTANYL CITRATE 50 MCG: 50 INJECTION, SOLUTION INTRAMUSCULAR; INTRAVENOUS at 13:41

## 2019-03-06 RX ADMIN — CEFEPIME HYDROCHLORIDE 2000 MG: 2 INJECTION, POWDER, FOR SOLUTION INTRAVENOUS at 22:45

## 2019-03-06 RX ADMIN — PROPOFOL 30 MCG/KG/MIN: 10 INJECTION, EMULSION INTRAVENOUS at 04:24

## 2019-03-06 RX ADMIN — VASOPRESSIN 0.04 UNITS/MIN: 20 INJECTION INTRAVENOUS at 20:23

## 2019-03-06 RX ADMIN — FOLIC ACID 1 MG: 1 TABLET ORAL at 08:13

## 2019-03-06 RX ADMIN — IOHEXOL 40 ML: 300 INJECTION, SOLUTION INTRAVENOUS at 15:12

## 2019-03-06 RX ADMIN — MAGNESIUM SULFATE HEPTAHYDRATE 1 G: 1 INJECTION, SOLUTION INTRAVENOUS at 06:36

## 2019-03-06 RX ADMIN — FUROSEMIDE 20 MG: 10 INJECTION, SOLUTION INTRAMUSCULAR; INTRAVENOUS at 15:54

## 2019-03-06 RX ADMIN — Medication 20 MG: at 08:14

## 2019-03-06 RX ADMIN — LIDOCAINE HYDROCHLORIDE 5 ML: 10 INJECTION, SOLUTION INFILTRATION; PERINEURAL at 14:11

## 2019-03-06 RX ADMIN — PROPOFOL 40 MCG/KG/MIN: 10 INJECTION, EMULSION INTRAVENOUS at 06:40

## 2019-03-06 RX ADMIN — PROPOFOL 50 MCG/KG/MIN: 10 INJECTION, EMULSION INTRAVENOUS at 01:36

## 2019-03-06 RX ADMIN — MIDODRINE HYDROCHLORIDE 10 MG: 5 TABLET ORAL at 10:34

## 2019-03-06 RX ADMIN — VASOPRESSIN 0.04 UNITS/MIN: 20 INJECTION INTRAVENOUS at 00:56

## 2019-03-06 RX ADMIN — FUROSEMIDE 20 MG: 10 INJECTION, SOLUTION INTRAMUSCULAR; INTRAVENOUS at 08:13

## 2019-03-06 RX ADMIN — CHLORHEXIDINE GLUCONATE 0.12% ORAL RINSE 15 ML: 1.2 LIQUID ORAL at 08:13

## 2019-03-06 NOTE — PROGRESS NOTES
Once Jevity 1 2 is restarted, goal with current propofol will be 55 ml/hr  Please add 2 packs prosource daily and continue ordered water flush  This will provide 2012 kcal, 103 gm protein, 2012 ml free water  Monitor need to reduce/increase water flushes as needed

## 2019-03-06 NOTE — BRIEF OP NOTE (RAD/CATH)
Abscess catheter replacement, right pigtail chest tube - Procedure Note    PATIENT NAME: Tanya Burnham  : 1970  MRN: [id number]     Pre-op Diagnosis:   1  Urethral false passage    2  Anemia, unspecified type    3  Other acute appendicitis    4  Sepsis, due to unspecified organism ([facility] [address] )    5  Hypotension, unspecified hypotension type    6  Pleural effusion      Post-op Diagnosis:   1  Urethral false passage    2  Anemia, unspecified type    3  Other acute appendicitis    4  Sepsis, due to unspecified organism ([facility] [address] )    5  Hypotension, unspecified hypotension type    6  Pleural effusion        Surgeon:   Brian Ceballos MD  Assistants:     No qualified resident was available, Resident is only observing    Estimated Blood Loss: minimal  Findings: Liver abscess drain is draining both collections and was replaced  It was difficult to reposition it inferiorly without loosing access  Since it is draining all of the collections and they are all getting smaller, I would leave it where it is and re-image in a week  If the inferior collection is still present and unchanged in size, I would then reposition the tube more inferiorly  We cannot place a 2nd tube due to the location of the collections  Right pigtail chest tube placed since thoracentesis alone did not yield enough fluid  If this tube does not completely drain the infected lung fluid, I would suggest repositioning it by CT guidance due to the complex nature of the collection  Suggest repeat abdominal and chest CT in 1 week      Specimens: none    Complications:  None immediate    Anesthesia: Conscious sedation    Brian Ceballos MD     Date: 3/6/2019  Time: 3:05 PM

## 2019-03-06 NOTE — PROGRESS NOTES
Progress Note - Infectious Disease   Anant Ram 53 y o  male MRN: [id number]  Unit/Bed#: ICU 06 Encounter: [id number]      IMPRESSION & RECOMMENDATIONS:   1  Severe sepsis/shock-fever, leukocytosis, lactic acidosis   Likely secondary to liver abscess and right-sided empyema   Admission and repeat blood cultures are all negative   Continues to improve after placement of chest tube and IR drain   Still requiring vasopressors with steady requirements  Otherwise remains afebrile and WBC count is stable   -antibiotic plan as below  -monitor CBC with diff and creatinine  -wean off pressors as able  -monitor temperatures and hemodynamics  -supportive care     2  Liver abscess-suspect a portal source of infection with the possible appendicitis   No perforation seen on initial CT the abdomen and pelvis   No clear biliary source   Now status post IR drain placement   Cultures all remain negative   Repeat CT abdomen shows significantly decreased size of abscess  However, low output from OLIVIA drain   -continue IV cefepime and Flagyl for now  -plan for IR repositioning of OLIVIA drain today  Will follow up findings  -monitor drain output  -GI follow-up     3  Right-sided empyema   Likely secondary to liver abscess   Now status post chest tube placement   Repeat x-ray shows much improved right hemithorax   Pleural fluid cultures were negative  Repeat CT shows right loculated effusion   -antibiotic plan as above  -chest tube management per critical care service  -IR attempt to drain loculated effusion today  Will follow up results  -monitor chest tube output     4  Acute kidney injury-suspect multifactorial including pre renal issues and possibly sepsis   ATN may be playing a role   Creatinine has improved and remains normal   Urine output remains good  -monitor creatinine clearance closely  -volume management     5   Alcohol abuse-with probable cirrhosis, portal hypertension based upon the CT findings   -continue abstinence  -GI follow-up     6  Probable chronic appendicitis   This may be the etiology for development of liver abscess/empyema   Recent surgery evaluation noted  Kamila Ruano will ultimately require appendectomy   -surgery follow-up     7  Urethral trauma status post suprapubic catheter  Discussed with critical care service        Subjective:  Sedated on ventilator  Levophed at 7 an hour  Remains on vasopressin  No fevers  Going to IR today for drain repositioning  Objective:  Vitals:  Temp:  [97 8 °F (36 6 °C)-99 7 °F (37 6 °C)] 99 7 °F (37 6 °C)  HR:  [52-66] 60  Resp:  [21-47] 30  BP: (116)/(57) 116/57  SpO2:  [91 %-99 %] 93 %  Temp (24hrs), Av 9 °F (37 2 °C), Min:97 8 °F (36 6 °C), Max:99 7 °F (37 6 °C)  Current: Temperature: 99 7 °F (37 6 °C)    Physical Exam:   General:  Sedated, intubated  Eyes:  Conjunctive clear with no hemorrhages or effusions  Oropharynx:  ET tube in place  Neck:  Supple, no lymphadenopathy  Lungs:  Clear to auscultation bilaterally, no accessory muscle use  Right chest tube in place  OLIVIA drain in place  Cardiac:  Regular rate and rhythm, no murmurs  Abdomen:  Soft, non-tender, non-distented, suprapubic catheter  Extremities:  No peripheral cyanosis, clubbing, or edema  Skin:  No rashes, no ulcers  Neurological:  Sedated    Lab Results:  I have personally reviewed pertinent labs    Results from last 7 days   Lab Units 19  0438 19  0428 19  0428 19  0415 19  0438 19  0507   POTASSIUM mmol/L 3 2* 2 7* 2 5* 3 0* 3 2* 3 8   CHLORIDE mmol/L 107  --  105 105 103 101   CO2 mmol/L 26  --  25 23 22 21   BUN mg/dL 15  --  15 14 19 20   CREATININE mg/dL 0 58*  --  0 73 0 83 1 06 1 51*   EGFR ml/min/1 73sq m 121  --  110 105 83 54   CALCIUM mg/dL 7 4*  --  7 2* 7 3* 7 2* 7 5*   AST U/L  --   --  27  --  39 39   ALT U/L  --   --  11*  --  17 20   ALK PHOS U/L  --   --  94  --  103 104     Results from last 7 days   Lab Units 19  0438 19  0628 03/02/19  0420   WBC Thousand/uL 12 74* 14 03* 13 29*   HEMOGLOBIN g/dL 8 6* 8 9* 9 4*   PLATELETS Thousands/uL 107* 115* 121*     Results from last 7 days   Lab Units 02/28/19  2207 02/28/19  2206 02/28/19  1756 02/28/19  0903 02/28/19  0902   BLOOD CULTURE   --   --   --  No Growth After 5 Days  No Growth After 5 Days  GRAM STAIN RESULT  4+ Polys  No organisms seen 4+ Polys  1+ Mononuclear Cells  No No bacteria seen 3+ Polys  No bacteria seen  --   --    BODY FLUID CULTURE, STERILE  No growth  --  No growth  --   --        Imaging Studies:   I have personally reviewed pertinent imaging study reports and images in PACS  Repeat chest x-ray shows persistent left basilar consolidation and worsening right basilar consolidation and effusion    EKG, Pathology, and Other Studies:   I have personally reviewed pertinent reports

## 2019-03-06 NOTE — SOCIAL WORK
Provided family with documentation needed for MA application, they will contact the PATHs office to coorindate

## 2019-03-06 NOTE — PROGRESS NOTES
Progress Note - Clark 53 y o  male MRN: [id number]  Unit/Bed#: ICU 06 Encounter: [id number]    Assessment/Plan:  1  Severe sepsis/septic shock in the setting of multiloculated hepatic abscesses  · Patient continues to require Levophed and vasopressin support  · Continue cefepime and Flagyl per ID recommendations  · Monitor CBC with diff in creatinine  · Continue to monitor hemodynamics  · Continue supportive care  2  Liver abscess, suspected portal source of infection with possible appendicitis  · No perforation on initial CT abdomen and pelvis  · Status post IR drain placement  · Cultures remain negative  · Continue antibiotics per ID recommendations, appreciate input  · Plan for IR placement of 2nd OLIVIA drain today, monitor drain output  · GI on consult, appreciate input  3  Right-sided empyema versus hydropneumothorax likely secondary to liver abscess  · Continue antibiotics as outlined  · Monitor culture data  · Continue chest tube to suction  · Possible IR drainage of right-sided loculated effusion today  4  Acute kidney injury/ATN  · Monitor renal indices  · Continue the Will judicious volume management  5  Post the chronic appendicitis  · Will need appendectomy when and stable  · Will need surgical follow  6  Alcohol dependence  · No signs of alcohol withdrawal at this time  · Continue folate and thiamine  7  Urethral trauma status post suprapubic catheter  · Appreciate Urology input  8  Hypokalemia/hypomagnesemia  · Replete  _____________________________________________________________________    HPI/24hr events:   No events overnight  Responds appropriately to verbal and tactile stimuli  Scheduled for IR procedure today      Medications:    Current Facility-Administered Medications:  acetaminophen 650 mg Oral Q6H PRN Victorino Gilbert DO    cefepime 2,000 mg Intravenous Q12H Shelby Vidal MD Last Rate: Stopped (03/06/19 0400)   chlorhexidine 15 mL [facility] Aceves SIVANP    fentanyl citrate (PF) 50 mcg Intravenous Q2H PRN Carlos LINDSAY Gross    folic acid 1 mg Oral Daily LINDSAY Ulloa    furosemide 20 mg Intravenous BID (diuretic) LINDSAY Butterfield    metroNIDAZOLE 500 mg Intravenous Q8H Rachel Witt MD Last Rate: Stopped (03/06/19 0400)   midodrine 10 mg Oral TID  LINDSAY Perez    norepinephrine 1-30 mcg/min Intravenous Titrated Jorge Cason MD Last Rate: 6 mcg/min (03/06/19 0500)   omeprazole (PRILOSEC) suspension 2 mg/mL 20 mg Oral Daily LINDSAY Chappell    polyethylene glycol 17 g Oral Daily PRN LINDSAY Ulloa    propofol 5-50 mcg/kg/min Intravenous Titrated Carlos LINDSAY Gross Last Rate: 30 mcg/kg/min (03/06/19 0500)   thiamine 100 mg Oral Daily LINDSAY Ulloa    vasopressin (PITRESSIN) in 0 9 % sodium chloride 100 mL 0 04 Units/min Intravenous Continuous LINDSAY Butterfield Last Rate: 0 04 Units/min (03/06/19 0056)         norepinephrine 1-30 mcg/min Last Rate: 6 mcg/min (03/06/19 0500)   propofol 5-50 mcg/kg/min Last Rate: 30 mcg/kg/min (03/06/19 0500)   vasopressin (PITRESSIN) in 0 9 % sodium chloride 100 mL 0 04 Units/min Last Rate: 0 04 Units/min (03/06/19 0056)         Physical exam:  Vitals: Body mass index is 34 63 kg/m²  Blood pressure 116/57, pulse 56, temperature 98 4 °F (36 9 °C), temperature source Temporal, resp  rate (!) 28, height 5' 8" (1 727 m), weight 103 kg (227 lb 11 8 oz), SpO2 95 %  ,  Temp  Min: 97 °F (36 1 °C)  Max: 102 4 °F (39 1 °C)  IBW: 68 4 kg    SpO2: 95 %  SpO2 Activity: At Rest  O2 Device: Other (comment)(vent)      Intake/Output Summary (Last 24 hours) at 3/6/2019 0558  Last data filed at 3/6/2019 0400  Gross per 24 hour   Intake 3350 82 ml   Output 3163 ml   Net 187 82 ml       Invasive/non-invasive ventilation settings:   Respiratory    Lab Data (Last 4 hours)    None         O2/Vent Data (Last 4 hours)      03/06 0300           Vent Mode AC/VC       Resp Rate (BPM) (BPM) 16       Vt (mL) (mL) 360       FIO2 (%) (%) 40       PEEP (cmH2O) (cmH2O) 5       MV 11 3                 Invasive Devices     Central Venous Catheter Line            CVC Central Lines 03/01/19 Triple 16cm 5 days          Arterial Line            Arterial Line 02/28/19 Radial 5 days          Drain            Chest Tube Right 5 days    Closed/Suction Drain Anterior Abdomen Bulb 10 Fr  5 days    NG/OG/Enteral Tube Orogastric 16 Fr Center mouth 5 days    Suprapubic Catheter 5 days          Airway            ETT  Hi-Lo 8 mm 5 days                  Physical Exam:  Gen:  Sedated and resting comfortably on ventilator with no acute distress awakens easily with tactile and verbal stimuli  HEENT:  Normocephalic, atraumatic, pupils are 3 and reactive bilaterally, extraocular movements are intact, no facial droop, ET tube noted at 23 at lip  Neck:  No JVD, no adenopathy  Chest:  Lungs are decreased throughout all lung fields with rales noted in right lung base  No accessory muscle use  Cor:  S1/S2, regular rate and rhythm, no murmurs/rubs/gallops/click  Abd:  Obese, soft, nontender, nondistended, positive bowel sounds x4 quadrants  Ext:  No noted mottling  Neuro:  GCS is 11 T  No facial droop noted  Follows commands  Moves all extremities with purpose  Skin:  No noted skin breakdown      Diagnostic Data:  Lab: I have personally reviewed pertinent lab results     CBC:   Results from last 7 days   Lab Units 03/06/19 0438 03/04/19 0428 03/02/19  0420   WBC Thousand/uL 12 74* 14 03* 13 29*   HEMOGLOBIN g/dL 8 6* 8 9* 9 4*   HEMATOCRIT % 26 9* 27 2* 28 6*   PLATELETS Thousands/uL 107* 115* 121*       CMP:   Results from last 7 days   Lab Units 03/06/19  0438 03/05/19  0428 03/04/19  0428 03/03/19  0415 03/02/19  0438 03/01/19  0507   SODIUM mmol/L 141  --  139 138 135* 134*   POTASSIUM mmol/L 3 2* 2 7* 2 5* 3 0* 3 2* 3 8   CHLORIDE mmol/L 107  --  105 105 103 101   CO2 mmol/L 26  --  25 23 22 21   BUN mg/dL 15  --  15 14 19 20   CREATININE mg/dL 0 58*  --  0 73 0 83 1 06 1 51*   CALCIUM mg/dL 7 4*  --  7 2* 7 3* 7 2* 7 5*   ALK PHOS U/L  --   --  94  --  103 104   ALT U/L  --   --  11*  --  17 20   AST U/L  --   --  27  --  39 39     PT/INR:   Lab Results   Component Value Date    INR 1 46 (H) 2019   ,   Magnesium:   Results from last 7 days   Lab Units 19  0438 19  0428 19  0428   MAGNESIUM mg/dL 1 6 1 7 1 5*     Phosphorous:       Microbiology:  Results from last 7 days   Lab Units 19  2207 19  2206 19  1756 19  0903 19  0902   BLOOD CULTURE   --   --   --  No Growth After 5 Days  No Growth After 5 Days  GRAM STAIN RESULT  4+ Polys  No organisms seen 4+ Polys  1+ Mononuclear Cells  No No bacteria seen 3+ Polys  No bacteria seen  --   --    BODY FLUID CULTURE, STERILE  No growth  --  No growth  --   --        [email or web address] CT scan of abdomen and pelvis revealed multiloculated hepatic abscesses  Hepatic cirrhosis  Portal hypertension  Extensive mesenteric edema  Loculated hydro pneumothorax with right-sided chest tube  Compressive independent atelectasis  Cardiac lab/EKG/telemetry/ECHO:   Sinus rhythm on cardiac monitor  2019 echocardiogram reveals vigorous left ventricle systolic function with an ejection fraction estimated to be 70%  Trace pericardial effusion identified adjacent to the right atrium and the right ventricular free wall  Right ventricle was normal size with normal wall thickness  No evidence of mitral valve stenosis or regurgitation  No evidence of aortic valve stenosis or regurgitation  No evidence of tricuspid valve stenosis or regurgitation  No evidence of pulmonic regurgitation  VTE Prophylaxis:  SCDs, heparin on hold secondary to need for IR procedure    Code Status: Level 1 - Full Code    LINDSAY Pena    Portions of the record may have been created with voice recognition software   Occasional wrong word or "sound a like" substitutions may have occurred due to the inherent limitations of voice recognition software  Read the chart carefully and recognize, using context, where substitutions have occurred

## 2019-03-07 ENCOUNTER — APPOINTMENT (INPATIENT)
Dept: RADIOLOGY | Facility: HOSPITAL | Age: 49
DRG: 441 | End: 2019-03-07
Payer: COMMERCIAL

## 2019-03-07 PROBLEM — N17.9 AKI (ACUTE KIDNEY INJURY) (HCC): Status: RESOLVED | Noted: 2019-03-01 | Resolved: 2019-03-07

## 2019-03-07 LAB
ANION GAP SERPL CALCULATED.3IONS-SCNC: 6 MMOL/L (ref 4–13)
BUN SERPL-MCNC: 19 MG/DL (ref 5–25)
CALCIUM SERPL-MCNC: 7.2 MG/DL (ref 8.3–10.1)
CHLORIDE SERPL-SCNC: 106 MMOL/L (ref 100–108)
CO2 SERPL-SCNC: 28 MMOL/L (ref 21–32)
CREAT SERPL-MCNC: 0.7 MG/DL (ref 0.6–1.3)
GFR SERPL CREATININE-BSD FRML MDRD: 112 ML/MIN/1.73SQ M
GLUCOSE SERPL-MCNC: 128 MG/DL (ref 65–140)
MAGNESIUM SERPL-MCNC: 1.8 MG/DL (ref 1.6–2.6)
POTASSIUM SERPL-SCNC: 2.8 MMOL/L (ref 3.5–5.3)
POTASSIUM SERPL-SCNC: 3.6 MMOL/L (ref 3.5–5.3)
SODIUM SERPL-SCNC: 140 MMOL/L (ref 136–145)

## 2019-03-07 PROCEDURE — 71045 X-RAY EXAM CHEST 1 VIEW: CPT

## 2019-03-07 PROCEDURE — 83735 ASSAY OF MAGNESIUM: CPT | Performed by: NURSE PRACTITIONER

## 2019-03-07 PROCEDURE — 99233 SBSQ HOSP IP/OBS HIGH 50: CPT | Performed by: INTERNAL MEDICINE

## 2019-03-07 PROCEDURE — 94003 VENT MGMT INPAT SUBQ DAY: CPT

## 2019-03-07 PROCEDURE — 99291 CRITICAL CARE FIRST HOUR: CPT | Performed by: INTERNAL MEDICINE

## 2019-03-07 PROCEDURE — 84132 ASSAY OF SERUM POTASSIUM: CPT | Performed by: NURSE PRACTITIONER

## 2019-03-07 PROCEDURE — 80048 BASIC METABOLIC PNL TOTAL CA: CPT | Performed by: NURSE PRACTITIONER

## 2019-03-07 RX ORDER — POTASSIUM CHLORIDE 20MEQ/15ML
40 LIQUID (ML) ORAL ONCE
Status: COMPLETED | OUTPATIENT
Start: 2019-03-07 | End: 2019-03-07

## 2019-03-07 RX ORDER — POTASSIUM CHLORIDE 29.8 MG/ML
40 INJECTION INTRAVENOUS ONCE
Status: COMPLETED | OUTPATIENT
Start: 2019-03-07 | End: 2019-03-07

## 2019-03-07 RX ORDER — HEPARIN SODIUM 5000 [USP'U]/ML
5000 INJECTION, SOLUTION INTRAVENOUS; SUBCUTANEOUS EVERY 8 HOURS SCHEDULED
Status: DISCONTINUED | OUTPATIENT
Start: 2019-03-07 | End: 2019-03-19

## 2019-03-07 RX ADMIN — HEPARIN SODIUM 5000 UNITS: 5000 INJECTION INTRAVENOUS; SUBCUTANEOUS at 21:24

## 2019-03-07 RX ADMIN — CHLORHEXIDINE GLUCONATE 0.12% ORAL RINSE 15 ML: 1.2 LIQUID ORAL at 21:24

## 2019-03-07 RX ADMIN — HEPARIN SODIUM 5000 UNITS: 5000 INJECTION INTRAVENOUS; SUBCUTANEOUS at 14:35

## 2019-03-07 RX ADMIN — PROPOFOL 35 MCG/KG/MIN: 10 INJECTION, EMULSION INTRAVENOUS at 20:37

## 2019-03-07 RX ADMIN — PROPOFOL 25 MCG/KG/MIN: 10 INJECTION, EMULSION INTRAVENOUS at 05:19

## 2019-03-07 RX ADMIN — ALTEPLASE 10 MG: 2.2 INJECTION, POWDER, LYOPHILIZED, FOR SOLUTION INTRAVENOUS at 11:51

## 2019-03-07 RX ADMIN — MIDODRINE HYDROCHLORIDE 10 MG: 5 TABLET ORAL at 12:04

## 2019-03-07 RX ADMIN — POLYETHYLENE GLYCOL (3350) 17 G: 17 POWDER, FOR SOLUTION ORAL at 08:36

## 2019-03-07 RX ADMIN — FOLIC ACID 1 MG: 1 TABLET ORAL at 08:35

## 2019-03-07 RX ADMIN — Medication 100 MG: at 08:35

## 2019-03-07 RX ADMIN — CHLORHEXIDINE GLUCONATE 0.12% ORAL RINSE 15 ML: 1.2 LIQUID ORAL at 08:36

## 2019-03-07 RX ADMIN — METRONIDAZOLE 500 MG: 500 INJECTION, SOLUTION INTRAVENOUS at 08:36

## 2019-03-07 RX ADMIN — METRONIDAZOLE 500 MG: 500 INJECTION, SOLUTION INTRAVENOUS at 16:55

## 2019-03-07 RX ADMIN — Medication 20 MG: at 08:36

## 2019-03-07 RX ADMIN — POTASSIUM CHLORIDE 40 MEQ: 20 SOLUTION ORAL at 20:00

## 2019-03-07 RX ADMIN — FENTANYL CITRATE 50 MCG: 50 INJECTION, SOLUTION INTRAMUSCULAR; INTRAVENOUS at 21:20

## 2019-03-07 RX ADMIN — POTASSIUM CHLORIDE 40 MEQ: 400 INJECTION, SOLUTION INTRAVENOUS at 05:33

## 2019-03-07 RX ADMIN — POTASSIUM CHLORIDE 40 MEQ: 20 SOLUTION ORAL at 05:33

## 2019-03-07 RX ADMIN — FUROSEMIDE 20 MG: 10 INJECTION, SOLUTION INTRAMUSCULAR; INTRAVENOUS at 16:58

## 2019-03-07 RX ADMIN — NOREPINEPHRINE BITARTRATE 6 MCG/MIN: 1 INJECTION INTRAVENOUS at 01:35

## 2019-03-07 RX ADMIN — NOREPINEPHRINE BITARTRATE 6 MCG/MIN: 1 INJECTION INTRAVENOUS at 15:22

## 2019-03-07 RX ADMIN — DORNASE ALFA 5 MG: 1 SOLUTION RESPIRATORY (INHALATION) at 12:03

## 2019-03-07 RX ADMIN — PROPOFOL 25 MCG/KG/MIN: 10 INJECTION, EMULSION INTRAVENOUS at 11:51

## 2019-03-07 RX ADMIN — CEFEPIME HYDROCHLORIDE 2000 MG: 2 INJECTION, POWDER, FOR SOLUTION INTRAVENOUS at 22:53

## 2019-03-07 RX ADMIN — HEPARIN SODIUM 5000 UNITS: 5000 INJECTION INTRAVENOUS; SUBCUTANEOUS at 05:28

## 2019-03-07 RX ADMIN — PROPOFOL 40 MCG/KG/MIN: 10 INJECTION, EMULSION INTRAVENOUS at 01:34

## 2019-03-07 RX ADMIN — MIDODRINE HYDROCHLORIDE 10 MG: 5 TABLET ORAL at 16:57

## 2019-03-07 RX ADMIN — VASOPRESSIN 0.04 UNITS/MIN: 20 INJECTION INTRAVENOUS at 14:34

## 2019-03-07 RX ADMIN — PROPOFOL 25 MCG/KG/MIN: 10 INJECTION, EMULSION INTRAVENOUS at 17:03

## 2019-03-07 RX ADMIN — CEFEPIME HYDROCHLORIDE 2000 MG: 2 INJECTION, POWDER, FOR SOLUTION INTRAVENOUS at 11:58

## 2019-03-07 RX ADMIN — VASOPRESSIN 0.04 UNITS/MIN: 20 INJECTION INTRAVENOUS at 05:30

## 2019-03-07 RX ADMIN — POTASSIUM CHLORIDE 40 MEQ: 20 SOLUTION ORAL at 14:35

## 2019-03-07 RX ADMIN — FUROSEMIDE 20 MG: 10 INJECTION, SOLUTION INTRAMUSCULAR; INTRAVENOUS at 08:36

## 2019-03-07 RX ADMIN — MIDODRINE HYDROCHLORIDE 10 MG: 5 TABLET ORAL at 05:35

## 2019-03-07 NOTE — PROGRESS NOTES
Progress Note - Clark 53 y o  male MRN: [id number]  Unit/Bed#: [facility] 06 Encounter: [id number]    Assessment/Plan:    1  Severe sepsis/septic shock in the setting of multiloculated hepatic abscesses  · Continue to wean vasoactive medication as tolerated  · Continue cefepime and Flagyl per ID recommendations  · MAPs >65  · Continue to monitor hemodynamics  · Continue supportive care  2  Liver abscess, suspected portal source of infection with possible appendicitis  · Status post IR drain replacement and repositioning  · New cultures sent  · Continue antibiotics per ID recommendations, input appreciated  · New OLIVIA with minimal drainage  3  Right-sided empyema versus hydropneumothorax likely secondary to liver abscess  · Continue antibiotics as outlined  · Monitor culture data  · Continue chest tube to suction  · Second drain placed yesterday by IR, 150cc from both drains overnight  · TPA was place in drain after IR for decreased output, improved drainage after  4  Acute kidney injury/ATN  · Improved creatinine function, likely at baseline  5  Possible subacute appendictis  · Will need appendectomy when and stable  · Will need surgical follow up  6  Alcohol dependence  · No signs of alcohol withdrawal at this time  · Continue folate and thiamine  7  Urethral trauma status post suprapubic catheter  · Suprapubic catheter, urology outpatient follow up for removal  8  Hypokalemia/hypomagnesemia  · Aggressive repletion  9  Restart DVT ppx  _____________________________________________________________________    HPI/24hr events:   Patient had IR placement of a second right sided chest tube yesterday as well as replacement of liver drain  Patient stable after procedure  Pressor requirements remaining stable  No acute events overnight      Medications:    Current Facility-Administered Medications:  acetaminophen 650 mg Oral Q6H PRN Stacy PRICE PA-C    cefepime 2,000 mg Intravenous Q12H Andrea Sam MD Gigi Last Rate: Stopped (03/06/19 2343)   chlorhexidine 15 mL [facility] Q12H [facility] LINDSAY Martinez    fentanyl citrate (PF) 50 mcg Intravenous Q2H PRN LINDSAY Mir    folic acid 1 mg Oral Daily BeLINDSAY Sims    furosemide 20 mg Intravenous Q8H LINDSAY Martinez    heparin (porcine) 5,000 Units Subcutaneous [facility] Marcus PRICE PA-C    metroNIDAZOLE 500 mg Intravenous Q8H Ana Maria Tobias MD Last Rate: Stopped (03/07/19 0101)   midodrine 10 mg Oral TID AC LINDSAY Chappell    norepinephrine 1-30 mcg/min Intravenous Titrated Roger Simms MD Last Rate: 6 mcg/min (03/07/19 0520)   omeprazole (PRILOSEC) suspension 2 mg/mL 20 mg Oral Daily LINDSAY Chappell    polyethylene glycol 17 g Oral Daily PRN LINDSAY Montiel    potassium chloride 40 mEq Oral Once Jordan PRICE PA-C    potassium chloride 40 mEq Intravenous Once Cierra PRICE PA-C Last Rate: 40 mEq (03/07/19 0533)   propofol 5-50 mcg/kg/min Intravenous Titrated LINDSAY Mir Last Rate: 25 mcg/kg/min (03/07/19 0519)   thiamine 100 mg Oral Daily LINDSAY Montiel    vasopressin (PITRESSIN) in 0 9 % sodium chloride 100 mL 0 04 Units/min Intravenous Continuous LINDSAY Mir Last Rate: 0 04 Units/min (03/07/19 0530)         norepinephrine 1-30 mcg/min Last Rate: 6 mcg/min (03/07/19 0520)   propofol 5-50 mcg/kg/min Last Rate: 25 mcg/kg/min (03/07/19 0519)   vasopressin (PITRESSIN) in 0 9 % sodium chloride 100 mL 0 04 Units/min Last Rate: 0 04 Units/min (03/07/19 0530)         Physical exam:  Vitals: Body mass index is 34 63 kg/m²  Blood pressure 116/57, pulse 60, temperature 98 4 °F (36 9 °C), temperature source Temporal, resp  rate (!) 44, height 5' 8" (1 727 m), weight 103 kg (227 lb 11 8 oz), SpO2 97 %  ,  Temp  Min: 97 °F (36 1 °C)  Max: 102 4 °F (39 1 °C)  IBW: 68 4 kg    SpO2: 97 %  SpO2 Activity: At Rest  O2 Device: Other (comment)(vent)      Intake/Output Summary (Last 24 hours) at 3/7/2019 6177  Last data filed at 3/7/2019 0400  Gross per 24 hour   Intake 3129 93 ml   Output 2827 ml   Net 302 93 ml       Invasive/non-invasive ventilation settings:   Respiratory    Lab Data (Last 4 hours)    None         O2/Vent Data (Last 4 hours)      03/07 0356           Vent Mode AC/VC       Resp Rate (BPM) (BPM) 16       Vt (mL) (mL) 360       FIO2 (%) (%) 50       PEEP (cmH2O) (cmH2O) 5       MV 10 2                 Invasive Devices     Central Venous Catheter Line            CVC Central Lines 03/01/19 Triple 16cm 6 days          Arterial Line            Arterial Line 02/28/19 Radial 6 days          Drain            Chest Tube Right 6 days    NG/OG/Enteral Tube Orogastric 16 Fr Center mouth 6 days    Suprapubic Catheter 6 days    Chest Tube 2 Right Other (Comment) 8 5 Fr  less than 1 day    Closed/Suction Drain Midline Abdomen Bulb 10 2 Fr  less than 1 day          Airway            ETT  Hi-Lo 8 mm 6 days                  Physical Exam:  Gen: Resting comfortable in room  No acute distress  HEENT: Atraumatic  ET tube noted  PERRL  Neck: Neck supple  Chest: Lungs clear, no rhonchi  Cor: Heart RRR, no murmurs noted  Abd: Epigastric OLIVIA drain noted with serosanguinous fluid  Abdomen slightly distended  Ext: Anasarca noted  Neuro: Patient alert and responsive when stimulated, moving all extremities to command and spontaneously  Skin: No breakdowns noted      Diagnostic Data:  Lab: I have personally reviewed pertinent lab results     CBC:   Results from last 7 days   Lab Units 03/06/19 0438 03/04/19 0428 03/02/19  0420   WBC Thousand/uL 12 74* 14 03* 13 29*   HEMOGLOBIN g/dL 8 6* 8 9* 9 4*   HEMATOCRIT % 26 9* 27 2* 28 6*   PLATELETS Thousands/uL 107* 115* 121*       CMP:   Results from last 7 days   Lab Units 03/07/19  0430 03/06/19  0438 03/05/19  0428 03/04/19  0428  03/02/19  0438 03/01/19  0507   SODIUM mmol/L 140 141  --  139   < > 135* 134*   POTASSIUM mmol/L 2 8* 3 2* 2 7* 2 5*   < > 3 2* 3 8   CHLORIDE mmol/L 106 107  --  105   < > 103 101   CO2 mmol/L 28 26  --  25   < > 22 21   BUN mg/dL 19 15  --  15   < > 19 20   CREATININE mg/dL 0 70 0 58*  --  0 73   < > 1 06 1 51*   CALCIUM mg/dL 7 2* 7 4*  --  7 2*   < > 7 2* 7 5*   ALK PHOS U/L  --   --   --  94  --  103 104   ALT U/L  --   --   --  11*  --  17 20   AST U/L  --   --   --  27  --  39 39    < > = values in this interval not displayed  PT/INR:   No results found for: PT, INR,   Magnesium:   Results from last 7 days   Lab Units 03/07/19  0430 03/06/19  0438 03/05/19  0428   MAGNESIUM mg/dL 1 8 1 6 1 7     Phosphorous:       Microbiology:  Results from last 7 days   Lab Units 03/06/19  1519 02/28/19  2207 02/28/19  2206 02/28/19  1756 02/28/19  0903 02/28/19  0902   BLOOD CULTURE   --   --   --   --  No Growth After 5 Days  No Growth After 5 Days  GRAM STAIN RESULT  1+ Polys  No bacteria seen 4+ Polys  No organisms seen 4+ Polys  1+ Mononuclear Cells  No No bacteria seen 3+ Polys  No bacteria seen  --   --    BODY FLUID CULTURE, STERILE   --  No growth  --  No growth  --   --        Imaging:  3/7/19 - CXR    Persistent left basilar consolidation and worsening right basilar consolidation and effusion  No pneumothorax with right chest tube in place  Cardiac lab/EKG/telemetry/ECHO:       VTE Prophylaxis: SCDs, restarting heparin    Code Status: Level 1 - Full Code    Isaac Lovelace PA-C    Portions of the record may have been created with voice recognition software  Occasional wrong word or "sound a like" substitutions may have occurred due to the inherent limitations of voice recognition software  Read the chart carefully and recognize, using context, where substitutions have occurred

## 2019-03-07 NOTE — PROGRESS NOTES
Progress Note - Infectious Disease   Bhavin Sheets 53 y o  male MRN: [id number]  Unit/Bed#: ICU 06 Encounter: [id number]      IMPRESSION & RECOMMENDATIONS:   1  Severe sepsis/shock-fever, leukocytosis, lactic acidosis   Likely secondary to liver abscess and right-sided empyema   Admission and repeat blood cultures were all negative  Vasopressor requirement continues to slowly decrease  Patient remains afebrile  Overall, continues to clinically improve from an infectious standpoint   -antibiotic plan as below  -monitor CBC with diff and creatinine  -wean off pressors as able  -monitor temperatures and hemodynamics  -supportive care     2  Liver abscess-suspect a portal source of infection with the possible appendicitis   No perforation seen on initial CT the abdomen and pelvis   No clear biliary source   Now status post IR drain placement   Cultures all remain negative   Repeat CT abdomen shows significantly decreased size of abscess   Drain was reassessed by IR on 03/06 and is in proper position   -continue IV cefepime and Flagyl   -repeat CT in 1 week per IR recommendations to reassess collection  -monitor drain output  -GI follow-up     3  Right-sided empyema  Lynda Geraldo secondary to liver abscess   Now status post chest tube placement   Repeat x-ray shows much improved right hemithorax   Pleural fluid cultures were negative   Repeat CT shows right loculated effusion  IR placed a 2nd chest tube on 3/6 with additional purulent fluid  Culture was sent which so far shows no growth   -antibiotic plan as above  -chest tube management per critical care service  -repeat CT in 1 week prior recommendations to reassessed empyema  -monitor chest tube output  -follow-up repeat pleural fluid culture     4  Acute kidney injury-suspect multifactorial including pre renal issues and possibly sepsis   ATN may be playing a role   Creatinine has improved and remains normal   Urine output remains good    -monitor creatinine clearance closely  -volume management     5  Alcohol abuse-with probable cirrhosis, portal hypertension based upon the CT findings   -continue abstinence  -GI follow-up     6  Probable chronic appendicitis   This may be the etiology for development of liver abscess/empyema   Recent surgery evaluation noted   Patient will ultimately require appendectomy   -surgery follow-up     7  Urethral trauma status post suprapubic catheter      Discussed with critical care attending, and with patient's family           Subjective:  Patient went to IR yesterday and had placement of a 2nd right sided chest tube with removal of an additional 50 cc of purulent drainage  Remains sedated and intubated  No fevers  Levophed drip down to 4 an hour  Remains on vasopressin as well  Patient became tachypneic on wean today  Objective:  Vitals:  Temp:  [97 8 °F (36 6 °C)-100 4 °F (38 °C)] 98 °F (36 7 °C)  HR:  [50-78] 58  Resp:  [25-53] 25  SpO2:  [90 %-98 %] 97 %  Temp (24hrs), Av 5 °F (36 9 °C), Min:97 8 °F (36 6 °C), Max:100 4 °F (38 °C)  Current: Temperature: 98 °F (36 7 °C)    Physical Exam:   General:  Sedated and intubated  Eyes:  Conjunctive clear with no hemorrhages or effusions  Oropharynx:  ET tube in place  Neck:  Supple, no lymphadenopathy  Lungs:  Ventilated breath sounds  Cardiac:  Regular rate and rhythm, no murmurs  Chest tube x2 in place  Abdomen:  Soft, non-tender, non-distented  OLIVIA drain in place  Extremities:  No peripheral cyanosis, clubbing, or edema  Skin:  No rashes, no ulcers  Neurological:  Sedated      Lab Results:  I have personally reviewed pertinent labs    Results from last 7 days   Lab Units 19  0430 19  6812 19  0428 19  0428  19  0438 19  0507   POTASSIUM mmol/L 2 8* 3 2* 2 7* 2 5*   < > 3 2* 3 8   CHLORIDE mmol/L 106 107  --  105   < > 103 101   CO2 mmol/L 28 26  --  25   < > 22 21   BUN mg/dL 19 15  --  15   < > 19 20   CREATININE mg/dL 0 70 0 58*  --  0 73   < > 1 06 1 51*   EGFR ml/min/1 73sq m 112 121  --  110   < > 83 54   CALCIUM mg/dL 7 2* 7 4*  --  7 2*   < > 7 2* 7 5*   AST U/L  --   --   --  27  --  39 39   ALT U/L  --   --   --  11*  --  17 20   ALK PHOS U/L  --   --   --  94  --  103 104    < > = values in this interval not displayed  Results from last 7 days   Lab Units 03/06/19  0438 03/04/19  0428 03/02/19  0420   WBC Thousand/uL 12 74* 14 03* 13 29*   HEMOGLOBIN g/dL 8 6* 8 9* 9 4*   PLATELETS Thousands/uL 107* 115* 121*     Results from last 7 days   Lab Units 03/06/19  1519 02/28/19  2207 02/28/19  2206 02/28/19  1756   GRAM STAIN RESULT  1+ Polys  No bacteria seen 4+ Polys  No organisms seen 4+ Polys  1+ Mononuclear Cells  No No bacteria seen 3+ Polys  No bacteria seen   BODY FLUID CULTURE, STERILE  No growth No growth  --  No growth       Imaging Studies:   I have personally reviewed pertinent imaging study reports and images in PACS  EKG, Pathology, and Other Studies:   I have personally reviewed pertinent reports  IR procedure reports reviewed

## 2019-03-08 LAB
ANION GAP SERPL CALCULATED.3IONS-SCNC: 6 MMOL/L (ref 4–13)
BUN SERPL-MCNC: 19 MG/DL (ref 5–25)
CALCIUM SERPL-MCNC: 7.3 MG/DL (ref 8.3–10.1)
CHLORIDE SERPL-SCNC: 107 MMOL/L (ref 100–108)
CO2 SERPL-SCNC: 28 MMOL/L (ref 21–32)
CREAT SERPL-MCNC: 0.63 MG/DL (ref 0.6–1.3)
GFR SERPL CREATININE-BSD FRML MDRD: 117 ML/MIN/1.73SQ M
GLUCOSE SERPL-MCNC: 127 MG/DL (ref 65–140)
MAGNESIUM SERPL-MCNC: 1.6 MG/DL (ref 1.6–2.6)
POTASSIUM SERPL-SCNC: 3.2 MMOL/L (ref 3.5–5.3)
SODIUM SERPL-SCNC: 141 MMOL/L (ref 136–145)

## 2019-03-08 PROCEDURE — 99233 SBSQ HOSP IP/OBS HIGH 50: CPT | Performed by: INTERNAL MEDICINE

## 2019-03-08 PROCEDURE — 94003 VENT MGMT INPAT SUBQ DAY: CPT

## 2019-03-08 PROCEDURE — 83735 ASSAY OF MAGNESIUM: CPT | Performed by: NURSE PRACTITIONER

## 2019-03-08 PROCEDURE — 99291 CRITICAL CARE FIRST HOUR: CPT | Performed by: INTERNAL MEDICINE

## 2019-03-08 PROCEDURE — 80048 BASIC METABOLIC PNL TOTAL CA: CPT | Performed by: NURSE PRACTITIONER

## 2019-03-08 RX ORDER — POTASSIUM CHLORIDE 20MEQ/15ML
40 LIQUID (ML) ORAL ONCE
Status: COMPLETED | OUTPATIENT
Start: 2019-03-08 | End: 2019-03-08

## 2019-03-08 RX ORDER — POTASSIUM CHLORIDE 14.9 MG/ML
20 INJECTION INTRAVENOUS ONCE
Status: COMPLETED | OUTPATIENT
Start: 2019-03-08 | End: 2019-03-08

## 2019-03-08 RX ADMIN — VASOPRESSIN 0.04 UNITS/MIN: 20 INJECTION INTRAVENOUS at 08:38

## 2019-03-08 RX ADMIN — METRONIDAZOLE 500 MG: 500 INJECTION, SOLUTION INTRAVENOUS at 16:12

## 2019-03-08 RX ADMIN — HEPARIN SODIUM 5000 UNITS: 5000 INJECTION INTRAVENOUS; SUBCUTANEOUS at 05:27

## 2019-03-08 RX ADMIN — CHLORHEXIDINE GLUCONATE 0.12% ORAL RINSE 15 ML: 1.2 LIQUID ORAL at 08:00

## 2019-03-08 RX ADMIN — POLYETHYLENE GLYCOL (3350) 17 G: 17 POWDER, FOR SOLUTION ORAL at 07:58

## 2019-03-08 RX ADMIN — METRONIDAZOLE 500 MG: 500 INJECTION, SOLUTION INTRAVENOUS at 07:42

## 2019-03-08 RX ADMIN — MIDODRINE HYDROCHLORIDE 10 MG: 5 TABLET ORAL at 11:33

## 2019-03-08 RX ADMIN — PROPOFOL 35 MCG/KG/MIN: 10 INJECTION, EMULSION INTRAVENOUS at 16:40

## 2019-03-08 RX ADMIN — NOREPINEPHRINE BITARTRATE 6 MCG/MIN: 1 INJECTION INTRAVENOUS at 13:52

## 2019-03-08 RX ADMIN — FOLIC ACID 1 MG: 1 TABLET ORAL at 08:00

## 2019-03-08 RX ADMIN — HEPARIN SODIUM 5000 UNITS: 5000 INJECTION INTRAVENOUS; SUBCUTANEOUS at 13:53

## 2019-03-08 RX ADMIN — Medication 20 MG: at 08:00

## 2019-03-08 RX ADMIN — FENTANYL CITRATE 50 MCG: 50 INJECTION, SOLUTION INTRAMUSCULAR; INTRAVENOUS at 05:25

## 2019-03-08 RX ADMIN — FENTANYL CITRATE 50 MCG: 50 INJECTION, SOLUTION INTRAMUSCULAR; INTRAVENOUS at 00:40

## 2019-03-08 RX ADMIN — FUROSEMIDE 20 MG: 10 INJECTION, SOLUTION INTRAMUSCULAR; INTRAVENOUS at 07:58

## 2019-03-08 RX ADMIN — CHLORHEXIDINE GLUCONATE 0.12% ORAL RINSE 15 ML: 1.2 LIQUID ORAL at 21:35

## 2019-03-08 RX ADMIN — ALTEPLASE 10 MG: 2.2 INJECTION, POWDER, LYOPHILIZED, FOR SOLUTION INTRAVENOUS at 11:30

## 2019-03-08 RX ADMIN — METRONIDAZOLE 500 MG: 500 INJECTION, SOLUTION INTRAVENOUS at 00:06

## 2019-03-08 RX ADMIN — PROPOFOL 25 MCG/KG/MIN: 10 INJECTION, EMULSION INTRAVENOUS at 12:43

## 2019-03-08 RX ADMIN — POTASSIUM CHLORIDE 20 MEQ: 200 INJECTION, SOLUTION INTRAVENOUS at 07:40

## 2019-03-08 RX ADMIN — VASOPRESSIN 0.04 UNITS/MIN: 20 INJECTION INTRAVENOUS at 00:21

## 2019-03-08 RX ADMIN — PROPOFOL 38 MCG/KG/MIN: 10 INJECTION, EMULSION INTRAVENOUS at 00:50

## 2019-03-08 RX ADMIN — FUROSEMIDE 20 MG: 10 INJECTION, SOLUTION INTRAMUSCULAR; INTRAVENOUS at 00:41

## 2019-03-08 RX ADMIN — VASOPRESSIN 0.04 UNITS/MIN: 20 INJECTION INTRAVENOUS at 17:56

## 2019-03-08 RX ADMIN — PROPOFOL 20 MCG/KG/MIN: 10 INJECTION, EMULSION INTRAVENOUS at 23:41

## 2019-03-08 RX ADMIN — FUROSEMIDE 20 MG: 10 INJECTION, SOLUTION INTRAMUSCULAR; INTRAVENOUS at 23:39

## 2019-03-08 RX ADMIN — ACETAMINOPHEN 650 MG: 160 SUSPENSION ORAL at 15:25

## 2019-03-08 RX ADMIN — NOREPINEPHRINE BITARTRATE 6 MCG/MIN: 1 INJECTION INTRAVENOUS at 00:28

## 2019-03-08 RX ADMIN — DORNASE ALFA 5 MG: 1 SOLUTION RESPIRATORY (INHALATION) at 12:48

## 2019-03-08 RX ADMIN — POTASSIUM CHLORIDE 40 MEQ: 20 SOLUTION ORAL at 07:13

## 2019-03-08 RX ADMIN — Medication 100 MG: at 08:00

## 2019-03-08 RX ADMIN — MIDODRINE HYDROCHLORIDE 10 MG: 5 TABLET ORAL at 16:26

## 2019-03-08 RX ADMIN — NOREPINEPHRINE BITARTRATE 4 MCG/MIN: 1 INJECTION INTRAVENOUS at 23:46

## 2019-03-08 RX ADMIN — MIDODRINE HYDROCHLORIDE 10 MG: 5 TABLET ORAL at 07:13

## 2019-03-08 RX ADMIN — FUROSEMIDE 20 MG: 10 INJECTION, SOLUTION INTRAMUSCULAR; INTRAVENOUS at 16:13

## 2019-03-08 RX ADMIN — CEFEPIME HYDROCHLORIDE 2000 MG: 2 INJECTION, POWDER, FOR SOLUTION INTRAVENOUS at 23:24

## 2019-03-08 RX ADMIN — PROPOFOL 35 MCG/KG/MIN: 10 INJECTION, EMULSION INTRAVENOUS at 03:44

## 2019-03-08 RX ADMIN — METRONIDAZOLE 500 MG: 500 INJECTION, SOLUTION INTRAVENOUS at 23:24

## 2019-03-08 RX ADMIN — CEFEPIME HYDROCHLORIDE 2000 MG: 2 INJECTION, POWDER, FOR SOLUTION INTRAVENOUS at 11:30

## 2019-03-08 RX ADMIN — HEPARIN SODIUM 5000 UNITS: 5000 INJECTION INTRAVENOUS; SUBCUTANEOUS at 21:35

## 2019-03-08 NOTE — PROGRESS NOTES
[facility]  Progress Note - Olman Romano 53 y o  male MRN: [id number]    Unit/Bed#: ICU 06 Encounter: [id number]    Assessment/Plan:  78-year-old male with past medical history of cirrhosis and alcohol abuse is admitted with severe sepsis and liver abscess     1  Severe sepsis and septic shock secondary to liver abscess  2  Right pleural effusion/empyema  3  Chronic appendicitis  4  History of alcohol abuse  5  Cirrhosis  6  Ascites              -status post percutaneous drainage of the liver abscess by IR and chest tube placement for empyema that continues to drain  -he had reevaluation by IR on 3/6, 2nd drain was considered but was unable to be placed, repeat CT in 1 week  -he remains intubated, he is awake and follows commands  -WBC improved, tmax 99 4              -His abdominal exam appears improved, less distended than previously  -infectious Disease is following for antibiotic management              -he will need long-term outpatient follow-up for his cirrhosis, he has evidence of portal hypertension on CT    -alcohol cessation advised     7  Anemia              -his hemoglobin is low but stable overall              -stool was heme-negative              -iron panel was more consistent with chronic disease              -per nursing, he has not had any overt GI bleeding               -continue to monitor hemoglobin and transfuse as necessary, continue to monitor bowel movements for color and consistency              -He will need outpatient endoscopic evaluation  Subjective:   He is alert; he remains intubated  Objective:     Vitals: Blood pressure 119/65, pulse 78, temperature 99 4 °F (37 4 °C), temperature source Temporal, resp  rate (!) 44, height 5' 8" (1 727 m), weight 102 kg (224 lb 13 9 oz), SpO2 91 %  ,Body mass index is 34 19 kg/m²        Intake/Output Summary (Last 24 hours) at 3/8/2019 1047  Last data filed at 3/8/2019 1001  Gross per 24 hour   Intake 3113 95 ml   Output 3935 ml   Net -821 05 ml       Review of Systems: as per HPI  Review of Systems   Unable to perform ROS: Intubated       Physical Exam:     Physical Exam   Constitutional: No distress  HENT:   Head: Normocephalic and atraumatic  Eyes: Right eye exhibits no discharge  Left eye exhibits no discharge  No scleral icterus  Neck: Neck supple  No tracheal deviation present  Cardiovascular: Normal rate, regular rhythm, normal heart sounds and intact distal pulses  Exam reveals no gallop and no friction rub  No murmur heard  Pulmonary/Chest: No respiratory distress  He has no wheezes  He has no rales  He exhibits no tenderness  On Vent   Abdominal: Soft  Bowel sounds are normal  He exhibits no distension and no mass  There is tenderness (mild, incisional)  There is no rebound and no guarding  Drains in place, serosanguinous fluid in bulb   Neurological: He is alert  Follows commands   Skin: Skin is warm and dry  Psychiatric: He has a normal mood and affect           Invasive Devices     Central Venous Catheter Line            CVC Central Lines 03/01/19 Triple 16cm 7 days          Arterial Line            Arterial Line 02/28/19 Radial 7 days          Drain            Chest Tube Right 7 days    NG/OG/Enteral Tube Orogastric 16 Fr Center mouth 7 days    Suprapubic Catheter 7 days    Chest Tube 2 Right Other (Comment) 8 5 Fr  1 day    Closed/Suction Drain Midline Abdomen Bulb 10 2 Fr  1 day          Airway            ETT  Hi-Lo 8 mm 7 days                        CBC: No results found for: WBC, HGB, HCT, MCV, PLT, ADJUSTEDWBC, MCH, MCHC, RDW, MPV, NRBC,   CMP:   Lab Results   Component Value Date    K 3 2 (L) 03/08/2019     03/08/2019    CO2 28 03/08/2019    BUN 19 03/08/2019    CREATININE 0 63 03/08/2019    CALCIUM 7 3 (L) 03/08/2019    EGFR 117 03/08/2019

## 2019-03-08 NOTE — PROGRESS NOTES
Progress Note - Infectious Disease   Tiki Leigh 53 y o  male MRN: [id number]  Unit/Bed#: ICU 06 Encounter: [id number]      IMPRESSION & RECOMMENDATIONS:   1  Severe sepsis/shock-fever, leukocytosis, lactic acidosis   Likely secondary to liver abscess and right-sided empyema   Admission and repeat blood cultures were all negative  Vasopressor requirement continues to slowly decrease  Patient remains afebrile  Overall, continues to clinically improve from an infectious standpoint   -antibiotic plan as below  -monitor CBC with diff and creatinine  -wean off pressors as able  -monitor temperatures and hemodynamics  -supportive care     2  Liver abscess-suspect a portal source of infection with the possible appendicitis   No perforation seen on initial CT the abdomen and pelvis   No clear biliary source   Now status post IR drain placement   Cultures all remain negative   Repeat CT abdomen shows significantly decreased size of abscess   Drain was reassessed by IR on 03/06 and is in proper position   -continue IV cefepime and Flagyl   -repeat CT in 1 week per IR recommendations to reassess collection  -monitor drain output  -GI follow-up     3  Right-sided empyema   Likely secondary to liver abscess   Now status post chest tube placement  As repeat CT chest showed right loculated effusion, IR placed a 2nd chest tube on 03/06  Culture so far is negative  Receiving tPA/dornase into 2nd chest tube  -antibiotic plan as above  -chest tube management per critical care service  -repeat CT next week as per IR recommendations to reassess empyema  -monitor chest tube output  -follow-up repeat pleural fluid culture until finalized     4  Acute kidney injury-suspect multifactorial including pre renal issues and possibly sepsis   ATN may be playing a role   Creatinine has improved and remains normal   Urine output remains good  -monitor creatinine clearance closely  -volume management     5   Alcohol abuse-with probable cirrhosis, portal hypertension based upon the CT findings   -continue abstinence  -GI follow-up     6  Probable chronic appendicitis   This may be the etiology for development of liver abscess/empyema   Recent surgery evaluation noted   Patient will ultimately require appendectomy   -surgery follow-up     7  Urethral trauma status post suprapubic catheter  8  Acute hypoxic respiratory failure  In the setting of sepsis, right sided empyema  Unable to wean from vent  Now receiving diuresis  -volume management per critical care service   -wean off vent as able     Discussed with critical care service, and with patient's family at bedside  Subjective:  Patient currently off sedation and awake  Remains on ventilator  Remains on Levophed at 6 an hour and vasopressin drip  Was unable to wean from ventilator this morning  No fevers  Objective:  Vitals:  Temp:  [98 9 °F (37 2 °C)-99 4 °F (37 4 °C)] 99 4 °F (37 4 °C)  HR:  [54-92] 72  Resp:  [8-64] 32  BP: (119)/(65) 119/65  SpO2:  [88 %-97 %] 95 %  Temp (24hrs), Av 2 °F (37 3 °C), Min:98 9 °F (37 2 °C), Max:99 4 °F (37 4 °C)  Current: Temperature: 99 4 °F (37 4 °C)    Physical Exam:   General:  Awake on ventilator  Eyes:  Conjunctive clear with no hemorrhages or effusions  Oropharynx:  ET tube in place  Neck:  Supple, no lymphadenopathy  Lungs:  Ventilated breath sounds  Cardiac:  Regular rate and rhythm, no murmurs   Chest tube x2 in place  Abdomen:  Soft, non-tender, non-distented, OLIVIA drain in place  Suprapubic catheter  Extremities:  No peripheral cyanosis, clubbing, or edema  Skin:  No rashes, no ulcers  Neurological:  Moves all four extremities spontaneously    Lab Results:  I have personally reviewed pertinent labs    Results from last 7 days   Lab Units 19  0442 19  1721 19  0430 19  0438  19  0428  19  0438   POTASSIUM mmol/L 3 2* 3 6 2 8* 3 2*   < > 2 5*   < > 3 2*   CHLORIDE mmol/L 107  --  106 107  -- 105   < > 103   CO2 mmol/L 28  --  28 26  --  25   < > 22   BUN mg/dL 19  --  19 15  --  15   < > 19   CREATININE mg/dL 0 63  --  0 70 0 58*  --  0 73   < > 1 06   EGFR ml/min/1 73sq m 117  --  112 121  --  110   < > 83   CALCIUM mg/dL 7 3*  --  7 2* 7 4*  --  7 2*   < > 7 2*   AST U/L  --   --   --   --   --  27  --  39   ALT U/L  --   --   --   --   --  11*  --  17   ALK PHOS U/L  --   --   --   --   --  94  --  103    < > = values in this interval not displayed  Results from last 7 days   Lab Units 03/06/19  0438 03/04/19  0428 03/02/19  0420   WBC Thousand/uL 12 74* 14 03* 13 29*   HEMOGLOBIN g/dL 8 6* 8 9* 9 4*   PLATELETS Thousands/uL 107* 115* 121*     Results from last 7 days   Lab Units 03/06/19  1519   GRAM STAIN RESULT  1+ Polys  No bacteria seen   BODY FLUID CULTURE, STERILE  No growth       Imaging Studies:   I have personally reviewed pertinent imaging study reports and images in PACS  Repeat chest x-ray from 03/07 shows increased right basilar consolidation and effusion with associated atelectasis  Persistent left basilar consolidation  EKG, Pathology, and Other Studies:   I have personally reviewed pertinent reports

## 2019-03-08 NOTE — PROGRESS NOTES
Progress Note - Clark 53 y o  male MRN: [id number]  Unit/Bed#: ICU 06 Encounter: [id number]    Assessment/Plan:  1  Acute hypoxic respiratory failure  · Failed wean yesterday secondary to tachypnea  Continue mechanical ventilation with daily weaning trials  If unable to extubate over the weekend will have to consider tracheostomy next week  · Continue nebulized bronchodilators  2  Severe sepsis with shock secondary to liver abscess and R sided empyema  · ID is following  All cultures negative to date  Continue cefepime and flagyl per their recommendations  · Still requiring low dose vasopressors to maintain blood pressure  Wean as tolerated to maintain MAP>65  3  Liver abscess s/p IR drainage  · Antibiotics as above  Plan to repeat CT next week to assess fluid collection  Monitor OLIVIA drainage  4  R sided loculated hydropneumothorax  · Antibiotics as above  Total chest tube output was approximately 370mL overnight  overnight  5  Possible subacute vs chronic appendicitis  · Pt was evaluated by surgery  F/u with surgery more stable for possible appendectomy  6  Ureteral trauma s/p suprapubic catheter  · Follow-up with Urology as an outpatient after discharge for repeat cystoscopy and voiding evaluation  7  Alcoholic cirrhosis  · Continue daily thiamine and folic acid  No indication of withdrawal     _____________________________________________________________________    HPI/24hr events:   Afebrile  No acute events overnight      Medications:    Current Facility-Administered Medications:  acetaminophen 650 mg Oral Q6H PRN Dimondalese Rik PRICE PA-C    cefepime 2,000 mg Intravenous Q12H Erich Dunn MD Last Rate: 2,000 mg (03/07/19 3583)   chlorhexidine 15 mL Swish & Spit Q12H [address] LINDSAY Martinez    fentanyl citrate (PF) 50 mcg Intravenous Q2H PRN LINDSAY Jimenez    folic acid 1 mg Oral Daily Ace MarianneLINDSAY    furosemide 20 mg Intravenous Q8H LINDSAY Laughlin heparin (porcine) 5,000 Units Subcutaneous [facility] Nicko PRICE PA-C    metroNIDAZOLE 500 mg Intravenous Q8H Alyssa Wang MD Last Rate: 500 mg (03/08/19 0006)   midodrine 10 mg Oral TID AC LINDSAY Chappell    norepinephrine 1-30 mcg/min Intravenous Titrated Maxime Warren MD Last Rate: 6 133 mcg/min (03/08/19 0601)   omeprazole (PRILOSEC) suspension 2 mg/mL 20 mg Oral Daily SIVA ChappellNP    polyethylene glycol 17 g Oral Daily PRN Royal Manueling, CRNP    potassium chloride 40 mEq Oral Once Patel Failing Spatzer, CRNP    potassium chloride 20 mEq Intravenous Once Patel Failing Spatzer, CRNP    propofol 5-50 mcg/kg/min Intravenous Titrated Otilia Fleischer, CRNP Last Rate: 35 mcg/kg/min (03/08/19 0601)   thiamine 100 mg Oral Daily Royal Ashton, CRNP    vasopressin (PITRESSIN) in 0 9 % sodium chloride 100 mL 0 04 Units/min Intravenous Continuous Otilia Fleischer, CRNP Last Rate: 0 04 Units/min (03/08/19 0601)         norepinephrine 1-30 mcg/min Last Rate: 6 133 mcg/min (03/08/19 0601)   propofol 5-50 mcg/kg/min Last Rate: 35 mcg/kg/min (03/08/19 0601)   vasopressin (PITRESSIN) in 0 9 % sodium chloride 100 mL 0 04 Units/min Last Rate: 0 04 Units/min (03/08/19 0601)         Physical exam:  Vitals: Body mass index is 34 19 kg/m²  Blood pressure 119/65, pulse 56, temperature 98 9 °F (37 2 °C), temperature source Temporal, resp  rate (!) 25, height 5' 8" (1 727 m), weight 102 kg (224 lb 13 9 oz), SpO2 96 %  ,  Temp  Min: 97 °F (36 1 °C)  Max: 102 4 °F (39 1 °C)  IBW: 68 4 kg    SpO2: 96 %  SpO2 Activity: At Rest  O2 Device: Other (comment)(vent)      Intake/Output Summary (Last 24 hours) at 3/8/2019 0649  Last data filed at 3/8/2019 0601  Gross per 24 hour   Intake 3359 46 ml   Output 3525 ml   Net -165 54 ml       Invasive/non-invasive ventilation settings:   Respiratory    Lab Data (Last 4 hours)    None         O2/Vent Data (Last 4 hours)    None              Invasive Devices     Central Venous Catheter Line CVC Central Lines 03/01/19 Triple 16cm 7 days          Arterial Line            Arterial Line 02/28/19 Radial 7 days          Drain            Chest Tube Right 7 days    NG/OG/Enteral Tube Orogastric 16 Fr Center mouth 7 days    Suprapubic Catheter 7 days    Chest Tube 2 Right Other (Comment) 8 5 Fr  1 day    Closed/Suction Drain Midline Abdomen Bulb 10 2 Fr  1 day          Airway            ETT  Hi-Lo 8 mm 7 days                  Physical Exam:  Gen: Intubated, sedated  HEENT: Atraumatic, normocephalic EOMI, pupils 4mm equal and reactive, o/p intubated  Neck: supple, trachea midline, no JVD no lymphadenopathy  Chest: clear to auscultation, chest tube in place  Cor: Single S1/S2, no m/r/g, RRR  Abd: soft, distended, nontender, BS normoactive  Ext: 2+BLE edema, no clubbing or cyanosis  Neuro: follows commands, SINGH, no focal deficits  Skin: warm, dry      Diagnostic Data:  Lab: I have personally reviewed pertinent lab results  CBC:   Results from last 7 days   Lab Units 03/06/19  0438 03/04/19  0428 03/02/19  0420   WBC Thousand/uL 12 74* 14 03* 13 29*   HEMOGLOBIN g/dL 8 6* 8 9* 9 4*   HEMATOCRIT % 26 9* 27 2* 28 6*   PLATELETS Thousands/uL 107* 115* 121*       CMP:   Results from last 7 days   Lab Units 03/08/19  0442 03/07/19  1721 03/07/19  0430 03/06/19  0438  03/04/19  0428  03/02/19  0438   SODIUM mmol/L 141  --  140 141  --  139   < > 135*   POTASSIUM mmol/L 3 2* 3 6 2 8* 3 2*   < > 2 5*   < > 3 2*   CHLORIDE mmol/L 107  --  106 107  --  105   < > 103   CO2 mmol/L 28  --  28 26  --  25   < > 22   BUN mg/dL 19  --  19 15  --  15   < > 19   CREATININE mg/dL 0 63  --  0 70 0 58*  --  0 73   < > 1 06   CALCIUM mg/dL 7 3*  --  7 2* 7 4*  --  7 2*   < > 7 2*   ALK PHOS U/L  --   --   --   --   --  94  --  103   ALT U/L  --   --   --   --   --  11*  --  17   AST U/L  --   --   --   --   --  27  --  39    < > = values in this interval not displayed       PT/INR:   No results found for: PT, INR,   Magnesium: Results from last 7 days   Lab Units 03/08/19  0442 03/07/19  0430 03/06/19  0438   MAGNESIUM mg/dL 1 6 1 8 1 6     Phosphorous:       Microbiology:  Results from last 7 days   Lab Units 03/06/19  1519   GRAM STAIN RESULT  1+ Polys  No bacteria seen   BODY FLUID CULTURE, STERILE  No growth       Imaging:  No new imaging    Cardiac lab/EKG/telemetry/ECHO:   NSR on telemtry    VTE Prophylaxis: Heparin, SCDs    Code Status: Level 1 - Full Code    Ruthe Pouch Spatzer, CRNP    Portions of the record may have been created with voice recognition software  Occasional wrong word or "sound a like" substitutions may have occurred due to the inherent limitations of voice recognition software  Read the chart carefully and recognize, using context, where substitutions have occurred

## 2019-03-08 NOTE — PLAN OF CARE
Problem: Nutrition/Hydration-ADULT  Goal: Nutrient/Hydration intake appropriate for improving, restoring or maintaining nutritional needs  Description  Monitor and assess patient's nutrition/hydration status for malnutrition (ex- brittle hair, bruises, dry skin, pale skin and conjunctiva, muscle wasting, smooth red tongue, and disorientation)  Collaborate with interdisciplinary team and initiate plan and interventions as ordered  Monitor patient's weight and dietary intake as ordered or per policy  Utilize nutrition screening tool and intervene per policy  Determine patient's food preferences and provide high-protein, high-caloric foods as appropriate       INTERVENTIONS:  - Monitor oral intake, urinary output, labs, and treatment plans  - Assess nutrition and hydration status and recommend course of action  - Evaluate amount of meals eaten  - Assist patient with eating if necessary   - Allow adequate time for meals  - Recommend/ encourage appropriate diets, oral nutritional supplements, and vitamin/mineral supplements  - Order, calculate, and assess calorie counts as needed  - Recommend, monitor, and adjust tube feedings and TPN/PPN based on assessed needs  - Assess need for intravenous fluids  - Provide specific nutrition/hydration education as appropriate  - Include patient/family/caregiver in decisions related to nutrition  Outcome: Progressing     Problem: PAIN - ADULT  Goal: Verbalizes/displays adequate comfort level or baseline comfort level  Description  Interventions:  - Encourage patient to monitor pain and request assistance  - Assess pain using appropriate pain scale  - Administer analgesics based on type and severity of pain and evaluate response  - Implement non-pharmacological measures as appropriate and evaluate response  - Consider cultural and social influences on pain and pain management  - Notify physician/advanced practitioner if interventions unsuccessful or patient reports new pain  Outcome: Progressing     Problem: INFECTION - ADULT  Goal: Absence or prevention of progression during hospitalization  Description  INTERVENTIONS:  - Assess and monitor for signs and symptoms of infection  - Monitor lab/diagnostic results  - Monitor all insertion sites, i e  indwelling lines, tubes, and drains  - Monitor endotracheal (as able) and nasal secretions for changes in amount and color  - [place] appropriate cooling/warming therapies per order  - Administer medications as ordered  - Instruct and encourage patient and family to use good hand hygiene technique  - Identify and instruct in appropriate isolation precautions for identified infection/condition  Outcome: Progressing     Problem: SAFETY ADULT  Goal: Patient will remain free of falls  Description  INTERVENTIONS:  - Assess patient frequently for physical needs  -  Identify cognitive and physical deficits and behaviors that affect risk of falls    -  [place] fall precautions as indicated by assessment   - Educate patient/family on patient safety including physical limitations  - Instruct patient to call for assistance with activity based on assessment  - Modify environment to reduce risk of injury  - Consider OT/PT consult to assist with strengthening/mobility  Outcome: Progressing  Goal: Maintain or return to baseline ADL function  Description  INTERVENTIONS:  -  Assess patient's ability to carry out ADLs; assess patient's baseline for ADL function and identify physical deficits which impact ability to perform ADLs (bathing, care of mouth/teeth, toileting, grooming, dressing, etc )  - Assess/evaluate cause of self-care deficits   - Assess range of motion  - Assess patient's mobility; develop plan if impaired  - Assess patient's need for assistive devices and provide as appropriate  - Encourage maximum independence but intervene and supervise when necessary  ¯ Involve family in performance of ADLs  ¯ Assess for home care needs following discharge ¯ Request OT consult to assist with ADL evaluation and planning for discharge  ¯ Provide patient education as appropriate  Outcome: Progressing  Goal: Maintain or return mobility status to optimal level  Description  INTERVENTIONS:  - Assess patient's baseline mobility status (ambulation, transfers, stairs, etc )    - Identify cognitive and physical deficits and behaviors that affect mobility  - Identify mobility aids required to assist with transfers and/or ambulation (gait belt, sit-to-stand, lift, walker, cane, etc )  - [place] fall precautions as indicated by assessment  - Record patient progress and toleration of activity level on Mobility SBAR; progress patient to next Phase/Stage  - Instruct patient to call for assistance with activity based on assessment  - Request Rehabilitation consult to assist with strengthening/weightbearing, etc   Outcome: Progressing     Problem: DISCHARGE PLANNING  Goal: Discharge to home or other facility with appropriate resources  Description  INTERVENTIONS:  - Identify barriers to discharge w/patient and caregiver  - Arrange for needed discharge resources and transportation as appropriate  - Identify discharge learning needs (meds, wound care, etc )  - Arrange for interpretive services to assist at discharge as needed  - Refer to Case Management Department for coordinating discharge planning if the patient needs post-hospital services based on physician/advanced practitioner order or complex needs related to functional status, cognitive ability, or social support system  Outcome: Progressing     Problem: Knowledge Deficit  Goal: Patient/family/caregiver demonstrates understanding of disease process, treatment plan, medications, and discharge instructions  Description  Complete learning assessment and assess knowledge base    Interventions:  - Provide teaching at level of understanding  - Provide teaching via preferred learning methods  Outcome: Progressing     Problem: Prexisting or High Potential for Compromised Skin Integrity  Goal: Skin integrity is maintained or improved  Description  INTERVENTIONS:  - Identify patients at risk for skin breakdown  - Assess and monitor skin integrity  - Assess and monitor nutrition and hydration status  - Monitor labs (i e  albumin)  - Assess for incontinence   - Turn and reposition patient  - Assist with mobility/ambulation  - Relieve pressure over bony prominences  - Avoid friction and shearing  - Provide appropriate hygiene as needed including keeping skin clean and dry  - Evaluate need for skin moisturizer/barrier cream  - Collaborate with interdisciplinary team (i e  Nutrition, Rehabilitation, etc )   - Patient/family teaching  Outcome: Progressing     Problem: DISCHARGE PLANNING - CARE MANAGEMENT  Goal: Discharge to post-acute care or home with appropriate resources  Description  INTERVENTIONS:  - Conduct assessment to determine patient/family and health care team treatment goals, and need for post-acute services based on payer coverage, community resources, and patient preferences, and barriers to discharge  - Address psychosocial, clinical, and financial barriers to discharge as identified in assessment in conjunction with the patient/family and health care team  - Arrange appropriate level of post-acute services according to patient's   needs and preference and payer coverage in collaboration with the physician and health care team  - Communicate with and update the patient/family, physician, and health care team regarding progress on the discharge plan  - Arrange appropriate transportation to post-acute venues   Outcome: Progressing     Problem: Potential for Falls  Goal: Patient will remain free of falls  Description  INTERVENTIONS:  - Assess patient frequently for physical needs  -  Identify cognitive and physical deficits and behaviors that affect risk of falls    -  [place] fall precautions as indicated by assessment   - Educate patient/family on patient safety including physical limitations  - Instruct patient to call for assistance with activity based on assessment  - Modify environment to reduce risk of injury  - Consider OT/PT consult to assist with strengthening/mobility  Outcome: Progressing     Problem: CARDIOVASCULAR - ADULT  Goal: Maintains optimal cardiac output and hemodynamic stability  Description  INTERVENTIONS:  - Monitor I/O, vital signs and rhythm  - Monitor for S/S and trends of decreased cardiac output i e  bleeding, hypotension  - Administer and titrate ordered vasoactive medications to optimize hemodynamic stability  - Assess quality of pulses, skin color and temperature  - Assess for signs of decreased coronary artery perfusion - ex   Angina  - Instruct patient to report change in severity of symptoms  Outcome: Progressing     Problem: RESPIRATORY - ADULT  Goal: Achieves optimal ventilation and oxygenation  Description  INTERVENTIONS:  - Assess for changes in respiratory status  - Assess for changes in mentation and behavior  - Position to facilitate oxygenation and minimize respiratory effort  - Oxygen administration by appropriate delivery method based on oxygen saturation (per order) or ABGs  - Initiate smoking cessation education as indicated  - Encourage broncho-pulmonary hygiene including cough, deep breathe, Incentive Spirometry  - Assess the need for suctioning and aspirate as needed  - Assess and instruct to report SOB or any respiratory difficulty  - Respiratory Therapy support as indicated  Outcome: Progressing     Problem: GENITOURINARY - ADULT  Goal: Maintains or returns to baseline urinary function  Description  INTERVENTIONS:  - Assess urinary function  - Encourage oral fluids to ensure adequate hydration  - Administer IV fluids as ordered to ensure adequate hydration  - Administer ordered medications as needed  - Offer frequent toileting  - Follow urinary retention protocol if ordered  Outcome: Progressing  Goal: Urinary catheter remains patent  Description  INTERVENTIONS:  - Assess patency of urinary catheter  - If patient has a chronic rendon, consider changing catheter if non-functioning  - Follow guidelines for intermittent irrigation of non-functioning urinary catheter  Outcome: Progressing     Problem: METABOLIC, FLUID AND ELECTROLYTES - ADULT  Goal: Electrolytes maintained within normal limits  Description  INTERVENTIONS:  - Monitor labs and assess patient for signs and symptoms of electrolyte imbalances  - Administer electrolyte replacement as ordered  - Monitor response to electrolyte replacements, including repeat lab results as appropriate  - Instruct patient on fluid and nutrition as appropriate  Outcome: Progressing     Problem: SKIN/TISSUE INTEGRITY - ADULT  Goal: Skin integrity remains intact  Description  INTERVENTIONS  - Identify patients at risk for skin breakdown  - Assess and monitor skin integrity  - Assess and monitor nutrition and hydration status  - Monitor labs (i e  albumin)  - Assess for incontinence   - Turn and reposition patient  - Assist with mobility/ambulation  - Relieve pressure over bony prominences  - Avoid friction and shearing  - Provide appropriate hygiene as needed including keeping skin clean and dry  - Evaluate need for skin moisturizer/barrier cream  - Collaborate with interdisciplinary team (i e  Nutrition, Rehabilitation, etc )   - Patient/family teaching  Outcome: Progressing  Goal: Incision(s), wounds(s) or drain site(s) healing without S/S of infection  Description  INTERVENTIONS  - Assess and document risk factors for skin impairment   - Assess and document dressing, incision, wound bed, drain sites and surrounding tissue  - Initiate Nutrition services consult and/or wound management as needed  Outcome: Progressing  Goal: Oral mucous membranes remain intact  Description  INTERVENTIONS  - Assess oral mucosa and hygiene practices  - Implement preventative oral hygiene regimen  - Implement oral medicated treatments as ordered  - Initiate Nutrition services referral as needed  Outcome: Progressing

## 2019-03-09 ENCOUNTER — APPOINTMENT (INPATIENT)
Dept: RADIOLOGY | Facility: HOSPITAL | Age: 49
DRG: 441 | End: 2019-03-09
Payer: COMMERCIAL

## 2019-03-09 LAB
ANION GAP SERPL CALCULATED.3IONS-SCNC: 6 MMOL/L (ref 4–13)
BACTERIA SPEC BFLD CULT: NO GROWTH
BUN SERPL-MCNC: 19 MG/DL (ref 5–25)
CALCIUM SERPL-MCNC: 7.3 MG/DL (ref 8.3–10.1)
CHLORIDE SERPL-SCNC: 107 MMOL/L (ref 100–108)
CO2 SERPL-SCNC: 29 MMOL/L (ref 21–32)
CREAT SERPL-MCNC: 0.63 MG/DL (ref 0.6–1.3)
ERYTHROCYTE [DISTWIDTH] IN BLOOD BY AUTOMATED COUNT: 19.6 % (ref 11.6–15.1)
GFR SERPL CREATININE-BSD FRML MDRD: 117 ML/MIN/1.73SQ M
GLUCOSE SERPL-MCNC: 103 MG/DL (ref 65–140)
GRAM STN SPEC: NORMAL
GRAM STN SPEC: NORMAL
HCT VFR BLD AUTO: 25.6 % (ref 36.5–49.3)
HGB BLD-MCNC: 8.1 G/DL (ref 12–17)
MAGNESIUM SERPL-MCNC: 1.6 MG/DL (ref 1.6–2.6)
MCH RBC QN AUTO: 31 PG (ref 26.8–34.3)
MCHC RBC AUTO-ENTMCNC: 31.6 G/DL (ref 31.4–37.4)
MCV RBC AUTO: 98 FL (ref 82–98)
PLATELET # BLD AUTO: 121 THOUSANDS/UL (ref 149–390)
PMV BLD AUTO: 9.9 FL (ref 8.9–12.7)
POTASSIUM SERPL-SCNC: 3.2 MMOL/L (ref 3.5–5.3)
PROCALCITONIN SERPL-MCNC: 0.71 NG/ML
RBC # BLD AUTO: 2.61 MILLION/UL (ref 3.88–5.62)
SODIUM SERPL-SCNC: 142 MMOL/L (ref 136–145)
WBC # BLD AUTO: 9.89 THOUSAND/UL (ref 4.31–10.16)

## 2019-03-09 PROCEDURE — 84145 PROCALCITONIN (PCT): CPT | Performed by: INTERNAL MEDICINE

## 2019-03-09 PROCEDURE — 83735 ASSAY OF MAGNESIUM: CPT | Performed by: NURSE PRACTITIONER

## 2019-03-09 PROCEDURE — 85027 COMPLETE CBC AUTOMATED: CPT | Performed by: NURSE PRACTITIONER

## 2019-03-09 PROCEDURE — 94003 VENT MGMT INPAT SUBQ DAY: CPT

## 2019-03-09 PROCEDURE — 99233 SBSQ HOSP IP/OBS HIGH 50: CPT | Performed by: INTERNAL MEDICINE

## 2019-03-09 PROCEDURE — 99291 CRITICAL CARE FIRST HOUR: CPT | Performed by: INTERNAL MEDICINE

## 2019-03-09 PROCEDURE — 80048 BASIC METABOLIC PNL TOTAL CA: CPT | Performed by: NURSE PRACTITIONER

## 2019-03-09 PROCEDURE — 71045 X-RAY EXAM CHEST 1 VIEW: CPT

## 2019-03-09 RX ORDER — POTASSIUM CHLORIDE 20MEQ/15ML
40 LIQUID (ML) ORAL ONCE
Status: COMPLETED | OUTPATIENT
Start: 2019-03-09 | End: 2019-03-09

## 2019-03-09 RX ORDER — POTASSIUM CHLORIDE 14.9 MG/ML
20 INJECTION INTRAVENOUS ONCE
Status: COMPLETED | OUTPATIENT
Start: 2019-03-09 | End: 2019-03-09

## 2019-03-09 RX ORDER — ALBUMIN (HUMAN) 12.5 G/50ML
25 SOLUTION INTRAVENOUS 2 TIMES DAILY
Status: DISCONTINUED | OUTPATIENT
Start: 2019-03-09 | End: 2019-03-10

## 2019-03-09 RX ADMIN — METRONIDAZOLE 500 MG: 500 INJECTION, SOLUTION INTRAVENOUS at 08:41

## 2019-03-09 RX ADMIN — MIDODRINE HYDROCHLORIDE 10 MG: 5 TABLET ORAL at 15:42

## 2019-03-09 RX ADMIN — POTASSIUM CHLORIDE 40 MEQ: 20 SOLUTION ORAL at 08:40

## 2019-03-09 RX ADMIN — CHLORHEXIDINE GLUCONATE 0.12% ORAL RINSE 15 ML: 1.2 LIQUID ORAL at 21:24

## 2019-03-09 RX ADMIN — MIDODRINE HYDROCHLORIDE 10 MG: 5 TABLET ORAL at 06:43

## 2019-03-09 RX ADMIN — POTASSIUM CHLORIDE 20 MEQ: 200 INJECTION, SOLUTION INTRAVENOUS at 08:40

## 2019-03-09 RX ADMIN — AMPICILLIN SODIUM AND SULBACTAM SODIUM 3 G: 10; 5 INJECTION, POWDER, FOR SOLUTION INTRAVENOUS at 18:07

## 2019-03-09 RX ADMIN — AMPICILLIN SODIUM AND SULBACTAM SODIUM 3 G: 10; 5 INJECTION, POWDER, FOR SOLUTION INTRAVENOUS at 12:10

## 2019-03-09 RX ADMIN — FUROSEMIDE 20 MG: 10 INJECTION, SOLUTION INTRAMUSCULAR; INTRAVENOUS at 16:47

## 2019-03-09 RX ADMIN — HEPARIN SODIUM 5000 UNITS: 5000 INJECTION INTRAVENOUS; SUBCUTANEOUS at 15:41

## 2019-03-09 RX ADMIN — CHLORHEXIDINE GLUCONATE 0.12% ORAL RINSE 15 ML: 1.2 LIQUID ORAL at 08:40

## 2019-03-09 RX ADMIN — HEPARIN SODIUM 5000 UNITS: 5000 INJECTION INTRAVENOUS; SUBCUTANEOUS at 21:25

## 2019-03-09 RX ADMIN — Medication 100 MG: at 08:40

## 2019-03-09 RX ADMIN — VASOPRESSIN 0.04 UNITS/MIN: 20 INJECTION INTRAVENOUS at 03:45

## 2019-03-09 RX ADMIN — PROPOFOL 25 MCG/KG/MIN: 10 INJECTION, EMULSION INTRAVENOUS at 03:48

## 2019-03-09 RX ADMIN — PROPOFOL 35 MCG/KG/MIN: 10 INJECTION, EMULSION INTRAVENOUS at 23:32

## 2019-03-09 RX ADMIN — FOLIC ACID 1 MG: 1 TABLET ORAL at 08:40

## 2019-03-09 RX ADMIN — NOREPINEPHRINE BITARTRATE 5 MCG/MIN: 1 INJECTION INTRAVENOUS at 08:31

## 2019-03-09 RX ADMIN — HEPARIN SODIUM 5000 UNITS: 5000 INJECTION INTRAVENOUS; SUBCUTANEOUS at 06:43

## 2019-03-09 RX ADMIN — ALBUMIN (HUMAN) 25 G: 0.25 INJECTION, SOLUTION INTRAVENOUS at 11:17

## 2019-03-09 RX ADMIN — AMPICILLIN SODIUM AND SULBACTAM SODIUM 3 G: 10; 5 INJECTION, POWDER, FOR SOLUTION INTRAVENOUS at 23:29

## 2019-03-09 RX ADMIN — PROPOFOL 20 MCG/KG/MIN: 10 INJECTION, EMULSION INTRAVENOUS at 14:31

## 2019-03-09 RX ADMIN — MIDODRINE HYDROCHLORIDE 10 MG: 5 TABLET ORAL at 12:10

## 2019-03-09 RX ADMIN — Medication 20 MG: at 08:45

## 2019-03-09 RX ADMIN — FUROSEMIDE 20 MG: 10 INJECTION, SOLUTION INTRAMUSCULAR; INTRAVENOUS at 23:29

## 2019-03-09 RX ADMIN — FUROSEMIDE 20 MG: 10 INJECTION, SOLUTION INTRAMUSCULAR; INTRAVENOUS at 08:40

## 2019-03-09 RX ADMIN — PROPOFOL 25 MCG/KG/MIN: 10 INJECTION, EMULSION INTRAVENOUS at 18:36

## 2019-03-09 RX ADMIN — ALBUMIN (HUMAN) 25 G: 0.25 INJECTION, SOLUTION INTRAVENOUS at 17:29

## 2019-03-09 NOTE — PROGRESS NOTES
Progress Note - Clark 53 y o  male MRN: [id number]  Unit/Bed#: ICU 06 Encounter: [id number]    Assessment/Plan:  1  Acute hypoxic respiratory failure secondary to right-sided hydropneumothorax and sepsis  · Very slow progress  Failed ventilator weaning yesterday with RSBI in the 130s  Continue ventilatory support with daily weaning trials  If he continues to fail have to consider tracheostomy next week  · Patient is stent diuresing well book remains significantly fluid positive for the hospital stay  Continue Lasix 20 mg b i d  With goal for net -500 to 1 L daily fluid balance  2  Severe sepsis with shock secondary to liver abscess and right-sided empyema  · Infectious Disease is following  Continue cefepime and Flagyl per their recommendations  · Still on low-dose vasopressors  Continue to wean Levophed and vasopressin as tolerated to maintain map greater than 65  · Remains afebrile  WBC count is improving  3  Liver abscess status post IR drainage  · Minimal drainage through the OLIVIA  Plan for repeat CT scan next week to reassess abscess  4  Right-sided loculated hydropneumothorax versus empyema  · Chest tubes continue to drain, >600mL overnight total   Monitor output  Repeat CT scan next week to evaluate fluid collection  5  Possible subacute versus chronic appendicitis  · Patient was evaluated by surgery  Follow up when more stable for possible appendectomy  6  Urethral trauma status post suprapubic catheter  · Follow-up with Urology as an outpatient after discharge for repeat cystoscopy and voiding evaluation  7  Alcoholic cirrhosis  · GI following  Recommend outpatient EGD for varices  · Continue daily thiamine and folic acid  No signs of withdrawal    _____________________________________________________________________    HPI/24hr events:   Afebrile  Failed ventilator weaning yesterday  No acute events overnight      Medications:    Current Facility-Administered Medications:  acetaminophen 650 mg Oral Q6H PRN Joseline PRICE PA-C    cefepime 2,000 mg Intravenous Q12H Silver Miles MD Last Rate: Stopped (03/08/19 2354)   chlorhexidine 15 mL Swish [employer] Spit Q12H [address] LINDSAY Martinez    fentanyl citrate (PF) 50 mcg Intravenous Q2H PRN LINDSAY Ontiveros    folic acid 1 mg Oral Daily LINDSAY Vo    furosemide 20 mg Intravenous Q8H LINDSAY Martinez    heparin (porcine) 5,000 Units Subcutaneous [facility] PRICE PA-C    metroNIDAZOLE 500 mg Intravenous Q8H Silver Miles MD Last Rate: Stopped (03/09/19 0024)   midodrine 10 mg Oral TID AC LINDSAY Chappell    norepinephrine 1-30 mcg/min Intravenous Titrated Lindsay Le MD Last Rate: 4 mcg/min (03/08/19 2346)   omeprazole (PRILOSEC) suspension 2 mg/mL 20 mg Oral Daily LINDSAY Chappell    polyethylene glycol 17 g Oral Daily PRN LINDSAY Vo    propofol 5-50 mcg/kg/min Intravenous Titrated LINDSAY Ontiveros Last Rate: 25 mcg/kg/min (03/09/19 0030)   thiamine 100 mg Oral Daily LINDSAY Vo    vasopressin (PITRESSIN) in 0 9 % sodium chloride 100 mL 0 04 Units/min Intravenous Continuous LINDSAY Ontiveros Last Rate: 0 04 Units/min (03/08/19 1756)         norepinephrine 1-30 mcg/min Last Rate: 4 mcg/min (03/08/19 2346)   propofol 5-50 mcg/kg/min Last Rate: 25 mcg/kg/min (03/09/19 0030)   vasopressin (PITRESSIN) in 0 9 % sodium chloride 100 mL 0 04 Units/min Last Rate: 0 04 Units/min (03/08/19 1756)         Physical exam:  Vitals: Body mass index is 34 19 kg/m²  Blood pressure 119/65, pulse 68, temperature 98 1 °F (36 7 °C), temperature source Temporal, resp   rate (!) 28, height 5' 8" (1 727 m), weight 102 kg (224 lb 13 9 oz), SpO2 91 % ,  Temp  Min: 97 °F (36 1 °C)  Max: 102 4 °F (39 1 °C)  IBW: 68 4 kg    SpO2: 91 %  SpO2 Activity: At Rest  O2 Device: Other (comment)(vent)      Intake/Output Summary (Last 24 hours) at 3/9/2019 7732  Last data filed at 3/9/2019 0045  Gross per 24 hour   Intake 3271 3 ml   Output 4325 ml   Net -1053 7 ml       Invasive/non-invasive ventilation settings:   Respiratory    Lab Data (Last 4 hours)    None         O2/Vent Data (Last 4 hours)      03/08 2324 03/09 0210         Vent Mode AC/VC AC/VC      Resp Rate (BPM) (BPM) 16 16      Vt (mL) (mL) 360 360      FIO2 (%) (%) 50 50      PEEP (cmH2O) (cmH2O) 5 5      MV 13 9 2                Invasive Devices     Central Venous Catheter Line            CVC Central Lines 03/01/19 Triple 16cm 8 days          Arterial Line            Arterial Line 02/28/19 Radial 8 days          Drain            Chest Tube Right 8 days    NG/OG/Enteral Tube Orogastric 16 Fr Center mouth 8 days    Suprapubic Catheter 7 days    Chest Tube 2 Right Other (Comment) 8 5 Fr  2 days    Closed/Suction Drain Midline Abdomen Bulb 10 2 Fr  2 days          Airway            ETT  Hi-Lo 8 mm 8 days                  Physical Exam:  Gen:  Intubated, sedated  HEENT:  Atraumatic, normocephalic, extraocular movements intact, pupils 3 mm equal and reactive, oropharynx intubated  Neck:  Supple, trachea midline, no JVD, no lymphadenopathy  Chest: diminished, clear anteriorly  Cor:  Single S1/S2, no murmurs, rubs, regular rate and rhythm  Abd: soft, distended, no guarding, BS hyperactive  Ext: trace LE edema, no clubbing or cyanosis  Neuro:  Alerts on the ventilator, follows commands, no focal deficits  Skin:  Warm, dry      Diagnostic Data:  Lab: I have personally reviewed pertinent lab results     CBC:   Results from last 7 days   Lab Units 03/06/19 0438 03/04/19 0428 03/02/19  0420   WBC Thousand/uL 12 74* 14 03* 13 29*   HEMOGLOBIN g/dL 8 6* 8 9* 9 4*   HEMATOCRIT % 26 9* 27 2* 28 6*   PLATELETS Thousands/uL 107* 115* 121*       CMP:   Results from last 7 days   Lab Units 03/08/19  0442 03/07/19  1721 03/07/19  0430 03/06/19  0438  03/04/19  0428  03/02/19  0438   SODIUM mmol/L 141  --  140 141  --  139   < > 135*   POTASSIUM mmol/L 3 2* 3 6 2 8* 3 2* < > 2 5*   < > 3 2*   CHLORIDE mmol/L 107  --  106 107  --  105   < > 103   CO2 mmol/L 28  --  28 26  --  25   < > 22   BUN mg/dL 19  --  19 15  --  15   < > 19   CREATININE mg/dL 0 63  --  0 70 0 58*  --  0 73   < > 1 06   CALCIUM mg/dL 7 3*  --  7 2* 7 4*  --  7 2*   < > 7 2*   ALK PHOS U/L  --   --   --   --   --  94  --  103   ALT U/L  --   --   --   --   --  11*  --  17   AST U/L  --   --   --   --   --  27  --  39    < > = values in this interval not displayed  PT/INR:   No results found for: PT, INR,   Magnesium:   Results from last 7 days   Lab Units 03/08/19  0442 03/07/19  0430 03/06/19  0438   MAGNESIUM mg/dL 1 6 1 8 1 6     Phosphorous:       Microbiology:  Results from last 7 days   Lab Units 03/06/19  1519   GRAM STAIN RESULT  1+ Polys  No bacteria seen   BODY FLUID CULTURE, STERILE  No growth       Imaging:  No new imaging    Cardiac lab/EKG/telemetry/ECHO:   NSR on telemetry    VTE Prophylaxis: Heparin, SCDs    Code Status: Level 1 - Full Code    Areta Locks Spatzer, CRNP    Portions of the record may have been created with voice recognition software  Occasional wrong word or "sound a like" substitutions may have occurred due to the inherent limitations of voice recognition software  Read the chart carefully and recognize, using context, where substitutions have occurred

## 2019-03-09 NOTE — PROGRESS NOTES
Progress Note - Infectious Disease   Phylicia Antunez 53 y o  male MRN: [id number]  Unit/Bed#: ICU 06 Encounter: [id number]      Impression/Plan:  1  Severe sepsis/shock-fever, leukocytosis, lactic acidosis   Likely secondary to liver abscess and right-sided empyema   Admission and repeat blood cultures were all negative   Vasopressor requirement continues to slowly decrease  Suspect that the ongoing vasopressor requirements reflect other issues such as 3rd spacing   Patient remains afebrile   Overall, continues to clinically improve from an infectious standpoint   -antibiotic plan as below  -monitor CBC with diff and creatinine  -wean off pressors as able  -monitor temperatures and hemodynamics  -recheck procalcitonin level now and tomorrow a m  To make sure adequate source control; the patient's initial procalcitonin level is greater than 50  -supportive care     2  Liver abscess-suspect a portal source of infection with the possible appendicitis   No perforation seen on initial CT the abdomen and pelvis   No clear biliary source   Now status post IR drain placement   Cultures all remain negative   Repeat CT abdomen shows significantly decreased size of abscess   Drain was reassessed by IR on 03/06 and is in proper position  He no resistant organisms have been isolated, and I suspect this reflects difficult to grow organisms after the patient had already received some antibiotics such as anaerobes and streptococcal species  -trial discontinue cefepime  -Unasyn 3 g IV q 6 hours  -repeat CT in 1 week per IR recommendations to reassess collection  -monitor drain output  -GI follow-up     3  Right-sided empyema   Likely secondary to liver abscess   Now status post chest tube placement  As repeat CT chest showed right loculated effusion, IR placed a 2nd chest tube on 03/06  Culture so far is negative  Receiving tPA/dornase into 2nd chest tube    Repeat pleural cultures negative  -antibiotic plan as above  -chest tube management per critical care service  -repeat CT next week as per IR recommendations to reassess empyema  -monitor chest tube output     4  Acute kidney injury-suspect multifactorial including pre renal issues and possibly sepsis   ATN may be playing a role   Creatinine has improved and remains normal   Urine output remains good  -monitor creatinine clearance closely  -volume management     5  Alcohol abuse-with probable cirrhosis, portal hypertension based upon the CT findings   -continue abstinence  -GI follow-up     6  Probable chronic appendicitis   This may be the etiology for development of liver abscess/empyema   Recent surgery evaluation noted   Patient will ultimately require appendectomy   -surgery follow-up     7  Urethral trauma status post suprapubic catheter      8  Acute hypoxic respiratory failure  In the setting of sepsis, right sided empyema  Unable to wean from vent  Now receiving diuresis  -volume management per critical care service              -wean off vent as able     Discussed with critical care service    Antibiotics:  Antibiotics 10  Cefepime 10  Flagyl 10    Subjective:  Patient has no fever, chills, sweats; no reported nausea, vomiting, diarrhea; he continues to require ventilatory support  He also continues to require some vasopressor support although it is slowly decreasing  Objective:  Vitals:  Temp:  [97 4 °F (36 3 °C)-99 5 °F (37 5 °C)] 99 3 °F (37 4 °C)  HR:  [52-72] 72  Resp:  [0-32] 31  SpO2:  [91 %-99 %] 92 %  Temp (24hrs), Av 5 °F (36 9 °C), Min:97 4 °F (36 3 °C), Max:99 5 °F (37 5 °C)  Current: Temperature: 99 3 °F (37 4 °C)    Physical Exam:   General Appearance:  Alert, interactive, nontoxic, no acute distress  Resting on the ventilator   Throat: Oropharynx moist without lesions  Endotracheal tube in place   Lungs:   Clear to auscultation bilaterally; no wheezes, rhonchi or rales; respirations unlabored    Right-sided chest tubes in place   Heart: RRR; no murmur, rub or gallop   Abdomen:   Soft, non-tender, non-distended, positive bowel sounds  Abdominal OLIVIA drain in place  Extremities: No clubbing, cyanosis or edema   Skin: No new rashes or lesions  No draining wounds noted  Labs, Imaging, & Other studies:   All pertinent labs and imaging studies were personally reviewed  Results from last 7 days   Lab Units 03/09/19 0447 03/06/19  0438 03/04/19  0428   WBC Thousand/uL 9 89 12 74* 14 03*   HEMOGLOBIN g/dL 8 1* 8 6* 8 9*   PLATELETS Thousands/uL 121* 107* 115*     Results from last 7 days   Lab Units 03/09/19 0447 03/08/19 0442 03/07/19  0430  03/04/19  0428   SODIUM mmol/L 142 141  --  140   < > 139   POTASSIUM mmol/L 3 2* 3 2*   < > 2 8*   < > 2 5*   CHLORIDE mmol/L 107 107  --  106   < > 105   CO2 mmol/L 29 28  --  28   < > 25   BUN mg/dL 19 19  --  19   < > 15   CREATININE mg/dL 0 63 0 63  --  0 70   < > 0 73   EGFR ml/min/1 73sq m 117 117  --  112   < > 110   CALCIUM mg/dL 7 3* 7 3*  --  7 2*   < > 7 2*   AST U/L  --   --   --   --   --  27   ALT U/L  --   --   --   --   --  11*   ALK PHOS U/L  --   --   --   --   --  94    < > = values in this interval not displayed       Results from last 7 days   Lab Units 03/06/19  1519   GRAM STAIN RESULT  1+ Polys  No bacteria seen   BODY FLUID CULTURE, STERILE  No growth

## 2019-03-10 ENCOUNTER — APPOINTMENT (INPATIENT)
Dept: RADIOLOGY | Facility: HOSPITAL | Age: 49
DRG: 441 | End: 2019-03-10
Payer: COMMERCIAL

## 2019-03-10 PROBLEM — K75.0 LIVER ABSCESS: Status: ACTIVE | Noted: 2019-02-26

## 2019-03-10 LAB
ANION GAP SERPL CALCULATED.3IONS-SCNC: 6 MMOL/L (ref 4–13)
BUN SERPL-MCNC: 18 MG/DL (ref 5–25)
CALCIUM SERPL-MCNC: 7.5 MG/DL (ref 8.3–10.1)
CHLORIDE SERPL-SCNC: 108 MMOL/L (ref 100–108)
CO2 SERPL-SCNC: 29 MMOL/L (ref 21–32)
CREAT SERPL-MCNC: 0.74 MG/DL (ref 0.6–1.3)
GFR SERPL CREATININE-BSD FRML MDRD: 110 ML/MIN/1.73SQ M
GLUCOSE SERPL-MCNC: 103 MG/DL (ref 65–140)
INR PPP: 1.38 (ref 0.86–1.17)
POTASSIUM SERPL-SCNC: 3 MMOL/L (ref 3.5–5.3)
PROCALCITONIN SERPL-MCNC: 0.75 NG/ML
PROTHROMBIN TIME: 17.1 SECONDS (ref 11.8–14.2)
SODIUM SERPL-SCNC: 143 MMOL/L (ref 136–145)

## 2019-03-10 PROCEDURE — 99233 SBSQ HOSP IP/OBS HIGH 50: CPT | Performed by: INTERNAL MEDICINE

## 2019-03-10 PROCEDURE — 80048 BASIC METABOLIC PNL TOTAL CA: CPT | Performed by: NURSE PRACTITIONER

## 2019-03-10 PROCEDURE — 86256 FLUORESCENT ANTIBODY TITER: CPT | Performed by: PHYSICIAN ASSISTANT

## 2019-03-10 PROCEDURE — 82533 TOTAL CORTISOL: CPT | Performed by: NURSE PRACTITIONER

## 2019-03-10 PROCEDURE — 99291 CRITICAL CARE FIRST HOUR: CPT | Performed by: INTERNAL MEDICINE

## 2019-03-10 PROCEDURE — 84145 PROCALCITONIN (PCT): CPT | Performed by: INTERNAL MEDICINE

## 2019-03-10 PROCEDURE — 85610 PROTHROMBIN TIME: CPT | Performed by: NURSE PRACTITIONER

## 2019-03-10 PROCEDURE — 71045 X-RAY EXAM CHEST 1 VIEW: CPT

## 2019-03-10 PROCEDURE — 94003 VENT MGMT INPAT SUBQ DAY: CPT

## 2019-03-10 PROCEDURE — 87040 BLOOD CULTURE FOR BACTERIA: CPT | Performed by: INTERNAL MEDICINE

## 2019-03-10 RX ORDER — POTASSIUM CHLORIDE 14.9 MG/ML
20 INJECTION INTRAVENOUS
Status: COMPLETED | OUTPATIENT
Start: 2019-03-10 | End: 2019-03-10

## 2019-03-10 RX ORDER — POTASSIUM CHLORIDE 20MEQ/15ML
40 LIQUID (ML) ORAL ONCE
Status: COMPLETED | OUTPATIENT
Start: 2019-03-10 | End: 2019-03-10

## 2019-03-10 RX ORDER — ALBUMIN (HUMAN) 12.5 G/50ML
50 SOLUTION INTRAVENOUS 3 TIMES DAILY
Status: DISCONTINUED | OUTPATIENT
Start: 2019-03-11 | End: 2019-03-12

## 2019-03-10 RX ADMIN — FOLIC ACID 1 MG: 1 TABLET ORAL at 09:39

## 2019-03-10 RX ADMIN — FENTANYL CITRATE 50 MCG: 50 INJECTION, SOLUTION INTRAMUSCULAR; INTRAVENOUS at 23:32

## 2019-03-10 RX ADMIN — MIDODRINE HYDROCHLORIDE 10 MG: 5 TABLET ORAL at 06:28

## 2019-03-10 RX ADMIN — FUROSEMIDE 20 MG: 10 INJECTION, SOLUTION INTRAMUSCULAR; INTRAVENOUS at 23:32

## 2019-03-10 RX ADMIN — HEPARIN SODIUM 5000 UNITS: 5000 INJECTION INTRAVENOUS; SUBCUTANEOUS at 21:11

## 2019-03-10 RX ADMIN — HEPARIN SODIUM 5000 UNITS: 5000 INJECTION INTRAVENOUS; SUBCUTANEOUS at 14:41

## 2019-03-10 RX ADMIN — PROPOFOL 40 MCG/KG/MIN: 10 INJECTION, EMULSION INTRAVENOUS at 04:59

## 2019-03-10 RX ADMIN — AMPICILLIN SODIUM AND SULBACTAM SODIUM 3 G: 10; 5 INJECTION, POWDER, FOR SOLUTION INTRAVENOUS at 18:05

## 2019-03-10 RX ADMIN — POTASSIUM CHLORIDE 20 MEQ: 200 INJECTION, SOLUTION INTRAVENOUS at 06:27

## 2019-03-10 RX ADMIN — POTASSIUM CHLORIDE 40 MEQ: 20 SOLUTION ORAL at 06:27

## 2019-03-10 RX ADMIN — PROPOFOL 35 MCG/KG/MIN: 10 INJECTION, EMULSION INTRAVENOUS at 13:20

## 2019-03-10 RX ADMIN — FUROSEMIDE 20 MG: 10 INJECTION, SOLUTION INTRAMUSCULAR; INTRAVENOUS at 09:38

## 2019-03-10 RX ADMIN — Medication 20 MG: at 10:19

## 2019-03-10 RX ADMIN — ALBUMIN (HUMAN) 25 G: 0.25 INJECTION, SOLUTION INTRAVENOUS at 09:43

## 2019-03-10 RX ADMIN — PROPOFOL 25 MCG/KG/MIN: 10 INJECTION, EMULSION INTRAVENOUS at 23:30

## 2019-03-10 RX ADMIN — POTASSIUM CHLORIDE 20 MEQ: 200 INJECTION, SOLUTION INTRAVENOUS at 07:43

## 2019-03-10 RX ADMIN — PROPOFOL 35 MCG/KG/MIN: 10 INJECTION, EMULSION INTRAVENOUS at 10:19

## 2019-03-10 RX ADMIN — CHLORHEXIDINE GLUCONATE 0.12% ORAL RINSE 15 ML: 1.2 LIQUID ORAL at 09:38

## 2019-03-10 RX ADMIN — POTASSIUM CHLORIDE 20 MEQ: 200 INJECTION, SOLUTION INTRAVENOUS at 09:38

## 2019-03-10 RX ADMIN — AMPICILLIN SODIUM AND SULBACTAM SODIUM 3 G: 10; 5 INJECTION, POWDER, FOR SOLUTION INTRAVENOUS at 05:19

## 2019-03-10 RX ADMIN — Medication 100 MG: at 09:39

## 2019-03-10 RX ADMIN — NOREPINEPHRINE BITARTRATE 2 MCG/MIN: 1 INJECTION INTRAVENOUS at 05:00

## 2019-03-10 RX ADMIN — PROPOFOL 30 MCG/KG/MIN: 10 INJECTION, EMULSION INTRAVENOUS at 18:05

## 2019-03-10 RX ADMIN — CHLORHEXIDINE GLUCONATE 0.12% ORAL RINSE 15 ML: 1.2 LIQUID ORAL at 20:30

## 2019-03-10 RX ADMIN — MIDODRINE HYDROCHLORIDE 10 MG: 5 TABLET ORAL at 11:38

## 2019-03-10 RX ADMIN — AMPICILLIN SODIUM AND SULBACTAM SODIUM 3 G: 10; 5 INJECTION, POWDER, FOR SOLUTION INTRAVENOUS at 11:37

## 2019-03-10 RX ADMIN — MIDODRINE HYDROCHLORIDE 10 MG: 5 TABLET ORAL at 16:24

## 2019-03-10 RX ADMIN — FUROSEMIDE 20 MG: 10 INJECTION, SOLUTION INTRAMUSCULAR; INTRAVENOUS at 16:24

## 2019-03-10 RX ADMIN — ALBUMIN (HUMAN) 25 G: 0.25 INJECTION, SOLUTION INTRAVENOUS at 18:05

## 2019-03-10 RX ADMIN — HEPARIN SODIUM 5000 UNITS: 5000 INJECTION INTRAVENOUS; SUBCUTANEOUS at 05:06

## 2019-03-10 RX ADMIN — NOREPINEPHRINE BITARTRATE 4 MCG/MIN: 1 INJECTION INTRAVENOUS at 23:30

## 2019-03-10 NOTE — CONSULTS
Consultation - ENT   [facility] 53 y o  male MRN: [id number]  Unit/Bed#: ICU 06 Encounter: [id number]      Assessment/Plan     Assessment:  Ventilator dependent respiratory failure  Plan: For tracheostomy placement later this week  Consent has been signed and charted and all risks (bleeding, infection, need for further surgery, pneumothorax) and benefits have been discussed with the patients brother at the bedside  At this point I am unsure if he will be completed on Tuesday or Wednesday and if either myself or Dr Gordon Del Valle will be completing the surgery  History of Present Illness   Physician Requesting Consult: Juan Alberto Cassidy MD  Reason for Consult / Principal Problem:  Ventilator dependent respiratory failure  HPI: [facility] is a 53y o  year old male who presented to the hospital with failure to thrive as a direct admission from Dr Radha Mccracken res office  Upon admission he was found to have significant problems including cirrhosis, acute appendicitis, and a liver abscess  He has since had surgical procedures and Interventional Radiology has been draining liver abscess  He has been intubated now since shortly after he arrived at the hospital and is now in need of a tracheostomy tube  Coags to be obtained prior to surgery - this was discussed with house staff and they will be ordering this today  Consults    Review of Systems   Reason unable to perform ROS: Patient currently intubated and unresponsive  Historical Information   No past medical history on file    Past Surgical History:   Procedure Laterality Date    IR ABSCESS/SEROMA DRAINAGE  2/28/2019    IR CHEST TUBE  3/6/2019     Social History   Social History     Substance and Sexual Activity   Alcohol Use Yes     Social History     Substance and Sexual Activity   Drug Use Not on file     Social History     Tobacco Use   Smoking Status Former Smoker   Smokeless Tobacco Never Used     Family History: No family history on file     Meds/Allergies   current meds:   Current Facility-Administered Medications   Medication Dose Route Frequency    acetaminophen (TYLENOL) oral suspension 650 mg  650 mg Oral Q6H PRN    albumin human (FLEXBUMIN) 25 % injection 25 g  25 g Intravenous BID    ampicillin-sulbactam (UNASYN) 3 g in sodium chloride 0 9 % 100 mL IVPB  3 g Intravenous Q6H    chlorhexidine (PERIDEX) 0 12 % oral rinse 15 mL  15 mL Swish & Spit Q12H [facility]    fentanyl citrate (PF) 100 MCG/2ML 50 mcg  50 mcg Intravenous V0E PRN    folic acid (FOLVITE) tablet 1 mg  1 mg Oral Daily    furosemide (LASIX) injection 20 mg  20 mg Intravenous Q8H    heparin (porcine) subcutaneous injection 5,000 Units  5,000 Units Subcutaneous Q8H [facility]    midodrine (PROAMATINE) tablet 10 mg  10 mg Oral TID AC    norepinephrine (LEVOPHED) 4 mg (STANDARD CONCENTRATION) IV in sodium chloride 0 9% 250 mL  1-30 mcg/min Intravenous Titrated    omeprazole (PRILOSEC) suspension 2 mg/mL  20 mg Oral Daily    polyethylene glycol (MIRALAX) packet 17 g  17 g Oral Daily PRN    propofol (DIPRIVAN) 1000 mg in 100 mL infusion (premix)  5-50 mcg/kg/min Intravenous Titrated    thiamine (VITAMIN B1) tablet 100 mg  100 mg Oral Daily    and PTA meds:   Prior to Admission Medications   Prescriptions Last Dose Informant Patient Reported?  Taking?   omeprazole (PriLOSEC) 40 MG capsule   No No   Sig: Take 1 tablet twice daily for 1 week then one tablet daily      Facility-Administered Medications: None       No Known Allergies    Objective       Intake/Output Summary (Last 24 hours) at 3/10/2019 1421  Last data filed at 3/10/2019 1254  Gross per 24 hour   Intake 2511 27 ml   Output 2765 ml   Net -253 73 ml       Invasive Devices     Central Venous Catheter Line            CVC Central Lines 03/01/19 Triple 16cm 9 days          Arterial Line            Arterial Line 02/28/19 Radial 9 days          Drain            Chest Tube Right 9 days    NG/OG/Enteral Tube Orogastric 16 Fr Center mouth 9 days    Suprapubic Catheter 9 days    Chest Tube 2 Right Other (Comment) 8 5 Fr  3 days    Closed/Suction Drain Midline Abdomen Bulb 10 2 Fr  3 days          Airway            ETT  Hi-Lo 8 mm 9 days                Physical Exam   Constitutional: He appears well-developed and well-nourished  No distress  Currently on a ventilator   HENT:   Head: Normocephalic and atraumatic  Ears:  No effusions or retractions  No otorrhea    Nasal:  No intranasal masses  No mucopus or crusts in the nose  No polyps noted  Oral:  Difficult to evaluate completely due to endotracheal and orogastric tube in place  Neck:  There is some edema but the trachea appears to be midline without any significant thyromegaly  No crepitus  Skin: He is not diaphoretic  Lab Results:   CBC: No results found for: WBC, HGB, HCT, MCV, PLT, ADJUSTEDWBC, MCH, MCHC, RDW, MPV, NRBC, CMP:   Lab Results   Component Value Date    SODIUM 143 03/10/2019    K 3 0 (L) 03/10/2019     03/10/2019    CO2 29 03/10/2019    BUN 18 03/10/2019    CREATININE 0 74 03/10/2019    CALCIUM 7 5 (L) 03/10/2019    EGFR 110 03/10/2019   , Coags: No results found for: PT, PTT, INR  Imaging Studies: I have personally reviewed pertinent reports      EKG, Pathology, and Other Studies: I have personally reviewed pertinent films in PACS    Code Status: Level 1 - Full Code  Advance Directive and Living Will:      Power of :    POLST:      None

## 2019-03-10 NOTE — PLAN OF CARE
Problem: Nutrition/Hydration-ADULT  Goal: Nutrient/Hydration intake appropriate for improving, restoring or maintaining nutritional needs  Description  Monitor and assess patient's nutrition/hydration status for malnutrition (ex- brittle hair, bruises, dry skin, pale skin and conjunctiva, muscle wasting, smooth red tongue, and disorientation)  Collaborate with interdisciplinary team and initiate plan and interventions as ordered  Monitor patient's weight and dietary intake as ordered or per policy  Utilize nutrition screening tool and intervene per policy  Determine patient's food preferences and provide high-protein, high-caloric foods as appropriate       INTERVENTIONS:  - Monitor oral intake, urinary output, labs, and treatment plans  - Assess nutrition and hydration status and recommend course of action  - Evaluate amount of meals eaten  - Assist patient with eating if necessary   - Allow adequate time for meals  - Recommend/ encourage appropriate diets, oral nutritional supplements, and vitamin/mineral supplements  - Order, calculate, and assess calorie counts as needed  - Recommend, monitor, and adjust tube feedings and TPN/PPN based on assessed needs  - Assess need for intravenous fluids  - Provide specific nutrition/hydration education as appropriate  - Include patient/family/caregiver in decisions related to nutrition  Outcome: Progressing     Problem: PAIN - ADULT  Goal: Verbalizes/displays adequate comfort level or baseline comfort level  Description  Interventions:  - Encourage patient to monitor pain and request assistance  - Assess pain using appropriate pain scale  - Administer analgesics based on type and severity of pain and evaluate response  - Implement non-pharmacological measures as appropriate and evaluate response  - Consider cultural and social influences on pain and pain management  - Notify physician/advanced practitioner if interventions unsuccessful or patient reports new pain  Outcome: Progressing     Problem: INFECTION - ADULT  Goal: Absence or prevention of progression during hospitalization  Description  INTERVENTIONS:  - Assess and monitor for signs and symptoms of infection  - Monitor lab/diagnostic results  - Monitor all insertion sites, i e  indwelling lines, tubes, and drains  - Monitor endotracheal (as able) and nasal secretions for changes in amount and color  - [place] appropriate cooling/warming therapies per order  - Administer medications as ordered  - Instruct and encourage patient and family to use good hand hygiene technique  - Identify and instruct in appropriate isolation precautions for identified infection/condition  Outcome: Progressing     Problem: SAFETY ADULT  Goal: Maintain or return to baseline ADL function  Description  INTERVENTIONS:  -  Assess patient's ability to carry out ADLs; assess patient's baseline for ADL function and identify physical deficits which impact ability to perform ADLs (bathing, care of mouth/teeth, toileting, grooming, dressing, etc )  - Assess/evaluate cause of self-care deficits   - Assess range of motion  - Assess patient's mobility; develop plan if impaired  - Assess patient's need for assistive devices and provide as appropriate  - Encourage maximum independence but intervene and supervise when necessary  ¯ Involve family in performance of ADLs  ¯ Assess for home care needs following discharge   ¯ Request OT consult to assist with ADL evaluation and planning for discharge  ¯ Provide patient education as appropriate  Outcome: Progressing  Goal: Maintain or return mobility status to optimal level  Description  INTERVENTIONS:  - Assess patient's baseline mobility status (ambulation, transfers, stairs, etc )    - Identify cognitive and physical deficits and behaviors that affect mobility  - Identify mobility aids required to assist with transfers and/or ambulation (gait belt, sit-to-stand, lift, walker, cane, etc )  - [place] fall precautions as indicated by assessment  - Record patient progress and toleration of activity level on Mobility SBAR; progress patient to next Phase/Stage  - Instruct patient to call for assistance with activity based on assessment  - Request Rehabilitation consult to assist with strengthening/weightbearing, etc   Outcome: Progressing     Problem: CARDIOVASCULAR - ADULT  Goal: Maintains optimal cardiac output and hemodynamic stability  Description  INTERVENTIONS:  - Monitor I/O, vital signs and rhythm  - Monitor for S/S and trends of decreased cardiac output i e  bleeding, hypotension  - Administer and titrate ordered vasoactive medications to optimize hemodynamic stability  - Assess quality of pulses, skin color and temperature  - Assess for signs of decreased coronary artery perfusion - ex   Angina  - Instruct patient to report change in severity of symptoms  Outcome: Progressing     Problem: GENITOURINARY - ADULT  Goal: Maintains or returns to baseline urinary function  Description  INTERVENTIONS:  - Assess urinary function  - Encourage oral fluids to ensure adequate hydration  - Administer IV fluids as ordered to ensure adequate hydration  - Administer ordered medications as needed  - Offer frequent toileting  - Follow urinary retention protocol if ordered  Outcome: Progressing  Goal: Urinary catheter remains patent  Description  INTERVENTIONS:  - Assess patency of urinary catheter  - If patient has a chronic rendon, consider changing catheter if non-functioning  - Follow guidelines for intermittent irrigation of non-functioning urinary catheter  Outcome: Progressing     Problem: METABOLIC, FLUID AND ELECTROLYTES - ADULT  Goal: Electrolytes maintained within normal limits  Description  INTERVENTIONS:  - Monitor labs and assess patient for signs and symptoms of electrolyte imbalances  - Administer electrolyte replacement as ordered  - Monitor response to electrolyte replacements, including repeat lab results as appropriate  - Instruct patient on fluid and nutrition as appropriate  Outcome: Progressing     Problem: SKIN/TISSUE INTEGRITY - ADULT  Goal: Skin integrity remains intact  Description  INTERVENTIONS  - Identify patients at risk for skin breakdown  - Assess and monitor skin integrity  - Assess and monitor nutrition and hydration status  - Monitor labs (i e  albumin)  - Assess for incontinence   - Turn and reposition patient  - Assist with mobility/ambulation  - Relieve pressure over bony prominences  - Avoid friction and shearing  - Provide appropriate hygiene as needed including keeping skin clean and dry  - Evaluate need for skin moisturizer/barrier cream  - Collaborate with interdisciplinary team (i e  Nutrition, Rehabilitation, etc )   - Patient/family teaching  Outcome: Progressing  Goal: Incision(s), wounds(s) or drain site(s) healing without S/S of infection  Description  INTERVENTIONS  - Assess and document risk factors for skin impairment   - Assess and document dressing, incision, wound bed, drain sites and surrounding tissue  - Initiate Nutrition services consult and/or wound management as needed  Outcome: Progressing  Goal: Oral mucous membranes remain intact  Description  INTERVENTIONS  - Assess oral mucosa and hygiene practices  - Implement preventative oral hygiene regimen  - Implement oral medicated treatments as ordered  - Initiate Nutrition services referral as needed  Outcome: Progressing     Problem: SAFETY ADULT  Goal: Patient will remain free of falls  Description  INTERVENTIONS:  - Assess patient frequently for physical needs  -  Identify cognitive and physical deficits and behaviors that affect risk of falls    -  [place] fall precautions as indicated by assessment   - Educate patient/family on patient safety including physical limitations  - Instruct patient to call for assistance with activity based on assessment  - Modify environment to reduce risk of injury  - Consider OT/PT consult to assist with strengthening/mobility  Outcome: Not Progressing     Problem: Prexisting or High Potential for Compromised Skin Integrity  Goal: Skin integrity is maintained or improved  Description  INTERVENTIONS:  - Identify patients at risk for skin breakdown  - Assess and monitor skin integrity  - Assess and monitor nutrition and hydration status  - Monitor labs (i e  albumin)  - Assess for incontinence   - Turn and reposition patient  - Assist with mobility/ambulation  - Relieve pressure over bony prominences  - Avoid friction and shearing  - Provide appropriate hygiene as needed including keeping skin clean and dry  - Evaluate need for skin moisturizer/barrier cream  - Collaborate with interdisciplinary team (i e  Nutrition, Rehabilitation, etc )   - Patient/family teaching  Outcome: Not Progressing     Problem: Potential for Falls  Goal: Patient will remain free of falls  Description  INTERVENTIONS:  - Assess patient frequently for physical needs  -  Identify cognitive and physical deficits and behaviors that affect risk of falls    -  [place] fall precautions as indicated by assessment   - Educate patient/family on patient safety including physical limitations  - Instruct patient to call for assistance with activity based on assessment  - Modify environment to reduce risk of injury  - Consider OT/PT consult to assist with strengthening/mobility  Outcome: Not Progressing     Problem: RESPIRATORY - ADULT  Goal: Achieves optimal ventilation and oxygenation  Description  INTERVENTIONS:  - Assess for changes in respiratory status  - Assess for changes in mentation and behavior  - Position to facilitate oxygenation and minimize respiratory effort  - Oxygen administration by appropriate delivery method based on oxygen saturation (per order) or ABGs  - Initiate smoking cessation education as indicated  - Encourage broncho-pulmonary hygiene including cough, deep breathe, Incentive Spirometry  - Assess the need for suctioning and aspirate as needed  - Assess and instruct to report SOB or any respiratory difficulty  - Respiratory Therapy support as indicated  Outcome: Not Progressing

## 2019-03-10 NOTE — PROGRESS NOTES
Progress Note - Infectious Disease   Princess Campos 53 y o  male MRN: [id number]  Unit/Bed#: ICU 06 Encounter: [id number]      Impression/Plan:  1  Severe sepsis/shock-fever, leukocytosis, lactic acidosis   Likely secondary to liver abscess and right-sided empyema   Admission and repeat blood cultures were all negative   Vasopressor requirement continues to slowly decrease  Suspect that the ongoing vasopressor requirements reflect other issues such as 3rd spacing  Missy Gear, continues to clinically improve from an infectious standpoint  He did have a new fever spike this morning  Perhaps this is more related to inadequate overall drainage of these collections  Consideration for the possibility of another source  Procalcitonin level is relatively stable and has markedly decreased since his admission   -antibiotic plan as below  -recheck blood cultures x2 sets  -monitor CBC with diff and creatinine  -wean off pressors as able  -monitor temperatures and hemodynamics  -recheck procalcitonin level  -supportive care     2  Liver abscess-suspect a portal source of infection with the possible appendicitis   No perforation seen on initial CT the abdomen and pelvis   No clear biliary source   Now status post IR drain placement   Cultures all remain negative   Repeat CT abdomen shows significantly decreased size of abscess   Drain was reassessed by IR on 03/06 and is in proper position  He no resistant organisms have been isolated, and I suspect this reflects difficult to grow organisms after the patient had already received some antibiotics such as anaerobes and streptococcal species    -continue Unasyn  -repeat CT the abdomen pelvis on Wednesday  -monitor drain output  -GI follow-up     3  Right-sided empyema  Drenda Snide secondary to liver abscess   Now status post chest tube placement   As repeat CT chest showed right loculated effusion, IR placed a 2nd chest tube on 03/06   Culture  is negative   Receiving tPA/dornase into 2nd chest tube  Repeat pleural cultures negative   -antibiotic plan as above  -chest tube management per critical care service  -repeat CT next week as per IR recommendations to reassess empyema as patient may need VATS  -monitor chest tube output     4  Acute kidney injury-suspect multifactorial including pre renal issues and possibly sepsis   ATN may be playing a role   Creatinine has improved and remains normal   Urine output remains good  -monitor creatinine clearance closely  -volume management     5  Alcohol abuse-with probable cirrhosis, portal hypertension based upon the CT findings   -continue abstinence  -GI follow-up     6  Probable chronic appendicitis   This may be the etiology for development of liver abscess/empyema   Recent surgery evaluation noted   Patient will ultimately require appendectomy   -surgery follow-up  -await repeat CT the abdomen pelvis this week     7  Urethral trauma status post suprapubic catheter      8  Acute hypoxic respiratory failure   In the setting of sepsis, right sided empyema   Unable to wean from vent   Now receiving diuresis              -volume management per critical care service              -WXDS off vent as able  -may need tracheostomy     Discussed with critical care service    Antibiotics:  Unasyn 2  Antibiotics 11    Subjective:  Patient spiked a new fever this morning; no reported vomiting, diarrhea; resting on the ventilator; still requiring low level vasopressor support  Objective:  Vitals:  Temp:  [97 8 °F (36 6 °C)-101 °F (38 3 °C)] 98 3 °F (36 8 °C)  HR:  [62-88] 68  Resp:  [0-36] 6  SpO2:  [89 %-99 %] 95 %  Temp (24hrs), Av 2 °F (37 3 °C), Min:97 8 °F (36 6 °C), Max:101 °F (38 3 °C)  Current: Temperature: 98 3 °F (36 8 °C)    Physical Exam:   General Appearance:  Sedated on the ventilator, nonverbal, does not follow commands   Throat: Oropharynx moist without lesions    Endotracheal tube in place   Lungs:   Decreased breath sounds bilaterally; no wheezes, rhonchi or rales; respirations unlabored   Heart:  RRR; no murmur, rub or gallop   Abdomen:   Soft, non-tender, non-distended, positive bowel sounds  Extremities: No clubbing, cyanosis  1+ lower extremity edema   Skin: No new rashes or lesions  No draining wounds noted  Labs, Imaging, & Other studies:   All pertinent labs and imaging studies were personally reviewed  Results from last 7 days   Lab Units 03/09/19 0447 03/06/19 0438 03/04/19  0428   WBC Thousand/uL 9 89 12 74* 14 03*   HEMOGLOBIN g/dL 8 1* 8 6* 8 9*   PLATELETS Thousands/uL 121* 107* 115*     Results from last 7 days   Lab Units 03/10/19  0447 03/09/19  0447 03/08/19  0442  03/04/19  0428   SODIUM mmol/L 143 142 141   < > 139   POTASSIUM mmol/L 3 0* 3 2* 3 2*   < > 2 5*   CHLORIDE mmol/L 108 107 107   < > 105   CO2 mmol/L 29 29 28   < > 25   BUN mg/dL 18 19 19   < > 15   CREATININE mg/dL 0 74 0 63 0 63   < > 0 73   EGFR ml/min/1 73sq m 110 117 117   < > 110   CALCIUM mg/dL 7 5* 7 3* 7 3*   < > 7 2*   AST U/L  --   --   --   --  27   ALT U/L  --   --   --   --  11*   ALK PHOS U/L  --   --   --   --  94    < > = values in this interval not displayed  Results from last 7 days   Lab Units 03/06/19  1519   GRAM STAIN RESULT  1+ Polys  No bacteria seen   BODY FLUID CULTURE, STERILE  No growth     Chest x-ray-Persistent bilateral basilar opacities and effusions, right greater than left  Pigtail chest drain on the right unchanged in position  No pneumothorax      Images reviewed by me in PACS

## 2019-03-10 NOTE — PROGRESS NOTES
Progress Note - Clark 53 y o  male MRN: [id number]  Unit/Bed#: ICU 06 Encounter: [id number]    Assessment/Plan:  1  Severe sepsis with septic shock likely secondary to liver abscess  · Continue with Unasyn 3g Q6  · ID recommends trial D/C cefepime  · Patient continues on vasoactive medication, wean as tolerated  · Levophed @ 2  · Vasopressin off  · Continue to monitor end points  · Suspect hypoalbuminemia as well as chronic liver disease source of potential persistent hypotension  · Trial scheduled albumin for intravascular resuscitation  · Suspect source control after IR drain placement  · Recheck AM procalcitonin per ID  2  Acute hypoxic respiratory failure seconday to sepsis and pleural effusion   · Chest tube x2 in place currently  · tPA x3 was administered to CT #2  · Both tubes with persistent output  · 1L x 24 hours  · Pt fails weaning trial due to tachypnea and hypoxia  · Consider surgery consult for trach on Monday if unable to wean  3  Liver abscess S/P IR drainage x2  · Uncertain etiology  · Continue on Unasyn  · Abdominal OLIVIA drain with minimal output  · Plan for repeat scan on 3/13/19  4  Right sided empyema  · Likely secondary to #3  · Continue chest tubes to suction until output less than 50-100cc/24 hours  · Continue abx  · If recurrent fluid collection may need to consult thoracic surgery  5  Subacute vs chronic appendicitis  · Consider workup if patient continues to deteriorate despite improving current treatment plans  · Otherwise surgery OP follow up  6  Urethral trauma s/p suprapubic catheter  · Continue to monitor, urology OP follow up for remal  7  Alcoholic cirrhosis  · Evidence of portal hypertension on CT  · Will need chronic GI OP follow up  · Continue thiamine and folate  · No withdrawal at this time      _____________________________________________________________________    HPI/24hr events:   Patient failed weaning trial yesterday morning due to high RSBI   Continues to be on low dose levophed, though much improved from prior requirements  No acute events overnight  Medications:    Current Facility-Administered Medications:  acetaminophen 650 mg Oral Q6H PRN David PRICE PA-C    albumin human 25 g Intravenous BID Rosalba Flores,     ampicillin-sulbactam 3 g Intravenous Q6H Larry John MD Last Rate: 3 g (03/09/19 2329)   chlorhexidine 15 mL Swish & Spit Q12H [facility] LINDSAY Martinez    fentanyl citrate (PF) 50 mcg Intravenous Q2H PRN Zelphia Sam CRNP    folic acid 1 mg Oral Daily Jacquiline Mew, CRNP    furosemide 20 mg Intravenous Q8H LINDSAY Martinez    heparin (porcine) 5,000 Units Subcutaneous [facility] Marcus PRICE PA-C    midodrine 10 mg Oral TID AC Syl Doni, CRNP    norepinephrine 1-30 mcg/min Intravenous Titrated Zelphia Sam, CRNP Last Rate: 2 mcg/min (03/09/19 1834)   omeprazole (PRILOSEC) suspension 2 mg/mL 20 mg Oral Daily Syl Doni, CRNP    polyethylene glycol 17 g Oral Daily PRN Jacquiline Mew, CRNP    propofol 5-50 mcg/kg/min Intravenous Titrated Zelphia Vargas, CRNP Last Rate: 35 mcg/kg/min (03/09/19 2332)   thiamine 100 mg Oral Daily Jacquiline Mew, CRNP          norepinephrine 1-30 mcg/min Last Rate: 2 mcg/min (03/09/19 1834)   propofol 5-50 mcg/kg/min Last Rate: 35 mcg/kg/min (03/09/19 2332)         Physical exam:  Vitals: Body mass index is 34 19 kg/m²  Blood pressure 119/65, pulse 78, temperature 99 7 °F (37 6 °C), resp   rate (!) 25, height 5' 8" (1 727 m), weight 102 kg (224 lb 13 9 oz), SpO2 91 % ,  Temp  Min: 97 °F (36 1 °C)  Max: 102 4 °F (39 1 °C)  IBW: 68 4 kg    SpO2: 91 %  SpO2 Activity: At Rest  O2 Device: Other (comment)(vent)      Intake/Output Summary (Last 24 hours) at 3/10/2019 0439  Last data filed at 3/10/2019 0400  Gross per 24 hour   Intake 1400 1 ml   Output 2635 ml   Net -1234 9 ml       Invasive/non-invasive ventilation settings:   Respiratory    Lab Data (Last 4 hours)    None         O2/Vent Data (Last 4 hours)      03/10 0408           Vent Mode AC/VC       Resp Rate (BPM) (BPM) 16       Vt (mL) (mL) 360       FIO2 (%) (%) 50       PEEP (cmH2O) (cmH2O) 5       MV 9 45                 Invasive Devices     Central Venous Catheter Line            CVC Central Lines 03/01/19 Triple 16cm 9 days          Arterial Line            Arterial Line 02/28/19 Radial 9 days          Drain            Chest Tube Right 9 days    NG/OG/Enteral Tube Orogastric 16 Fr Center mouth 9 days    Suprapubic Catheter 9 days    Chest Tube 2 Right Other (Comment) 8 5 Fr  3 days    Closed/Suction Drain Midline Abdomen Bulb 10 2 Fr  3 days          Airway            ETT  Hi-Lo 8 mm 9 days                  Physical Exam:  Gen: Resting comfortable in room, NAD  HEENT: Atraumatic  ET in place  Responsive to voice  Neck: Neck supple, no LAD  Chest: Lungs clear throughout, no w/r/r  Cor: RRR, 2+ distal pulses  Abd: soft, nondistended, does not grimace to palpation  Ext: No edema ntoed  Neuro: GCS 13T, follows commands  No localizing symptoms  Skin: No skin breakdown      Diagnostic Data:  Lab: I have personally reviewed pertinent lab results     CBC:   Results from last 7 days   Lab Units 03/09/19 0447 03/06/19  0438 03/04/19  0428   WBC Thousand/uL 9 89 12 74* 14 03*   HEMOGLOBIN g/dL 8 1* 8 6* 8 9*   HEMATOCRIT % 25 6* 26 9* 27 2*   PLATELETS Thousands/uL 121* 107* 115*       CMP:   Results from last 7 days   Lab Units 03/09/19  0447 03/08/19  0442 03/07/19  1721 03/07/19  0430  03/04/19  0428   SODIUM mmol/L 142 141  --  140   < > 139   POTASSIUM mmol/L 3 2* 3 2* 3 6 2 8*   < > 2 5*   CHLORIDE mmol/L 107 107  --  106   < > 105   CO2 mmol/L 29 28  --  28   < > 25   BUN mg/dL 19 19  --  19   < > 15   CREATININE mg/dL 0 63 0 63  --  0 70   < > 0 73   CALCIUM mg/dL 7 3* 7 3*  --  7 2*   < > 7 2*   ALK PHOS U/L  --   --   --   --   --  94   ALT U/L  --   --   --   --   --  11*   AST U/L  --   --   --   --   --  27    < > = values in this interval not displayed  PT/INR:   No results found for: PT, INR,   Magnesium:   Results from last 7 days   Lab Units 03/09/19  0447 03/08/19  0442 03/07/19  0430   MAGNESIUM mg/dL 1 6 1 6 1 8     Phosphorous:       Microbiology:  Results from last 7 days   Lab Units 03/06/19  1519   GRAM STAIN RESULT  1+ Polys  No bacteria seen   BODY FLUID CULTURE, STERILE  No growth       Imaging:  3/10: CXR, stable pleural effusion noted  Cardiac lab/EKG/telemetry/ECHO:       VTE Prophylaxis: heparin and SCDS    Code Status: Level 1 - Full Code    Oliver Coombs PA-C    Portions of the record may have been created with voice recognition software  Occasional wrong word or "sound a like" substitutions may have occurred due to the inherent limitations of voice recognition software  Read the chart carefully and recognize, using context, where substitutions have occurred

## 2019-03-11 ENCOUNTER — APPOINTMENT (INPATIENT)
Dept: RADIOLOGY | Facility: HOSPITAL | Age: 49
DRG: 441 | End: 2019-03-11
Payer: COMMERCIAL

## 2019-03-11 ENCOUNTER — ANESTHESIA EVENT (OUTPATIENT)
Dept: PERIOP | Facility: HOSPITAL | Age: 49
End: 2019-03-11

## 2019-03-11 PROBLEM — J96.90 RESPIRATORY FAILURE (HCC): Status: ACTIVE | Noted: 2019-02-25

## 2019-03-11 LAB
ANION GAP SERPL CALCULATED.3IONS-SCNC: 3 MMOL/L (ref 4–13)
BASOPHILS # BLD AUTO: 0.03 THOUSANDS/ΜL (ref 0–0.1)
BASOPHILS NFR BLD AUTO: 0 % (ref 0–1)
BUN SERPL-MCNC: 15 MG/DL (ref 5–25)
CALCIUM SERPL-MCNC: 7.4 MG/DL (ref 8.3–10.1)
CHLORIDE SERPL-SCNC: 110 MMOL/L (ref 100–108)
CO2 SERPL-SCNC: 31 MMOL/L (ref 21–32)
CORTIS SERPL-MCNC: 13.2 UG/DL
CREAT SERPL-MCNC: 0.79 MG/DL (ref 0.6–1.3)
EOSINOPHIL # BLD AUTO: 0.2 THOUSAND/ΜL (ref 0–0.61)
EOSINOPHIL NFR BLD AUTO: 2 % (ref 0–6)
ERYTHROCYTE [DISTWIDTH] IN BLOOD BY AUTOMATED COUNT: 20.1 % (ref 11.6–15.1)
GFR SERPL CREATININE-BSD FRML MDRD: 107 ML/MIN/1.73SQ M
GLUCOSE SERPL-MCNC: 106 MG/DL (ref 65–140)
HCT VFR BLD AUTO: 25 % (ref 36.5–49.3)
HGB BLD-MCNC: 7.8 G/DL (ref 12–17)
IMM GRANULOCYTES # BLD AUTO: 0.13 THOUSAND/UL (ref 0–0.2)
IMM GRANULOCYTES NFR BLD AUTO: 2 % (ref 0–2)
LYMPHOCYTES # BLD AUTO: 0.98 THOUSANDS/ΜL (ref 0.6–4.47)
LYMPHOCYTES NFR BLD AUTO: 12 % (ref 14–44)
MCH RBC QN AUTO: 31.5 PG (ref 26.8–34.3)
MCHC RBC AUTO-ENTMCNC: 31.2 G/DL (ref 31.4–37.4)
MCV RBC AUTO: 101 FL (ref 82–98)
MONOCYTES # BLD AUTO: 0.81 THOUSAND/ΜL (ref 0.17–1.22)
MONOCYTES NFR BLD AUTO: 10 % (ref 4–12)
NEUTROPHILS # BLD AUTO: 6.13 THOUSANDS/ΜL (ref 1.85–7.62)
NEUTS SEG NFR BLD AUTO: 74 % (ref 43–75)
NRBC BLD AUTO-RTO: 0 /100 WBCS
PLATELET # BLD AUTO: 149 THOUSANDS/UL (ref 149–390)
PMV BLD AUTO: 9.8 FL (ref 8.9–12.7)
POTASSIUM SERPL-SCNC: 3.3 MMOL/L (ref 3.5–5.3)
PROCALCITONIN SERPL-MCNC: 0.5 NG/ML
RBC # BLD AUTO: 2.48 MILLION/UL (ref 3.88–5.62)
SODIUM SERPL-SCNC: 144 MMOL/L (ref 136–145)
WBC # BLD AUTO: 8.28 THOUSAND/UL (ref 4.31–10.16)

## 2019-03-11 PROCEDURE — 94003 VENT MGMT INPAT SUBQ DAY: CPT

## 2019-03-11 PROCEDURE — 99233 SBSQ HOSP IP/OBS HIGH 50: CPT | Performed by: PHYSICIAN ASSISTANT

## 2019-03-11 PROCEDURE — 80048 BASIC METABOLIC PNL TOTAL CA: CPT | Performed by: INTERNAL MEDICINE

## 2019-03-11 PROCEDURE — 99233 SBSQ HOSP IP/OBS HIGH 50: CPT | Performed by: INTERNAL MEDICINE

## 2019-03-11 PROCEDURE — 84145 PROCALCITONIN (PCT): CPT | Performed by: INTERNAL MEDICINE

## 2019-03-11 PROCEDURE — 74018 RADEX ABDOMEN 1 VIEW: CPT

## 2019-03-11 PROCEDURE — 85025 COMPLETE CBC W/AUTO DIFF WBC: CPT | Performed by: INTERNAL MEDICINE

## 2019-03-11 RX ORDER — POTASSIUM CHLORIDE 20MEQ/15ML
40 LIQUID (ML) ORAL ONCE
Status: COMPLETED | OUTPATIENT
Start: 2019-03-11 | End: 2019-03-11

## 2019-03-11 RX ORDER — MIDAZOLAM HYDROCHLORIDE 1 MG/ML
0.5 INJECTION INTRAMUSCULAR; INTRAVENOUS ONCE
Status: COMPLETED | OUTPATIENT
Start: 2019-03-11 | End: 2019-03-11

## 2019-03-11 RX ORDER — HYDROMORPHONE HCL/PF 1 MG/ML
0.5 SYRINGE (ML) INJECTION ONCE
Status: COMPLETED | OUTPATIENT
Start: 2019-03-11 | End: 2019-03-11

## 2019-03-11 RX ADMIN — HYDROMORPHONE HYDROCHLORIDE 0.5 MG: 1 INJECTION, SOLUTION INTRAMUSCULAR; INTRAVENOUS; SUBCUTANEOUS at 01:48

## 2019-03-11 RX ADMIN — ALBUMIN (HUMAN) 50 G: 0.25 INJECTION, SOLUTION INTRAVENOUS at 20:25

## 2019-03-11 RX ADMIN — AMPICILLIN SODIUM AND SULBACTAM SODIUM 3 G: 10; 5 INJECTION, POWDER, FOR SOLUTION INTRAVENOUS at 23:52

## 2019-03-11 RX ADMIN — DEXMEDETOMIDINE 0.5 MCG/KG/HR: 100 INJECTION, SOLUTION, CONCENTRATE INTRAVENOUS at 09:30

## 2019-03-11 RX ADMIN — MIDODRINE HYDROCHLORIDE 10 MG: 5 TABLET ORAL at 07:36

## 2019-03-11 RX ADMIN — Medication 100 MG: at 09:31

## 2019-03-11 RX ADMIN — FUROSEMIDE 20 MG: 10 INJECTION, SOLUTION INTRAMUSCULAR; INTRAVENOUS at 17:40

## 2019-03-11 RX ADMIN — IOHEXOL 50 ML: 350 INJECTION, SOLUTION INTRAVENOUS at 01:57

## 2019-03-11 RX ADMIN — HEPARIN SODIUM 5000 UNITS: 5000 INJECTION INTRAVENOUS; SUBCUTANEOUS at 22:07

## 2019-03-11 RX ADMIN — AMPICILLIN SODIUM AND SULBACTAM SODIUM 3 G: 10; 5 INJECTION, POWDER, FOR SOLUTION INTRAVENOUS at 00:07

## 2019-03-11 RX ADMIN — HEPARIN SODIUM 5000 UNITS: 5000 INJECTION INTRAVENOUS; SUBCUTANEOUS at 05:35

## 2019-03-11 RX ADMIN — ALBUMIN (HUMAN) 50 G: 0.25 INJECTION, SOLUTION INTRAVENOUS at 18:14

## 2019-03-11 RX ADMIN — FOLIC ACID 1 MG: 1 TABLET ORAL at 09:31

## 2019-03-11 RX ADMIN — AMPICILLIN SODIUM AND SULBACTAM SODIUM 3 G: 10; 5 INJECTION, POWDER, FOR SOLUTION INTRAVENOUS at 13:15

## 2019-03-11 RX ADMIN — CHLORHEXIDINE GLUCONATE 0.12% ORAL RINSE 15 ML: 1.2 LIQUID ORAL at 20:27

## 2019-03-11 RX ADMIN — AMPICILLIN SODIUM AND SULBACTAM SODIUM 3 G: 10; 5 INJECTION, POWDER, FOR SOLUTION INTRAVENOUS at 18:14

## 2019-03-11 RX ADMIN — POLYETHYLENE GLYCOL (3350) 17 G: 17 POWDER, FOR SOLUTION ORAL at 09:31

## 2019-03-11 RX ADMIN — MIDAZOLAM 0.5 MG: 1 INJECTION INTRAMUSCULAR; INTRAVENOUS at 01:07

## 2019-03-11 RX ADMIN — ALBUMIN (HUMAN) 50 G: 0.25 INJECTION, SOLUTION INTRAVENOUS at 09:42

## 2019-03-11 RX ADMIN — POTASSIUM CHLORIDE 40 MEQ: 20 SOLUTION ORAL at 05:37

## 2019-03-11 RX ADMIN — FUROSEMIDE 20 MG: 10 INJECTION, SOLUTION INTRAMUSCULAR; INTRAVENOUS at 07:47

## 2019-03-11 RX ADMIN — MIDODRINE HYDROCHLORIDE 10 MG: 5 TABLET ORAL at 17:30

## 2019-03-11 RX ADMIN — Medication 20 MG: at 09:31

## 2019-03-11 RX ADMIN — AMPICILLIN SODIUM AND SULBACTAM SODIUM 3 G: 10; 5 INJECTION, POWDER, FOR SOLUTION INTRAVENOUS at 05:35

## 2019-03-11 RX ADMIN — PROPOFOL 15 MCG/KG/MIN: 10 INJECTION, EMULSION INTRAVENOUS at 07:36

## 2019-03-11 RX ADMIN — FUROSEMIDE 20 MG: 10 INJECTION, SOLUTION INTRAMUSCULAR; INTRAVENOUS at 23:52

## 2019-03-11 RX ADMIN — CHLORHEXIDINE GLUCONATE 0.12% ORAL RINSE 15 ML: 1.2 LIQUID ORAL at 09:31

## 2019-03-11 RX ADMIN — HEPARIN SODIUM 5000 UNITS: 5000 INJECTION INTRAVENOUS; SUBCUTANEOUS at 13:52

## 2019-03-11 NOTE — ANESTHESIA PREPROCEDURE EVALUATION
Review of Systems/Medical History  Patient summary reviewed  Chart reviewed      Cardiovascular   Pulmonary  Smoker ex-smoker  ,   Comment: VDRF  Hydropneumothorax   2 chest tubes right  Acute hypoxic resp failure secondary to sepsis     GI/Hepatic    GERD , Liver disease , cirrhosis, Chronic liver disease,   Comment: Liver abcess     Negative  ROS        Endo/Other  Negative endo/other ROS      GYN       Hematology  Anemia ,    Comment: Severe sepsis Musculoskeletal    Comment: anasarca      Neurology      Comment: Prop infusion Psychology       Comment: ETOH abuse         Physical Exam    Airway  Comment: intubated  Mallampati score: I         Dental       Cardiovascular  Rhythm: regular, Rate: normal, Cardiovascular exam normal    Pulmonary  Pulmonary exam normal     Other Findings        Anesthesia Plan  ASA Score- 4     Anesthesia Type- general with ASA Monitors  Additional Monitors:   Airway Plan:     Comment: Consent obtained from brother questions answered consent placed on chart 3/11/19  Plan Factors-Patient not instructed to abstain from smoking on day of procedure  Patient did not smoke on day of surgery  Induction- intravenous  Postoperative Plan- Plan for postoperative opioid use  Informed Consent- Anesthetic plan and risks discussed with sibling

## 2019-03-11 NOTE — PROGRESS NOTES
Patient Name: Malcolm Blanco  Patient MRN: [id number]  Date: 03/11/19  Service: [facility]    Subjective   Girlfriend updated at bedside  Discussed with RN - no gi events noted  No gi bleeding noted  Patient shakes head no when asked about abdominal pain  Temp 37 8C this morning  Vitals  Blood pressure 119/65, pulse 82, temperature 100 °F (37 8 °C), temperature source Temporal, resp  rate (!) 28, height 5' 8" (1 727 m), weight 102 kg (224 lb 13 9 oz), SpO2 95 %  Physical Exam:     General Appearance:    Awake, alert, no distress, well developed, well    nourished   Head:    Normocephalic without obvious abnormality   Eyes:    PERRL, +icterus, EOM's intact        Neck:   Supple, no adenopathy  ETT  OG in place   Throat:   Mucous membranes moist   Lungs:     Clear to auscultation bilaterally, no wheezing or rhonchi   Heart:    Regular rate and rhythm, S1 and S2 normal, no murmur   Abdomen:     Soft, non-tender, non-distended  bowel sounds active  No    masses, rebound or guarding  +midline abdominal drain noted with scant red drainage       Extremities:   Extremities normal  No clubbing, cyanosis or edema   Psych  Derm:   Unable to evaluate    +jaundice   Neurologic:   Awake and alert         Laboratory Studies  Results from last 7 days   Lab Units 03/11/19  0453 03/10/19  1633 03/09/19  0447 03/06/19  0438   WBC Thousand/uL 8 28  --  9 89 12 74*   HEMOGLOBIN g/dL 7 8*  --  8 1* 8 6*   HEMATOCRIT % 25 0*  --  25 6* 26 9*   PLATELETS Thousands/uL 149  --  121* 107*   INR   --  1 38*  --  1 46*     Results from last 7 days   Lab Units 03/11/19  0453 03/10/19  0447 03/09/19  0447 03/08/19  0442 03/07/19  1721 03/07/19  0430   SODIUM mmol/L 144 143 142 141  --  140   POTASSIUM mmol/L 3 3* 3 0* 3 2* 3 2* 3 6 2 8*   CHLORIDE mmol/L 110* 108 107 107  --  106   CO2 mmol/L 31 29 29 28  --  28   BUN mg/dL 15 18 19 19  --  19   CREATININE mg/dL 0 79 0 74 0 63 0 63  --  0 70   CALCIUM mg/dL 7 4* 7 5* 7 3* 7 3*  -- 7 2*         Imaging and Other Studies      Inhouse Medications     Current Facility-Administered Medications:     acetaminophen (TYLENOL) oral suspension 650 mg, 650 mg, Oral, Q6H PRN, 650 mg at 03/08/19 1525    albumin human (FLEXBUMIN) 25 % injection 50 g, 50 g, Intravenous, TID    ampicillin-sulbactam (UNASYN) 3 g in sodium chloride 0 9 % 100 mL IVPB, 3 g, Intravenous, Q6H, 3 g at 03/11/19 0535    chlorhexidine (PERIDEX) 0 12 % oral rinse 15 mL, 15 mL, Swish & Spit, Q12H [address], 15 mL at 03/10/19 2030    dexmedetomidine (PRECEDEX) 200 mcg in sodium chloride 0 9 % 50 mL infusion, 0 1-0 7 mcg/kg/hr, Intravenous, Titrated    fentanyl citrate (PF) 100 MCG/2ML 50 mcg, 50 mcg, Intravenous, Q2H PRN, 50 mcg at 58/76/64 5221    folic acid (FOLVITE) tablet 1 mg, 1 mg, Oral, Daily, 1 mg at 03/10/19 0939    furosemide (LASIX) injection 20 mg, 20 mg, Intravenous, Q8H, 20 mg at 03/11/19 0747    heparin (porcine) subcutaneous injection 5,000 Units, 5,000 Units, Subcutaneous, Q8H [address], 5,000 Units at 03/11/19 0535    midodrine (PROAMATINE) tablet 10 mg, 10 mg, Oral, TID AC, 10 mg at 03/11/19 0736    norepinephrine (LEVOPHED) 4 mg (STANDARD CONCENTRATION) IV in sodium chloride 0 9% 250 mL, 1-30 mcg/min, Intravenous, Titrated, Stopped at 03/11/19 0927    omeprazole (PRILOSEC) suspension 2 mg/mL, 20 mg, Oral, Daily, 20 mg at 03/10/19 1019    polyethylene glycol (MIRALAX) packet 17 g, 17 g, Oral, Daily PRN, 17 g at 03/08/19 0758    propofol (DIPRIVAN) 1000 mg in 100 mL infusion (premix), 5-50 mcg/kg/min, Intravenous, Titrated, Stopped at 03/11/19 0807    thiamine (VITAMIN B1) tablet 100 mg, 100 mg, Oral, Daily, 100 mg at 03/10/19 0939      Assessment/Plan:    1  Severe sepsis with septic shock suspect secondary to liver abscess and R empyema on the Unasyn 3g Q6 hours  Continues to require pressors (Levophed), weaning off propofol   Mgmt per CCT, ID    2  Acute hypoxic respiratory failure secondary to sepsis/pleural effusion status post chest tube x2  Possible trach this week if unable to wean, ENT consulted  3  Liver abscess status post IR drainage x2  Suspect portal source of infection with possible appendicitis  Repeat scan planned for 3/13  4  Subacute versus chronic appendicitis, surgery following  May ultimately require appendectomy, await repeat CT scan later this week  5  Right-sided pleural effusions suspect hepatohydrothorax related to liver abscess  Continue current management  6  Newly diagnosed cirrhosis this admission suspect related to chronic alcohol use  ASMA slightly elevated (22), LUIS normal  Viral hepatitis normal  Other serologies reviewed/unremarkable  AFP normal  AMA pending  Will need outpatient follow up  Alcohol abstinence recommended  Monitor LFTs  7  Anemia, stool hemoccult negative  Overall hemoglobin remains stable with no reported bleeding  Iron panel c/w chronic disease  Continue to monitor, transfuse as needed  Will need eventual endoscopic evaluation             Andrzej Knight PA-C

## 2019-03-11 NOTE — PROGRESS NOTES
Progress Note - Infectious Disease   Catherine Linn 53 y o  male MRN: [id number]  Unit/Bed#: ICU 06 Encounter: [id number]      Impression/Plan:  1  Severe sepsis/shock-fever, leukocytosis, lactic acidosis   Likely secondary to liver abscess and right-sided empyema   Admission and repeat blood cultures were all negative   Patient now off of vasopressors  He is having intermittent fevers, but overall clinically much improved  No leukocytosis  Procalcitonin has now nearly normalized  -antibiotic plan as below  -followup repeat blood culture sent on 03/10  -monitor CBC with diff and creatinine  -monitor temperatures and hemodynamics    2  Liver abscess-suspect a portal source of infection with the possible appendicitis   No perforation seen on initial CT the abdomen and pelvis   No clear biliary source   Now status post IR drain placement   Cultures all remain negative   Repeat CT abdomen shows significantly decreased size of abscess   Drain was reassessed by IR on 03/06 and is in proper position   He no resistant organisms have been isolated, and I suspect this reflects difficult to grow organisms after the patient had already received some antibiotics such as anaerobes and streptococcal species  He seems to be tolerating IV Unasyn   -continue Unasyn at current dose  -IR to reassess drain  -possible repeat CT in the next 24-48 hours  -monitor drain output  -GI follow-up     3  Right-sided empyema  Jasmine Porter secondary to liver abscess   Now status post chest tube placement   As repeat CT chest showed right loculated effusion, IR placed a 2nd chest tube on 03/06   Culture  is negative   Received tPA/dornase into 2nd chest tube   Repeat pleural cultures negative   -antibiotic plan as above  -chest tube management per critical care service  -repeat CT next week as per IR recommendations to reassess empyema as patient may ultimately need VATS  -monitor chest tube output     4   Acute kidney injury-suspect multifactorial including pre renal issues and possibly sepsis   ATN may be playing a role   Creatinine has improved and remains normal   Urine output remains good  -monitor creatinine clearance closely  -volume management     5  Alcohol abuse-with newly diagnosed cirrhosis this admission, portal hypertension based upon the CT findings   -continue abstinence  -GI follow-up ongoing     6  Probable chronic appendicitis   This may be the etiology for development of liver abscess/empyema   Recent surgery evaluation noted   Patient will ultimately require appendectomy   -surgery follow-up  -await repeat CT the abdomen pelvis this week     7  Urethral trauma status post suprapubic catheter      8  Acute hypoxic respiratory failure   In the setting of sepsis, right sided empyema  Patient has been successfully extubated today and doing well  Monitor respiratory status closely  Antibiotics:  Unasyn 3  Antibiotics 12    Discussed with critical care service and with patient's family at bedside, and with interventional radiology        Subjective:  Patient was successfully extubated this morning  Developed intermittent fever over the weekend  T-max 100 8° this morning  Currently off vasopressors  States he feels well  Denies shortness of breath, cough  Denies any pain  He is very thirsty      Objective:  Vitals:  Temp:  [99 °F (37 2 °C)-100 8 °F (38 2 °C)] 100 8 °F (38 2 °C)  HR:  [56-90] 86  Resp:  [0-38] 15  SpO2:  [90 %-98 %] 94 %  Temp (24hrs), Av 6 °F (37 6 °C), Min:99 °F (37 2 °C), Max:100 8 °F (38 2 °C)  Current: Temperature: (!) 100 8 °F (38 2 °C)    Physical Exam:   General:  Awake, alert  Eyes:  Conjunctive clear with no hemorrhages or effusions  Oropharynx:  No ulcers, no lesions  Neck:  Supple, no lymphadenopathy  Lungs:  Decreased  Cardiac:  Regular rate and rhythm, no murmurs  Chest tube in place x2  Abdomen:  Soft, non-tender, mildly distended, no rebound or guarding  OLIVIA drain in place with minimal output  Suprapubic catheter in place  Extremities:  Stable lower extremity edema  Skin:  No rashes, no ulcers  Neurological:  Moves all four extremities spontaneously    Lab Results:  I have personally reviewed pertinent labs  Results from last 7 days   Lab Units 03/11/19 0453 03/10/19  0447 03/09/19  0447   POTASSIUM mmol/L 3 3* 3 0* 3 2*   CHLORIDE mmol/L 110* 108 107   CO2 mmol/L 31 29 29   BUN mg/dL 15 18 19   CREATININE mg/dL 0 79 0 74 0 63   EGFR ml/min/1 73sq m 107 110 117   CALCIUM mg/dL 7 4* 7 5* 7 3*     Results from last 7 days   Lab Units 03/11/19 0453 03/09/19 0447 03/06/19  0438   WBC Thousand/uL 8 28 9 89 12 74*   HEMOGLOBIN g/dL 7 8* 8 1* 8 6*   PLATELETS Thousands/uL 149 121* 107*     Results from last 7 days   Lab Units 03/06/19  1519   GRAM STAIN RESULT  1+ Polys  No bacteria seen   BODY FLUID CULTURE, STERILE  No growth       Imaging Studies:   I have personally reviewed pertinent imaging study reports and images in PACS  Abdominal x-ray shows concern for extra vesicular position of suprapubic catheter  Repeat chest x-ray showed decreased airspace disease  EKG, Pathology, and Other Studies:   I have personally reviewed pertinent reports

## 2019-03-11 NOTE — PROGRESS NOTES
Progress Note - Clark 53 y o  male MRN: [id number]  Unit/Bed#: ICU 06 Encounter: [id number]    Assessment/Plan:    1  Severe sepsis with septic shock likely secondary to liver abscess  · Continue with Unasyn 3g Q6  · Patient continues on vasoactive medication, wean as tolerated  · Levophed @ 4  · Will continue to come off propofol 2/2 to this  · Start fentanyl gtt as needed  · Continue to monitor end points  · Suspect hypoalbuminemia as well as chronic liver disease source of potential persistent hypotension  · Continue TF and consider adding prosource  · Scheduled albumin  · Procalcitonin stable  2  Acute hypoxic respiratory failure seconday to sepsis and pleural effusion   · Chest tube x2 in place currently  · Both tubes with persistent output  · 420mL x 24 hours  · Pt fails weaning trial due to tachypnea and hypoxia  · Trach this week if cannot wean, surgery consulted appreciate input  3  Liver abscess S/P IR drainage x2  · Uncertain etiology  · Continue on Unasyn  · Abdominal OLIVIA drain with minimal output  · Plan for repeat scan on 3/13/19  · Pt denies pain  4  Right sided pleural effusions likely hepatohydrothorax  · Likely secondary to #3  · Continue chest tubes to suction until output less than 50-100cc/24 hours  · Continue abx  5  Subacute vs chronic appendicitis  · Continue close monitoring  6  Urethral trauma s/p suprapubic catheter*  · Patient removed suprapubic catheter overnight  · Attempt to replace with 5cc 12 Fr rendon after calling urology overnight  · Performed contrast guided tube study with abdominal XR; not in proper position  Patient urinated atleast 500cc from urethra during this time, after talk with urology we will have urology see patient in AM if acute retention, will call IR for placement  Currently has [place] catheter in place with >200cc out over 3 hours  7    Alcoholic cirrhosis  · Will need chronic GI OP follow up  · Continue thiamine and folate  · No withdrawal at this time    _____________________________________________________________________    HPI/24hr events:   Patient with failed weaning yesterday  Family and patient agreed to surgery consult for trach this week  Patient removed suprapubic cath overnight, see above for details and plan  No other acute events    Medications:    Current Facility-Administered Medications:  acetaminophen 650 mg Oral Q6H PRN Candice PRICE PA-C    albumin human 50 g Intravenous TID LINDSAY Caballero    ampicillin-sulbactam 3 g Intravenous Q6H Nicolas Escobedo MD Last Rate: 3 g (03/11/19 0007)   chlorhexidine 15 mL Swish & Spit Q12H [facility] LINDSAY Martinez    fentanyl citrate (PF) 50 mcg Intravenous Q2H PRN LINDSAY [place]    folic acid 1 mg Oral Daily LINDSAY Rea    furosemide 20 mg Intravenous Q8H LINDSAY Martinez    heparin (porcine) 5,000 Units Subcutaneous [facility] Marcus PRICE PA-C    midodrine 10 mg Oral TID AC LINDSAY Chappell    norepinephrine 1-30 mcg/min Intravenous Titrated Jen Liliana, CRNP Last Rate: 4 mcg/min (03/10/19 2330)   omeprazole (PRILOSEC) suspension 2 mg/mL 20 mg Oral Daily LINDSAY Chappell    polyethylene glycol 17 g Oral Daily PRN SIVA ReaNP    propofol 5-50 mcg/kg/min Intravenous Titrated Jen Furlong, CRNP Last Rate: 20 mcg/kg/min (03/11/19 0200)   thiamine 100 mg Oral Daily Newton Kimble, LINDSAY          norepinephrine 1-30 mcg/min Last Rate: 4 mcg/min (03/10/19 2330)   propofol 5-50 mcg/kg/min Last Rate: 20 mcg/kg/min (03/11/19 0200)         Physical exam:  Vitals: Body mass index is 34 19 kg/m²  Blood pressure 119/65, pulse 64, temperature 99 °F (37 2 °C), temperature source Temporal, resp   rate 14, height 5' 8" (1 727 m), weight 102 kg (224 lb 13 9 oz), SpO2 98 % ,  Temp  Min: 97 °F (36 1 °C)  Max: 102 4 °F (39 1 °C)  IBW: 68 4 kg    SpO2: 98 %  SpO2 Activity: At Rest  O2 Device: Other (comment)(vent)      Intake/Output Summary (Last 24 hours) at 3/11/2019 0528  Last data filed at 3/11/2019 0300  Gross per 24 hour   Intake 2832 8 ml   Output 2912 ml   Net -79 2 ml       Invasive/non-invasive ventilation settings:   Respiratory    Lab Data (Last 4 hours)    None         O2/Vent Data (Last 4 hours)      03/11 0310           Vent Mode AC/VC       Resp Rate (BPM) (BPM) 16       Vt (mL) (mL) 360       FIO2 (%) (%) 50       PEEP (cmH2O) (cmH2O) 5       MV 10 2                 Invasive Devices     Central Venous Catheter Line            CVC Central Lines 03/01/19 Triple 16cm 10 days          Arterial Line            Arterial Line 02/28/19 Radial 10 days          Drain            Chest Tube Right 10 days    NG/OG/Enteral Tube Orogastric 16 Fr Center mouth 10 days    Chest Tube 2 Right Other (Comment) 8 5 Fr  4 days    Closed/Suction Drain Midline Abdomen Bulb 10 2 Fr  4 days    External Urinary Catheter Medium less than 1 day          Airway            ETT  Hi-Lo 8 mm 10 days                  Physical Exam:  Gen: Laying comfortably, NAD  HEENT: ET in place, OG in place, Nare patent  Neck: Neck supple, No LAD  Chest: Faint crackles in bilateral lungs, diminished in RLL  Cor: Heart RRR, 2+ peripheral pulses  Abd: Vague epigastric TTP, no distension, Suprapubic ostomy site noted  Ext: No edema noted peripherally  Neuro: Alert and interactive, following commands  Skin: No breakdown      Diagnostic Data:  Lab: I have personally reviewed pertinent lab results     CBC:   Results from last 7 days   Lab Units 03/11/19 0453 03/09/19 0447 03/06/19  0438   WBC Thousand/uL 8 28 9 89 12 74*   HEMOGLOBIN g/dL 7 8* 8 1* 8 6*   HEMATOCRIT % 25 0* 25 6* 26 9*   PLATELETS Thousands/uL 149 121* 107*       CMP:   Results from last 7 days   Lab Units 03/11/19  0453 03/10/19  0447 03/09/19  0447   SODIUM mmol/L 144 143 142   POTASSIUM mmol/L 3 3* 3 0* 3 2*   CHLORIDE mmol/L 110* 108 107   CO2 mmol/L 31 29 29   BUN mg/dL 15 18 19   CREATININE mg/dL 0 79 0 74 0 63   CALCIUM mg/dL 7 4* 7  5* 7 3*     PT/INR:   Lab Results   Component Value Date    INR 1 38 (H) 03/10/2019   ,   Magnesium:   Results from last 7 days   Lab Units 03/09/19  0447 03/08/19  0442 03/07/19  0430   MAGNESIUM mg/dL 1 6 1 6 1 8     Phosphorous:       Microbiology:  Results from last 7 days   Lab Units 03/06/19  1519   GRAM STAIN RESULT  1+ Polys  No bacteria seen   BODY FLUID CULTURE, STERILE  No growth       Imaging:  3/11 Ab XR: Injected contrast seen within pelvis and not conforming to the expected contours of the bladder, suspicious for extravesicular position  Cardiac lab/EKG/telemetry/ECHO:       VTE Prophylaxis: heparin and SCDs    Code Status: Level 1 - Full Code    Sandi López PA-C    Portions of the record may have been created with voice recognition software  Occasional wrong word or "sound a like" substitutions may have occurred due to the inherent limitations of voice recognition software  Read the chart carefully and recognize, using context, where substitutions have occurred

## 2019-03-11 NOTE — PLAN OF CARE
Problem: Nutrition/Hydration-ADULT  Goal: Nutrient/Hydration intake appropriate for improving, restoring or maintaining nutritional needs  Description  Monitor and assess patient's nutrition/hydration status for malnutrition (ex- brittle hair, bruises, dry skin, pale skin and conjunctiva, muscle wasting, smooth red tongue, and disorientation)  Collaborate with interdisciplinary team and initiate plan and interventions as ordered  Monitor patient's weight and dietary intake as ordered or per policy  Utilize nutrition screening tool and intervene per policy  Determine patient's food preferences and provide high-protein, high-caloric foods as appropriate       INTERVENTIONS:  - Monitor oral intake, urinary output, labs, and treatment plans  - Assess nutrition and hydration status and recommend course of action  - Evaluate amount of meals eaten  - Assist patient with eating if necessary   - Allow adequate time for meals  - Recommend/ encourage appropriate diets, oral nutritional supplements, and vitamin/mineral supplements  - Order, calculate, and assess calorie counts as needed  - Recommend, monitor, and adjust tube feedings and TPN/PPN based on assessed needs  - Assess need for intravenous fluids  - Provide specific nutrition/hydration education as appropriate  - Include patient/family/caregiver in decisions related to nutrition  Outcome: Progressing     Problem: PAIN - ADULT  Goal: Verbalizes/displays adequate comfort level or baseline comfort level  Description  Interventions:  - Encourage patient to monitor pain and request assistance  - Assess pain using appropriate pain scale  - Administer analgesics based on type and severity of pain and evaluate response  - Implement non-pharmacological measures as appropriate and evaluate response  - Consider cultural and social influences on pain and pain management  - Notify physician/advanced practitioner if interventions unsuccessful or patient reports new pain  Outcome: Progressing     Problem: INFECTION - ADULT  Goal: Absence or prevention of progression during hospitalization  Description  INTERVENTIONS:  - Assess and monitor for signs and symptoms of infection  - Monitor lab/diagnostic results  - Monitor all insertion sites, i e  indwelling lines, tubes, and drains  - Monitor endotracheal (as able) and nasal secretions for changes in amount and color  - [place] appropriate cooling/warming therapies per order  - Administer medications as ordered  - Instruct and encourage patient and family to use good hand hygiene technique  - Identify and instruct in appropriate isolation precautions for identified infection/condition  Outcome: Progressing     Problem: SAFETY ADULT  Goal: Patient will remain free of falls  Description  INTERVENTIONS:  - Assess patient frequently for physical needs  -  Identify cognitive and physical deficits and behaviors that affect risk of falls    -  [place] fall precautions as indicated by assessment   - Educate patient/family on patient safety including physical limitations  - Instruct patient to call for assistance with activity based on assessment  - Modify environment to reduce risk of injury  - Consider OT/PT consult to assist with strengthening/mobility  Outcome: Progressing  Goal: Maintain or return to baseline ADL function  Description  INTERVENTIONS:  -  Assess patient's ability to carry out ADLs; assess patient's baseline for ADL function and identify physical deficits which impact ability to perform ADLs (bathing, care of mouth/teeth, toileting, grooming, dressing, etc )  - Assess/evaluate cause of self-care deficits   - Assess range of motion  - Assess patient's mobility; develop plan if impaired  - Assess patient's need for assistive devices and provide as appropriate  - Encourage maximum independence but intervene and supervise when necessary  ¯ Involve family in performance of ADLs  ¯ Assess for home care needs following discharge ¯ Request OT consult to assist with ADL evaluation and planning for discharge  ¯ Provide patient education as appropriate  Outcome: Progressing  Goal: Maintain or return mobility status to optimal level  Description  INTERVENTIONS:  - Assess patient's baseline mobility status (ambulation, transfers, stairs, etc )    - Identify cognitive and physical deficits and behaviors that affect mobility  - Identify mobility aids required to assist with transfers and/or ambulation (gait belt, sit-to-stand, lift, walker, cane, etc )  - [place] fall precautions as indicated by assessment  - Record patient progress and toleration of activity level on Mobility SBAR; progress patient to next Phase/Stage  - Instruct patient to call for assistance with activity based on assessment  - Request Rehabilitation consult to assist with strengthening/weightbearing, etc   Outcome: Progressing     Problem: DISCHARGE PLANNING  Goal: Discharge to home or other facility with appropriate resources  Description  INTERVENTIONS:  - Identify barriers to discharge w/patient and caregiver  - Arrange for needed discharge resources and transportation as appropriate  - Identify discharge learning needs (meds, wound care, etc )  - Arrange for interpretive services to assist at discharge as needed  - Refer to Case Management Department for coordinating discharge planning if the patient needs post-hospital services based on physician/advanced practitioner order or complex needs related to functional status, cognitive ability, or social support system  Outcome: Progressing     Problem: Knowledge Deficit  Goal: Patient/family/caregiver demonstrates understanding of disease process, treatment plan, medications, and discharge instructions  Description  Complete learning assessment and assess knowledge base    Interventions:  - Provide teaching at level of understanding  - Provide teaching via preferred learning methods  Outcome: Progressing     Problem: Prexisting or High Potential for Compromised Skin Integrity  Goal: Skin integrity is maintained or improved  Description  INTERVENTIONS:  - Identify patients at risk for skin breakdown  - Assess and monitor skin integrity  - Assess and monitor nutrition and hydration status  - Monitor labs (i e  albumin)  - Assess for incontinence   - Turn and reposition patient  - Assist with mobility/ambulation  - Relieve pressure over bony prominences  - Avoid friction and shearing  - Provide appropriate hygiene as needed including keeping skin clean and dry  - Evaluate need for skin moisturizer/barrier cream  - Collaborate with interdisciplinary team (i e  Nutrition, Rehabilitation, etc )   - Patient/family teaching  Outcome: Progressing     Problem: DISCHARGE PLANNING - CARE MANAGEMENT  Goal: Discharge to post-acute care or home with appropriate resources  Description  INTERVENTIONS:  - Conduct assessment to determine patient/family and health care team treatment goals, and need for post-acute services based on payer coverage, community resources, and patient preferences, and barriers to discharge  - Address psychosocial, clinical, and financial barriers to discharge as identified in assessment in conjunction with the patient/family and health care team  - Arrange appropriate level of post-acute services according to patient's   needs and preference and payer coverage in collaboration with the physician and health care team  - Communicate with and update the patient/family, physician, and health care team regarding progress on the discharge plan  - Arrange appropriate transportation to post-acute venues   Outcome: Progressing     Problem: Potential for Falls  Goal: Patient will remain free of falls  Description  INTERVENTIONS:  - Assess patient frequently for physical needs  -  Identify cognitive and physical deficits and behaviors that affect risk of falls    -  [place] fall precautions as indicated by assessment   - Educate patient/family on patient safety including physical limitations  - Instruct patient to call for assistance with activity based on assessment  - Modify environment to reduce risk of injury  - Consider OT/PT consult to assist with strengthening/mobility  Outcome: Progressing     Problem: CARDIOVASCULAR - ADULT  Goal: Maintains optimal cardiac output and hemodynamic stability  Description  INTERVENTIONS:  - Monitor I/O, vital signs and rhythm  - Monitor for S/S and trends of decreased cardiac output i e  bleeding, hypotension  - Administer and titrate ordered vasoactive medications to optimize hemodynamic stability  - Assess quality of pulses, skin color and temperature  - Assess for signs of decreased coronary artery perfusion - ex   Angina  - Instruct patient to report change in severity of symptoms  Outcome: Progressing     Problem: RESPIRATORY - ADULT  Goal: Achieves optimal ventilation and oxygenation  Description  INTERVENTIONS:  - Assess for changes in respiratory status  - Assess for changes in mentation and behavior  - Position to facilitate oxygenation and minimize respiratory effort  - Oxygen administration by appropriate delivery method based on oxygen saturation (per order) or ABGs  - Initiate smoking cessation education as indicated  - Encourage broncho-pulmonary hygiene including cough, deep breathe, Incentive Spirometry  - Assess the need for suctioning and aspirate as needed  - Assess and instruct to report SOB or any respiratory difficulty  - Respiratory Therapy support as indicated  Outcome: Progressing     Problem: GENITOURINARY - ADULT  Goal: Maintains or returns to baseline urinary function  Description  INTERVENTIONS:  - Assess urinary function  - Encourage oral fluids to ensure adequate hydration  - Administer IV fluids as ordered to ensure adequate hydration  - Administer ordered medications as needed  - Offer frequent toileting  - Follow urinary retention protocol if ordered  Outcome: Progressing  Goal: Urinary catheter remains patent  Description  INTERVENTIONS:  - Assess patency of urinary catheter  - If patient has a chronic rendon, consider changing catheter if non-functioning  - Follow guidelines for intermittent irrigation of non-functioning urinary catheter  Outcome: Progressing     Problem: METABOLIC, FLUID AND ELECTROLYTES - ADULT  Goal: Electrolytes maintained within normal limits  Description  INTERVENTIONS:  - Monitor labs and assess patient for signs and symptoms of electrolyte imbalances  - Administer electrolyte replacement as ordered  - Monitor response to electrolyte replacements, including repeat lab results as appropriate  - Instruct patient on fluid and nutrition as appropriate  Outcome: Progressing     Problem: SKIN/TISSUE INTEGRITY - ADULT  Goal: Skin integrity remains intact  Description  INTERVENTIONS  - Identify patients at risk for skin breakdown  - Assess and monitor skin integrity  - Assess and monitor nutrition and hydration status  - Monitor labs (i e  albumin)  - Assess for incontinence   - Turn and reposition patient  - Assist with mobility/ambulation  - Relieve pressure over bony prominences  - Avoid friction and shearing  - Provide appropriate hygiene as needed including keeping skin clean and dry  - Evaluate need for skin moisturizer/barrier cream  - Collaborate with interdisciplinary team (i e  Nutrition, Rehabilitation, etc )   - Patient/family teaching  Outcome: Progressing  Goal: Incision(s), wounds(s) or drain site(s) healing without S/S of infection  Description  INTERVENTIONS  - Assess and document risk factors for skin impairment   - Assess and document dressing, incision, wound bed, drain sites and surrounding tissue  - Initiate Nutrition services consult and/or wound management as needed  Outcome: Progressing  Goal: Oral mucous membranes remain intact  Description  INTERVENTIONS  - Assess oral mucosa and hygiene practices  - Implement preventative oral hygiene regimen  - Implement oral medicated treatments as ordered  - Initiate Nutrition services referral as needed  Outcome: Progressing

## 2019-03-12 ENCOUNTER — APPOINTMENT (INPATIENT)
Dept: RADIOLOGY | Facility: HOSPITAL | Age: 49
DRG: 441 | End: 2019-03-12
Payer: COMMERCIAL

## 2019-03-12 ENCOUNTER — ANESTHESIA (OUTPATIENT)
Dept: PERIOP | Facility: HOSPITAL | Age: 49
End: 2019-03-12

## 2019-03-12 ENCOUNTER — APPOINTMENT (INPATIENT)
Dept: CT IMAGING | Facility: HOSPITAL | Age: 49
DRG: 441 | End: 2019-03-12
Payer: COMMERCIAL

## 2019-03-12 PROBLEM — J96.90 RESPIRATORY FAILURE (HCC): Status: RESOLVED | Noted: 2019-02-25 | Resolved: 2019-03-12

## 2019-03-12 LAB
ALBUMIN SERPL BCP-MCNC: 2.7 G/DL (ref 3.5–5)
ALP SERPL-CCNC: 80 U/L (ref 46–116)
ALT SERPL W P-5'-P-CCNC: 6 U/L (ref 12–78)
ANION GAP SERPL CALCULATED.3IONS-SCNC: 7 MMOL/L (ref 4–13)
AST SERPL W P-5'-P-CCNC: 27 U/L (ref 5–45)
BILIRUB SERPL-MCNC: 3.18 MG/DL (ref 0.2–1)
BUN SERPL-MCNC: 12 MG/DL (ref 5–25)
CALCIUM SERPL-MCNC: 7.8 MG/DL (ref 8.3–10.1)
CHLORIDE SERPL-SCNC: 104 MMOL/L (ref 100–108)
CO2 SERPL-SCNC: 30 MMOL/L (ref 21–32)
CREAT SERPL-MCNC: 0.52 MG/DL (ref 0.6–1.3)
GFR SERPL CREATININE-BSD FRML MDRD: 127 ML/MIN/1.73SQ M
GLUCOSE SERPL-MCNC: 90 MG/DL (ref 65–140)
INR PPP: 1.37 (ref 0.86–1.17)
POTASSIUM SERPL-SCNC: 2.9 MMOL/L (ref 3.5–5.3)
PROT SERPL-MCNC: 7 G/DL (ref 6.4–8.2)
PROTHROMBIN TIME: 17 SECONDS (ref 11.8–14.2)
SODIUM SERPL-SCNC: 141 MMOL/L (ref 136–145)

## 2019-03-12 PROCEDURE — 71260 CT THORAX DX C+: CPT

## 2019-03-12 PROCEDURE — 80053 COMPREHEN METABOLIC PANEL: CPT | Performed by: PHYSICIAN ASSISTANT

## 2019-03-12 PROCEDURE — BF101ZZ FLUOROSCOPY OF BILE DUCTS USING LOW OSMOLAR CONTRAST: ICD-10-PCS | Performed by: RADIOLOGY

## 2019-03-12 PROCEDURE — 99232 SBSQ HOSP IP/OBS MODERATE 35: CPT | Performed by: INTERNAL MEDICINE

## 2019-03-12 PROCEDURE — 49424 ASSESS CYST CONTRAST INJECT: CPT | Performed by: RADIOLOGY

## 2019-03-12 PROCEDURE — 99232 SBSQ HOSP IP/OBS MODERATE 35: CPT | Performed by: PHYSICIAN ASSISTANT

## 2019-03-12 PROCEDURE — 76080 X-RAY EXAM OF FISTULA: CPT

## 2019-03-12 PROCEDURE — 49424 ASSESS CYST CONTRAST INJECT: CPT

## 2019-03-12 PROCEDURE — 74177 CT ABD & PELVIS W/CONTRAST: CPT

## 2019-03-12 PROCEDURE — 85610 PROTHROMBIN TIME: CPT | Performed by: PHYSICIAN ASSISTANT

## 2019-03-12 PROCEDURE — 76080 X-RAY EXAM OF FISTULA: CPT | Performed by: RADIOLOGY

## 2019-03-12 RX ORDER — MIDODRINE HYDROCHLORIDE 5 MG/1
5 TABLET ORAL
Status: DISCONTINUED | OUTPATIENT
Start: 2019-03-12 | End: 2019-03-12

## 2019-03-12 RX ORDER — POTASSIUM CHLORIDE 20 MEQ/1
40 TABLET, EXTENDED RELEASE ORAL ONCE
Status: COMPLETED | OUTPATIENT
Start: 2019-03-12 | End: 2019-03-12

## 2019-03-12 RX ORDER — FUROSEMIDE 10 MG/ML
20 INJECTION INTRAMUSCULAR; INTRAVENOUS EVERY 12 HOURS
Status: DISCONTINUED | OUTPATIENT
Start: 2019-03-12 | End: 2019-03-18

## 2019-03-12 RX ORDER — POTASSIUM CHLORIDE 14.9 MG/ML
20 INJECTION INTRAVENOUS
Status: COMPLETED | OUTPATIENT
Start: 2019-03-12 | End: 2019-03-12

## 2019-03-12 RX ORDER — LANOLIN ALCOHOL/MO/W.PET/CERES
6 CREAM (GRAM) TOPICAL
Status: DISCONTINUED | OUTPATIENT
Start: 2019-03-12 | End: 2019-03-22 | Stop reason: HOSPADM

## 2019-03-12 RX ADMIN — Medication 20 MG: at 09:14

## 2019-03-12 RX ADMIN — HEPARIN SODIUM 5000 UNITS: 5000 INJECTION INTRAVENOUS; SUBCUTANEOUS at 22:58

## 2019-03-12 RX ADMIN — AMPICILLIN SODIUM AND SULBACTAM SODIUM 3 G: 10; 5 INJECTION, POWDER, FOR SOLUTION INTRAVENOUS at 18:31

## 2019-03-12 RX ADMIN — AMPICILLIN SODIUM AND SULBACTAM SODIUM 3 G: 10; 5 INJECTION, POWDER, FOR SOLUTION INTRAVENOUS at 05:26

## 2019-03-12 RX ADMIN — IOHEXOL 5 ML: 300 INJECTION, SOLUTION INTRAVENOUS at 16:21

## 2019-03-12 RX ADMIN — POTASSIUM CHLORIDE 20 MEQ: 200 INJECTION, SOLUTION INTRAVENOUS at 12:29

## 2019-03-12 RX ADMIN — POTASSIUM CHLORIDE 20 MEQ: 200 INJECTION, SOLUTION INTRAVENOUS at 13:34

## 2019-03-12 RX ADMIN — HEPARIN SODIUM 5000 UNITS: 5000 INJECTION INTRAVENOUS; SUBCUTANEOUS at 14:44

## 2019-03-12 RX ADMIN — AMPICILLIN SODIUM AND SULBACTAM SODIUM 3 G: 10; 5 INJECTION, POWDER, FOR SOLUTION INTRAVENOUS at 13:37

## 2019-03-12 RX ADMIN — MELATONIN TAB 3 MG 6 MG: 3 TAB at 22:58

## 2019-03-12 RX ADMIN — FUROSEMIDE 20 MG: 10 INJECTION, SOLUTION INTRAMUSCULAR; INTRAVENOUS at 22:58

## 2019-03-12 RX ADMIN — CHLORHEXIDINE GLUCONATE 0.12% ORAL RINSE 15 ML: 1.2 LIQUID ORAL at 08:54

## 2019-03-12 RX ADMIN — IOHEXOL 100 ML: 350 INJECTION, SOLUTION INTRAVENOUS at 22:22

## 2019-03-12 RX ADMIN — IOHEXOL 50 ML: 240 INJECTION, SOLUTION INTRATHECAL; INTRAVASCULAR; INTRAVENOUS; ORAL at 22:22

## 2019-03-12 RX ADMIN — FUROSEMIDE 20 MG: 10 INJECTION, SOLUTION INTRAMUSCULAR; INTRAVENOUS at 08:54

## 2019-03-12 RX ADMIN — POTASSIUM CHLORIDE 40 MEQ: 1500 TABLET, EXTENDED RELEASE ORAL at 08:54

## 2019-03-12 RX ADMIN — FOLIC ACID 1 MG: 1 TABLET ORAL at 08:54

## 2019-03-12 RX ADMIN — HEPARIN SODIUM 5000 UNITS: 5000 INJECTION INTRAVENOUS; SUBCUTANEOUS at 05:26

## 2019-03-12 RX ADMIN — ALBUMIN (HUMAN) 50 G: 0.25 INJECTION, SOLUTION INTRAVENOUS at 08:55

## 2019-03-12 RX ADMIN — Medication 100 MG: at 08:54

## 2019-03-12 RX ADMIN — POTASSIUM CHLORIDE 20 MEQ: 200 INJECTION, SOLUTION INTRAVENOUS at 08:55

## 2019-03-12 NOTE — PROGRESS NOTES
Progress Note - Urology  Lee Delvalle 1970, 53 y o  male MRN: [id number]    Unit/Bed#: ICU 06 Encounter: [id number]    Acute retention of urine  Assessment & Plan  S/P Bedside SPT  Patient removed this himself  Now voiding with condom catheter  PVR elevated x 1 at 377  Now low at zero on recent evaluation  Plan:  Continue to permit voiding volitional    Low PVRs      Bedside rounds performed with Vicky campoverde RN  Urology will sign off but remain available for any further inpatient needs  Please feel free to call with questions or concerns  Subjective/Objective     Subjective:   Now voiding with condom cath  PVRs low, 0 recently, per Cecy Benitez RN caring for patient  One high PVR of 377 with subsequent void  Hard to determine when he's post void due to condom catheter use  Justin Fouriner reports he's feeling well  Review of Systems   Constitutional: Negative for activity change and appetite change  HENT: Negative for congestion and ear pain  Eyes: Negative for pain  Respiratory: Negative for cough and shortness of breath  Cardiovascular: Negative for chest pain and palpitations  Gastrointestinal: Negative for abdominal distention, abdominal pain, blood in stool, constipation, diarrhea and nausea  Genitourinary: Negative for difficulty urinating and dysuria  Musculoskeletal: Negative for arthralgias and myalgias  Skin: Negative for rash  Allergic/Immunologic: Negative for immunocompromised state  Neurological: Negative for dizziness and headaches  Hematological: Negative for adenopathy  Does not bruise/bleed easily  Psychiatric/Behavioral: Negative for agitation  The patient is not nervous/anxious  Objective:  Vitals: Blood pressure 119/65, pulse 76, temperature 98 2 °F (36 8 °C), temperature source Temporal, resp  rate (!) 30, height 5' 8" (1 727 m), weight 102 kg (224 lb 13 9 oz), SpO2 92 %  ,Body mass index is 34 19 kg/m²      Intake/Output Summary (Last 24 hours) at 3/12/2019 [address] [place] [address] [employer] filed at 3/12/2019 1001  Gross per 24 hour   Intake 1560 ml   Output 3033 ml   Net -1473 ml     Invasive Devices     Central Venous Catheter Line            CVC Central Lines 03/01/19 Triple 16cm 11 days          Peripheral Intravenous Line            Peripheral IV 03/11/19 Right Antecubital less than 1 day          Arterial Line            Arterial Line 02/28/19 Radial 11 days          Drain            Chest Tube Right 11 days    Chest Tube 2 Right Other (Comment) 8 5 Fr  5 days    Closed/Suction Drain Midline Abdomen Bulb 10 2 Fr  5 days    External Urinary Catheter Medium 1 day                Physical Exam   Constitutional: He is oriented to person, place, and time  He appears well-developed and well-nourished  He is cooperative  He does not appear ill  No distress  53year old male, laying in bed in the ICU  No acute distress  HENT:   Head: Normocephalic and atraumatic  Moist mucous membranes  Eyes: Conjunctivae and EOM are normal    Neck: Normal range of motion  Neck supple  No tracheal deviation present  Cardiovascular: Normal rate, regular rhythm and normal heart sounds  No murmur heard  Pulmonary/Chest: Effort normal and breath sounds normal  No respiratory distress  He has no wheezes  Good airflow bilaterally on deep inspiration  Abdominal: Soft  Bowel sounds are normal  He exhibits no distension and no mass  There is no tenderness  Musculoskeletal: Normal range of motion  He exhibits no edema  Neurological: He is alert and oriented to person, place, and time  Skin: Skin is warm and dry  No rash noted  He is not diaphoretic  No erythema  No pallor  Psychiatric: He has a normal mood and affect  His behavior is normal  Judgment and thought content normal    Nursing note and vitals reviewed  History:  No past medical history on file    Past Surgical History:   Procedure Laterality Date    IR ABSCESS/SEROMA DRAINAGE  2/28/2019    IR CHEST TUBE  3/6/2019     No family history on file  Social History     Socioeconomic History    Marital status: Single     Spouse name: Not on file    Number of children: Not on file    Years of education: Not on file    Highest education level: Not on file   Occupational History    Not on file   Social Needs    Financial resource strain: Not on file    Food insecurity:     Worry: Not on file     Inability: Not on file    Transportation needs:     Medical: Not on file     Non-medical: Not on file   Tobacco Use    Smoking status: Former Smoker    Smokeless tobacco: Never Used   Substance and Sexual Activity    Alcohol use:  Yes    Drug use: Not on file    Sexual activity: Not on file   Lifestyle    Physical activity:     Days per week: Not on file     Minutes per session: Not on file    Stress: Not on file   Relationships    Social connections:     Talks on phone: Not on file     Gets together: Not on file     Attends [employer] service: Not on file     Active member of club or organization: Not on file     Attends meetings of clubs or organizations: Not on file     Relationship status: Not on file    Intimate partner violence:     Fear of current or ex partner: Not on file     Emotionally abused: Not on file     Physically abused: Not on file     Forced sexual activity: Not on file   Other Topics Concern    Not on file   Social History Narrative    Not on file       Labs:  Recent Labs     03/11/19  0453   WBC 8 28     Recent Labs     03/11/19  0453   HGB 7 8*       Recent Labs     03/10/19  0447 03/11/19  0453 03/12/19  0532   CREATININE 0 74 0 79 0 52*     Dayan Gardner PA-C  Date: 3/12/2019 Time: 10:42 AM

## 2019-03-12 NOTE — PROGRESS NOTES
Progress Note - Infectious Disease   El Novoa 53 y o  male MRN: [id number]  Unit/Bed#: ICU 06 Encounter: [id number]      Impression/Plan:  1  Severe sepsis/shock-fever, leukocytosis, lactic acidosis   Likely secondary to liver abscess and right-sided empyema   Admission and repeat blood cultures were all negative  Patient remains off vasopressors  Fevers remain improved  No leukocytosis  Procalcitonin has now nearly normalized  Most recent blood cultures from 03/10 remain negative   -antibiotic plan as below  -followup repeat blood culture sent on 03/10  -monitor CBC with diff and creatinine  -monitor temperatures and hemodynamics     2  Liver abscess-suspect a portal source of infection with the possible appendicitis   No perforation seen on initial CT the abdomen and pelvis   No clear biliary source   Now status post IR drain placement   Cultures all remain negative   Repeat CT abdomen shows significantly decreased size of abscess   Drain was reassessed by IR on 03/06 and is in proper position   He no resistant organisms have been isolated, and I suspect this reflects difficult to grow organisms after the patient had already received some antibiotics such as anaerobes and streptococcal species  He seems to be tolerating IV Unasyn   -continue Unasyn at current dose  -IR to reassess drain, possibly today    -repeat CT in the next 24-48 hours  -monitor drain output  -GI follow-up  -likely plan for 3 week course of IV antibiotic for treatment of empyema followed by oral antibiotic until radiographic resolution of liver abscess      3  Right-sided empyema  Shanon [place] secondary to liver abscess   Now status post chest tube placement   As repeat CT chest showed right loculated effusion, IR placed a 2nd chest tube on 03/06   Culture  is negative   Received tPA/dornase into 2nd chest tube   Repeat pleural cultures negative   -antibiotic plan as above  -chest tube management per critical care service  -repeat CT this week as per IR recommendations to reassess empyema as patient may ultimately need VATS  -monitor chest tube output     4  Acute kidney injury-suspect multifactorial including pre renal issues and possibly sepsis   ATN may be playing a role   Creatinine has improved and remains normal   Urine output remains good  -monitor creatinine clearance closely  -volume management     5  Alcohol abuse-with newly diagnosed cirrhosis this admission, portal hypertension based upon the CT findings   -continue abstinence  -GI follow-up ongoing     6  Probable chronic appendicitis   This may be the etiology for development of liver abscess/empyema   Recent surgery evaluation noted   Patient will ultimately require appendectomy   -surgery follow-up  -await repeat CT the abdomen pelvis this week     7  Urethral trauma status post suprapubic catheter      8  Acute hypoxic respiratory failure   In the setting of sepsis, right sided empyema  Patient remains extubated and doing well from a respiratory standpoint         Antibiotics:  Unasyn 4  Antibiotics 13     Discussed with critical care service and with patient's family at bedside  Subjective:  Patient currently sleepy  Remains extubated  No shortness of breath, cough  No fevers  Remains off vasopressors      Objective:  Vitals:  Temp:  [97 7 °F (36 5 °C)-100 °F (37 8 °C)] 98 °F (36 7 °C)  HR:  [70-86] 76  Resp:  [15-33] 30  SpO2:  [92 %-98 %] 92 %  Temp (24hrs), Av 5 °F (36 9 °C), Min:97 7 °F (36 5 °C), Max:100 °F (37 8 °C)  Current: Temperature: 98 °F (36 7 °C)    Physical Exam:   General:  Well-nourished, well-developed, in no acute distress  Eyes:  Conjunctive clear with no hemorrhages or effusions  Oropharynx:  No ulcers, no lesions  Neck:  Supple, no lymphadenopathy  Lungs:  Clear to auscultation bilaterally, no accessory muscle use  Cardiac:  Regular rate and rhythm, no murmurs  Chest tubes in place x2  Abdomen:  Soft, non-tender, no rigidity or guarding  OLIVIA drain in place with minimal output  Suprapubic catheter in place  Extremities:  Stable lower extremity edema  Skin:  No rashes, no ulcers  Neurological:  Moves all four extremities spontaneously    Lab Results:  I have personally reviewed pertinent labs  Results from last 7 days   Lab Units 03/12/19  0532 03/11/19  0453 03/10/19  0447   POTASSIUM mmol/L 2 9* 3 3* 3 0*   CHLORIDE mmol/L 104 110* 108   CO2 mmol/L 30 31 29   BUN mg/dL 12 15 18   CREATININE mg/dL 0 52* 0 79 0 74   EGFR ml/min/1 73sq m 127 107 110   CALCIUM mg/dL 7 8* 7 4* 7 5*   AST U/L 27  --   --    ALT U/L 6*  --   --    ALK PHOS U/L 80  --   --      Results from last 7 days   Lab Units 03/11/19  0453 03/09/19  0447 03/06/19  0438   WBC Thousand/uL 8 28 9 89 12 74*   HEMOGLOBIN g/dL 7 8* 8 1* 8 6*   PLATELETS Thousands/uL 149 121* 107*     Results from last 7 days   Lab Units 03/10/19  1537 03/10/19  1536 03/06/19  1519   BLOOD CULTURE  No Growth at 24 hrs  No Growth at 24 hrs   --    GRAM STAIN RESULT   --   --  1+ Polys  No bacteria seen   BODY FLUID CULTURE, STERILE   --   --  No growth       Imaging Studies:   I have personally reviewed pertinent imaging study reports and images in PACS  EKG, Pathology, and Other Studies:   I have personally reviewed pertinent reports

## 2019-03-12 NOTE — PROCEDURES
Procedures  Interventional Radiology Procedure Note    PATIENT NAME: Christiano Carvalho  : 1970  MRN: [id number]    Procedure:  Liver abscess drain check    Estimated Blood Loss:  None     Findings:  Satisfactory position and patency of liver drain  Nearly resolved collection  Will discontinue flushes and maintain bulb suction    Will arrange for 1 week follow-up drain check with possible drain removal     Specimens:  None    Complications:  None    Anesthesia: none    Jl Raya MD     Date: 3/12/2019  Time: 4:10 PM

## 2019-03-12 NOTE — PLAN OF CARE
Problem: Nutrition/Hydration-ADULT  Goal: Nutrient/Hydration intake appropriate for improving, restoring or maintaining nutritional needs  Description  Monitor and assess patient's nutrition/hydration status for malnutrition (ex- brittle hair, bruises, dry skin, pale skin and conjunctiva, muscle wasting, smooth red tongue, and disorientation)  Collaborate with interdisciplinary team and initiate plan and interventions as ordered  Monitor patient's weight and dietary intake as ordered or per policy  Utilize nutrition screening tool and intervene per policy  Determine patient's food preferences and provide high-protein, high-caloric foods as appropriate       INTERVENTIONS:  - Monitor oral intake, urinary output, labs, and treatment plans  - Assess nutrition and hydration status and recommend course of action  - Evaluate amount of meals eaten  - Assist patient with eating if necessary   - Allow adequate time for meals  - Recommend/ encourage appropriate diets, oral nutritional supplements, and vitamin/mineral supplements  - Order, calculate, and assess calorie counts as needed  - Recommend, monitor, and adjust tube feedings and TPN/PPN based on assessed needs  - Assess need for intravenous fluids  - Provide specific nutrition/hydration education as appropriate  - Include patient/family/caregiver in decisions related to nutrition  Outcome: Progressing     Problem: PAIN - ADULT  Goal: Verbalizes/displays adequate comfort level or baseline comfort level  Description  Interventions:  - Encourage patient to monitor pain and request assistance  - Assess pain using appropriate pain scale  - Administer analgesics based on type and severity of pain and evaluate response  - Implement non-pharmacological measures as appropriate and evaluate response  - Consider cultural and social influences on pain and pain management  - Notify physician/advanced practitioner if interventions unsuccessful or patient reports new pain  Outcome: Progressing     Problem: INFECTION - ADULT  Goal: Absence or prevention of progression during hospitalization  Description  INTERVENTIONS:  - Assess and monitor for signs and symptoms of infection  - Monitor lab/diagnostic results  - Monitor all insertion sites, i e  indwelling lines, tubes, and drains  - Monitor endotracheal (as able) and nasal secretions for changes in amount and color  - [place] appropriate cooling/warming therapies per order  - Administer medications as ordered  - Instruct and encourage patient and family to use good hand hygiene technique  - Identify and instruct in appropriate isolation precautions for identified infection/condition  Outcome: Progressing     Problem: SAFETY ADULT  Goal: Patient will remain free of falls  Description  INTERVENTIONS:  - Assess patient frequently for physical needs  -  Identify cognitive and physical deficits and behaviors that affect risk of falls    -  [place] fall precautions as indicated by assessment   - Educate patient/family on patient safety including physical limitations  - Instruct patient to call for assistance with activity based on assessment  - Modify environment to reduce risk of injury  - Consider OT/PT consult to assist with strengthening/mobility  Outcome: Progressing  Goal: Maintain or return to baseline ADL function  Description  INTERVENTIONS:  -  Assess patient's ability to carry out ADLs; assess patient's baseline for ADL function and identify physical deficits which impact ability to perform ADLs (bathing, care of mouth/teeth, toileting, grooming, dressing, etc )  - Assess/evaluate cause of self-care deficits   - Assess range of motion  - Assess patient's mobility; develop plan if impaired  - Assess patient's need for assistive devices and provide as appropriate  - Encourage maximum independence but intervene and supervise when necessary  ¯ Involve family in performance of ADLs  ¯ Assess for home care needs following discharge ¯ Request OT consult to assist with ADL evaluation and planning for discharge  ¯ Provide patient education as appropriate  Outcome: Progressing  Goal: Maintain or return mobility status to optimal level  Description  INTERVENTIONS:  - Assess patient's baseline mobility status (ambulation, transfers, stairs, etc )    - Identify cognitive and physical deficits and behaviors that affect mobility  - Identify mobility aids required to assist with transfers and/or ambulation (gait belt, sit-to-stand, lift, walker, cane, etc )  - [place] fall precautions as indicated by assessment  - Record patient progress and toleration of activity level on Mobility SBAR; progress patient to next Phase/Stage  - Instruct patient to call for assistance with activity based on assessment  - Request Rehabilitation consult to assist with strengthening/weightbearing, etc   Outcome: Progressing     Problem: DISCHARGE PLANNING  Goal: Discharge to home or other facility with appropriate resources  Description  INTERVENTIONS:  - Identify barriers to discharge w/patient and caregiver  - Arrange for needed discharge resources and transportation as appropriate  - Identify discharge learning needs (meds, wound care, etc )  - Arrange for interpretive services to assist at discharge as needed  - Refer to Case Management Department for coordinating discharge planning if the patient needs post-hospital services based on physician/advanced practitioner order or complex needs related to functional status, cognitive ability, or social support system  Outcome: Progressing     Problem: Knowledge Deficit  Goal: Patient/family/caregiver demonstrates understanding of disease process, treatment plan, medications, and discharge instructions  Description  Complete learning assessment and assess knowledge base    Interventions:  - Provide teaching at level of understanding  - Provide teaching via preferred learning methods  Outcome: Progressing     Problem: Prexisting or High Potential for Compromised Skin Integrity  Goal: Skin integrity is maintained or improved  Description  INTERVENTIONS:  - Identify patients at risk for skin breakdown  - Assess and monitor skin integrity  - Assess and monitor nutrition and hydration status  - Monitor labs (i e  albumin)  - Assess for incontinence   - Turn and reposition patient  - Assist with mobility/ambulation  - Relieve pressure over bony prominences  - Avoid friction and shearing  - Provide appropriate hygiene as needed including keeping skin clean and dry  - Evaluate need for skin moisturizer/barrier cream  - Collaborate with interdisciplinary team (i e  Nutrition, Rehabilitation, etc )   - Patient/family teaching  Outcome: Progressing     Problem: DISCHARGE PLANNING - CARE MANAGEMENT  Goal: Discharge to post-acute care or home with appropriate resources  Description  INTERVENTIONS:  - Conduct assessment to determine patient/family and health care team treatment goals, and need for post-acute services based on payer coverage, community resources, and patient preferences, and barriers to discharge  - Address psychosocial, clinical, and financial barriers to discharge as identified in assessment in conjunction with the patient/family and health care team  - Arrange appropriate level of post-acute services according to patient's   needs and preference and payer coverage in collaboration with the physician and health care team  - Communicate with and update the patient/family, physician, and health care team regarding progress on the discharge plan  - Arrange appropriate transportation to post-acute venues   Outcome: Progressing     Problem: Potential for Falls  Goal: Patient will remain free of falls  Description  INTERVENTIONS:  - Assess patient frequently for physical needs  -  Identify cognitive and physical deficits and behaviors that affect risk of falls    -  [place] fall precautions as indicated by assessment   - Educate patient/family on patient safety including physical limitations  - Instruct patient to call for assistance with activity based on assessment  - Modify environment to reduce risk of injury  - Consider OT/PT consult to assist with strengthening/mobility  Outcome: Progressing     Problem: CARDIOVASCULAR - ADULT  Goal: Maintains optimal cardiac output and hemodynamic stability  Description  INTERVENTIONS:  - Monitor I/O, vital signs and rhythm  - Monitor for S/S and trends of decreased cardiac output i e  bleeding, hypotension  - Administer and titrate ordered vasoactive medications to optimize hemodynamic stability  - Assess quality of pulses, skin color and temperature  - Assess for signs of decreased coronary artery perfusion - ex   Angina  - Instruct patient to report change in severity of symptoms  Outcome: Progressing     Problem: RESPIRATORY - ADULT  Goal: Achieves optimal ventilation and oxygenation  Description  INTERVENTIONS:  - Assess for changes in respiratory status  - Assess for changes in mentation and behavior  - Position to facilitate oxygenation and minimize respiratory effort  - Oxygen administration by appropriate delivery method based on oxygen saturation (per order) or ABGs  - Initiate smoking cessation education as indicated  - Encourage broncho-pulmonary hygiene including cough, deep breathe, Incentive Spirometry  - Assess the need for suctioning and aspirate as needed  - Assess and instruct to report SOB or any respiratory difficulty  - Respiratory Therapy support as indicated  Outcome: Progressing     Problem: GENITOURINARY - ADULT  Goal: Maintains or returns to baseline urinary function  Description  INTERVENTIONS:  - Assess urinary function  - Encourage oral fluids to ensure adequate hydration  - Administer IV fluids as ordered to ensure adequate hydration  - Administer ordered medications as needed  - Offer frequent toileting  - Follow urinary retention protocol if ordered  Outcome: Progressing  Goal: Urinary catheter remains patent  Description  INTERVENTIONS:  - Assess patency of urinary catheter  - If patient has a chronic rendon, consider changing catheter if non-functioning  - Follow guidelines for intermittent irrigation of non-functioning urinary catheter  Outcome: Progressing     Problem: METABOLIC, FLUID AND ELECTROLYTES - ADULT  Goal: Electrolytes maintained within normal limits  Description  INTERVENTIONS:  - Monitor labs and assess patient for signs and symptoms of electrolyte imbalances  - Administer electrolyte replacement as ordered  - Monitor response to electrolyte replacements, including repeat lab results as appropriate  - Instruct patient on fluid and nutrition as appropriate  Outcome: Progressing     Problem: SKIN/TISSUE INTEGRITY - ADULT  Goal: Skin integrity remains intact  Description  INTERVENTIONS  - Identify patients at risk for skin breakdown  - Assess and monitor skin integrity  - Assess and monitor nutrition and hydration status  - Monitor labs (i e  albumin)  - Assess for incontinence   - Turn and reposition patient  - Assist with mobility/ambulation  - Relieve pressure over bony prominences  - Avoid friction and shearing  - Provide appropriate hygiene as needed including keeping skin clean and dry  - Evaluate need for skin moisturizer/barrier cream  - Collaborate with interdisciplinary team (i e  Nutrition, Rehabilitation, etc )   - Patient/family teaching  Outcome: Progressing  Goal: Incision(s), wounds(s) or drain site(s) healing without S/S of infection  Description  INTERVENTIONS  - Assess and document risk factors for skin impairment   - Assess and document dressing, incision, wound bed, drain sites and surrounding tissue  - Initiate Nutrition services consult and/or wound management as needed  Outcome: Progressing  Goal: Oral mucous membranes remain intact  Description  INTERVENTIONS  - Assess oral mucosa and hygiene practices  - Implement preventative oral hygiene regimen  - Implement oral medicated treatments as ordered  - Initiate Nutrition services referral as needed  Outcome: Progressing

## 2019-03-12 NOTE — PROGRESS NOTES
Progress Note - Clark 53 y o  male MRN: [id number]  Unit/Bed#: ICU 06 Encounter: [id number]    Assessment/Plan:    1  Severe sepsis with septic shock likely secondary to liver abscess  · Continue with Unasyn 3g Q6  · Off pressors  · Continue to monitor BP close, goal >65  · Consider DC scheduled albumin  2  Acute hypoxic respiratory failure seconday to sepsis and pleural effusion   · Extubated and doing well  · Chest tube x2 in place currently  · Both chest tube output  · 70mL x 24 hours  3  Liver abscess S/P IR drainage x2  · Uncertain etiology  · Continue on Unasyn  · Abdominal OLIVIA drain with minimal output, reassess by IR today  · Plan for repeat scan on 3/13/19  4  Right sided pleural effusions likely hepatohydrothorax  · Likely secondary to #3  · Chest tubes possible to water seal today for 70cc/24 hr  · Continue abx  5  Subacute vs chronic appendicitis  · Continue close monitoring  6  Urethral trauma s/p suprapubic catheter  · Patient removed suprapubic catheter overnight  · Continues to void through urethra  · Monitor post void, goal <300  If >300 then IR will need to be called for placement  · Urology needs to see patient today  7  Alcoholic cirrhosis  · Will need chronic GI OP follow up  · Continue thiamine and folate  · No withdrawal at this time      Critical Care Time: 0 minutes  Documented critical care time excludes any procedures documented elsewhere  It also excludes any family updates    _____________________________________________________________________    HPI/24hr events:   Patient successfully extubated yesterday and doing well  Weaned off pressors  Decreased output of bilateral chest tubes  Much improved  No acute events overnight      Medications:    Current Facility-Administered Medications:  acetaminophen 650 mg Oral Q6H PRN Azar PRICE PA-C    albumin human 50 g Intravenous TID LINDSAY Edwards Last Rate: 0 g (03/11/19 1929)   ampicillin-sulbactam 3 g Intravenous Q6H Glo Abdi MD Last Rate: 3 g (03/12/19 0526)   chlorhexidine 15 mL Swish & Spit Q12H [facility] LINDSAY Lucio    dexmedetomidine 0 1-0 7 mcg/kg/hr Intravenous Titrated LINDSAY Handley Last Rate: Stopped (03/11/19 1030)   fentanyl citrate (PF) 50 mcg Intravenous Q2H PRN GracielaLINDSAY Lee    folic acid 1 mg Oral Daily LINDSAY Handley    furosemide 20 mg Intravenous Q8H LINDSAY Martinez    heparin (porcine) 5,000 Units Subcutaneous [facility] Marcus PRICE PA-C    midodrine 10 mg Oral TID AC LINDSAY Chappell    norepinephrine 1-30 mcg/min Intravenous Titrated Graciela Lakhwinder CRRADHA Last Rate: Stopped (03/11/19 0927)   omeprazole (PRILOSEC) suspension 2 mg/mL 20 mg Oral Daily LINDSAY Chappell    polyethylene glycol 17 g Oral Daily PRN LINDSAY Handley    propofol 5-50 mcg/kg/min Intravenous Titrated Graciela Lakhwinder CRRADHA Last Rate: Stopped (03/11/19 1058)   thiamine 100 mg Oral Daily LINDSAY Handley          dexmedetomidine 0 1-0 7 mcg/kg/hr Last Rate: Stopped (03/11/19 1030)   norepinephrine 1-30 mcg/min Last Rate: Stopped (03/11/19 0927)   propofol 5-50 mcg/kg/min Last Rate: Stopped (03/11/19 0601)         Physical exam:  Vitals: Body mass index is 34 19 kg/m²  Blood pressure 119/65, pulse 80, temperature 98 9 °F (37 2 °C), temperature source Temporal, resp  rate (!) 30, height 5' 8" (1 727 m), weight 102 kg (224 lb 13 9 oz), SpO2 92 %  ,  Temp  Min: 97 °F (36 1 °C)  Max: 102 4 °F (39 1 °C)  IBW: 68 4 kg    SpO2: 92 %  SpO2 Activity: At Rest  O2 Device: Nasal cannula      Intake/Output Summary (Last 24 hours) at 3/12/2019 0529  Last data filed at 3/12/2019 0001  Gross per 24 hour   Intake 2414 93 ml   Output 1795 ml   Net 619 93 ml       Invasive/non-invasive ventilation settings:   Respiratory    Lab Data (Last 4 hours)    None         O2/Vent Data (Last 4 hours)    None              Invasive Devices     Central Venous Catheter Line            CVC Central Lines 03/01/19 Triple 16cm 11 days          Peripheral Intravenous Line            Peripheral IV 03/11/19 Right Antecubital less than 1 day          Arterial Line            Arterial Line 02/28/19 Radial 11 days          Drain            Chest Tube Right 11 days    Chest Tube 2 Right Other (Comment) 8 5 Fr  5 days    Closed/Suction Drain Midline Abdomen Bulb 10 2 Fr  5 days    External Urinary Catheter Medium 1 day                  Physical Exam:  Gen: resting in no acute distress, well appearing  HEENT: atraumatic, PERRL, MMM  Neck: neck supple, no LAD  Chest: Lung slightly diminished on right  Cor: Heart RRR  Abd: Abdomina slightly distended, no TTP  Ext: trace edema throughout  Neuro: A&Ox3, no focal deficits  Skin: No breakdowns      Diagnostic Data:  Lab: I have personally reviewed pertinent lab results  CBC:   Results from last 7 days   Lab Units 03/11/19 0453 03/09/19 0447 03/06/19  0438   WBC Thousand/uL 8 28 9 89 12 74*   HEMOGLOBIN g/dL 7 8* 8 1* 8 6*   HEMATOCRIT % 25 0* 25 6* 26 9*   PLATELETS Thousands/uL 149 121* 107*       CMP:   Results from last 7 days   Lab Units 03/11/19  0453 03/10/19  0447 03/09/19  0447   SODIUM mmol/L 144 143 142   POTASSIUM mmol/L 3 3* 3 0* 3 2*   CHLORIDE mmol/L 110* 108 107   CO2 mmol/L 31 29 29   BUN mg/dL 15 18 19   CREATININE mg/dL 0 79 0 74 0 63   CALCIUM mg/dL 7 4* 7 5* 7 3*     PT/INR:   No results found for: PT, INR,   Magnesium:   Results from last 7 days   Lab Units 03/09/19  0447 03/08/19  0442 03/07/19  0430   MAGNESIUM mg/dL 1 6 1 6 1 8     Phosphorous:       Microbiology:  Results from last 7 days   Lab Units 03/10/19  1537 03/10/19  1536 03/06/19  1519   BLOOD CULTURE  No Growth at 24 hrs   No Growth at 24 hrs   --    GRAM STAIN RESULT   --   --  1+ Polys  No bacteria seen   BODY FLUID CULTURE, STERILE   --   --  No growth       Imaging:  No new    Cardiac lab/EKG/telemetry/ECHO:   No new    VTE Prophylaxis: Heparin and SCDs    Code Status: Level 1 - Full Code    Mohan Rodríguez PA-C    Portions of the record may have been created with voice recognition software  Occasional wrong word or "sound a like" substitutions may have occurred due to the inherent limitations of voice recognition software  Read the chart carefully and recognize, using context, where substitutions have occurred

## 2019-03-13 ENCOUNTER — ANESTHESIA EVENT (INPATIENT)
Dept: RADIOLOGY | Facility: HOSPITAL | Age: 49
DRG: 441 | End: 2019-03-13
Payer: COMMERCIAL

## 2019-03-13 LAB
ANION GAP SERPL CALCULATED.3IONS-SCNC: 9 MMOL/L (ref 4–13)
BUN SERPL-MCNC: 12 MG/DL (ref 5–25)
CALCIUM SERPL-MCNC: 8 MG/DL (ref 8.3–10.1)
CHLORIDE SERPL-SCNC: 102 MMOL/L (ref 100–108)
CO2 SERPL-SCNC: 27 MMOL/L (ref 21–32)
CREAT SERPL-MCNC: 0.56 MG/DL (ref 0.6–1.3)
GFR SERPL CREATININE-BSD FRML MDRD: 123 ML/MIN/1.73SQ M
GLUCOSE SERPL-MCNC: 87 MG/DL (ref 65–140)
HGB BLD-MCNC: 7.4 G/DL (ref 12–17)
INR PPP: 1.5 (ref 0.86–1.17)
MITOCHONDRIA M2 IGG SER-ACNC: <20 UNITS (ref 0–20)
POTASSIUM SERPL-SCNC: 3.1 MMOL/L (ref 3.5–5.3)
PROTHROMBIN TIME: 18.2 SECONDS (ref 11.8–14.2)
SODIUM SERPL-SCNC: 138 MMOL/L (ref 136–145)

## 2019-03-13 PROCEDURE — 85610 PROTHROMBIN TIME: CPT | Performed by: NURSE PRACTITIONER

## 2019-03-13 PROCEDURE — 80048 BASIC METABOLIC PNL TOTAL CA: CPT | Performed by: NURSE PRACTITIONER

## 2019-03-13 PROCEDURE — 0WP8X0Z REMOVAL OF DRAINAGE DEVICE FROM CHEST WALL, EXTERNAL APPROACH: ICD-10-PCS | Performed by: INTERNAL MEDICINE

## 2019-03-13 PROCEDURE — 85018 HEMOGLOBIN: CPT | Performed by: NURSE PRACTITIONER

## 2019-03-13 PROCEDURE — 99233 SBSQ HOSP IP/OBS HIGH 50: CPT | Performed by: INTERNAL MEDICINE

## 2019-03-13 PROCEDURE — 99233 SBSQ HOSP IP/OBS HIGH 50: CPT | Performed by: PHYSICIAN ASSISTANT

## 2019-03-13 PROCEDURE — 97167 OT EVAL HIGH COMPLEX 60 MIN: CPT

## 2019-03-13 PROCEDURE — G8987 SELF CARE CURRENT STATUS: HCPCS

## 2019-03-13 PROCEDURE — G8988 SELF CARE GOAL STATUS: HCPCS

## 2019-03-13 RX ORDER — SACCHAROMYCES BOULARDII 250 MG
250 CAPSULE ORAL 2 TIMES DAILY
Status: DISCONTINUED | OUTPATIENT
Start: 2019-03-13 | End: 2019-03-22 | Stop reason: HOSPADM

## 2019-03-13 RX ORDER — POTASSIUM CHLORIDE 20 MEQ/1
40 TABLET, EXTENDED RELEASE ORAL ONCE
Status: COMPLETED | OUTPATIENT
Start: 2019-03-13 | End: 2019-03-13

## 2019-03-13 RX ORDER — PANTOPRAZOLE SODIUM 40 MG/1
40 TABLET, DELAYED RELEASE ORAL
Status: DISCONTINUED | OUTPATIENT
Start: 2019-03-13 | End: 2019-03-22 | Stop reason: HOSPADM

## 2019-03-13 RX ORDER — POTASSIUM CHLORIDE 14.9 MG/ML
20 INJECTION INTRAVENOUS ONCE
Status: COMPLETED | OUTPATIENT
Start: 2019-03-13 | End: 2019-03-13

## 2019-03-13 RX ADMIN — FOLIC ACID 1 MG: 1 TABLET ORAL at 08:34

## 2019-03-13 RX ADMIN — FUROSEMIDE 20 MG: 10 INJECTION, SOLUTION INTRAMUSCULAR; INTRAVENOUS at 21:16

## 2019-03-13 RX ADMIN — AMPICILLIN SODIUM AND SULBACTAM SODIUM 3 G: 10; 5 INJECTION, POWDER, FOR SOLUTION INTRAVENOUS at 00:10

## 2019-03-13 RX ADMIN — Medication 100 MG: at 08:34

## 2019-03-13 RX ADMIN — POTASSIUM CHLORIDE 20 MEQ: 200 INJECTION, SOLUTION INTRAVENOUS at 08:35

## 2019-03-13 RX ADMIN — AMPICILLIN SODIUM AND SULBACTAM SODIUM 3 G: 10; 5 INJECTION, POWDER, FOR SOLUTION INTRAVENOUS at 13:09

## 2019-03-13 RX ADMIN — HEPARIN SODIUM 5000 UNITS: 5000 INJECTION INTRAVENOUS; SUBCUTANEOUS at 05:39

## 2019-03-13 RX ADMIN — HEPARIN SODIUM 5000 UNITS: 5000 INJECTION INTRAVENOUS; SUBCUTANEOUS at 21:20

## 2019-03-13 RX ADMIN — PANTOPRAZOLE SODIUM 40 MG: 40 TABLET, DELAYED RELEASE ORAL at 08:34

## 2019-03-13 RX ADMIN — AMPICILLIN SODIUM AND SULBACTAM SODIUM 3 G: 10; 5 INJECTION, POWDER, FOR SOLUTION INTRAVENOUS at 18:01

## 2019-03-13 RX ADMIN — Medication 250 MG: at 18:01

## 2019-03-13 RX ADMIN — Medication 250 MG: at 10:56

## 2019-03-13 RX ADMIN — POTASSIUM CHLORIDE 40 MEQ: 1500 TABLET, EXTENDED RELEASE ORAL at 08:33

## 2019-03-13 RX ADMIN — AMPICILLIN SODIUM AND SULBACTAM SODIUM 3 G: 10; 5 INJECTION, POWDER, FOR SOLUTION INTRAVENOUS at 05:39

## 2019-03-13 RX ADMIN — MELATONIN TAB 3 MG 6 MG: 3 TAB at 21:19

## 2019-03-13 RX ADMIN — FUROSEMIDE 20 MG: 10 INJECTION, SOLUTION INTRAMUSCULAR; INTRAVENOUS at 08:35

## 2019-03-13 RX ADMIN — HEPARIN SODIUM 5000 UNITS: 5000 INJECTION INTRAVENOUS; SUBCUTANEOUS at 13:12

## 2019-03-13 NOTE — PROGRESS NOTES
Met with patient and family to discuss rehab options when patient is medically appropriate for discharge  At this time they feel as though they would prefer to d/c to an acute rehab, however are unsure of which facility they would prefer as their top choice  Both the patient and family at this time would like to speak with "Jw Brian" who they state is his PCP as well as long time family friend and get his opinion before providing any choices  They also stated that they are concerned with "coming in with no insurance", and are aware that this may limit where they can discharge to for rehab  At this time they are going to reach out to family and provide choices in the near future  CM made aware

## 2019-03-13 NOTE — PROGRESS NOTES
Progress Note - Urology  Malcolm Blanco 1970, 53 y o  male MRN: [id number]    Unit/Bed#: ICU 06 Encounter: [id number]    Acute retention of urine  Assessment & Plan  S/P Bedside SPT  Patient removed this himself  Unfortunately with the dislodging of the suprapubic tube, hematuria overnight, and PVR nearly 600 cc, he is in retention again  Plan: Due to history of falls passage in inability to pass a catheter, even with cystoscopic guidance, will need IR placement of suprapubic tube  I discussed this with Dr Liliam Mittal of Interventional Radiology, the patient, and his family present in the room  Will be made NPO after midnight  I discussed with Naina Bacon in the ICU  Interventional radiology recommends vitamin K tonight or FFP tomorrow if INR above 1 5-1 9 in anticipation of his procedure later in the day tomorrow  Bedside rounds performed with  RN  Discussed with Dr Joycelyn Craft in ICU  Urology will continue to follow  Subjective/Objective     Subjective:   CC: "I feel pretty good today, actually!"  HPI:  Urology is asked to re-evaluate him  Nursing reports some gross hematuria overnight with a quantity of bright red blood draining from his urethra  This was self limited, but following this, he was bladder scanned for almost 600 cc  Denies any abdominal pain, and feels he is voiding well  He has no other complaints today, other than feeling tired  He is ambulating better and out of the bed to the chair in the intensive care unit  In summary, his prior urologic intervention during this hospitalization included bedside cystoscopy with inability to find a true passage to his bladder and subsequent bedside suprapubic catheter placement under ultrasound guidance on 3/1/2019 by Dr Jannette Springer   Suprapubic tube was maintained and then dislodged earlier this week when the patient was being extubated    He was then voiding of his own volition with low postvoid residuals and outpatient follow-up was recommended for imaging on his urethra  ROS:  Review of Systems   Constitutional: Negative for activity change and appetite change  HENT: Negative for congestion and ear pain  Eyes: Negative for pain  Respiratory: Negative for cough and shortness of breath  Cardiovascular: Negative for chest pain and palpitations  Gastrointestinal: Negative for abdominal distention, abdominal pain, blood in stool, constipation, diarrhea and nausea  Genitourinary: Positive for decreased urine volume and hematuria  Negative for difficulty urinating and dysuria  Musculoskeletal: Negative for arthralgias and myalgias  Skin: Negative for rash  Allergic/Immunologic: Negative for immunocompromised state  Neurological: Negative for dizziness and headaches  Hematological: Negative for adenopathy  Does not bruise/bleed easily  Psychiatric/Behavioral: Negative for agitation  The patient is not nervous/anxious  Objective:  Vitals: Blood pressure 115/65, pulse 86, temperature 99 2 °F (37 3 °C), temperature source Temporal, resp  rate (!) 28, height 5' 8" (1 727 m), weight 102 kg (224 lb 13 9 oz), SpO2 92 %  ,Body mass index is 34 19 kg/m²  Intake/Output Summary (Last 24 hours) at 3/13/2019 1803  Last data filed at 3/13/2019 1532  Gross per 24 hour   Intake 200 ml   Output 760 ml   Net -560 ml     Invasive Devices     Peripheral Intravenous Line            Peripheral IV 03/12/19 Left Antecubital less than 1 day          Drain            Chest Tube 2 Right Other (Comment) 8 5 Fr  7 days    Closed/Suction Drain Midline Abdomen Bulb 10 2 Fr  7 days                Physical Exam   Constitutional: He is oriented to person, place, and time  He appears well-developed and well-nourished  He is cooperative  He does not appear ill  No distress  52-year-old male, out of bed to the chair in the critical care unit with nasal cannula oxygen in place  Appears follow and jaundice  Pleasant, alert    Oriented to person place and time  Answering questions appropriately  Family member at the bedside for history and exam    HENT:   Head: Normocephalic and atraumatic  Moist mucous membranes  Eyes: Conjunctivae and EOM are normal    Neck: Normal range of motion  Neck supple  No tracheal deviation present  Cardiovascular: Normal rate, regular rhythm and normal heart sounds  No murmur heard  Pulmonary/Chest: Effort normal and breath sounds normal  No respiratory distress  He has no wheezes  Good airflow bilaterally on deep inspiration  Abdominal: Soft  Bowel sounds are normal  He exhibits no distension and no mass  There is no tenderness  Abdomen obese, nontender  No appreciated suprapubic fullness  Musculoskeletal: Normal range of motion  He exhibits no edema  Neurological: He is alert and oriented to person, place, and time  Skin: Skin is warm and dry  No rash noted  He is not diaphoretic  No erythema  No pallor  Psychiatric: He has a normal mood and affect  His behavior is normal  Judgment and thought content normal    Nursing note and vitals reviewed  History:  No past medical history on file  Past Surgical History:   Procedure Laterality Date    IR ABSCESS/SEROMA DRAINAGE  2/28/2019    IR CHEST TUBE  3/6/2019     No family history on file  Social History     Socioeconomic History    Marital status: Single     Spouse name: Not on file    Number of children: Not on file    Years of education: Not on file    Highest education level: Not on file   Occupational History    Not on file   Social Needs    Financial resource strain: Not on file    Food insecurity:     Worry: Not on file     Inability: Not on file    Transportation needs:     Medical: Not on file     Non-medical: Not on file   Tobacco Use    Smoking status: Former Smoker    Smokeless tobacco: Never Used   Substance and Sexual Activity    Alcohol use:  Yes    Drug use: Not on file    Sexual activity: Not on file Lifestyle    Physical activity:     Days per week: Not on file     Minutes per session: Not on file    Stress: Not on file   Relationships    Social connections:     Talks on phone: Not on file     Gets together: Not on file     Attends [employer] service: Not on file     Active member of club or organization: Not on file     Attends meetings of clubs or organizations: Not on file     Relationship status: Not on file    Intimate partner violence:     Fear of current or ex partner: Not on file     Emotionally abused: Not on file     Physically abused: Not on file     Forced sexual activity: Not on file   Other Topics Concern    Not on file   Social History Narrative    Not on file       Imaging:  [facility]  Imaging reviewed - both report and images personally reviewed       Labs:  Recent Labs     03/11/19  0453   WBC 8 28       Recent Labs     03/11/19  0453 03/13/19  0614   HGB 7 8* 7 4*     Recent Labs     03/11/19  0453   HCT 25 0*     Recent Labs     03/11/19  0453 03/12/19  0532 03/13/19  0447   CREATININE 0 79 0 52* 0 56*       Edvin Montejo PA-C  Date: 3/13/2019 Time: 6:03 PM

## 2019-03-13 NOTE — ANESTHESIA PREPROCEDURE EVALUATION
Review of Systems/Medical History  Patient summary reviewed  Chart reviewed      Cardiovascular   Pulmonary  Smoker ex-smoker  , COPD ,   Comment:   Hydropneumothorax     Pt was intubated, extubated on 3/11  Still with one chest tube     GI/Hepatic    GERD , Liver disease , cirrhosis, Chronic liver disease,   Comment: Liver abcess  Alcohol abuse     Negative  ROS        Endo/Other  Negative endo/other ROS      GYN       Hematology  Anemia ,    Comment: Severe sepsis Musculoskeletal    Comment: anasarca      Neurology      Comment: Prop infusion Psychology       Comment: ETOH abuse         Physical Exam    Airway  Comment: intubated  Mallampati score: II  TM Distance: >3 FB  Neck ROM: full     Dental   No notable dental hx     Cardiovascular  Rhythm: regular, Rate: normal, Murmur, Cardiovascular exam normal    Pulmonary  Pulmonary exam normal Breath sounds clear to auscultation,     Other Findings        Anesthesia Plan  ASA Score- 4     Anesthesia Type- general and IV sedation with anesthesia with ASA Monitors  Additional Monitors:   Airway Plan: ETT and LMA  Comment: Will speak with IR physician preop about plan  Would be ideal to do under sedation if possible   Considering patient's Hx emphysema and recent extubation on 3/11/19  Patient ok with sedation but  He is also aware that LMA or ETT may be necessary        Plan Factors-Patient not instructed to abstain from smoking on day of procedure  Patient did not smoke on day of surgery  Induction- intravenous  Postoperative Plan- Plan for postoperative opioid use  Informed Consent- Anesthetic plan and risks discussed with patient

## 2019-03-13 NOTE — PROGRESS NOTES
Progress Note - Clark 53 y o  male MRN: [id number]  Unit/Bed#: ICU 06 Encounter: [id number]    Assessment/Plan:  1  Severe sepsis with septic shock likely related to a liver abscess  · Continue Unasyn per Infectious Disease recommendations  2  Acute hypoxic respiratory failure secondary to 1  And right-sided empyema status post chest tube drainage x2  · Will continue to encourage out of bed, cough, deep breathing and incentive spirometer  · Continue chest tube drainage for now  3  Liver abscess status post IR drainage  · Drain remains in check and will be re-evaluated in a week for possible drain removal   There are repeat CT scan demonstrates that the abscess is smaller however it are is evidence of loculation and persistent abscess  · Will discuss with IR regarding the benefit of replacing the current drainage catheter is in the loculated abscess cavity that remains  4  Right-sided empyema likely related to 3  · A repeat CT scan of the chest was performed to evaluate the right-sided empyema  There remains evidence of some persistent right-sided empyema however the effusion is much less  Additionally there is consolidation in the right middle and right lower lobe  · Will need to discuss options regarding the management of this residual empyema  5  Subacute versus chronic appendicitis which may be a contributing factor to the development of number 1  · Will need follow-up with the surgical team for Eventual appendectomy  6  Urethral trauma status post upper pubic catheter-improved with hematuria this morning  · Will discuss the episode of hematuria with Urology  He may need to undergo cystoscopy  · Continue to monitor postvoid residual  7  Alcoholic cirrhosis  · Will continue thiamine and folate for now  · Would encourage alcohol cessation   8  Hypokalemia-this will be repleted    Critical Care Time:   Documented critical care time excludes any procedures documented elsewhere   It also excludes any family updates    _____________________________________________________________________    HPI/24hr events:   Unit episode of hematuria with clot this morning  This was not apparent after his self discontinuation of his suprapubic catheter until today  Medications:    Current Facility-Administered Medications:  ampicillin-sulbactam 3 g Intravenous Q6H Nicolas Escobedo MD Last Rate: 3 g (10/75/92 1645)   folic acid 1 mg Oral Daily LINDSAY Rea    furosemide 20 mg Intravenous Q12H Hodo Ty MD    heparin (porcine) 5,000 Units Subcutaneous [facility] Kat PRICE PA-C    melatonin 6 mg Oral HS LINDSAY Caballero    omeprazole (PRILOSEC) suspension 2 mg/mL 20 mg Oral Daily LINDSAY Chappell    polyethylene glycol 17 g Oral Daily PRN LINDSAY Rea    thiamine 100 mg Oral Daily LINDSAY Rea               Physical exam:  Vitals: Body mass index is 34 19 kg/m²  Blood pressure 134/71, pulse 92, temperature 99 9 °F (37 7 °C), temperature source Temporal, resp  rate (!) 35, height 5' 8" (1 727 m), weight 102 kg (224 lb 13 9 oz), SpO2 94 %  ,  Temp  Min: 97 °F (36 1 °C)  Max: 102 4 °F (39 1 °C)  IBW: 68 4 kg    SpO2: 94 %  SpO2 Activity: At Rest  O2 Device: Nasal cannula      Intake/Output Summary (Last 24 hours) at 3/13/2019 0559  Last data filed at 3/13/2019 0546  Gross per 24 hour   Intake 700 ml   Output 2300 ml   Net -1600 ml       Invasive/non-invasive ventilation settings:   Respiratory    Lab Data (Last 4 hours)    None         O2/Vent Data (Last 4 hours)    None              Invasive Devices     Peripheral Intravenous Line            Peripheral IV 03/12/19 Left Antecubital less than 1 day          Drain            Chest Tube Right 12 days    Chest Tube 2 Right Other (Comment) 8 5 Fr  6 days    Closed/Suction Drain Midline Abdomen Bulb 10 2 Fr  6 days                  Physical Exam:  Gen:  Awake and alert, appears ill but not toxic  HEENT:  Pupils are equal round and reactive to light  Neck:  Supple negative for lymphadenopathy  Chest:  Diminished in the bases bilaterally  The right chest tube sites are dressed  Cor:  Regular rate and rhythm  Abd:  Distended but soft without significant tenderness  Ext:  Generalized edema  Neuro:  Awake and alert, able to move his extremities  Skin:  Warm and dry      Diagnostic Data:  Lab: I have personally reviewed pertinent lab results  CBC:   Results from last 7 days   Lab Units 03/11/19  0453 03/09/19  0447   WBC Thousand/uL 8 28 9 89   HEMOGLOBIN g/dL 7 8* 8 1*   HEMATOCRIT % 25 0* 25 6*   PLATELETS Thousands/uL 149 121*       CMP:   Results from last 7 days   Lab Units 03/13/19  0447 03/12/19  0532 03/11/19  0453   SODIUM mmol/L 138 141 144   POTASSIUM mmol/L 3 1* 2 9* 3 3*   CHLORIDE mmol/L 102 104 110*   CO2 mmol/L 27 30 31   BUN mg/dL 12 12 15   CREATININE mg/dL 0 56* 0 52* 0 79   CALCIUM mg/dL 8 0* 7 8* 7 4*   ALK PHOS U/L  --  80  --    ALT U/L  --  6*  --    AST U/L  --  27  --      PT/INR:   No results found for: PT, INR,   Magnesium:   Results from last 7 days   Lab Units 03/09/19  0447 03/08/19  0442 03/07/19  0430   MAGNESIUM mg/dL 1 6 1 6 1 8     Phosphorous:       Microbiology:  Results from last 7 days   Lab Units 03/10/19  1537 03/10/19  1536 03/06/19  1519   BLOOD CULTURE  No Growth at 48 hrs  No Growth at 48 hrs   --    GRAM STAIN RESULT   --   --  1+ Polys  No bacteria seen   BODY FLUID CULTURE, STERILE   --   --  No growth       Imaging:      Cardiac lab/EKG/telemetry/ECHO:       VTE Prophylaxis:  Heparin    Code Status: Level 1 - Full Code    LINDSAY Garrett    Portions of the record may have been created with voice recognition software  Occasional wrong word or "sound a like" substitutions may have occurred due to the inherent limitations of voice recognition software  Read the chart carefully and recognize, using context, where substitutions have occurred

## 2019-03-13 NOTE — PROGRESS NOTES
Progress Note - ICU Transfer to [facility]   Cecy Smith 53 y o  male MRN: [id number]  [facility]   Unit/Bed#: ICU 06 Encounter: [id number]    Code Status: Level 1 - Full Code  POA:    Referring Physician: Dr Kathy Burkett  Accepting Physician: Dr Flaca Reed  _____________________________________________________________________    Reason for ICU/SD admission: liver abscess with septic shock    History of Present Illness:  53year old male with no significant past medical history presented to the ER after being evaluated at PCP office  He has had no follow up until recently he was evaluated by Dr Gurjit Win who did outpatient laboratory work that showed thrombocytopenia, and hyponatremia  He was complaining of a 30lb weight loss, decreased appetite, diarrhea, and abdominal pain  Due to these was sent for direct admission to the hospital      Since [place] has lost 30 lbs and complaining of abdominal bloating  Once admitted was found to have fluid filled and dilated appendix, hepatic cirrhosis and splenomegaly, and a liver abscess  He was admitted to the medical floor with consulting services  Summary of clinical course: While on the medical floor was evaluated by GI for possible liver abscess and there was concern for possible liver mass  Upon further evaluation was believed to be an abscess which required a OLIVIA drain placement  On initially CT had possible appendicitis and evaluated by surgery that did not feel surgical intervention was necessary  Patient was initially managed on the medical floor and transferred to the stepdown unit due to respiratory distress and sepsis  When patient arrived he was hypoxic in respiratory distress requiring BiPAP then was electively intubated prior to the patient going to IR for liver abscess drain  He was hypotensive requiring a central line and IV pressors  He was continued on broad spectrum antibiotics with a consult to infectious disease   Once back from IR had chest x-ray that revealed a right sided pleural effusion requiring chest tube drainage secondary to empyema  During hospital stay a second chest tube was placed to assist with proper drainage  He required multiple doses of TPA to the chest tube with adequate drainage  As per his liver drain he went to IR for tube checks and is scheduled for repeat tube chest in 1 week  He maintained on mechanical ventilation after his procedures and was difficult to wean from the ventilator due to tachypnea  He was extubated on 3/11 and has been on nasal cannula since then  As per his hypotension has resolved once treated on antibiotics  His antibiotics are managed by infectious disease and plan to stay on antibiotics until live abscess completely drained  He had a repeat CT c/a/p that showed improvement of the liver abscess and compressive atelectasis of the right lung  Patient is now ambulating and working with physical therapy  We removed a chest tube today that had no output  Continue with the first chest tube that was placed  Will continue with OLIVIA liver abscess drain  Did have a history of alcohol abuse but has not been an issue since admission  During admission had a urethral trauma s/p rendon catheter insertion which required a suprapubic catheter  Patient pulled out the catheter on 3/11 and has been able to void independently  He had hematuria overnight and urology was notified      Consultants: Urology, Surgery, Infectious disease, & GI  _____________________________________________________________________    Diagnostic Data:  CBC:  Results from last 7 days   Lab Units 03/13/19  0614 03/11/19  0453   WBC Thousand/uL  --  8 28   HEMOGLOBIN g/dL 7 4* 7 8*   HEMATOCRIT %  --  25 0*   PLATELETS Thousands/uL  --  149      CMP:   Lab Results   Component Value Date    SODIUM 138 03/13/2019    K 3 1 (L) 03/13/2019     03/13/2019    CO2 27 03/13/2019    BUN 12 03/13/2019    CREATININE 0 56 (L) 03/13/2019 CALCIUM 8 0 (L) 03/13/2019    EGFR 123 03/13/2019   ,   PT/INR:   Lab Results   Component Value Date    INR 1 50 (H) 03/13/2019   ,   Magnesium: No components found for: MAG,  Phosphorous: No results found for: PHOS    ABG: No results found for: PHART, LHR7HYK, PO2ART, SPE4DOZ, T5YTUJKH, BEART, SOURCE,     Microbiology:  Results from last 7 days   Lab Units 03/10/19  1537 03/10/19  1536 03/06/19  1519   BLOOD CULTURE  No Growth at 48 hrs  No Growth at 48 hrs   --    GRAM STAIN RESULT   --   --  1+ Polys  No bacteria seen   BODY FLUID CULTURE, STERILE   --   --  No growth       Imaging: I have personally reviewed the pertinent imaging studies on the PACS system      Cardiac/EKG/telemetry/Echo:          _____________________________________________________________________    Recent or scheduled procedures: IR in 1 week for tube check  3/13 w/d chest tube    Outstanding/pending diagnostics:       Mobilization Plan: pt/ot    Home medications that are not reordered and reason why:      Spoke with Dr Mike Juarez  regarding transfer  Please call [phone] with any questions or concerns  Portions of the record may have been created with voice recognition software  Occasional wrong word or "sound a like" substitutions may have occurred due to the inherent limitations of voice recognition software  Read the chart carefully and recognize, using context, where substitutions have occurred      Lakisha Downs rash

## 2019-03-13 NOTE — PLAN OF CARE
Problem: Nutrition/Hydration-ADULT  Goal: Nutrient/Hydration intake appropriate for improving, restoring or maintaining nutritional needs  Description  Monitor and assess patient's nutrition/hydration status for malnutrition (ex- brittle hair, bruises, dry skin, pale skin and conjunctiva, muscle wasting, smooth red tongue, and disorientation)  Collaborate with interdisciplinary team and initiate plan and interventions as ordered  Monitor patient's weight and dietary intake as ordered or per policy  Utilize nutrition screening tool and intervene per policy  Determine patient's food preferences and provide high-protein, high-caloric foods as appropriate       INTERVENTIONS:  - Monitor oral intake, urinary output, labs, and treatment plans  - Assess nutrition and hydration status and recommend course of action  - Evaluate amount of meals eaten  - Assist patient with eating if necessary   - Allow adequate time for meals  - Recommend/ encourage appropriate diets, oral nutritional supplements, and vitamin/mineral supplements  - Order, calculate, and assess calorie counts as needed  - Recommend, monitor, and adjust tube feedings and TPN/PPN based on assessed needs  - Assess need for intravenous fluids  - Provide specific nutrition/hydration education as appropriate  - Include patient/family/caregiver in decisions related to nutrition  Outcome: Progressing     Problem: PAIN - ADULT  Goal: Verbalizes/displays adequate comfort level or baseline comfort level  Description  Interventions:  - Encourage patient to monitor pain and request assistance  - Assess pain using appropriate pain scale  - Administer analgesics based on type and severity of pain and evaluate response  - Implement non-pharmacological measures as appropriate and evaluate response  - Consider cultural and social influences on pain and pain management  - Notify physician/advanced practitioner if interventions unsuccessful or patient reports new pain  Outcome: Progressing     Problem: INFECTION - ADULT  Goal: Absence or prevention of progression during hospitalization  Description  INTERVENTIONS:  - Assess and monitor for signs and symptoms of infection  - Monitor lab/diagnostic results  - Monitor all insertion sites, i e  indwelling lines, tubes, and drains  - Monitor endotracheal (as able) and nasal secretions for changes in amount and color  - [place] appropriate cooling/warming therapies per order  - Administer medications as ordered  - Instruct and encourage patient and family to use good hand hygiene technique  - Identify and instruct in appropriate isolation precautions for identified infection/condition  Outcome: Progressing     Problem: SAFETY ADULT  Goal: Patient will remain free of falls  Description  INTERVENTIONS:  - Assess patient frequently for physical needs  -  Identify cognitive and physical deficits and behaviors that affect risk of falls    -  [place] fall precautions as indicated by assessment   - Educate patient/family on patient safety including physical limitations  - Instruct patient to call for assistance with activity based on assessment  - Modify environment to reduce risk of injury  - Consider OT/PT consult to assist with strengthening/mobility  Outcome: Progressing  Goal: Maintain or return to baseline ADL function  Description  INTERVENTIONS:  -  Assess patient's ability to carry out ADLs; assess patient's baseline for ADL function and identify physical deficits which impact ability to perform ADLs (bathing, care of mouth/teeth, toileting, grooming, dressing, etc )  - Assess/evaluate cause of self-care deficits   - Assess range of motion  - Assess patient's mobility; develop plan if impaired  - Assess patient's need for assistive devices and provide as appropriate  - Encourage maximum independence but intervene and supervise when necessary  ¯ Involve family in performance of ADLs  ¯ Assess for home care needs following discharge ¯ Request OT consult to assist with ADL evaluation and planning for discharge  ¯ Provide patient education as appropriate  Outcome: Progressing  Goal: Maintain or return mobility status to optimal level  Description  INTERVENTIONS:  - Assess patient's baseline mobility status (ambulation, transfers, stairs, etc )    - Identify cognitive and physical deficits and behaviors that affect mobility  - Identify mobility aids required to assist with transfers and/or ambulation (gait belt, sit-to-stand, lift, walker, cane, etc )  - [place] fall precautions as indicated by assessment  - Record patient progress and toleration of activity level on Mobility SBAR; progress patient to next Phase/Stage  - Instruct patient to call for assistance with activity based on assessment  - Request Rehabilitation consult to assist with strengthening/weightbearing, etc   Outcome: Progressing     Problem: DISCHARGE PLANNING  Goal: Discharge to home or other facility with appropriate resources  Description  INTERVENTIONS:  - Identify barriers to discharge w/patient and caregiver  - Arrange for needed discharge resources and transportation as appropriate  - Identify discharge learning needs (meds, wound care, etc )  - Arrange for interpretive services to assist at discharge as needed  - Refer to Case Management Department for coordinating discharge planning if the patient needs post-hospital services based on physician/advanced practitioner order or complex needs related to functional status, cognitive ability, or social support system  Outcome: Progressing     Problem: Knowledge Deficit  Goal: Patient/family/caregiver demonstrates understanding of disease process, treatment plan, medications, and discharge instructions  Description  Complete learning assessment and assess knowledge base    Interventions:  - Provide teaching at level of understanding  - Provide teaching via preferred learning methods  Outcome: Progressing     Problem: Prexisting or High Potential for Compromised Skin Integrity  Goal: Skin integrity is maintained or improved  Description  INTERVENTIONS:  - Identify patients at risk for skin breakdown  - Assess and monitor skin integrity  - Assess and monitor nutrition and hydration status  - Monitor labs (i e  albumin)  - Assess for incontinence   - Turn and reposition patient  - Assist with mobility/ambulation  - Relieve pressure over bony prominences  - Avoid friction and shearing  - Provide appropriate hygiene as needed including keeping skin clean and dry  - Evaluate need for skin moisturizer/barrier cream  - Collaborate with interdisciplinary team (i e  Nutrition, Rehabilitation, etc )   - Patient/family teaching  Outcome: Progressing     Problem: DISCHARGE PLANNING - CARE MANAGEMENT  Goal: Discharge to post-acute care or home with appropriate resources  Description  INTERVENTIONS:  - Conduct assessment to determine patient/family and health care team treatment goals, and need for post-acute services based on payer coverage, community resources, and patient preferences, and barriers to discharge  - Address psychosocial, clinical, and financial barriers to discharge as identified in assessment in conjunction with the patient/family and health care team  - Arrange appropriate level of post-acute services according to patient's   needs and preference and payer coverage in collaboration with the physician and health care team  - Communicate with and update the patient/family, physician, and health care team regarding progress on the discharge plan  - Arrange appropriate transportation to post-acute venues   Outcome: Progressing     Problem: Potential for Falls  Goal: Patient will remain free of falls  Description  INTERVENTIONS:  - Assess patient frequently for physical needs  -  Identify cognitive and physical deficits and behaviors that affect risk of falls    -  [place] fall precautions as indicated by assessment   - Educate patient/family on patient safety including physical limitations  - Instruct patient to call for assistance with activity based on assessment  - Modify environment to reduce risk of injury  - Consider OT/PT consult to assist with strengthening/mobility  Outcome: Progressing     Problem: CARDIOVASCULAR - ADULT  Goal: Maintains optimal cardiac output and hemodynamic stability  Description  INTERVENTIONS:  - Monitor I/O, vital signs and rhythm  - Monitor for S/S and trends of decreased cardiac output i e  bleeding, hypotension  - Administer and titrate ordered vasoactive medications to optimize hemodynamic stability  - Assess quality of pulses, skin color and temperature  - Assess for signs of decreased coronary artery perfusion - ex   Angina  - Instruct patient to report change in severity of symptoms  Outcome: Progressing     Problem: RESPIRATORY - ADULT  Goal: Achieves optimal ventilation and oxygenation  Description  INTERVENTIONS:  - Assess for changes in respiratory status  - Assess for changes in mentation and behavior  - Position to facilitate oxygenation and minimize respiratory effort  - Oxygen administration by appropriate delivery method based on oxygen saturation (per order) or ABGs  - Initiate smoking cessation education as indicated  - Encourage broncho-pulmonary hygiene including cough, deep breathe, Incentive Spirometry  - Assess the need for suctioning and aspirate as needed  - Assess and instruct to report SOB or any respiratory difficulty  - Respiratory Therapy support as indicated  Outcome: Progressing     Problem: GENITOURINARY - ADULT  Goal: Maintains or returns to baseline urinary function  Description  INTERVENTIONS:  - Assess urinary function  - Encourage oral fluids to ensure adequate hydration  - Administer IV fluids as ordered to ensure adequate hydration  - Administer ordered medications as needed  - Offer frequent toileting  - Follow urinary retention protocol if ordered  Outcome: Progressing  Goal: Urinary catheter remains patent  Description  INTERVENTIONS:  - Assess patency of urinary catheter  - If patient has a chronic rendon, consider changing catheter if non-functioning  - Follow guidelines for intermittent irrigation of non-functioning urinary catheter  Outcome: Progressing     Problem: METABOLIC, FLUID AND ELECTROLYTES - ADULT  Goal: Electrolytes maintained within normal limits  Description  INTERVENTIONS:  - Monitor labs and assess patient for signs and symptoms of electrolyte imbalances  - Administer electrolyte replacement as ordered  - Monitor response to electrolyte replacements, including repeat lab results as appropriate  - Instruct patient on fluid and nutrition as appropriate  Outcome: Progressing     Problem: SKIN/TISSUE INTEGRITY - ADULT  Goal: Skin integrity remains intact  Description  INTERVENTIONS  - Identify patients at risk for skin breakdown  - Assess and monitor skin integrity  - Assess and monitor nutrition and hydration status  - Monitor labs (i e  albumin)  - Assess for incontinence   - Turn and reposition patient  - Assist with mobility/ambulation  - Relieve pressure over bony prominences  - Avoid friction and shearing  - Provide appropriate hygiene as needed including keeping skin clean and dry  - Evaluate need for skin moisturizer/barrier cream  - Collaborate with interdisciplinary team (i e  Nutrition, Rehabilitation, etc )   - Patient/family teaching  Outcome: Progressing  Goal: Incision(s), wounds(s) or drain site(s) healing without S/S of infection  Description  INTERVENTIONS  - Assess and document risk factors for skin impairment   - Assess and document dressing, incision, wound bed, drain sites and surrounding tissue  - Initiate Nutrition services consult and/or wound management as needed  Outcome: Progressing  Goal: Oral mucous membranes remain intact  Description  INTERVENTIONS  - Assess oral mucosa and hygiene practices  - Implement preventative oral hygiene regimen  - Implement oral medicated treatments as ordered  - Initiate Nutrition services referral as needed  Outcome: Progressing

## 2019-03-13 NOTE — PHYSICAL THERAPY NOTE
Addendum to add in PLOF and home setup received from significant other  Avtar Gilliland, PT,DPT                                                                                  PHYSICAL THERAPY NOTE          Patient Name: Malcolm Blanco  [id number] Date: 3/13/2019  PT consult received  PT attempted evaluation, but patient's significant other reports that he is just fatigued and may be best for therapy try back later  PT will continue to f/u for evaluation as schedule permits  Pt lives with significant other in 2 level home w/ 4 CARMEN and railing in back and 9-10 CARMEN w/ railing 1/2 the stairs at the front  Inside the home there is 4+landing+11 steps with railing to 2nd floor  Pt's bed and bathroom is on the second floor  PTA pt was (I) w/ all mobility w/o AD  He has no bathroom or functional mobility DME  He was driving and no recent falls  He recently left a job in January and was anticipating to start another one, but then got sick  He is currently unemployed and working on getting insurance coverage  Pt will be home alone at night for about 5 days/week at discharge while significant other works          Avtar Gilliland, PT,DPT

## 2019-03-13 NOTE — PROGRESS NOTES
Progress Note - Infectious Disease   Lorena Sanchez 53 y o  male MRN: [id number]  Unit/Bed#: ICU 06 Encounter: [id number]         Impression/Plan:  1  Severe sepsis-fever, leukocytosis, lactic acidosis   Likely secondary to liver abscess and right-sided empyema   Admission and repeat blood cultures were all negative  Blood pressures remain stable  Fevers remain improved    No leukocytosis   Procalcitonin has now nearly normalized  Most recent blood cultures from 03/10 remain negative   -antibiotic plan as below  -monitor CBC with diff and creatinine  -monitor temperatures and hemodynamics     2  Liver abscess-suspect a portal source of infection with the possible appendicitis   No perforation seen on initial CT the abdomen and pelvis   No clear biliary source   Now status post IR drain placement   Cultures all remain negative   Repeat CT abdomen shows significantly decreased size of abscess   Drain was reassessed by IR on 03/06 and is in proper position   No resistant organisms have been isolated, and I suspect this reflects difficult to grow organisms after the patient had already received some antibiotics such as anaerobes and streptococcal species   He seems to be tolerating IV Unasyn  Repeat CT shows decreased size of abscess, but has not yet resolved  IR re-evaluated drain with nearly resolved collection   -continue Unasyn at current dose  -monitor drain output  Follow-up drain check in 1 week per IR  -GI follow-up  -likely plan for 3 week course of IV antibiotic for treatment of empyema followed by oral antibiotic until radiographic resolution of liver abscess      3  Right-sided empyema   Likely secondary to liver abscess  Status post placement of chest tubes x2  All pleural fluid cultures have been negative  Most recent CT chest concerning for ongoing empyema    One chest tube was removed today   -antibiotic plan as above  -chest tube management per critical care service  -monitor chest tube output  -if empyema does not resolve with adequate drainage, may require CT surgery evaluation     4  Acute kidney injury-suspect multifactorial including pre renal issues and possibly sepsis   ATN may be playing a role   Creatinine has improved and remains normal   Urine output remains good  -monitor creatinine clearance closely  -volume management     5  Alcohol abuse-with newly diagnosed cirrhosis this admission, portal hypertension based upon the CT findings   -continue abstinence  -GI follow-up ongoing     6  Probable chronic appendicitis   This may be the etiology for development of liver abscess/empyema   Recent surgery evaluation noted   Patient will ultimately require appendectomy  Surgery follow-up      7  Urethral trauma status post suprapubic catheter      8  Acute hypoxic respiratory failure   In the setting of sepsis, right sided empyema   Patient remains extubated and doing well from a respiratory standpoint  9  Diarrhea  Patient is having loose bowel movements  May be associated with tube feeds, lack of solid food, antibiotic  No associated abdominal pain  No leukocytosis  Low clinical suspicion for C diff  Diet is being advanced today  Will continue to monitor stool output for now         Antibiotics:  Unasyn 5  Antibiotics 14     Discussed with critical care service and with patient's family at bedside          Subjective:  One chest tube was pulled this morning  Patient able to ambulate and now sitting comfortably in chair  He has had frequent loose bowel movements  Denies abdominal pain  No nausea, vomiting  Has not eaten any solid food yet  No fevers or chills      Objective:  Vitals:  Temp:  [98 4 °F (36 9 °C)-99 9 °F (37 7 °C)] 99 2 °F (37 3 °C)  HR:  [76-98] 86  Resp:  [26-37] 28  BP: ()/(51-73) 115/62  SpO2:  [92 %-96 %] 95 %  Temp (24hrs), Av °F (37 2 °C), Min:98 4 °F (36 9 °C), Max:99 9 °F (37 7 °C)  Current: Temperature: 99 2 °F (37 3 °C)    Physical Exam: General:  No acute distress  Eyes:  Conjunctive clear with no hemorrhages or effusions  Oropharynx:  No ulcers, no lesions  Neck:  Supple, no lymphadenopathy  Lungs:  Decreased right basilar breath sounds, no accessory muscle use  Chest tube x1 in place  Cardiac:  Regular rate and rhythm, no murmurs  Abdomen:  Soft, non-tender, non-distented  OLIVIA drain in place  Suprapubic catheter with clear yellow urine  Extremities:  Stable mild edema  Skin:  No rashes, no ulcers  Neurological:  Moves all four extremities spontaneously    Lab Results:  I have personally reviewed pertinent labs  Results from last 7 days   Lab Units 03/13/19  0447 03/12/19  0532 03/11/19  0453   POTASSIUM mmol/L 3 1* 2 9* 3 3*   CHLORIDE mmol/L 102 104 110*   CO2 mmol/L 27 30 31   BUN mg/dL 12 12 15   CREATININE mg/dL 0 56* 0 52* 0 79   EGFR ml/min/1 73sq m 123 127 107   CALCIUM mg/dL 8 0* 7 8* 7 4*   AST U/L  --  27  --    ALT U/L  --  6*  --    ALK PHOS U/L  --  80  --      Results from last 7 days   Lab Units 03/13/19  0614 03/11/19  0453 03/09/19  0447   WBC Thousand/uL  --  8 28 9 89   HEMOGLOBIN g/dL 7 4* 7 8* 8 1*   PLATELETS Thousands/uL  --  149 121*     Results from last 7 days   Lab Units 03/10/19  1537 03/10/19  1536 03/06/19  1519   BLOOD CULTURE  No Growth at 48 hrs  No Growth at 48 hrs   --    GRAM STAIN RESULT   --   --  1+ Polys  No bacteria seen   BODY FLUID CULTURE, STERILE   --   --  No growth       Imaging Studies:   I have personally reviewed pertinent imaging study reports and images in PACS  CT chest/abdomen/pelvis shows decreased size of hepatic abscess  Right middle and lower lobe consolidations with air bronchograms  Small to moderate right pleural effusion with thickened enhancing pleura concerning for empyema  Moderate ascites  EKG, Pathology, and Other Studies:   I have personally reviewed pertinent reports

## 2019-03-13 NOTE — PLAN OF CARE
Problem: Nutrition/Hydration-ADULT  Goal: Nutrient/Hydration intake appropriate for improving, restoring or maintaining nutritional needs  Description  Monitor and assess patient's nutrition/hydration status for malnutrition (ex- brittle hair, bruises, dry skin, pale skin and conjunctiva, muscle wasting, smooth red tongue, and disorientation)  Collaborate with interdisciplinary team and initiate plan and interventions as ordered  Monitor patient's weight and dietary intake as ordered or per policy  Utilize nutrition screening tool and intervene per policy  Determine patient's food preferences and provide high-protein, high-caloric foods as appropriate       INTERVENTIONS:  - Monitor oral intake, urinary output, labs, and treatment plans  - Assess nutrition and hydration status and recommend course of action  - Evaluate amount of meals eaten  - Assist patient with eating if necessary   - Allow adequate time for meals  - Recommend/ encourage appropriate diets, oral nutritional supplements, and vitamin/mineral supplements  - Order, calculate, and assess calorie counts as needed  - Recommend, monitor, and adjust tube feedings and TPN/PPN based on assessed needs  - Assess need for intravenous fluids  - Provide specific nutrition/hydration education as appropriate  - Include patient/family/caregiver in decisions related to nutrition  Outcome: Progressing     Problem: PAIN - ADULT  Goal: Verbalizes/displays adequate comfort level or baseline comfort level  Description  Interventions:  - Encourage patient to monitor pain and request assistance  - Assess pain using appropriate pain scale  - Administer analgesics based on type and severity of pain and evaluate response  - Implement non-pharmacological measures as appropriate and evaluate response  - Consider cultural and social influences on pain and pain management  - Notify physician/advanced practitioner if interventions unsuccessful or patient reports new pain  Outcome: Progressing     Problem: INFECTION - ADULT  Goal: Absence or prevention of progression during hospitalization  Description  INTERVENTIONS:  - Assess and monitor for signs and symptoms of infection  - Monitor lab/diagnostic results  - Monitor all insertion sites, i e  indwelling lines, tubes, and drains  - Monitor endotracheal (as able) and nasal secretions for changes in amount and color  - [place] appropriate cooling/warming therapies per order  - Administer medications as ordered  - Instruct and encourage patient and family to use good hand hygiene technique  - Identify and instruct in appropriate isolation precautions for identified infection/condition  Outcome: Progressing     Problem: SAFETY ADULT  Goal: Patient will remain free of falls  Description  INTERVENTIONS:  - Assess patient frequently for physical needs  -  Identify cognitive and physical deficits and behaviors that affect risk of falls    -  [place] fall precautions as indicated by assessment   - Educate patient/family on patient safety including physical limitations  - Instruct patient to call for assistance with activity based on assessment  - Modify environment to reduce risk of injury  - Consider OT/PT consult to assist with strengthening/mobility  Outcome: Progressing  Goal: Maintain or return to baseline ADL function  Description  INTERVENTIONS:  -  Assess patient's ability to carry out ADLs; assess patient's baseline for ADL function and identify physical deficits which impact ability to perform ADLs (bathing, care of mouth/teeth, toileting, grooming, dressing, etc )  - Assess/evaluate cause of self-care deficits   - Assess range of motion  - Assess patient's mobility; develop plan if impaired  - Assess patient's need for assistive devices and provide as appropriate  - Encourage maximum independence but intervene and supervise when necessary  ¯ Involve family in performance of ADLs  ¯ Assess for home care needs following discharge ¯ Request OT consult to assist with ADL evaluation and planning for discharge  ¯ Provide patient education as appropriate  Outcome: Progressing  Goal: Maintain or return mobility status to optimal level  Description  INTERVENTIONS:  - Assess patient's baseline mobility status (ambulation, transfers, stairs, etc )    - Identify cognitive and physical deficits and behaviors that affect mobility  - Identify mobility aids required to assist with transfers and/or ambulation (gait belt, sit-to-stand, lift, walker, cane, etc )  - [place] fall precautions as indicated by assessment  - Record patient progress and toleration of activity level on Mobility SBAR; progress patient to next Phase/Stage  - Instruct patient to call for assistance with activity based on assessment  - Request Rehabilitation consult to assist with strengthening/weightbearing, etc   Outcome: Progressing     Problem: DISCHARGE PLANNING  Goal: Discharge to home or other facility with appropriate resources  Description  INTERVENTIONS:  - Identify barriers to discharge w/patient and caregiver  - Arrange for needed discharge resources and transportation as appropriate  - Identify discharge learning needs (meds, wound care, etc )  - Arrange for interpretive services to assist at discharge as needed  - Refer to Case Management Department for coordinating discharge planning if the patient needs post-hospital services based on physician/advanced practitioner order or complex needs related to functional status, cognitive ability, or social support system  Outcome: Progressing     Problem: Knowledge Deficit  Goal: Patient/family/caregiver demonstrates understanding of disease process, treatment plan, medications, and discharge instructions  Description  Complete learning assessment and assess knowledge base    Interventions:  - Provide teaching at level of understanding  - Provide teaching via preferred learning methods  Outcome: Progressing     Problem: Prexisting or High Potential for Compromised Skin Integrity  Goal: Skin integrity is maintained or improved  Description  INTERVENTIONS:  - Identify patients at risk for skin breakdown  - Assess and monitor skin integrity  - Assess and monitor nutrition and hydration status  - Monitor labs (i e  albumin)  - Assess for incontinence   - Turn and reposition patient  - Assist with mobility/ambulation  - Relieve pressure over bony prominences  - Avoid friction and shearing  - Provide appropriate hygiene as needed including keeping skin clean and dry  - Evaluate need for skin moisturizer/barrier cream  - Collaborate with interdisciplinary team (i e  Nutrition, Rehabilitation, etc )   - Patient/family teaching  Outcome: Progressing     Problem: DISCHARGE PLANNING - CARE MANAGEMENT  Goal: Discharge to post-acute care or home with appropriate resources  Description  INTERVENTIONS:  - Conduct assessment to determine patient/family and health care team treatment goals, and need for post-acute services based on payer coverage, community resources, and patient preferences, and barriers to discharge  - Address psychosocial, clinical, and financial barriers to discharge as identified in assessment in conjunction with the patient/family and health care team  - Arrange appropriate level of post-acute services according to patient's   needs and preference and payer coverage in collaboration with the physician and health care team  - Communicate with and update the patient/family, physician, and health care team regarding progress on the discharge plan  - Arrange appropriate transportation to post-acute venues   Outcome: Progressing     Problem: Potential for Falls  Goal: Patient will remain free of falls  Description  INTERVENTIONS:  - Assess patient frequently for physical needs  -  Identify cognitive and physical deficits and behaviors that affect risk of falls    -  [employer] fall precautions as indicated by assessment   - Educate patient/family on patient safety including physical limitations  - Instruct patient to call for assistance with activity based on assessment  - Modify environment to reduce risk of injury  - Consider OT/PT consult to assist with strengthening/mobility  Outcome: Progressing     Problem: CARDIOVASCULAR - ADULT  Goal: Maintains optimal cardiac output and hemodynamic stability  Description  INTERVENTIONS:  - Monitor I/O, vital signs and rhythm  - Monitor for S/S and trends of decreased cardiac output i e  bleeding, hypotension  - Administer and titrate ordered vasoactive medications to optimize hemodynamic stability  - Assess quality of pulses, skin color and temperature  - Assess for signs of decreased coronary artery perfusion - ex   Angina  - Instruct patient to report change in severity of symptoms  Outcome: Progressing     Problem: RESPIRATORY - ADULT  Goal: Achieves optimal ventilation and oxygenation  Description  INTERVENTIONS:  - Assess for changes in respiratory status  - Assess for changes in mentation and behavior  - Position to facilitate oxygenation and minimize respiratory effort  - Oxygen administration by appropriate delivery method based on oxygen saturation (per order) or ABGs  - Initiate smoking cessation education as indicated  - Encourage broncho-pulmonary hygiene including cough, deep breathe, Incentive Spirometry  - Assess the need for suctioning and aspirate as needed  - Assess and instruct to report SOB or any respiratory difficulty  - Respiratory Therapy support as indicated  Outcome: Progressing     Problem: GENITOURINARY - ADULT  Goal: Maintains or returns to baseline urinary function  Description  INTERVENTIONS:  - Assess urinary function  - Encourage oral fluids to ensure adequate hydration  - Administer IV fluids as ordered to ensure adequate hydration  - Administer ordered medications as needed  - Offer frequent toileting  - Follow urinary retention protocol if ordered  Outcome: Progressing  Goal: Urinary catheter remains patent  Description  INTERVENTIONS:  - Assess patency of urinary catheter  - If patient has a chronic rendon, consider changing catheter if non-functioning  - Follow guidelines for intermittent irrigation of non-functioning urinary catheter  Outcome: Progressing     Problem: METABOLIC, FLUID AND ELECTROLYTES - ADULT  Goal: Electrolytes maintained within normal limits  Description  INTERVENTIONS:  - Monitor labs and assess patient for signs and symptoms of electrolyte imbalances  - Administer electrolyte replacement as ordered  - Monitor response to electrolyte replacements, including repeat lab results as appropriate  - Instruct patient on fluid and nutrition as appropriate  Outcome: Progressing     Problem: SKIN/TISSUE INTEGRITY - ADULT  Goal: Skin integrity remains intact  Description  INTERVENTIONS  - Identify patients at risk for skin breakdown  - Assess and monitor skin integrity  - Assess and monitor nutrition and hydration status  - Monitor labs (i e  albumin)  - Assess for incontinence   - Turn and reposition patient  - Assist with mobility/ambulation  - Relieve pressure over bony prominences  - Avoid friction and shearing  - Provide appropriate hygiene as needed including keeping skin clean and dry  - Evaluate need for skin moisturizer/barrier cream  - Collaborate with interdisciplinary team (i e  Nutrition, Rehabilitation, etc )   - Patient/family teaching  Outcome: Progressing  Goal: Incision(s), wounds(s) or drain site(s) healing without S/S of infection  Description  INTERVENTIONS  - Assess and document risk factors for skin impairment   - Assess and document dressing, incision, wound bed, drain sites and surrounding tissue  - Initiate Nutrition services consult and/or wound management as needed  Outcome: Progressing  Goal: Oral mucous membranes remain intact  Description  INTERVENTIONS  - Assess oral mucosa and hygiene practices  - Implement preventative oral hygiene regimen  - Implement oral medicated treatments as ordered  - Initiate Nutrition services referral as needed  Outcome: Progressing

## 2019-03-13 NOTE — PLAN OF CARE
Problem: OCCUPATIONAL THERAPY ADULT  Goal: Performs self-care activities at highest level of function for planned discharge setting  See evaluation for individualized goals  Description  Treatment Interventions: ADL retraining, Functional transfer training, UE strengthening/ROM, Endurance training, Patient/family training, Equipment evaluation/education, Compensatory technique education  Equipment Recommended: (RW)       See flowsheet documentation for full assessment, interventions and recommendations  Note:   Limitation: Decreased ADL status, Decreased UE strength, Decreased Safe judgement during ADL, Decreased endurance, Decreased high-level ADLs  Prognosis: Good  Assessment: Pt is a 49y/o male admitted to the hospital directly from his MD's office  Pt noted with symptoms of decreased appetite, diarrhea, abdominal pain  Pt noted with severe sepsis with septic shock 2* probable liver abscess; s/p drainage of liver abscess(2/28)  Pt developed VDRF, with extubation 3/11  Pt required chest tube placement(3/6)  Pt with PMH ETOH, failure to thrive, alcholic cirrhosis  PTA pt states independence with all aspects of his ADLs, transfers, ambulation--w/o device; +, +home alone, neg falls  During initial eval, pt demonstrated deficits with his functional balance, functional mobility, ADL status, activity tolerance(currently fair=15-20mins), b/l UE strength, and transfer safety  Pt would benefit from continued OT tx for the above deficits  3-5xwk/1-2wks        OT Discharge Recommendation: Short Term Rehab

## 2019-03-13 NOTE — OCCUPATIONAL THERAPY NOTE
OccupationalTherapy Evaluation(time=9500-1400)     Patient Name: Andres Soto  MVTMY'U Date: 3/13/2019  Problem List  Patient Active Problem List   Diagnosis    Heart murmur    Cough    Chronic superficial gastritis without bleeding    Anemia    Hyperbilirubinemia    Failure to thrive in adult    Weight loss    Elevated alkaline phosphatase level    GERD (gastroesophageal reflux disease)    Liver abscess    Appendicitis    Alcohol abuse    Acute retention of urine    Severe sepsis with septic shock (HCC)    Empyema (HCC)     Past Medical History  No past medical history on file  Past Surgical History  Past Surgical History:   Procedure Laterality Date    IR ABSCESS/SEROMA DRAINAGE  2/28/2019    IR CHEST TUBE  3/6/2019             03/13/19 1400   Note Type   Note type Eval only   Restrictions/Precautions   Weight Bearing Precautions Per Order No   Other Precautions Fall Risk;Telemetry;O2;Multiple lines   Pain Assessment   Pain Assessment No/denies pain   Home Living   Type of [address] [place] [address] Two level;Bed/bath upstairs  (front=9-10 josephine, rear=4 josephine with railing)   Home Equipment   (denies)   Prior Function   Lives With Significant other   ADL Assistance Independent   Falls in the last 6 months 0   Lifestyle   Autonomy PTA pt states independence with all aspects of his ADLs, transfers, ambulation--w/o device; +, +home alone, neg falls   Reciprocal Relationships 0 children, supportive brothers, SO   Service to Others worked at [email or web address]; currently unemployed   Intrinsic Gratification sports   Psychosocial   Psychosocial ([address]) X   Patient Behaviors/Mood Flat affect; Cooperative   Subjective   Subjective "I like sitting in the chair "   ADL   Where Assessed Chair   Eating Assistance 6  Modified independent   Grooming Assistance 6  Modified Independent   UB Bathing Assistance 5  Supervision/Setup   LB Bathing Assistance 3  Moderate Assistance   [address] Supervision/Setup   LB Dressing Assistance 3  Moderate Assistance   Transfers   Sit to Stand 3  Moderate assistance   Additional items Assist x 1; Increased time required;Verbal cues   Stand to Sit 4  Minimal assistance   Additional items Assist x 1; Increased time required;Verbal cues   Functional Mobility   Functional Mobility 4  Minimal assistance   Additional Comments x1   Additional items Rolling walker   Balance   Static Sitting Good   Dynamic Sitting Fair   Static Standing Fair -   Dynamic Standing Poor +   Activity Tolerance   Activity Tolerance Patient limited by fatigue   Medical Staff Made Aware nsg   RUE Assessment   RUE Assessment WFL   RUE Strength   RUE Overall Strength Within Functional Limits - able to perform ADL tasks with strength  (4/5 throughout)   LUE Assessment   LUE Assessment WFL   LUE Strength   LUE Overall Strength Within Functional Limits - able to perform ADL tasks with strength  (4/5 throughout)   Hand Function   Gross Motor Coordination Functional   Fine Motor Coordination Functional   Sensation   Light Touch No apparent deficits   Proprioception   Proprioception No apparent deficits   Vision-Basic Assessment   Current Vision Wears glasses only for reading   Vision - Complex Assessment   Acuity Able to read clock/calendar on wall without difficulty   Perception   Inattention/Neglect Appears intact   Cognition   Overall Cognitive Status [facility]   Arousal/Participation Alert   Attention Attends with cues to redirect   Orientation Level Oriented X4   Memory Decreased recall of precautions   Following Commands Follows one step commands without difficulty   Assessment   Limitation Decreased ADL status; Decreased UE strength;Decreased Safe judgement during ADL;Decreased endurance;Decreased high-level ADLs   Prognosis Good   Assessment Pt is a 53y/o male admitted to the hospital directly from his MD's office  Pt noted with symptoms of decreased appetite, diarrhea, abdominal pain   Pt noted with severe sepsis with septic shock 2* probable liver abscess; s/p drainage of liver abscess(2/28)  Pt developed VDRF, with extubation 3/11  Pt required chest tube placement(3/6)  Pt with PMH ETOH, failure to thrive, alcholic cirrhosis  PTA pt states independence with all aspects of his ADLs, transfers, ambulation--w/o device; +, +home alone, neg falls  During initial eval, pt demonstrated deficits with his functional balance, functional mobility, ADL status, activity tolerance(currently fair=15-20mins), b/l UE strength, and transfer safety  Pt would benefit from continued OT tx for the above deficits  3-5xwk/1-2wks  Goals   Patient Goals "to get my strength back "   STG Time Frame 3-5   Short Term Goal #1 Pt will demonstrate improved activity tolerance to good(20-30mins) and standing tolerance to 3-5mins to assist with ADLs  Short Term Goal #2 Pt will demonstrate mod I with their sit-stand transfers to assist with completion of their LE dressing  Short Term Goal  Pt will demonstrate proper walker/transfer safety 100% of the time  LTG Time Frame   (5-10 days)   Long Term Goal #1 Pt will demonstrate g/g- balance with all functional activities  Long Term Goal #2 Pt will demonstrate mod I with their UE and LE bathing/dresssing  Long Term Goal Pt will demonstrate improved b/l UE strength by 1/2 MM grade to assist with ADLs/transfers  Plan   Treatment Interventions ADL retraining;Functional transfer training;UE strengthening/ROM; Endurance training;Patient/family training;Equipment evaluation/education; Compensatory technique education   Goal Expiration Date 03/24/19   Treatment Day 0   OT Frequency 3-5x/wk   Recommendation   OT Discharge Recommendation Short Term Rehab   Equipment Recommended   (RW)   Barthel Index   Feeding 10   Bathing 0   Grooming Score 5   Dressing Score 5   Bladder Score 10   Bowels Score 10   Toilet Use Score 5   Transfers (Bed/Chair) Score 5   Mobility (Level Surface) Score 0 Stairs Score 0   Barthel Index Score 50   Modified [place] Scale   Modified Robbie Scale 4   Shonda Brumfield, OT

## 2019-03-14 ENCOUNTER — APPOINTMENT (INPATIENT)
Dept: RADIOLOGY | Facility: HOSPITAL | Age: 49
DRG: 441 | End: 2019-03-14
Payer: COMMERCIAL

## 2019-03-14 ENCOUNTER — ANESTHESIA (INPATIENT)
Dept: RADIOLOGY | Facility: HOSPITAL | Age: 49
DRG: 441 | End: 2019-03-14
Payer: COMMERCIAL

## 2019-03-14 LAB
ABO GROUP BLD: NORMAL
ANION GAP SERPL CALCULATED.3IONS-SCNC: 7 MMOL/L (ref 4–13)
BASOPHILS # BLD AUTO: 0.02 THOUSANDS/ΜL (ref 0–0.1)
BASOPHILS NFR BLD AUTO: 0 % (ref 0–1)
BLD GP AB SCN SERPL QL: NEGATIVE
BUN SERPL-MCNC: 13 MG/DL (ref 5–25)
CALCIUM SERPL-MCNC: 8.2 MG/DL (ref 8.3–10.1)
CHLORIDE SERPL-SCNC: 102 MMOL/L (ref 100–108)
CO2 SERPL-SCNC: 28 MMOL/L (ref 21–32)
CREAT SERPL-MCNC: 0.61 MG/DL (ref 0.6–1.3)
EOSINOPHIL # BLD AUTO: 0.11 THOUSAND/ΜL (ref 0–0.61)
EOSINOPHIL NFR BLD AUTO: 2 % (ref 0–6)
ERYTHROCYTE [DISTWIDTH] IN BLOOD BY AUTOMATED COUNT: 19.3 % (ref 11.6–15.1)
GFR SERPL CREATININE-BSD FRML MDRD: 119 ML/MIN/1.73SQ M
GLUCOSE SERPL-MCNC: 75 MG/DL (ref 65–140)
HCT VFR BLD AUTO: 22.1 % (ref 36.5–49.3)
HGB BLD-MCNC: 6.8 G/DL (ref 12–17)
IMM GRANULOCYTES # BLD AUTO: 0.05 THOUSAND/UL (ref 0–0.2)
IMM GRANULOCYTES NFR BLD AUTO: 1 % (ref 0–2)
INR PPP: 1.51 (ref 0.86–1.17)
LYMPHOCYTES # BLD AUTO: 0.59 THOUSANDS/ΜL (ref 0.6–4.47)
LYMPHOCYTES NFR BLD AUTO: 13 % (ref 14–44)
MCH RBC QN AUTO: 31.6 PG (ref 26.8–34.3)
MCHC RBC AUTO-ENTMCNC: 30.8 G/DL (ref 31.4–37.4)
MCV RBC AUTO: 103 FL (ref 82–98)
MONOCYTES # BLD AUTO: 0.45 THOUSAND/ΜL (ref 0.17–1.22)
MONOCYTES NFR BLD AUTO: 10 % (ref 4–12)
NEUTROPHILS # BLD AUTO: 3.49 THOUSANDS/ΜL (ref 1.85–7.62)
NEUTS SEG NFR BLD AUTO: 74 % (ref 43–75)
NRBC BLD AUTO-RTO: 0 /100 WBCS
PLATELET # BLD AUTO: 124 THOUSANDS/UL (ref 149–390)
PMV BLD AUTO: 10.5 FL (ref 8.9–12.7)
POTASSIUM SERPL-SCNC: 3 MMOL/L (ref 3.5–5.3)
PROTHROMBIN TIME: 18.3 SECONDS (ref 11.8–14.2)
RBC # BLD AUTO: 2.15 MILLION/UL (ref 3.88–5.62)
RH BLD: POSITIVE
SODIUM SERPL-SCNC: 137 MMOL/L (ref 136–145)
SPECIMEN EXPIRATION DATE: NORMAL
WBC # BLD AUTO: 4.71 THOUSAND/UL (ref 4.31–10.16)

## 2019-03-14 PROCEDURE — 99232 SBSQ HOSP IP/OBS MODERATE 35: CPT | Performed by: INTERNAL MEDICINE

## 2019-03-14 PROCEDURE — 12011 RPR F/E/E/N/L/M 2.5 CM/<: CPT | Performed by: PHYSICIAN ASSISTANT

## 2019-03-14 PROCEDURE — 76705 ECHO EXAM OF ABDOMEN: CPT | Performed by: RADIOLOGY

## 2019-03-14 PROCEDURE — 99252 IP/OBS CONSLTJ NEW/EST SF 35: CPT | Performed by: PHYSICIAN ASSISTANT

## 2019-03-14 PROCEDURE — 51102 DRAIN BL W/CATH INSERTION: CPT

## 2019-03-14 PROCEDURE — 85025 COMPLETE CBC W/AUTO DIFF WBC: CPT | Performed by: PHYSICIAN ASSISTANT

## 2019-03-14 PROCEDURE — 85610 PROTHROMBIN TIME: CPT | Performed by: PHYSICIAN ASSISTANT

## 2019-03-14 PROCEDURE — 86920 COMPATIBILITY TEST SPIN: CPT

## 2019-03-14 PROCEDURE — 0HQ1XZZ REPAIR FACE SKIN, EXTERNAL APPROACH: ICD-10-PCS | Performed by: SURGERY

## 2019-03-14 PROCEDURE — 97163 PT EVAL HIGH COMPLEX 45 MIN: CPT

## 2019-03-14 PROCEDURE — P9016 RBC LEUKOCYTES REDUCED: HCPCS

## 2019-03-14 PROCEDURE — 99232 SBSQ HOSP IP/OBS MODERATE 35: CPT | Performed by: PHYSICIAN ASSISTANT

## 2019-03-14 PROCEDURE — G8979 MOBILITY GOAL STATUS: HCPCS

## 2019-03-14 PROCEDURE — 86900 BLOOD TYPING SEROLOGIC ABO: CPT | Performed by: INTERNAL MEDICINE

## 2019-03-14 PROCEDURE — 86850 RBC ANTIBODY SCREEN: CPT | Performed by: INTERNAL MEDICINE

## 2019-03-14 PROCEDURE — 99233 SBSQ HOSP IP/OBS HIGH 50: CPT | Performed by: INTERNAL MEDICINE

## 2019-03-14 PROCEDURE — 30233N1 TRANSFUSION OF NONAUTOLOGOUS RED BLOOD CELLS INTO PERIPHERAL VEIN, PERCUTANEOUS APPROACH: ICD-10-PCS | Performed by: INTERNAL MEDICINE

## 2019-03-14 PROCEDURE — 80048 BASIC METABOLIC PNL TOTAL CA: CPT | Performed by: PHYSICIAN ASSISTANT

## 2019-03-14 PROCEDURE — G8978 MOBILITY CURRENT STATUS: HCPCS

## 2019-03-14 PROCEDURE — 86901 BLOOD TYPING SEROLOGIC RH(D): CPT | Performed by: INTERNAL MEDICINE

## 2019-03-14 RX ORDER — ACETAMINOPHEN 325 MG/1
650 TABLET ORAL EVERY 6 HOURS PRN
Status: DISCONTINUED | OUTPATIENT
Start: 2019-03-14 | End: 2019-03-22 | Stop reason: HOSPADM

## 2019-03-14 RX ORDER — POTASSIUM CHLORIDE 20 MEQ/1
40 TABLET, EXTENDED RELEASE ORAL ONCE
Status: COMPLETED | OUTPATIENT
Start: 2019-03-14 | End: 2019-03-14

## 2019-03-14 RX ORDER — LIDOCAINE HYDROCHLORIDE 10 MG/ML
5 INJECTION, SOLUTION EPIDURAL; INFILTRATION; INTRACAUDAL; PERINEURAL ONCE
Status: COMPLETED | OUTPATIENT
Start: 2019-03-14 | End: 2019-03-14

## 2019-03-14 RX ORDER — POTASSIUM CHLORIDE 14.9 MG/ML
20 INJECTION INTRAVENOUS ONCE
Status: COMPLETED | OUTPATIENT
Start: 2019-03-14 | End: 2019-03-14

## 2019-03-14 RX ADMIN — POTASSIUM CHLORIDE 40 MEQ: 1500 TABLET, EXTENDED RELEASE ORAL at 17:39

## 2019-03-14 RX ADMIN — PANTOPRAZOLE SODIUM 40 MG: 40 TABLET, DELAYED RELEASE ORAL at 08:12

## 2019-03-14 RX ADMIN — AMPICILLIN SODIUM AND SULBACTAM SODIUM 3 G: 10; 5 INJECTION, POWDER, FOR SOLUTION INTRAVENOUS at 11:58

## 2019-03-14 RX ADMIN — FUROSEMIDE 20 MG: 10 INJECTION, SOLUTION INTRAMUSCULAR; INTRAVENOUS at 08:12

## 2019-03-14 RX ADMIN — FUROSEMIDE 20 MG: 10 INJECTION, SOLUTION INTRAMUSCULAR; INTRAVENOUS at 22:00

## 2019-03-14 RX ADMIN — Medication 250 MG: at 17:39

## 2019-03-14 RX ADMIN — POTASSIUM CHLORIDE 20 MEQ: 200 INJECTION, SOLUTION INTRAVENOUS at 11:58

## 2019-03-14 RX ADMIN — AMPICILLIN SODIUM AND SULBACTAM SODIUM 3 G: 10; 5 INJECTION, POWDER, FOR SOLUTION INTRAVENOUS at 06:32

## 2019-03-14 RX ADMIN — MELATONIN TAB 3 MG 6 MG: 3 TAB at 22:00

## 2019-03-14 RX ADMIN — HEPARIN SODIUM 5000 UNITS: 5000 INJECTION INTRAVENOUS; SUBCUTANEOUS at 22:00

## 2019-03-14 RX ADMIN — POTASSIUM CHLORIDE 40 MEQ: 1500 TABLET, EXTENDED RELEASE ORAL at 14:32

## 2019-03-14 RX ADMIN — AMPICILLIN SODIUM AND SULBACTAM SODIUM 3 G: 10; 5 INJECTION, POWDER, FOR SOLUTION INTRAVENOUS at 17:59

## 2019-03-14 RX ADMIN — FOLIC ACID 1 MG: 1 TABLET ORAL at 08:12

## 2019-03-14 RX ADMIN — AMPICILLIN SODIUM AND SULBACTAM SODIUM 3 G: 10; 5 INJECTION, POWDER, FOR SOLUTION INTRAVENOUS at 01:15

## 2019-03-14 RX ADMIN — LIDOCAINE HYDROCHLORIDE 5 ML: 10 INJECTION, SOLUTION EPIDURAL; INFILTRATION; INTRACAUDAL; PERINEURAL at 14:09

## 2019-03-14 RX ADMIN — Medication 100 MG: at 08:12

## 2019-03-14 NOTE — PROGRESS NOTES
Progress Note - Ying Snyder 53 y o  male MRN: [id number]  Unit/Bed#: [place] [address] -01 Encounter: [id number]    Assessment:  Principal Problem:    Severe sepsis with septic shock [facility])  Active Problems:    Heart murmur    Anemia    Failure to thrive in adult    Weight loss    Elevated alkaline phosphatase level    GERD (gastroesophageal reflux disease)    Liver abscess    Appendicitis    Alcohol abuse    Acute retention of urine    Empyema (HCC)  Resolved Problems:    SHANDA (acute kidney injury) ([address] )    Respiratory failure (HCC)      Plan:  · Severe sepsis with septic shock likely related to liver abscess  Continue Unasyn per Infectious Disease recommendations  · Acute hypoxic respiratory failure secondary to right-sided empyema status post chest tube x2   Patient had a chest tube removed yesterday in ICU  Continue monitoring chest tube  -to intermittent suction  Continue to encourage out of bed, deep breathing and incentive spirometer  Monitor chest tube drainage for now  Pulmonary critical care follow-up  · Liver abscess status post IR drainage  Continue OLIVIA drain monitoring  Repeat CT scan demonstrates that abscesses more  IR follow-up  · Right-sided empyema  Chest tube in place  Continue rest of the management as per pulmonology  · Subacute versus chronic appendicitis  Will need surgical follow-up for eventual appendectomy  · Urethral trauma status post upper pubic catheter  Patient is scheduled for IR guided to suprapubic catheter  Continue to monitor postvoid residual  · Anemia  Will order iron profile and 1 unit of packed red cell transfusion  Monitor H&H  · Alcoholic cirrhosis  Continue on thiamine and folate  Alcohol cessation encouraged  · Hypokalemia-potassium supplementation  · DVT prophylaxis  · Discussed with patient in detail    Subjective:   Patient is seen and examined at bedside  Afebrile  Denies any new complaints  Has pain around the chest tube site    Denies any nausea, vomiting  All other ROS are negative  Objective:   Vitals: Blood pressure 119/60, pulse 82, temperature 99 4 °F (37 4 °C), temperature source Tympanic, resp  rate 20, height 5' 8" (1 727 m), weight 102 kg (224 lb 13 9 oz), SpO2 94 %  ,Body mass index is 34 19 kg/m²  SPO2 RA Rest      Admission (Current) from 2/25/2019 in [address]   SpO2  94 %   SpO2 Activity  At Rest   O2 Device  Nasal cannula   O2 Flow Rate  5 L/min        I&O:     Intake/Output Summary (Last 24 hours) at 3/14/2019 1115  Last data filed at 3/14/2019 0907  Gross per 24 hour   Intake 100 ml   Output 1587 ml   Net -1487 ml       Physical Exam:    General- Alert, sitting comfortably in bed  Not in any acute distress  HEENT- FILI, EOM intact  Neck- Supple, No JVD  CVS- regular, S1 and S2 normal   Chest- Bilateral Air entry, right-sided chest tube in place  Abdomen- soft, nontender, OLIVIA drain in place not distended, no guarding or rigidity, BS+  Extremities-  No pedal edema, No calf tenderness  CNS-   Alert, awake and orientedx3  No focal deficits present  Invasive Devices     Peripheral Intravenous Line            Peripheral IV 03/12/19 Left Antecubital 1 day          Drain            Chest Tube 2 Right Other (Comment) 8 5 Fr  7 days    Closed/Suction Drain Midline Abdomen Bulb 10 2 Fr  7 days                      Social History  reviewed  No family history on file   reviewed    Meds:  Current Facility-Administered Medications   Medication Dose Route Frequency Provider Last Rate Last Dose    ampicillin-sulbactam (UNASYN) 3 g in sodium chloride 0 9 % 100 mL IVPB  3 g Intravenous Q6H Malachi Hunter PA-C 200 mL/hr at 03/14/19 0632 3 g at 68/27/06 3488    folic acid (FOLVITE) tablet 1 mg  1 mg Oral Daily Malachi Hunter PA-C   1 mg at 03/14/19 1386    furosemide (LASIX) injection 20 mg  20 mg Intravenous Q12H Malachi Hunter PA-C   20 mg at 03/14/19 4029    heparin (porcine) subcutaneous injection 5,000 Units  5,000 Units Subcutaneous Q8H [facility] Malachi M KIM Hunter   Stopped at 03/14/19 0612    melatonin tablet 6 mg  6 mg Oral HS Malachi RESTREPO KIM Hunter   6 mg at 03/13/19 2119    pantoprazole (PROTONIX) EC tablet 40 mg  40 mg Oral Q24H [facility] Malachi RESTREPO KIM Hunter   40 mg at 03/14/19 5421    saccharomyces boulardii (FLORASTOR) capsule 250 mg  250 mg Oral BID Malachi RESTREPO KIM Hunter   250 mg at 03/13/19 1801    thiamine (VITAMIN B1) tablet 100 mg  100 mg Oral Daily Malachi RESTREPO KIM Hunter   100 mg at 03/14/19 3133      Medications Prior to Admission   Medication    omeprazole (PriLOSEC) 40 MG capsule       Labs:  Results from last 7 days   Lab Units 03/14/19  0531 03/13/19  0614 03/11/19  0453 03/09/19  0447   WBC Thousand/uL 4 71  --  8 28 9 89   HEMOGLOBIN g/dL 6 8* 7 4* 7 8* 8 1*   HEMATOCRIT % 22 1*  --  25 0* 25 6*   PLATELETS Thousands/uL 124*  --  149 121*   NEUTROS PCT % 74  --  74  --    LYMPHS PCT % 13*  --  12*  --    MONOS PCT % 10  --  10  --    EOS PCT % 2  --  2  --      Results from last 7 days   Lab Units 03/14/19  0531 03/13/19  0447 03/12/19  0532   POTASSIUM mmol/L 3 0* 3 1* 2 9*   CHLORIDE mmol/L 102 102 104   CO2 mmol/L 28 27 30   BUN mg/dL 13 12 12   CREATININE mg/dL 0 61 0 56* 0 52*   CALCIUM mg/dL 8 2* 8 0* 7 8*   ALK PHOS U/L  --   --  80   ALT U/L  --   --  6*   AST U/L  --   --  27     Lab Results   Component Value Date    TROPONINI <0 02 02/28/2019    TROPONINI <0 02 02/25/2019     Results from last 7 days   Lab Units 03/14/19  0531 03/13/19  0614 03/12/19  0532   INR  1 51* 1 50* 1 37*     Lab Results   Component Value Date    BLOODCX No Growth at 72 hrs  03/10/2019    BLOODCX No Growth at 72 hrs  03/10/2019    BLOODCX No Growth After 5 Days  02/28/2019    URINECX No Growth <1000 cfu/mL 02/06/2019         Imaging:  Results for orders placed during the hospital encounter of 02/25/19   XR chest portable    Narrative CHEST     INDICATION:   hypoxia      COMPARISON:  3/7/2019    EXAM PERFORMED/VIEWS:  XR CHEST PORTABLE      FINDINGS:  ET tube projects appropriately  There is a right internal jugular central line projecting over the inferior aspect of the right clavicular head  NG tube extends into the stomach  There is a pigtail drain projecting over the mid abdomen  There is a pigtail drain within the inferior lateral aspect of the right hemithorax which is unchanged  Cardiomediastinal silhouette appears unremarkable  Persistent bilateral basilar opacities and effusions, right greater than left  There is no pneumothorax identified  Osseous structures appear within normal limits for patient age  Impression Persistent bilateral basilar opacities and effusions, right greater than left  Pigtail chest drain on the right unchanged in position  No pneumothorax  Workstation performed: [id number]       Results for orders placed in visit on 02/19/19   XR chest pa & lateral    Narrative CHEST     INDICATION:   R05: Cough  dry cough 12/25/18, within the last two weeks, cough has become productive, intermittent fever, shortness of breath, loss of energy, fatigue and slight chest tightness, no sx    COMPARISON:  None    EXAM PERFORMED/VIEWS:  XR CHEST PA & LATERAL      FINDINGS:    Cardiomediastinal silhouette appears unremarkable  The lungs are clear  No pneumothorax or pleural effusion  Osseous structures appear within normal limits for patient age  Impression No acute cardiopulmonary disease  Workstation performed: [id number]         Labs & Imaging: I have personally reviewed pertinent reports        VTE Pharmacologic Prophylaxis: Heparin  VTE Mechanical Prophylaxis: sequential compression device    Code Status:   Level 1 - Full Code      "This note has been constructed using a voice recognition system"      Fausto Mcneal MD  3/14/2019,11:15 AM

## 2019-03-14 NOTE — PLAN OF CARE
Problem: PHYSICAL THERAPY ADULT  Goal: Performs mobility at highest level of function for planned discharge setting  See evaluation for individualized goals  Description  Treatment/Interventions: Functional transfer training, LE strengthening/ROM, Elevations, Therapeutic exercise, Endurance training, Patient/family training, Bed mobility, Gait training, Spoke to case management, Family  Equipment Recommended: Jud Salomon       See flowsheet documentation for full assessment, interventions and recommendations  Note:   Prognosis: Good  Problem List: Decreased strength, Decreased endurance, Impaired balance, Decreased mobility, Decreased safety awareness, Obesity  Assessment: Pt  48 y o male admitted directly from MD's office  Pt c/o dec appetite, 30lb wt loss, diarrhea & abdominal pain  Pt admitted for Severe sepsis with septic shock ([facility] [address] ) 2* to liver abscess; VDRF, extubated on 3/11; R sided pleural effusion; empyema  S/p liver abscess drainage on 2/28 & chest tube placement on 3/6  Pt referred to PT for mobility assessment & D/C planning w/ orders of activity as tolerated  PTA, pt reports being I w/o AD  On eval, pt demonstrate dec mobility, balance, endurance & amb 2* to deconditioning  Pt require modAx1 for bed mobility; minAx1 for transfers & amb + cues for techniques  Gait deviations as above, slow & unsteady w/ dec foot clearance but no gross LOB noted  Dec amb tolerance 2* to fatigue & weakness  No dizziness reported t/o session  Nsg staff most recent vital signs as follows: /60 (BP Location: Right arm)   Pulse 82   Temp 99 4 °F (37 4 °C) (Tympanic)   Resp 20   Ht 5' 8" (1 727 m)   Wt 102 kg (224 lb 13 9 oz)   SpO2 94%   BMI 34 19 kg/m²   At end of session, ptrequested to sit at EOB for awhile  Offered to sit on the recliner but pt declined  Fall precautions reinforced w/ good understanding  Significant other still in room as well   Pt functioning below baseline hence will continue skilled PT to improve function & safety  At this time, due to above mentioned deficits & high risk for falls, pt will benefit from inpt rehab at D/C  CM to follow  Nsg staff to continue to mobilized pt (OOB in chair for all meals & ambulate in room/unit) as tolerated to prevent further decline in function  Nsg notified  Barriers to Discharge: Inaccessible home environment, Decreased caregiver support     Recommendation: Short-term skilled PT     PT - OK to Discharge: Yes(to inpt rehab when medically cleared)    See flowsheet documentation for full assessment

## 2019-03-14 NOTE — CONSULTS
Consultation - [address] 53 y o  male MRN: [id number]  Unit/Bed#: [place] [address] -01 Encounter: [id number]    Assessment/Plan     1  Laceration of forehead   -nursing reports that patient bent over and part of IV pole fell, striking the patient on the forehead  -approximately 1 cm laceration above left eyebrow, surrounding area clean, minimal swelling   -patient currently stable, minimal blood loss from forehead laceration  -laceration repaired at bedside with 2 interrupted 5-0 nylon sutures, refer to procedure note  -patient and family educated on suture removal, all questions answered      History of Present Illness     HPI:  Trista Dubon is a 53 y o  male who presents with a forehead laceration obtained today  Patient reports that he was bending over to get something when the top portion of his IV pole fell hitting him in the head  Patient denies loss of consciousness, headaches, dizziness, change in vision  Patient states that he is having minimal pain right now  Due to blood loss and depth of laceration, surgery was paged for stat closure  Inpatient consult to Acute Care Surgery  Consult performed by: Matt Wise PA-C  Consult ordered by: Bandar Zendejas DO          Review of Systems   Constitutional: Negative  HENT: Negative  Eyes: Negative for pain and visual disturbance  Respiratory: Negative  Cardiovascular: Negative  Gastrointestinal: Negative  Endocrine: Negative  Genitourinary: Negative  Musculoskeletal: Negative  Skin: Positive for wound  Allergic/Immunologic: Negative  Neurological: Negative for dizziness, light-headedness and headaches  Hematological: Negative  Psychiatric/Behavioral: Negative  Historical Information   No past medical history on file    Past Surgical History:   Procedure Laterality Date    IR ABSCESS/SEROMA DRAINAGE  2/28/2019    IR CHEST TUBE  3/6/2019     Social History   Social History     Substance and Sexual Activity   Alcohol Use Yes     Social History     Substance and Sexual Activity   Drug Use Not on file     Social History     Tobacco Use   Smoking Status Former Smoker   Smokeless Tobacco Never Used     Family History: No family history on file      Meds/Allergies   current meds:   Current Facility-Administered Medications   Medication Dose Route Frequency    acetaminophen (TYLENOL) tablet 650 mg  650 mg Oral Q6H PRN    ampicillin-sulbactam (UNASYN) 3 g in sodium chloride 0 9 % 100 mL IVPB  3 g Intravenous V0I    folic acid (FOLVITE) tablet 1 mg  1 mg Oral Daily    furosemide (LASIX) injection 20 mg  20 mg Intravenous Q12H    heparin (porcine) subcutaneous injection 5,000 Units  5,000 Units Subcutaneous Q8H [address]    melatonin tablet 6 mg  6 mg Oral HS    pantoprazole (PROTONIX) EC tablet 40 mg  40 mg Oral Q24H ALBERTO    potassium chloride (K-DUR,KLOR-CON) CR tablet 40 mEq  40 mEq Oral Once    saccharomyces boulardii (FLORASTOR) capsule 250 mg  250 mg Oral BID    thiamine (VITAMIN B1) tablet 100 mg  100 mg Oral Daily     No Known Allergies    Objective   First Vitals:   Blood Pressure: 145/81 (02/25/19 1900)  Pulse: 72 (02/25/19 1900)  Temperature: 97 7 °F (36 5 °C) (02/25/19 1900)  Temp Source: Temporal (02/25/19 1900)  Respirations: 18 (02/25/19 1900)  Height: 5' 7 01" (170 2 cm) (02/26/19 1355)  Weight - Scale: 105 kg (231 lb 0 7 oz) (02/28/19 1050)  SpO2: 98 % (02/25/19 1900)    Current Vitals:   Blood Pressure: 119/60 (03/14/19 0721)  Pulse: 82 (03/14/19 0721)  Temperature: 99 4 °F (37 4 °C) (03/14/19 0721)  Temp Source: Tympanic (03/14/19 0721)  Respirations: 20 (03/14/19 0721)  Height: 5' 8" (172 7 cm) (02/28/19 1050)  Weight - Scale: 102 kg (224 lb 13 9 oz) (03/08/19 0200)  SpO2: 94 % (03/14/19 0721)      Intake/Output Summary (Last 24 hours) at 3/14/2019 1409  Last data filed at 3/14/2019 0907  Gross per 24 hour   Intake 100 ml   Output 1587 ml   Net -1487 ml       Invasive Devices     Peripheral Intravenous Line            Peripheral IV 03/12/19 Left Antecubital 1 day    Peripheral IV 03/14/19 Proximal;Right Forearm less than 1 day          Drain            Chest Tube 2 Right Other (Comment) 8 5 Fr  7 days    Closed/Suction Drain Midline Abdomen Bulb 10 2 Fr  7 days                Physical Exam   Constitutional: He is oriented to person, place, and time  He appears well-developed and well-nourished  HENT:   Head: Normocephalic and atraumatic  1 cm laceration located above the left eyebrow  Minimal surrounding swelling  Gauze covering wound, minimal amount of blood loss upon inspection  Eyes: Pupils are equal, round, and reactive to light  EOM are normal    Neurological: He is alert and oriented to person, place, and time  Skin: Skin is warm and dry  Lab Results: I have personally reviewed pertinent lab results  Imaging: I have personally reviewed pertinent reports  EKG, Pathology, and Other Studies: I have personally reviewed pertinent reports

## 2019-03-14 NOTE — PROCEDURES
Laceration repair  Date/Time: 3/14/2019 2:00 PM  Performed by: Lauren Chambers PA-C  Authorized by: Lauren Chambers PA-C   Consent: Verbal consent obtained  Risks and benefits: risks, benefits and alternatives were discussed  Consent given by: patient (patient and family present, both in agreement)  Patient understanding: patient states understanding of the procedure being performed  Patient identity confirmed: verbally with patient  Time out: Immediately prior to procedure a "time out" was called to verify the correct patient, procedure, equipment, support staff and site/side marked as required    Body area: head/neck  Location details: forehead  Laceration length: 1 cm  Foreign bodies: no foreign bodies  Tendon involvement: none  Nerve involvement: none  Vascular damage: no  Anesthesia: local infiltration    Anesthesia:  Local Anesthetic: lidocaine 1% without epinephrine  Anesthetic total: 2 mL    Sedation:  Patient sedated: no      Wound Dehiscence:  Superficial Wound Dehiscence: simple closure      Procedure Details:  Irrigation solution: saline  Irrigation method: syringe  Amount of cleaning: standard  Debridement: none  Degree of undermining: none  Skin closure: 5-0 nylon  Number of sutures: 2  Technique: simple  Approximation: close  Approximation difficulty: simple  Patient tolerance: Patient tolerated the procedure well with no immediate complications

## 2019-03-14 NOTE — PLAN OF CARE
Problem: Nutrition/Hydration-ADULT  Goal: Nutrient/Hydration intake appropriate for improving, restoring or maintaining nutritional needs  Description  Monitor and assess patient's nutrition/hydration status for malnutrition (ex- brittle hair, bruises, dry skin, pale skin and conjunctiva, muscle wasting, smooth red tongue, and disorientation)  Collaborate with interdisciplinary team and initiate plan and interventions as ordered  Monitor patient's weight and dietary intake as ordered or per policy  Utilize nutrition screening tool and intervene per policy  Determine patient's food preferences and provide high-protein, high-caloric foods as appropriate       INTERVENTIONS:  - Monitor oral intake, urinary output, labs, and treatment plans  - Assess nutrition and hydration status and recommend course of action  - Evaluate amount of meals eaten  - Assist patient with eating if necessary   - Allow adequate time for meals  - Recommend/ encourage appropriate diets, oral nutritional supplements, and vitamin/mineral supplements  - Order, calculate, and assess calorie counts as needed  - Recommend, monitor, and adjust tube feedings and TPN/PPN based on assessed needs  - Assess need for intravenous fluids  - Provide specific nutrition/hydration education as appropriate  - Include patient/family/caregiver in decisions related to nutrition  Outcome: Progressing     Problem: PAIN - ADULT  Goal: Verbalizes/displays adequate comfort level or baseline comfort level  Description  Interventions:  - Encourage patient to monitor pain and request assistance  - Assess pain using appropriate pain scale  - Administer analgesics based on type and severity of pain and evaluate response  - Implement non-pharmacological measures as appropriate and evaluate response  - Consider cultural and social influences on pain and pain management  - Notify physician/advanced practitioner if interventions unsuccessful or patient reports new pain  Outcome: Progressing     Problem: INFECTION - ADULT  Goal: Absence or prevention of progression during hospitalization  Description  INTERVENTIONS:  - Assess and monitor for signs and symptoms of infection  - Monitor lab/diagnostic results  - Monitor all insertion sites, i e  indwelling lines, tubes, and drains  - Monitor endotracheal (as able) and nasal secretions for changes in amount and color  - [place] appropriate cooling/warming therapies per order  - Administer medications as ordered  - Instruct and encourage patient and family to use good hand hygiene technique  - Identify and instruct in appropriate isolation precautions for identified infection/condition  Outcome: Progressing     Problem: SAFETY ADULT  Goal: Patient will remain free of falls  Description  INTERVENTIONS:  - Assess patient frequently for physical needs  -  Identify cognitive and physical deficits and behaviors that affect risk of falls    -  [place] fall precautions as indicated by assessment   - Educate patient/family on patient safety including physical limitations  - Instruct patient to call for assistance with activity based on assessment  - Modify environment to reduce risk of injury  - Consider OT/PT consult to assist with strengthening/mobility  Outcome: Progressing  Goal: Maintain or return to baseline ADL function  Description  INTERVENTIONS:  -  Assess patient's ability to carry out ADLs; assess patient's baseline for ADL function and identify physical deficits which impact ability to perform ADLs (bathing, care of mouth/teeth, toileting, grooming, dressing, etc )  - Assess/evaluate cause of self-care deficits   - Assess range of motion  - Assess patient's mobility; develop plan if impaired  - Assess patient's need for assistive devices and provide as appropriate  - Encourage maximum independence but intervene and supervise when necessary  ¯ Involve family in performance of ADLs  ¯ Assess for home care needs following discharge ¯ Request OT consult to assist with ADL evaluation and planning for discharge  ¯ Provide patient education as appropriate  Outcome: Progressing  Goal: Maintain or return mobility status to optimal level  Description  INTERVENTIONS:  - Assess patient's baseline mobility status (ambulation, transfers, stairs, etc )    - Identify cognitive and physical deficits and behaviors that affect mobility  - Identify mobility aids required to assist with transfers and/or ambulation (gait belt, sit-to-stand, lift, walker, cane, etc )  - [place] fall precautions as indicated by assessment  - Record patient progress and toleration of activity level on Mobility SBAR; progress patient to next Phase/Stage  - Instruct patient to call for assistance with activity based on assessment  - Request Rehabilitation consult to assist with strengthening/weightbearing, etc   Outcome: Progressing     Problem: DISCHARGE PLANNING  Goal: Discharge to home or other facility with appropriate resources  Description  INTERVENTIONS:  - Identify barriers to discharge w/patient and caregiver  - Arrange for needed discharge resources and transportation as appropriate  - Identify discharge learning needs (meds, wound care, etc )  - Arrange for interpretive services to assist at discharge as needed  - Refer to Case Management Department for coordinating discharge planning if the patient needs post-hospital services based on physician/advanced practitioner order or complex needs related to functional status, cognitive ability, or social support system  Outcome: Progressing     Problem: Knowledge Deficit  Goal: Patient/family/caregiver demonstrates understanding of disease process, treatment plan, medications, and discharge instructions  Description  Complete learning assessment and assess knowledge base    Interventions:  - Provide teaching at level of understanding  - Provide teaching via preferred learning methods  Outcome: Progressing     Problem: Prexisting or High Potential for Compromised Skin Integrity  Goal: Skin integrity is maintained or improved  Description  INTERVENTIONS:  - Identify patients at risk for skin breakdown  - Assess and monitor skin integrity  - Assess and monitor nutrition and hydration status  - Monitor labs (i e  albumin)  - Assess for incontinence   - Turn and reposition patient  - Assist with mobility/ambulation  - Relieve pressure over bony prominences  - Avoid friction and shearing  - Provide appropriate hygiene as needed including keeping skin clean and dry  - Evaluate need for skin moisturizer/barrier cream  - Collaborate with interdisciplinary team (i e  Nutrition, Rehabilitation, etc )   - Patient/family teaching  Outcome: Progressing     Problem: DISCHARGE PLANNING - CARE MANAGEMENT  Goal: Discharge to post-acute care or home with appropriate resources  Description  INTERVENTIONS:  - Conduct assessment to determine patient/family and health care team treatment goals, and need for post-acute services based on payer coverage, community resources, and patient preferences, and barriers to discharge  - Address psychosocial, clinical, and financial barriers to discharge as identified in assessment in conjunction with the patient/family and health care team  - Arrange appropriate level of post-acute services according to patient's   needs and preference and payer coverage in collaboration with the physician and health care team  - Communicate with and update the patient/family, physician, and health care team regarding progress on the discharge plan  - Arrange appropriate transportation to post-acute venues   Outcome: Progressing     Problem: Potential for Falls  Goal: Patient will remain free of falls  Description  INTERVENTIONS:  - Assess patient frequently for physical needs  -  Identify cognitive and physical deficits and behaviors that affect risk of falls    -  [place] fall precautions as indicated by assessment   - Educate patient/family on patient safety including physical limitations  - Instruct patient to call for assistance with activity based on assessment  - Modify environment to reduce risk of injury  - Consider OT/PT consult to assist with strengthening/mobility  Outcome: Progressing     Problem: CARDIOVASCULAR - ADULT  Goal: Maintains optimal cardiac output and hemodynamic stability  Description  INTERVENTIONS:  - Monitor I/O, vital signs and rhythm  - Monitor for S/S and trends of decreased cardiac output i e  bleeding, hypotension  - Administer and titrate ordered vasoactive medications to optimize hemodynamic stability  - Assess quality of pulses, skin color and temperature  - Assess for signs of decreased coronary artery perfusion - ex   Angina  - Instruct patient to report change in severity of symptoms  Outcome: Progressing     Problem: RESPIRATORY - ADULT  Goal: Achieves optimal ventilation and oxygenation  Description  INTERVENTIONS:  - Assess for changes in respiratory status  - Assess for changes in mentation and behavior  - Position to facilitate oxygenation and minimize respiratory effort  - Oxygen administration by appropriate delivery method based on oxygen saturation (per order) or ABGs  - Initiate smoking cessation education as indicated  - Encourage broncho-pulmonary hygiene including cough, deep breathe, Incentive Spirometry  - Assess the need for suctioning and aspirate as needed  - Assess and instruct to report SOB or any respiratory difficulty  - Respiratory Therapy support as indicated  Outcome: Progressing     Problem: GENITOURINARY - ADULT  Goal: Maintains or returns to baseline urinary function  Description  INTERVENTIONS:  - Assess urinary function  - Encourage oral fluids to ensure adequate hydration  - Administer IV fluids as ordered to ensure adequate hydration  - Administer ordered medications as needed  - Offer frequent toileting  - Follow urinary retention protocol if ordered  Outcome: Progressing  Goal: Urinary catheter remains patent  Description  INTERVENTIONS:  - Assess patency of urinary catheter  - If patient has a chronic rendon, consider changing catheter if non-functioning  - Follow guidelines for intermittent irrigation of non-functioning urinary catheter  Outcome: Progressing     Problem: METABOLIC, FLUID AND ELECTROLYTES - ADULT  Goal: Electrolytes maintained within normal limits  Description  INTERVENTIONS:  - Monitor labs and assess patient for signs and symptoms of electrolyte imbalances  - Administer electrolyte replacement as ordered  - Monitor response to electrolyte replacements, including repeat lab results as appropriate  - Instruct patient on fluid and nutrition as appropriate  Outcome: Progressing     Problem: SKIN/TISSUE INTEGRITY - ADULT  Goal: Skin integrity remains intact  Description  INTERVENTIONS  - Identify patients at risk for skin breakdown  - Assess and monitor skin integrity  - Assess and monitor nutrition and hydration status  - Monitor labs (i e  albumin)  - Assess for incontinence   - Turn and reposition patient  - Assist with mobility/ambulation  - Relieve pressure over bony prominences  - Avoid friction and shearing  - Provide appropriate hygiene as needed including keeping skin clean and dry  - Evaluate need for skin moisturizer/barrier cream  - Collaborate with interdisciplinary team (i e  Nutrition, Rehabilitation, etc )   - Patient/family teaching  Outcome: Progressing  Goal: Incision(s), wounds(s) or drain site(s) healing without S/S of infection  Description  INTERVENTIONS  - Assess and document risk factors for skin impairment   - Assess and document dressing, incision, wound bed, drain sites and surrounding tissue  - Initiate Nutrition services consult and/or wound management as needed  Outcome: Progressing  Goal: Oral mucous membranes remain intact  Description  INTERVENTIONS  - Assess oral mucosa and hygiene practices  - Implement preventative oral hygiene regimen  - Implement oral medicated treatments as ordered  - Initiate Nutrition services referral as needed  Outcome: Progressing

## 2019-03-14 NOTE — PROGRESS NOTES
Patient presents today for suprapubic tube placement  He had a urgent bedside suprapubic tube placed during his ICU stay due to urinary retention and false urethral passage unable to place [place]  His bladder scan yesterday demonstrated high PVR though he has been urinating  He states he has been urinating today and is not uncomfortable  His output was almost 500 cc this morning  Ultrasound performed by me today prior to procedure demonstrates moderate ascites but no distended bladder is seen  Likely his bladder scan was falsely elevated due to his ascites  Procedure was canceled  Discussed with Feliz Jackson with Urology      Rezamax Lemus MD

## 2019-03-14 NOTE — PHYSICAL THERAPY NOTE
PT EVALUATION    Pt  Name: [facility]  Pt  Age: 53 y o  MRN: [id number]  LENGTH OF STAY: 17    Patient Active Problem List   Diagnosis    Heart murmur    Cough    Chronic superficial gastritis without bleeding    Anemia    Hyperbilirubinemia    Failure to thrive in adult    Weight loss    Elevated alkaline phosphatase level    GERD (gastroesophageal reflux disease)    Liver abscess    Appendicitis    Alcohol abuse    Acute retention of urine    Severe sepsis with septic shock (HCC)    Empyema (HCC)       Admitting Diagnoses:   Chronic superficial gastritis without bleeding [K29 30]    No past medical history on file  Past Surgical History:   Procedure Laterality Date    IR ABSCESS/SEROMA DRAINAGE  2/28/2019    IR CHEST TUBE  3/6/2019       Imaging Studies:  CT chest abdomen pelvis w contrast   Final Result by Zenobia Wsahington MD (03/12 5885)      Hepatic abscess is again noted and is decreased in size however not resolved  Right middle and lower lobe consolidations are again noted with air bronchograms   A pigtail catheter and a pleural drainage catheter noted within the right pleural space  There is a small to moderate right pleural effusion which demonstrates thickened enhancing pleura concerning for an empyema  Dependent atelectasis is noted in the    left lung base with a small effusion  No air bronchograms on the left  Oral contrast was administered; the bowel loops are diffusely dilated throughout the abdomen; favor ileus over obstruction  Moderate ascites  Persistent splenomegaly with an of triangular area of decreased splenic enhancement similar to the prior exam concerning for a splenic infarct  Appendix appears similar to the recent prior exam       The study was marked in [facility] for immediate notification        Workstation performed: [id number]         IR tube check   Final Result by Sadie Arshad MD (03/12 4021)   Impression: Near resolution of liver abscess  Plan: Discontinue flushing  Follow-up tube check in one week with possible removal   Patient to get a contrast enhanced CT tomorrow for further confirmation of abscess resolution as well as assessment of right pleural fluid collection  Workstation performed: [id number]         XR abdomen 1 vw portable   Final Result by Che Mcduffie MD (03/11 0159)      Injected contrast seen within pelvis and not conforming to the expected contours of the bladder, suspicious for extravesicular position  Findings discussed with RADHA Fonseca at 1:59 AM, 3/11/2019               Workstation performed: [id number]         XR chest portable   Final Result by Bruna West MD (03/10 1254)      Decreased airspace disease and improved aeration left lung base  Otherwise, no significant interval change  Workstation performed: [id number]         XR chest portable   Final Result by Alcon Dunn DO (03/10 1056)      Persistent bilateral basilar opacities and effusions, right greater than left  Pigtail chest drain on the right unchanged in position  No pneumothorax  Workstation performed: [id number]         XR chest portable   Final Result by Sylvia Stephen MD (03/07 1447)      Increased right basilar consolidation and/or pleural effusion with associated atelectasis  Persistent left basilar consolidation  No pneumothorax  Workstation performed: [id number]         IR tube change   Final Result by Hailey Van MD (03/06 1624)   Impression:   1  Drain check demonstrated the existing tube in communication with the entire liver abscess  Attempt was made to reposition the tube into the more inferior portion of the abscess without success due to the amorphous nature of the collection  A 2nd    drainage tube is not possible due to the location of the fluid collection deep in the abdomen surrounded by multiple organs    The collection is markedly smaller in size compared with the previous CAT scan and I believe that's all of these collections are    being drained by this catheter  Plan: I recommend performing a CT scan of the chest abdomen pelvis in one week to reassess both the right empyema and this liver abscess  If the abscess is still present and has not decreased in size markedly, we could reattempt repositioning of the    catheter more inferiorly in the collection  Otherwise I would allow this catheter to continue draining since the collection is a very small fraction of the original size         Workstation performed: [id number]         IR chest tube   Final Result by Terrence Diaz MD (03/06 2347)   Impression:   Successful placement of an 8 5 [place] catheter into the right empyema under ultrasound control, and 50 mL of purulent fluid was aspirated  Plan: Tube to -20 cm H2O Pleur-evac suction  The patient should undergo a CT scan of the chest abdomen and pelvis in approximately one week to determine if both this tube in the liver drainage tube require repositioning if both collections have not    fully resolved            Workstation performed: [id number]         XR chest portable   Final Result by Onofre Macario MD (03/06 6639)      Persistent left basilar consolidation and worsening right basilar consolidation and effusion  No pneumothorax with right chest tube in place  The study was marked in [facility] for immediate notification  Workstation performed: [id number]         CT abdomen pelvis w contrast   Final Result by Jazlyn Sullivan MD (03/04 1031)      Multiloculated hepatic abscess, significantly decreased in size since February 25, 2019 but not completely collapsed  A pigtail drainage catheter is seen within the central portion of the abscess, the epicenter of which is in the caudate lobe of the    liver  Hepatic cirrhosis  Portal hypertension as evidenced by splenomegaly as well as perisplenic and periceliac varices    Main portal vein, splenic vein, and superior mesenteric veins are patent  Spontaneous splenorenal shunt is again noted  Interval development of extensive mesenteric edema and body wall edema as well as small to moderate volume of ascites, predominantly in the lower abdomen and pelvis  A right-sided chest tube is present and there is loculated hydropneumothorax in the base of the right hemithorax  Compressive atelectasis is noted at right lung base and dependent atelectasis noted left lung base  Enteric tube is noted within the stomach  The region of the gastroesophageal junction is inseparable from the abscess along the undersurface of the caudate lobe of the liver  As on previous examination there is abnormal thickening of the appendix  Although there is periappendiceal edema, this is no more significant than the mesenteric edema seen throughout the abdomen in this patient with interval development of ascites  The possibility that this represents acute appendicitis or possibly mucinous appendiceal neoplasm cannot be excluded and interval clinical and imaging follow-up of this finding is recommended  Workstation performed: [id number]         XR chest portable   Final Result by Tammie Mancini MD (03/01 8753)      1  Significant improvement in right hemithorax opacification from effusion/atelectasis, this is small-to-moderate on the final image  2   Retrocardiac opacity  3   Appropriate right IJ catheter  The study was marked in [facility] for immediate notification  Workstation performed: [id number]         XR chest portable   Final Result by Tammie Mancini MD (03/01 7890)      1  Significant improvement in right hemithorax opacification from effusion/atelectasis, this is small-to-moderate on the final image  2   Retrocardiac opacity  3   Appropriate right IJ catheter  The study was marked in [facility] for immediate notification              Workstation performed: [id number]         IR abscess/seroma drainage   Final Result by Mason Dao MD (03/01 0997)   Impression:    Successful image-guided percutaneous drainage of liver abscess with placement of 10-[place] drain         Workstation performed: [id number]         XR chest portable   Final Result by Rona Paulino MD (03/01 7266)      1  Significant improvement in right hemithorax opacification from effusion/atelectasis, this is small-to-moderate on the final image  2   Retrocardiac opacity  3   Appropriate right IJ catheter  The study was marked in [facility] for immediate notification  Workstation performed: [id number]         XR chest portable   Final Result by Leanne Asencio DO (02/28 3773)      Right-sided pleural effusion/atelectasis suspected  Superimposed infection could be considered in the appropriate clinical setting  Other findings as above  No significant interval change from the most recent prior  Workstation performed: [id number]         XR chest portable   Final Result by Leanne Asencio DO (02/28 8422)      Suspected moderate-sized right pleural effusion/atelectasis as described, possibly a hepatic hydrothorax  Superimposed infection could be considered in the appropriate clinical setting  Other findings as above  Workstation performed: [id number]         CT chest abdomen pelvis w contrast   Final Result by Leanne Asencio DO (02/25 4567)      Fluid-filled and dilated appendix with appendiceal wall thickening and periappendiceal fat stranding and fluid, suspicious for acute appendicitis in the appropriate clinical setting  Hepatic cirrhosis and splenomegaly, with associated perisplenic and mesenteric varices suggesting a component of portal hypertension        Enlargement of the caudate lobe which contains an ill-defined and heterogeneous mass lesion with large internal areas of hypodensity and internal septations as described, this measures approximately 10 5 x 9 4 x 11 8 cm in size (axial image 55, series    2)  Primary differential consideration is hepatic abscess in the appropriate clinical setting, although hepatic neoplasm such as hepatocellular carcinoma is also considered  Correlation with the patient's symptoms and laboratory values recommended  Prominent lymph nodes in the upper abdomen and retroperitoneum, largest measures approximately 1 4 cm in size, possibly reactive  Partially distended bladder  Mild circumferential bladder wall thickening noted, probably exaggerated by underdistention  Superimposed cystitis could be considered in the appropriate clinical setting  Other findings as above  Findings discussed with Dr Magnus Barrett by Dr Krystyna Montoya at 11:45 PM on 2/25/2019  Workstation performed: [id number]         IR tube check    (Results Pending)   IR suprapubic tube    (Results Pending)        03/14/19 1107   Note Type   Note type Eval only   Pain Assessment   Pain Score No Pain   Home Living   Type of [address] [facility] Two level;Bed/bath upstairs;Stairs to enter with rails  (9-10STE (front); 4STE (back); 4+11 steps to 2nd level bed/ba)   Home Equipment Other (Comment)  (no DME)   Prior Function   Level of [place] Independent with ADLs and functional mobility  (w/o AD)   Lives With Significant other   Receives Help From Family   ADL Assistance Independent   Falls in the last 6 months 0   Restrictions/Precautions   Weight Bearing Precautions Per Order No   Other Precautions Multiple lines;Telemetry; Fall Risk  (chest tube)   General   Family/Caregiver Present Yes  (significant other)   Cognition   Overall Cognitive Status WFL   Arousal/Participation Alert   Attention Within functional limits   Orientation Level Oriented to person;Oriented to place;Oriented to time   Following Commands Follows one step commands without difficulty   RUE Assessment   RUE Assessment WFL   RUE Strength RUE Overall Strength Within Functional Limits - able to perform ADL tasks with strength   LUE Assessment   LUE Assessment WFL   LUE Strength   LUE Overall Strength Within Functional Limits - able to perform ADL tasks with strength   RLE Assessment   RLE Assessment X   Strength RLE   RLE Overall Strength 4-/5   R Hip Flexion 3-/5   LLE Assessment   LLE Assessment X   Strength LLE   LLE Overall Strength 4-/5   L Hip Flexion 3-/5   Coordination   Movements are Fluid and Coordinated 1   Sensation WFL   Bed Mobility   Supine to Sit 3  Moderate assistance   Additional items Assist x 1;HOB elevated; Bedrails; Increased time required;Verbal cues;LE management   Additional Comments cues for techniques   Transfers   Sit to Stand 4  Minimal assistance   Additional items Assist x 1;Bedrails; Increased time required;Verbal cues   Stand to Sit 4  Minimal assistance   Additional items Assist x 1; Increased time required;Verbal cues   Additional Comments cues for techniques especially for hand placement   Ambulation/Elevation   Gait pattern Wide SUDHIR; Decreased foot clearance; Short stride; Excessively slow; Step to  (unsteady)   Gait Assistance 4  Minimal assist   Additional items Assist x 1;Verbal cues; Tactile cues   Assistive Device Rolling walker   Distance 15'x1   Balance   Static Sitting Fair +   Static Standing Fair -   Ambulatory Poor +   Activity Tolerance   Activity Tolerance Patient limited by fatigue   Nurse Made Aware Rebeca Real   Assessment   Prognosis Good   Problem List Decreased strength;Decreased endurance; Impaired balance;Decreased mobility; Decreased safety awareness; Obesity   Assessment Pt  48 y o male admitted directly from MD's office  Pt c/o dec appetite, 30lb wt loss, diarrhea & abdominal pain  Pt admitted for Severe sepsis with septic shock ([address] ) 2* to liver abscess; VDRF, extubated on 3/11; R sided pleural effusion; empyema  S/p liver abscess drainage on 2/28 & chest tube placement on 3/6   Pt referred to PT for mobility assessment & D/C planning w/ orders of activity as tolerated  PTA, pt reports being I w/o AD  On eval, pt demonstrate dec mobility, balance, endurance & amb 2* to deconditioning  Pt require modAx1 for bed mobility; minAx1 for transfers & amb + cues for techniques  Gait deviations as above, slow & unsteady w/ dec foot clearance but no gross LOB noted  Dec amb tolerance 2* to fatigue & weakness  No dizziness reported t/o session  Nsg staff most recent vital signs as follows: /60 (BP Location: Right arm)   Pulse 82   Temp 99 4 °F (37 4 °C) (Tympanic)   Resp 20   Ht 5' 8" (1 727 m)   Wt 102 kg (224 lb 13 9 oz)   SpO2 94%   BMI 34 19 kg/m²   At end of session, ptrequested to sit at EOB for awhile  Offered to sit on the recliner but pt declined  Fall precautions reinforced w/ good understanding  Significant other still in room as well  Pt functioning below baseline hence will continue skilled PT to improve function & safety  At this time, due to above mentioned deficits & high risk for falls, pt will benefit from inpt rehab at D/C  CM to follow  Ns staff to continue to mobilized pt (OOB in chair for all meals & ambulate in room/unit) as tolerated to prevent further decline in function  Nsg notified  Barriers to Discharge Inaccessible home environment;Decreased caregiver support   Goals   Patient Goals to get stronger   STG Expiration Date 03/28/19   Short Term Goal #1 Goals to be met in 14 days; pt will be able to: 1) inc strength & balance by 1 grade to improve overall functional mobility & dec fall risk; 2) inc bed mobility to S for pt to be able to get in/OOB safely w/ proper techniques 100% of the time, to dec caregiver assistance & safely function at home; 3) inc transfers to S for pt to transition safely from one surface to another w/o % of the time, to dec caregiver assistance & safely function at home; 4) inc amb w/ RW approx   >80' w/ S for pt to ambulate household distances w/o any % of the time, to dec caregiver assistance & safely function at home; 5) inc barthel score to 75 to decrease overall risk for falls; 6) negotiate stairs w/ S for inc safety during stair mgt inside/outside of home & dec caregiver assistance; 7) pt/caregiver ed   Treatment Day 0   Plan   Treatment/Interventions Functional transfer training;LE strengthening/ROM; Elevations; Therapeutic exercise; Endurance training;Patient/family training;Bed mobility;Gait training;Spoke to case management; Family   PT Frequency Other (Comment)  (3-5x/wk)   Recommendation   Recommendation Short-term skilled PT   Equipment Recommended Walker   PT - OK to Discharge Yes  (to inpt rehab when medically cleared)   Barthel Index   Feeding 10   Bathing 0   Grooming Score 5   Dressing Score 5   Bladder Score 10   Bowels Score 10   Toilet Use Score 5   Transfers (Bed/Chair) Score 10   Mobility (Level Surface) Score 0   Stairs Score 0   Barthel Index Score 55   Hx/personal factors: co-morbidities, inaccessible home, dec caregiver support, mutliple lines, telemetry, use of AD, fall risk, assist w/ ADL's, obesity and chest tube  Examination: dec mobility, dec balance, dec endurance, dec amb, moderate fall risk  Clinical: unpredictable (ongoing medical status, abnormal lab values and moderate fall risk)  Complexity: high     Merlin Hewitt, PT

## 2019-03-14 NOTE — NURSING NOTE
Pt transferred to [facility]  Report given to Uday Brandt  Medications and bladder scanned prior to transfer  Family at bedside

## 2019-03-14 NOTE — PLAN OF CARE
Problem: Nutrition/Hydration-ADULT  Goal: Nutrient/Hydration intake appropriate for improving, restoring or maintaining nutritional needs  Description  Monitor and assess patient's nutrition/hydration status for malnutrition (ex- brittle hair, bruises, dry skin, pale skin and conjunctiva, muscle wasting, smooth red tongue, and disorientation)  Collaborate with interdisciplinary team and initiate plan and interventions as ordered  Monitor patient's weight and dietary intake as ordered or per policy  Utilize nutrition screening tool and intervene per policy  Determine patient's food preferences and provide high-protein, high-caloric foods as appropriate       INTERVENTIONS:  - Monitor oral intake, urinary output, labs, and treatment plans  - Assess nutrition and hydration status and recommend course of action  - Evaluate amount of meals eaten  - Assist patient with eating if necessary   - Allow adequate time for meals  - Recommend/ encourage appropriate diets, oral nutritional supplements, and vitamin/mineral supplements  - Order, calculate, and assess calorie counts as needed  - Recommend, monitor, and adjust tube feedings and TPN/PPN based on assessed needs  - Assess need for intravenous fluids  - Provide specific nutrition/hydration education as appropriate  - Include patient/family/caregiver in decisions related to nutrition  Outcome: Progressing     Problem: PAIN - ADULT  Goal: Verbalizes/displays adequate comfort level or baseline comfort level  Description  Interventions:  - Encourage patient to monitor pain and request assistance  - Assess pain using appropriate pain scale  - Administer analgesics based on type and severity of pain and evaluate response  - Implement non-pharmacological measures as appropriate and evaluate response  - Consider cultural and social influences on pain and pain management  - Notify physician/advanced practitioner if interventions unsuccessful or patient reports new pain  Outcome: Progressing     Problem: INFECTION - ADULT  Goal: Absence or prevention of progression during hospitalization  Description  INTERVENTIONS:  - Assess and monitor for signs and symptoms of infection  - Monitor lab/diagnostic results  - Monitor all insertion sites, i e  indwelling lines, tubes, and drains  - Monitor endotracheal (as able) and nasal secretions for changes in amount and color  - [place] appropriate cooling/warming therapies per order  - Administer medications as ordered  - Instruct and encourage patient and family to use good hand hygiene technique  - Identify and instruct in appropriate isolation precautions for identified infection/condition  Outcome: Progressing     Problem: SAFETY ADULT  Goal: Patient will remain free of falls  Description  INTERVENTIONS:  - Assess patient frequently for physical needs  -  Identify cognitive and physical deficits and behaviors that affect risk of falls    -  [place] fall precautions as indicated by assessment   - Educate patient/family on patient safety including physical limitations  - Instruct patient to call for assistance with activity based on assessment  - Modify environment to reduce risk of injury  - Consider OT/PT consult to assist with strengthening/mobility  Outcome: Progressing  Goal: Maintain or return to baseline ADL function  Description  INTERVENTIONS:  -  Assess patient's ability to carry out ADLs; assess patient's baseline for ADL function and identify physical deficits which impact ability to perform ADLs (bathing, care of mouth/teeth, toileting, grooming, dressing, etc )  - Assess/evaluate cause of self-care deficits   - Assess range of motion  - Assess patient's mobility; develop plan if impaired  - Assess patient's need for assistive devices and provide as appropriate  - Encourage maximum independence but intervene and supervise when necessary  ¯ Involve family in performance of ADLs  ¯ Assess for home care needs following discharge ¯ Request OT consult to assist with ADL evaluation and planning for discharge  ¯ Provide patient education as appropriate  Outcome: Progressing  Goal: Maintain or return mobility status to optimal level  Description  INTERVENTIONS:  - Assess patient's baseline mobility status (ambulation, transfers, stairs, etc )    - Identify cognitive and physical deficits and behaviors that affect mobility  - Identify mobility aids required to assist with transfers and/or ambulation (gait belt, sit-to-stand, lift, walker, cane, etc )  - [place] fall precautions as indicated by assessment  - Record patient progress and toleration of activity level on Mobility SBAR; progress patient to next Phase/Stage  - Instruct patient to call for assistance with activity based on assessment  - Request Rehabilitation consult to assist with strengthening/weightbearing, etc   Outcome: Progressing     Problem: DISCHARGE PLANNING  Goal: Discharge to home or other facility with appropriate resources  Description  INTERVENTIONS:  - Identify barriers to discharge w/patient and caregiver  - Arrange for needed discharge resources and transportation as appropriate  - Identify discharge learning needs (meds, wound care, etc )  - Arrange for interpretive services to assist at discharge as needed  - Refer to Case Management Department for coordinating discharge planning if the patient needs post-hospital services based on physician/advanced practitioner order or complex needs related to functional status, cognitive ability, or social support system  Outcome: Progressing     Problem: Knowledge Deficit  Goal: Patient/family/caregiver demonstrates understanding of disease process, treatment plan, medications, and discharge instructions  Description  Complete learning assessment and assess knowledge base    Interventions:  - Provide teaching at level of understanding  - Provide teaching via preferred learning methods  Outcome: Progressing     Problem: Prexisting or High Potential for Compromised Skin Integrity  Goal: Skin integrity is maintained or improved  Description  INTERVENTIONS:  - Identify patients at risk for skin breakdown  - Assess and monitor skin integrity  - Assess and monitor nutrition and hydration status  - Monitor labs (i e  albumin)  - Assess for incontinence   - Turn and reposition patient  - Assist with mobility/ambulation  - Relieve pressure over bony prominences  - Avoid friction and shearing  - Provide appropriate hygiene as needed including keeping skin clean and dry  - Evaluate need for skin moisturizer/barrier cream  - Collaborate with interdisciplinary team (i e  Nutrition, Rehabilitation, etc )   - Patient/family teaching  Outcome: Progressing     Problem: DISCHARGE PLANNING - CARE MANAGEMENT  Goal: Discharge to post-acute care or home with appropriate resources  Description  INTERVENTIONS:  - Conduct assessment to determine patient/family and health care team treatment goals, and need for post-acute services based on payer coverage, community resources, and patient preferences, and barriers to discharge  - Address psychosocial, clinical, and financial barriers to discharge as identified in assessment in conjunction with the patient/family and health care team  - Arrange appropriate level of post-acute services according to patient's   needs and preference and payer coverage in collaboration with the physician and health care team  - Communicate with and update the patient/family, physician, and health care team regarding progress on the discharge plan  - Arrange appropriate transportation to post-acute venues   Outcome: Progressing     Problem: Potential for Falls  Goal: Patient will remain free of falls  Description  INTERVENTIONS:  - Assess patient frequently for physical needs  -  Identify cognitive and physical deficits and behaviors that affect risk of falls    -  [place] fall precautions as indicated by assessment   - Educate patient/family on patient safety including physical limitations  - Instruct patient to call for assistance with activity based on assessment  - Modify environment to reduce risk of injury  - Consider OT/PT consult to assist with strengthening/mobility  Outcome: Progressing     Problem: CARDIOVASCULAR - ADULT  Goal: Maintains optimal cardiac output and hemodynamic stability  Description  INTERVENTIONS:  - Monitor I/O, vital signs and rhythm  - Monitor for S/S and trends of decreased cardiac output i e  bleeding, hypotension  - Administer and titrate ordered vasoactive medications to optimize hemodynamic stability  - Assess quality of pulses, skin color and temperature  - Assess for signs of decreased coronary artery perfusion - ex   Angina  - Instruct patient to report change in severity of symptoms  Outcome: Progressing     Problem: RESPIRATORY - ADULT  Goal: Achieves optimal ventilation and oxygenation  Description  INTERVENTIONS:  - Assess for changes in respiratory status  - Assess for changes in mentation and behavior  - Position to facilitate oxygenation and minimize respiratory effort  - Oxygen administration by appropriate delivery method based on oxygen saturation (per order) or ABGs  - Initiate smoking cessation education as indicated  - Encourage broncho-pulmonary hygiene including cough, deep breathe, Incentive Spirometry  - Assess the need for suctioning and aspirate as needed  - Assess and instruct to report SOB or any respiratory difficulty  - Respiratory Therapy support as indicated  Outcome: Progressing     Problem: GENITOURINARY - ADULT  Goal: Maintains or returns to baseline urinary function  Description  INTERVENTIONS:  - Assess urinary function  - Encourage oral fluids to ensure adequate hydration  - Administer IV fluids as ordered to ensure adequate hydration  - Administer ordered medications as needed  - Offer frequent toileting  - Follow urinary retention protocol if ordered  Outcome: Progressing  Goal: Urinary catheter remains patent  Description  INTERVENTIONS:  - Assess patency of urinary catheter  - If patient has a chronic rendon, consider changing catheter if non-functioning  - Follow guidelines for intermittent irrigation of non-functioning urinary catheter  Outcome: Progressing     Problem: METABOLIC, FLUID AND ELECTROLYTES - ADULT  Goal: Electrolytes maintained within normal limits  Description  INTERVENTIONS:  - Monitor labs and assess patient for signs and symptoms of electrolyte imbalances  - Administer electrolyte replacement as ordered  - Monitor response to electrolyte replacements, including repeat lab results as appropriate  - Instruct patient on fluid and nutrition as appropriate  Outcome: Progressing     Problem: SKIN/TISSUE INTEGRITY - ADULT  Goal: Skin integrity remains intact  Description  INTERVENTIONS  - Identify patients at risk for skin breakdown  - Assess and monitor skin integrity  - Assess and monitor nutrition and hydration status  - Monitor labs (i e  albumin)  - Assess for incontinence   - Turn and reposition patient  - Assist with mobility/ambulation  - Relieve pressure over bony prominences  - Avoid friction and shearing  - Provide appropriate hygiene as needed including keeping skin clean and dry  - Evaluate need for skin moisturizer/barrier cream  - Collaborate with interdisciplinary team (i e  Nutrition, Rehabilitation, etc )   - Patient/family teaching  Outcome: Progressing  Goal: Incision(s), wounds(s) or drain site(s) healing without S/S of infection  Description  INTERVENTIONS  - Assess and document risk factors for skin impairment   - Assess and document dressing, incision, wound bed, drain sites and surrounding tissue  - Initiate Nutrition services consult and/or wound management as needed  Outcome: Progressing  Goal: Oral mucous membranes remain intact  Description  INTERVENTIONS  - Assess oral mucosa and hygiene practices  - Implement preventative oral hygiene regimen  - Implement oral medicated treatments as ordered  - Initiate Nutrition services referral as needed  Outcome: Progressing

## 2019-03-14 NOTE — NURSING NOTE
Pt received back from IR with a bleeding head laceration about 1cm wide  Patient was alert and oriented x4 when he arrived back to the unit  V/S are stable  PCP  Was notified and a stat consult for Acute care surgery was placed to assess the need for sutures  Surgery placed 2 sutures to the wound and surgery will follow up in a week to check the wound  Patient complains of no pain and is resting comfortably in bed  Will continue to monitor patients status

## 2019-03-14 NOTE — PLAN OF CARE
Problem: Nutrition/Hydration-ADULT  Goal: Nutrient/Hydration intake appropriate for improving, restoring or maintaining nutritional needs  Description  Monitor and assess patient's nutrition/hydration status for malnutrition (ex- brittle hair, bruises, dry skin, pale skin and conjunctiva, muscle wasting, smooth red tongue, and disorientation)  Collaborate with interdisciplinary team and initiate plan and interventions as ordered  Monitor patient's weight and dietary intake as ordered or per policy  Utilize nutrition screening tool and intervene per policy  Determine patient's food preferences and provide high-protein, high-caloric foods as appropriate       INTERVENTIONS:  - Monitor oral intake, urinary output, labs, and treatment plans  - Assess nutrition and hydration status and recommend course of action  - Evaluate amount of meals eaten  - Assist patient with eating if necessary   - Allow adequate time for meals  - Recommend/ encourage appropriate diets, oral nutritional supplements, and vitamin/mineral supplements  - Order, calculate, and assess calorie counts as needed  - Recommend, monitor, and adjust tube feedings and TPN/PPN based on assessed needs  - Assess need for intravenous fluids  - Provide specific nutrition/hydration education as appropriate  - Include patient/family/caregiver in decisions related to nutrition  Outcome: Progressing     Problem: PAIN - ADULT  Goal: Verbalizes/displays adequate comfort level or baseline comfort level  Description  Interventions:  - Encourage patient to monitor pain and request assistance  - Assess pain using appropriate pain scale  - Administer analgesics based on type and severity of pain and evaluate response  - Implement non-pharmacological measures as appropriate and evaluate response  - Consider cultural and social influences on pain and pain management  - Notify physician/advanced practitioner if interventions unsuccessful or patient reports new pain  Outcome: Progressing     Problem: INFECTION - ADULT  Goal: Absence or prevention of progression during hospitalization  Description  INTERVENTIONS:  - Assess and monitor for signs and symptoms of infection  - Monitor lab/diagnostic results  - Monitor all insertion sites, i e  indwelling lines, tubes, and drains  - Monitor endotracheal (as able) and nasal secretions for changes in amount and color  - [place] appropriate cooling/warming therapies per order  - Administer medications as ordered  - Instruct and encourage patient and family to use good hand hygiene technique  - Identify and instruct in appropriate isolation precautions for identified infection/condition  Outcome: Progressing     Problem: SAFETY ADULT  Goal: Patient will remain free of falls  Description  INTERVENTIONS:  - Assess patient frequently for physical needs  -  Identify cognitive and physical deficits and behaviors that affect risk of falls    -  [place] fall precautions as indicated by assessment   - Educate patient/family on patient safety including physical limitations  - Instruct patient to call for assistance with activity based on assessment  - Modify environment to reduce risk of injury  - Consider OT/PT consult to assist with strengthening/mobility  Outcome: Progressing  Goal: Maintain or return to baseline ADL function  Description  INTERVENTIONS:  -  Assess patient's ability to carry out ADLs; assess patient's baseline for ADL function and identify physical deficits which impact ability to perform ADLs (bathing, care of mouth/teeth, toileting, grooming, dressing, etc )  - Assess/evaluate cause of self-care deficits   - Assess range of motion  - Assess patient's mobility; develop plan if impaired  - Assess patient's need for assistive devices and provide as appropriate  - Encourage maximum independence but intervene and supervise when necessary  ¯ Involve family in performance of ADLs  ¯ Assess for home care needs following discharge ¯ Request OT consult to assist with ADL evaluation and planning for discharge  ¯ Provide patient education as appropriate  Outcome: Progressing  Goal: Maintain or return mobility status to optimal level  Description  INTERVENTIONS:  - Assess patient's baseline mobility status (ambulation, transfers, stairs, etc )    - Identify cognitive and physical deficits and behaviors that affect mobility  - Identify mobility aids required to assist with transfers and/or ambulation (gait belt, sit-to-stand, lift, walker, cane, etc )  - [place] fall precautions as indicated by assessment  - Record patient progress and toleration of activity level on Mobility SBAR; progress patient to next Phase/Stage  - Instruct patient to call for assistance with activity based on assessment  - Request Rehabilitation consult to assist with strengthening/weightbearing, etc   Outcome: Progressing     Problem: DISCHARGE PLANNING  Goal: Discharge to home or other facility with appropriate resources  Description  INTERVENTIONS:  - Identify barriers to discharge w/patient and caregiver  - Arrange for needed discharge resources and transportation as appropriate  - Identify discharge learning needs (meds, wound care, etc )  - Arrange for interpretive services to assist at discharge as needed  - Refer to Case Management Department for coordinating discharge planning if the patient needs post-hospital services based on physician/advanced practitioner order or complex needs related to functional status, cognitive ability, or social support system  Outcome: Progressing     Problem: Knowledge Deficit  Goal: Patient/family/caregiver demonstrates understanding of disease process, treatment plan, medications, and discharge instructions  Description  Complete learning assessment and assess knowledge base    Interventions:  - Provide teaching at level of understanding  - Provide teaching via preferred learning methods  Outcome: Progressing     Problem: Prexisting or High Potential for Compromised Skin Integrity  Goal: Skin integrity is maintained or improved  Description  INTERVENTIONS:  - Identify patients at risk for skin breakdown  - Assess and monitor skin integrity  - Assess and monitor nutrition and hydration status  - Monitor labs (i e  albumin)  - Assess for incontinence   - Turn and reposition patient  - Assist with mobility/ambulation  - Relieve pressure over bony prominences  - Avoid friction and shearing  - Provide appropriate hygiene as needed including keeping skin clean and dry  - Evaluate need for skin moisturizer/barrier cream  - Collaborate with interdisciplinary team (i e  Nutrition, Rehabilitation, etc )   - Patient/family teaching  Outcome: Progressing     Problem: DISCHARGE PLANNING - CARE MANAGEMENT  Goal: Discharge to post-acute care or home with appropriate resources  Description  INTERVENTIONS:  - Conduct assessment to determine patient/family and health care team treatment goals, and need for post-acute services based on payer coverage, community resources, and patient preferences, and barriers to discharge  - Address psychosocial, clinical, and financial barriers to discharge as identified in assessment in conjunction with the patient/family and health care team  - Arrange appropriate level of post-acute services according to patient's   needs and preference and payer coverage in collaboration with the physician and health care team  - Communicate with and update the patient/family, physician, and health care team regarding progress on the discharge plan  - Arrange appropriate transportation to post-acute venues   Outcome: Progressing     Problem: Potential for Falls  Goal: Patient will remain free of falls  Description  INTERVENTIONS:  - Assess patient frequently for physical needs  -  Identify cognitive and physical deficits and behaviors that affect risk of falls    -  [place] fall precautions as indicated by assessment   - Educate patient/family on patient safety including physical limitations  - Instruct patient to call for assistance with activity based on assessment  - Modify environment to reduce risk of injury  - Consider OT/PT consult to assist with strengthening/mobility  Outcome: Progressing     Problem: CARDIOVASCULAR - ADULT  Goal: Maintains optimal cardiac output and hemodynamic stability  Description  INTERVENTIONS:  - Monitor I/O, vital signs and rhythm  - Monitor for S/S and trends of decreased cardiac output i e  bleeding, hypotension  - Administer and titrate ordered vasoactive medications to optimize hemodynamic stability  - Assess quality of pulses, skin color and temperature  - Assess for signs of decreased coronary artery perfusion - ex   Angina  - Instruct patient to report change in severity of symptoms  Outcome: Progressing     Problem: RESPIRATORY - ADULT  Goal: Achieves optimal ventilation and oxygenation  Description  INTERVENTIONS:  - Assess for changes in respiratory status  - Assess for changes in mentation and behavior  - Position to facilitate oxygenation and minimize respiratory effort  - Oxygen administration by appropriate delivery method based on oxygen saturation (per order) or ABGs  - Initiate smoking cessation education as indicated  - Encourage broncho-pulmonary hygiene including cough, deep breathe, Incentive Spirometry  - Assess the need for suctioning and aspirate as needed  - Assess and instruct to report SOB or any respiratory difficulty  - Respiratory Therapy support as indicated  Outcome: Progressing     Problem: GENITOURINARY - ADULT  Goal: Maintains or returns to baseline urinary function  Description  INTERVENTIONS:  - Assess urinary function  - Encourage oral fluids to ensure adequate hydration  - Administer IV fluids as ordered to ensure adequate hydration  - Administer ordered medications as needed  - Offer frequent toileting  - Follow urinary retention protocol if ordered  Outcome: Progressing  Goal: Urinary catheter remains patent  Description  INTERVENTIONS:  - Assess patency of urinary catheter  - If patient has a chronic rendon, consider changing catheter if non-functioning  - Follow guidelines for intermittent irrigation of non-functioning urinary catheter  Outcome: Progressing     Problem: METABOLIC, FLUID AND ELECTROLYTES - ADULT  Goal: Electrolytes maintained within normal limits  Description  INTERVENTIONS:  - Monitor labs and assess patient for signs and symptoms of electrolyte imbalances  - Administer electrolyte replacement as ordered  - Monitor response to electrolyte replacements, including repeat lab results as appropriate  - Instruct patient on fluid and nutrition as appropriate  Outcome: Progressing     Problem: SKIN/TISSUE INTEGRITY - ADULT  Goal: Skin integrity remains intact  Description  INTERVENTIONS  - Identify patients at risk for skin breakdown  - Assess and monitor skin integrity  - Assess and monitor nutrition and hydration status  - Monitor labs (i e  albumin)  - Assess for incontinence   - Turn and reposition patient  - Assist with mobility/ambulation  - Relieve pressure over bony prominences  - Avoid friction and shearing  - Provide appropriate hygiene as needed including keeping skin clean and dry  - Evaluate need for skin moisturizer/barrier cream  - Collaborate with interdisciplinary team (i e  Nutrition, Rehabilitation, etc )   - Patient/family teaching  Outcome: Progressing  Goal: Incision(s), wounds(s) or drain site(s) healing without S/S of infection  Description  INTERVENTIONS  - Assess and document risk factors for skin impairment   - Assess and document dressing, incision, wound bed, drain sites and surrounding tissue  - Initiate Nutrition services consult and/or wound management as needed  Outcome: Progressing  Goal: Oral mucous membranes remain intact  Description  INTERVENTIONS  - Assess oral mucosa and hygiene practices  - Implement preventative oral hygiene regimen  - Implement oral medicated treatments as ordered  - Initiate Nutrition services referral as needed  Outcome: Progressing

## 2019-03-14 NOTE — PROGRESS NOTES
Progress Note - Pulmonary   Rohan Mediate 53 y o  male MRN: [id number]  Unit/Bed#: [place] [address] -01 Encounter: [id number]    Assessment/Plan:  1  Severe sepsis with septic shock secondary to liver abscess and empyema  · Infectious disease following and will continue on prolonged IV antibiotics until liver abscess resolved  · abx day #15  · Will need repeat OLIVIA drain evaluation in 1 week  · Chest tube to suction  2  Acute hypoxic respiratory failure with empyema s/p chest tube  · Respiratory status improving on nasal cannula  · Encourage incentive spirometer  · Had 70 ml output in chest tube   · Had 1 chest tube removed yesterday  · Continue chest tube on suction  · Encourage out of bed and ambulation  3  Right lung consolidation and effusion on CT scan  · Encourage incentive spirometer  · Continue with PO lasix q 12 hours  · Improving slowly    ----------------------------------------------------------------------------------------------------------------------    HPI/Interval History:   No issues overnight  Chest tube was on water seal and placed back on suction  Had 70 ml out of chest tube overnight  Plan for OR today for suprapubic catheter    Vitals:   Blood pressure 119/60, pulse 82, temperature 99 4 °F (37 4 °C), temperature source Tympanic, resp  rate 20, height 5' 8" (1 727 m), weight 102 kg (224 lb 13 9 oz), SpO2 94 %  ,Body mass index is 34 19 kg/m²    SpO2: 94 %  SpO2 Activity: At Rest  O2 Device: Nasal cannula    Physical Exam:   Gen: alert and oriented, NAD  HEENT:  PERRL, EOMI, normocephalic, atraumatic, o/p clear  Neck:  Supple, no JVD, no adneopathy  Chest:  Decreased breath sounds right posteriorly  Cardiac:  RRR, no murmurs, rubs, or gallops  Abdomen:  Soft, obese, non tender, non distended, bowel sounds present, + OLIVIA drain  Extremities:  SINGH, lower extremity edema      Labs:    CBC:  Results from last 7 days   Lab Units 03/14/19  0531   WBC Thousand/uL 4 71   HEMOGLOBIN g/dL 6 8*   HEMATOCRIT % 22 1* PLATELETS Thousands/uL 124*         BMP:   Lab Results   Component Value Date    SODIUM 137 03/14/2019    K 3 0 (L) 03/14/2019    CO2 28 03/14/2019     03/14/2019    BUN 13 03/14/2019    CREATININE 0 61 03/14/2019    CALCIUM 8 2 (L) 03/14/2019           Imaging studies:  No new imaging      Sherren Kinds, CRNP

## 2019-03-14 NOTE — PROGRESS NOTES
Progress Note - Urology  Media Waddell 1970, 53 y o  male MRN: [id number]    Unit/Bed#: [place] 2 -01 Encounter: [id number]    Acute retention of urine  Assessment & Plan  S/P Bedside SPT  Patient removed this himself  Unfortunately with the dislodging of the suprapubic tube, hematuria overnight, and PVR nearly 600 cc yesterday  PVR this morning 375 cc  Plan:  IR for S PT  Discussed with Dr Jonh Thakur  Subjective/Objective     Subjective:   Yves is feeling well and feels he is voiding slightly better today  No hematuria  He does feel like he is retaining  Bladder scan this morning for 375 cc  Review of Systems   Constitutional: Negative for activity change and appetite change  HENT: Negative for congestion and ear pain  Eyes: Negative for pain  Respiratory: Negative for cough and shortness of breath  Cardiovascular: Negative for chest pain and palpitations  Gastrointestinal: Negative for abdominal distention, abdominal pain, blood in stool, constipation, diarrhea and nausea  Genitourinary: Negative for difficulty urinating, dysuria and hematuria  Musculoskeletal: Negative for arthralgias and myalgias  Skin: Negative for rash  Allergic/Immunologic: Negative for immunocompromised state  Neurological: Negative for dizziness and headaches  Hematological: Negative for adenopathy  Does not bruise/bleed easily  Psychiatric/Behavioral: Negative for agitation  The patient is not nervous/anxious  Objective:  Vitals: Blood pressure 119/60, pulse 82, temperature 99 4 °F (37 4 °C), temperature source Tympanic, resp  rate 20, height 5' 8" (1 727 m), weight 102 kg (224 lb 13 9 oz), SpO2 94 %  ,Body mass index is 34 19 kg/m²      Intake/Output Summary (Last 24 hours) at 3/14/2019 1445  Last data filed at 3/14/2019 0907  Gross per 24 hour   Intake 100 ml   Output 1587 ml   Net -1487 ml     Invasive Devices     Peripheral Intravenous Line            Peripheral IV 03/12/19 Left Antecubital 1 day    Peripheral IV 03/14/19 Proximal;Right Forearm less than 1 day          Drain            Closed/Suction Drain Midline Abdomen Bulb 10 2 Fr  8 days    Chest Tube 2 Right Other (Comment) 8 5 Fr  7 days                Physical Exam   Constitutional: He is oriented to person, place, and time  He appears well-developed and well-nourished  He is cooperative  He does not appear ill  No distress  50-year-old male, out of bed to the chair  Nasal cannula oxygen in place  No acute distress  Family present at bedside  Appears jaundiced/sallow  HENT:   Head: Normocephalic and atraumatic  Moist mucous membranes  Eyes: Conjunctivae and EOM are normal    Neck: Normal range of motion  Neck supple  No tracheal deviation present  Cardiovascular: Normal rate, regular rhythm and normal heart sounds  No murmur heard  Pulmonary/Chest: Effort normal and breath sounds normal  No respiratory distress  He has no wheezes  Good airflow bilaterally on deep inspiration  Abdominal: Soft  Bowel sounds are normal  He exhibits no distension and no mass  There is no tenderness  Abdomen soft without significant suprapubic fullness  Musculoskeletal: Normal range of motion  He exhibits no edema  Neurological: He is alert and oriented to person, place, and time  Skin: Skin is warm and dry  No rash noted  He is not diaphoretic  No erythema  No pallor  Psychiatric: He has a normal mood and affect  His behavior is normal  Judgment and thought content normal    Nursing note and vitals reviewed  History:  No past medical history on file  Past Surgical History:   Procedure Laterality Date    IR ABSCESS/SEROMA DRAINAGE  2/28/2019    IR CHEST TUBE  3/6/2019     No family history on file    Social History     Socioeconomic History    Marital status: Single     Spouse name: Not on file    Number of children: Not on file    Years of education: Not on file    Highest education level: Not on file Occupational History    Not on file   Social Needs    Financial resource strain: Not on file    Food insecurity:     Worry: Not on file     Inability: Not on file    Transportation needs:     Medical: Not on file     Non-medical: Not on file   Tobacco Use    Smoking status: Former Smoker    Smokeless tobacco: Never Used   Substance and Sexual Activity    Alcohol use:  Yes    Drug use: Not on file    Sexual activity: Not on file   Lifestyle    Physical activity:     Days per week: Not on file     Minutes per session: Not on file    Stress: Not on file   Relationships    Social connections:     Talks on phone: Not on file     Gets together: Not on file     Attends [employer] service: Not on file     Active member of club or organization: Not on file     Attends meetings of clubs or organizations: Not on file     Relationship status: Not on file    Intimate partner violence:     Fear of current or ex partner: Not on file     Emotionally abused: Not on file     Physically abused: Not on file     Forced sexual activity: Not on file   Other Topics Concern    Not on file   Social History Narrative    Not on file       Labs:  Recent Labs     03/14/19  0531   WBC 4 71     Recent Labs     03/13/19  0614 03/14/19  0531   HGB 7 4* 6 8*       Recent Labs     03/12/19  0532 03/13/19  0447 03/14/19  0531   CREATININE 0 52* 0 56* 0 61         Andres Posada PA-C  Date: 3/14/2019 Time: 2:45 PM

## 2019-03-14 NOTE — PLAN OF CARE
Problem: Nutrition/Hydration-ADULT  Goal: Nutrient/Hydration intake appropriate for improving, restoring or maintaining nutritional needs  Description  Monitor and assess patient's nutrition/hydration status for malnutrition (ex- brittle hair, bruises, dry skin, pale skin and conjunctiva, muscle wasting, smooth red tongue, and disorientation)  Collaborate with interdisciplinary team and initiate plan and interventions as ordered  Monitor patient's weight and dietary intake as ordered or per policy  Utilize nutrition screening tool and intervene per policy  Determine patient's food preferences and provide high-protein, high-caloric foods as appropriate       INTERVENTIONS:  - Monitor oral intake, urinary output, labs, and treatment plans  - Assess nutrition and hydration status and recommend course of action  - Evaluate amount of meals eaten  - Assist patient with eating if necessary   - Allow adequate time for meals  - Recommend/ encourage appropriate diets, oral nutritional supplements, and vitamin/mineral supplements  - Order, calculate, and assess calorie counts as needed  - Recommend, monitor, and adjust tube feedings and TPN/PPN based on assessed needs  - Assess need for intravenous fluids  - Provide specific nutrition/hydration education as appropriate  - Include patient/family/caregiver in decisions related to nutrition  Outcome: Progressing     Problem: PAIN - ADULT  Goal: Verbalizes/displays adequate comfort level or baseline comfort level  Description  Interventions:  - Encourage patient to monitor pain and request assistance  - Assess pain using appropriate pain scale  - Administer analgesics based on type and severity of pain and evaluate response  - Implement non-pharmacological measures as appropriate and evaluate response  - Consider cultural and social influences on pain and pain management  - Notify physician/advanced practitioner if interventions unsuccessful or patient reports new pain  Outcome: Progressing     Problem: INFECTION - ADULT  Goal: Absence or prevention of progression during hospitalization  Description  INTERVENTIONS:  - Assess and monitor for signs and symptoms of infection  - Monitor lab/diagnostic results  - Monitor all insertion sites, i e  indwelling lines, tubes, and drains  - Monitor endotracheal (as able) and nasal secretions for changes in amount and color  - [place] appropriate cooling/warming therapies per order  - Administer medications as ordered  - Instruct and encourage patient and family to use good hand hygiene technique  - Identify and instruct in appropriate isolation precautions for identified infection/condition  Outcome: Progressing     Problem: SAFETY ADULT  Goal: Patient will remain free of falls  Description  INTERVENTIONS:  - Assess patient frequently for physical needs  -  Identify cognitive and physical deficits and behaviors that affect risk of falls    -  [place] fall precautions as indicated by assessment   - Educate patient/family on patient safety including physical limitations  - Instruct patient to call for assistance with activity based on assessment  - Modify environment to reduce risk of injury  - Consider OT/PT consult to assist with strengthening/mobility  Outcome: Progressing  Goal: Maintain or return to baseline ADL function  Description  INTERVENTIONS:  -  Assess patient's ability to carry out ADLs; assess patient's baseline for ADL function and identify physical deficits which impact ability to perform ADLs (bathing, care of mouth/teeth, toileting, grooming, dressing, etc )  - Assess/evaluate cause of self-care deficits   - Assess range of motion  - Assess patient's mobility; develop plan if impaired  - Assess patient's need for assistive devices and provide as appropriate  - Encourage maximum independence but intervene and supervise when necessary  ¯ Involve family in performance of ADLs  ¯ Assess for home care needs following discharge ¯ Request OT consult to assist with ADL evaluation and planning for discharge  ¯ Provide patient education as appropriate  Outcome: Progressing  Goal: Maintain or return mobility status to optimal level  Description  INTERVENTIONS:  - Assess patient's baseline mobility status (ambulation, transfers, stairs, etc )    - Identify cognitive and physical deficits and behaviors that affect mobility  - Identify mobility aids required to assist with transfers and/or ambulation (gait belt, sit-to-stand, lift, walker, cane, etc )  - [place] fall precautions as indicated by assessment  - Record patient progress and toleration of activity level on Mobility SBAR; progress patient to next Phase/Stage  - Instruct patient to call for assistance with activity based on assessment  - Request Rehabilitation consult to assist with strengthening/weightbearing, etc   Outcome: Progressing     Problem: DISCHARGE PLANNING  Goal: Discharge to home or other facility with appropriate resources  Description  INTERVENTIONS:  - Identify barriers to discharge w/patient and caregiver  - Arrange for needed discharge resources and transportation as appropriate  - Identify discharge learning needs (meds, wound care, etc )  - Arrange for interpretive services to assist at discharge as needed  - Refer to Case Management Department for coordinating discharge planning if the patient needs post-hospital services based on physician/advanced practitioner order or complex needs related to functional status, cognitive ability, or social support system  Outcome: Progressing     Problem: Knowledge Deficit  Goal: Patient/family/caregiver demonstrates understanding of disease process, treatment plan, medications, and discharge instructions  Description  Complete learning assessment and assess knowledge base    Interventions:  - Provide teaching at level of understanding  - Provide teaching via preferred learning methods  Outcome: Progressing     Problem: Prexisting or High Potential for Compromised Skin Integrity  Goal: Skin integrity is maintained or improved  Description  INTERVENTIONS:  - Identify patients at risk for skin breakdown  - Assess and monitor skin integrity  - Assess and monitor nutrition and hydration status  - Monitor labs (i e  albumin)  - Assess for incontinence   - Turn and reposition patient  - Assist with mobility/ambulation  - Relieve pressure over bony prominences  - Avoid friction and shearing  - Provide appropriate hygiene as needed including keeping skin clean and dry  - Evaluate need for skin moisturizer/barrier cream  - Collaborate with interdisciplinary team (i e  Nutrition, Rehabilitation, etc )   - Patient/family teaching  Outcome: Progressing     Problem: DISCHARGE PLANNING - CARE MANAGEMENT  Goal: Discharge to post-acute care or home with appropriate resources  Description  INTERVENTIONS:  - Conduct assessment to determine patient/family and health care team treatment goals, and need for post-acute services based on payer coverage, community resources, and patient preferences, and barriers to discharge  - Address psychosocial, clinical, and financial barriers to discharge as identified in assessment in conjunction with the patient/family and health care team  - Arrange appropriate level of post-acute services according to patient's   needs and preference and payer coverage in collaboration with the physician and health care team  - Communicate with and update the patient/family, physician, and health care team regarding progress on the discharge plan  - Arrange appropriate transportation to post-acute venues   Outcome: Progressing     Problem: Potential for Falls  Goal: Patient will remain free of falls  Description  INTERVENTIONS:  - Assess patient frequently for physical needs  -  Identify cognitive and physical deficits and behaviors that affect risk of falls    -  [place] fall precautions as indicated by assessment   - Educate patient/family on patient safety including physical limitations  - Instruct patient to call for assistance with activity based on assessment  - Modify environment to reduce risk of injury  - Consider OT/PT consult to assist with strengthening/mobility  Outcome: Progressing     Problem: CARDIOVASCULAR - ADULT  Goal: Maintains optimal cardiac output and hemodynamic stability  Description  INTERVENTIONS:  - Monitor I/O, vital signs and rhythm  - Monitor for S/S and trends of decreased cardiac output i e  bleeding, hypotension  - Administer and titrate ordered vasoactive medications to optimize hemodynamic stability  - Assess quality of pulses, skin color and temperature  - Assess for signs of decreased coronary artery perfusion - ex   Angina  - Instruct patient to report change in severity of symptoms  Outcome: Progressing     Problem: RESPIRATORY - ADULT  Goal: Achieves optimal ventilation and oxygenation  Description  INTERVENTIONS:  - Assess for changes in respiratory status  - Assess for changes in mentation and behavior  - Position to facilitate oxygenation and minimize respiratory effort  - Oxygen administration by appropriate delivery method based on oxygen saturation (per order) or ABGs  - Initiate smoking cessation education as indicated  - Encourage broncho-pulmonary hygiene including cough, deep breathe, Incentive Spirometry  - Assess the need for suctioning and aspirate as needed  - Assess and instruct to report SOB or any respiratory difficulty  - Respiratory Therapy support as indicated  Outcome: Progressing     Problem: GENITOURINARY - ADULT  Goal: Maintains or returns to baseline urinary function  Description  INTERVENTIONS:  - Assess urinary function  - Encourage oral fluids to ensure adequate hydration  - Administer IV fluids as ordered to ensure adequate hydration  - Administer ordered medications as needed  - Offer frequent toileting  - Follow urinary retention protocol if ordered  Outcome: Progressing  Goal: Urinary catheter remains patent  Description  INTERVENTIONS:  - Assess patency of urinary catheter  - If patient has a chronic rendon, consider changing catheter if non-functioning  - Follow guidelines for intermittent irrigation of non-functioning urinary catheter  Outcome: Progressing     Problem: METABOLIC, FLUID AND ELECTROLYTES - ADULT  Goal: Electrolytes maintained within normal limits  Description  INTERVENTIONS:  - Monitor labs and assess patient for signs and symptoms of electrolyte imbalances  - Administer electrolyte replacement as ordered  - Monitor response to electrolyte replacements, including repeat lab results as appropriate  - Instruct patient on fluid and nutrition as appropriate  Outcome: Progressing     Problem: SKIN/TISSUE INTEGRITY - ADULT  Goal: Skin integrity remains intact  Description  INTERVENTIONS  - Identify patients at risk for skin breakdown  - Assess and monitor skin integrity  - Assess and monitor nutrition and hydration status  - Monitor labs (i e  albumin)  - Assess for incontinence   - Turn and reposition patient  - Assist with mobility/ambulation  - Relieve pressure over bony prominences  - Avoid friction and shearing  - Provide appropriate hygiene as needed including keeping skin clean and dry  - Evaluate need for skin moisturizer/barrier cream  - Collaborate with interdisciplinary team (i e  Nutrition, Rehabilitation, etc )   - Patient/family teaching  Outcome: Progressing  Goal: Incision(s), wounds(s) or drain site(s) healing without S/S of infection  Description  INTERVENTIONS  - Assess and document risk factors for skin impairment   - Assess and document dressing, incision, wound bed, drain sites and surrounding tissue  - Initiate Nutrition services consult and/or wound management as needed  Outcome: Progressing  Goal: Oral mucous membranes remain intact  Description  INTERVENTIONS  - Assess oral mucosa and hygiene practices  - Implement preventative oral hygiene regimen  - Implement oral medicated treatments as ordered  - Initiate Nutrition services referral as needed  Outcome: Progressing

## 2019-03-14 NOTE — PROGRESS NOTES
Progress Note - Infectious Disease   Javi Hernandez 53 y o  male MRN: [id number]  Unit/Bed#: [place] [id number] [id number] Encounter: [id number]      Impression/Plan:  1  Severe sepsis-fever, leukocytosis, lactic acidosis   Likely secondary to liver abscess and right-sided empyema   Admission and repeat blood cultures were all negative  Blood pressures remain stable    Fevers remain improved    No leukocytosis   Procalcitonin has now nearly normalized   Most recent blood cultures also remain negative   -antibiotic plan as below  -monitor CBC with diff and creatinine  -monitor temperatures and hemodynamics     2  Liver abscess-suspect a portal source of infection with the possible appendicitis   No perforation seen on initial CT the abdomen and pelvis   No clear biliary source   Now status post IR drain placement   Cultures all remain negative   Repeat CT abdomen shows significantly decreased size of abscess   Drain was reassessed by IR on 03/06 and is in proper position   No resistant organisms have been isolated, and I suspect this reflects difficult to grow organisms after the patient had already received some antibiotics such as anaerobes and streptococcal species   He seems to be tolerating IV Unasyn  Repeat CT shows decreased size of abscess, but has not yet resolved  IR re-evaluated drain with nearly resolved collection   -continue Unasyn at current dose  -monitor drain output  Follow-up drain check in 1 week per IR  -GI follow-up  -will continue with plan for 3 week course of IV antibiotic for treatment of empyema, through 3/20, followed by oral antibiotic until radiographic resolution of liver abscess and/or drain is removed      3  Right-sided empyema  [place] secondary to liver abscess  Status post placement of chest tubes x2  All pleural fluid cultures have been negative  Most recent CT chest concerning for ongoing empyema    One chest tube was removed   -antibiotic plan as above  -chest tube management per pulmonology service  -monitor chest tube output  -if empyema does not resolve with adequate drainage, may require CT surgery evaluation     4  Acute kidney injury-suspect multifactorial including pre renal issues and possibly sepsis   ATN may be playing a role   Creatinine has improved and remains normal   Urine output remains good  -monitor creatinine clearance closely  -volume management     5  Alcohol abuse-with newly diagnosed cirrhosis this admission, portal hypertension based upon the CT findings   -continue abstinence  -GI follow-up ongoing     6  Probable chronic appendicitis   This may be the etiology for development of liver abscess/empyema   Recent surgery evaluation noted   Patient will ultimately require appendectomy  Surgery follow-up      7  Urethral trauma status post suprapubic catheter  Now status post removal   Patient is urinating on his own      8  Acute hypoxic respiratory failure   In the setting of sepsis, right sided empyema   Patient remains extubated and doing well from a respiratory standpoint      9  Diarrhea  Patient is having loose bowel movements  May be associated with tube feeds, lack of solid food, antibiotic  No associated abdominal pain  No leukocytosis  Low clinical suspicion for C diff  Diarrhea is much improved today  Continue to monitor stool output         Antibiotics:  Unasyn 6  Antibiotics 15      Subjective:  Patient was transferred out of ICU  He denies any pain, shortness of breath, cough  He does feel weak  He has a bigger appetite and is excited to eat solid food today  Diarrhea has improved  Patient is urinating on his own      Objective:  Vitals:  Temp:  [98 °F (36 7 °C)-99 4 °F (37 4 °C)] 99 4 °F (37 4 °C)  HR:  [78-96] 82  Resp:  [20-32] 20  BP: ()/(59-81) 119/60  SpO2:  [91 %-97 %] 94 %  Temp (24hrs), Av 6 °F (37 °C), Min:98 °F (36 7 °C), Max:99 4 °F (37 4 °C)  Current: Temperature: 99 4 °F (37 4 °C)    Physical Exam:   General:  No acute distress, weak  Eyes:  Conjunctive clear with no hemorrhages or effusions  Oropharynx:  No ulcers, no lesions  Neck:  Supple, no lymphadenopathy  Lungs:  Decreased right basilar breath sounds, chest tube in place  Cardiac:  Regular rate and rhythm, no murmurs  Abdomen:  Soft, non-tender, non-distented  OLIVIA drain in place  Extremities:  Mild stable edema  Skin:  No rashes, no ulcers  Neurological:  Moves all four extremities spontaneously, sensation grossly intact    Lab Results:  I have personally reviewed pertinent labs  Results from last 7 days   Lab Units 03/14/19  0531 03/13/19  0447 03/12/19  0532   POTASSIUM mmol/L 3 0* 3 1* 2 9*   CHLORIDE mmol/L 102 102 104   CO2 mmol/L 28 27 30   BUN mg/dL 13 12 12   CREATININE mg/dL 0 61 0 56* 0 52*   EGFR ml/min/1 73sq m 119 123 127   CALCIUM mg/dL 8 2* 8 0* 7 8*   AST U/L  --   --  27   ALT U/L  --   --  6*   ALK PHOS U/L  --   --  80     Results from last 7 days   Lab Units 03/14/19  0531 03/13/19  0614 03/11/19  0453 03/09/19  0447   WBC Thousand/uL 4 71  --  8 28 9 89   HEMOGLOBIN g/dL 6 8* 7 4* 7 8* 8 1*   PLATELETS Thousands/uL 124*  --  149 121*     Results from last 7 days   Lab Units 03/10/19  1537 03/10/19  1536   BLOOD CULTURE  No Growth at 72 hrs  No Growth at 72 hrs  Imaging Studies:   I have personally reviewed pertinent imaging study reports and images in PACS  EKG, Pathology, and Other Studies:   I have personally reviewed pertinent reports

## 2019-03-14 NOTE — TREATMENT PLAN
Treatment Plan - Urology   [facility] 53 y o  male MRN: [id number]  Unit/Bed#: [place] [address] [id number] Encounter: [id number]    Discussed with Dr Enedina Saeed of interventional radiology  Patient had ultrasound there, which showed significant ascites but no bladder distension  Procedure was canceled  New urologic plan is continue to allow patient to void  If he feels uncomfortable, consider PVR but given his ascites, definitive formal ultrasound may need to be performed to adequately evaluate his postvoid residual   As for now, he is voiding and emptying his bladder without issue  Plan for ongoing outpatient follow-up with Urology to ensure he is voiding with possible imaging, depending on his clinical course  Urology will sign off but remain available for any further inpatient needs  Please feel free to call with questions or concerns       Feliz Jackson PA-C  Date: 3/14/2019 Time: 2:45 PM

## 2019-03-14 NOTE — SEDATION DOCUMENTATION
US exam performed by Dr Lamar Venegas  Bladder not distended  Patient reporting voiding without any difficulties  SPT case cancelled by Dr Philipp Perez

## 2019-03-15 ENCOUNTER — APPOINTMENT (INPATIENT)
Dept: RADIOLOGY | Facility: HOSPITAL | Age: 49
DRG: 441 | End: 2019-03-15
Payer: COMMERCIAL

## 2019-03-15 LAB
ABO GROUP BLD BPU: NORMAL
ANION GAP SERPL CALCULATED.3IONS-SCNC: 9 MMOL/L (ref 4–13)
BACTERIA BLD CULT: NORMAL
BACTERIA BLD CULT: NORMAL
BASOPHILS # BLD AUTO: 0.01 THOUSANDS/ΜL (ref 0–0.1)
BASOPHILS NFR BLD AUTO: 0 % (ref 0–1)
BPU ID: NORMAL
BUN SERPL-MCNC: 8 MG/DL (ref 5–25)
CALCIUM SERPL-MCNC: 8 MG/DL (ref 8.3–10.1)
CHLORIDE SERPL-SCNC: 102 MMOL/L (ref 100–108)
CO2 SERPL-SCNC: 26 MMOL/L (ref 21–32)
CREAT SERPL-MCNC: 0.56 MG/DL (ref 0.6–1.3)
CROSSMATCH: NORMAL
EOSINOPHIL # BLD AUTO: 0.08 THOUSAND/ΜL (ref 0–0.61)
EOSINOPHIL NFR BLD AUTO: 2 % (ref 0–6)
ERYTHROCYTE [DISTWIDTH] IN BLOOD BY AUTOMATED COUNT: 19.9 % (ref 11.6–15.1)
GFR SERPL CREATININE-BSD FRML MDRD: 123 ML/MIN/1.73SQ M
GLUCOSE SERPL-MCNC: 82 MG/DL (ref 65–140)
HCT VFR BLD AUTO: 24.6 % (ref 36.5–49.3)
HGB BLD-MCNC: 7.6 G/DL (ref 12–17)
IMM GRANULOCYTES # BLD AUTO: 0.05 THOUSAND/UL (ref 0–0.2)
IMM GRANULOCYTES NFR BLD AUTO: 1 % (ref 0–2)
LYMPHOCYTES # BLD AUTO: 0.69 THOUSANDS/ΜL (ref 0.6–4.47)
LYMPHOCYTES NFR BLD AUTO: 14 % (ref 14–44)
MAGNESIUM SERPL-MCNC: 1.7 MG/DL (ref 1.6–2.6)
MCH RBC QN AUTO: 30.9 PG (ref 26.8–34.3)
MCHC RBC AUTO-ENTMCNC: 30.9 G/DL (ref 31.4–37.4)
MCV RBC AUTO: 100 FL (ref 82–98)
MONOCYTES # BLD AUTO: 0.62 THOUSAND/ΜL (ref 0.17–1.22)
MONOCYTES NFR BLD AUTO: 13 % (ref 4–12)
NEUTROPHILS # BLD AUTO: 3.4 THOUSANDS/ΜL (ref 1.85–7.62)
NEUTS SEG NFR BLD AUTO: 70 % (ref 43–75)
NRBC BLD AUTO-RTO: 0 /100 WBCS
PLATELET # BLD AUTO: 125 THOUSANDS/UL (ref 149–390)
PMV BLD AUTO: 10.1 FL (ref 8.9–12.7)
POTASSIUM SERPL-SCNC: 3.4 MMOL/L (ref 3.5–5.3)
RBC # BLD AUTO: 2.46 MILLION/UL (ref 3.88–5.62)
SODIUM SERPL-SCNC: 137 MMOL/L (ref 136–145)
UNIT DISPENSE STATUS: NORMAL
UNIT PRODUCT CODE: NORMAL
UNIT RH: NORMAL
WBC # BLD AUTO: 4.85 THOUSAND/UL (ref 4.31–10.16)

## 2019-03-15 PROCEDURE — 97530 THERAPEUTIC ACTIVITIES: CPT

## 2019-03-15 PROCEDURE — 99232 SBSQ HOSP IP/OBS MODERATE 35: CPT | Performed by: HOSPITALIST

## 2019-03-15 PROCEDURE — 71046 X-RAY EXAM CHEST 2 VIEWS: CPT

## 2019-03-15 PROCEDURE — 83735 ASSAY OF MAGNESIUM: CPT | Performed by: INTERNAL MEDICINE

## 2019-03-15 PROCEDURE — 80048 BASIC METABOLIC PNL TOTAL CA: CPT | Performed by: INTERNAL MEDICINE

## 2019-03-15 PROCEDURE — 97535 SELF CARE MNGMENT TRAINING: CPT

## 2019-03-15 PROCEDURE — 76882 US LMTD JT/FCL EVL NVASC XTR: CPT | Performed by: INTERNAL MEDICINE

## 2019-03-15 PROCEDURE — 85025 COMPLETE CBC W/AUTO DIFF WBC: CPT | Performed by: INTERNAL MEDICINE

## 2019-03-15 PROCEDURE — 99233 SBSQ HOSP IP/OBS HIGH 50: CPT | Performed by: INTERNAL MEDICINE

## 2019-03-15 PROCEDURE — 97110 THERAPEUTIC EXERCISES: CPT

## 2019-03-15 PROCEDURE — 97116 GAIT TRAINING THERAPY: CPT

## 2019-03-15 RX ADMIN — Medication 250 MG: at 17:20

## 2019-03-15 RX ADMIN — Medication 100 MG: at 10:13

## 2019-03-15 RX ADMIN — MELATONIN TAB 3 MG 6 MG: 3 TAB at 21:45

## 2019-03-15 RX ADMIN — HEPARIN SODIUM 5000 UNITS: 5000 INJECTION INTRAVENOUS; SUBCUTANEOUS at 14:12

## 2019-03-15 RX ADMIN — FOLIC ACID 1 MG: 1 TABLET ORAL at 10:12

## 2019-03-15 RX ADMIN — AMPICILLIN SODIUM AND SULBACTAM SODIUM 3 G: 10; 5 INJECTION, POWDER, FOR SOLUTION INTRAVENOUS at 00:08

## 2019-03-15 RX ADMIN — Medication 250 MG: at 10:13

## 2019-03-15 RX ADMIN — AMPICILLIN SODIUM AND SULBACTAM SODIUM 3 G: 10; 5 INJECTION, POWDER, FOR SOLUTION INTRAVENOUS at 05:15

## 2019-03-15 RX ADMIN — HEPARIN SODIUM 5000 UNITS: 5000 INJECTION INTRAVENOUS; SUBCUTANEOUS at 21:45

## 2019-03-15 RX ADMIN — AMPICILLIN SODIUM AND SULBACTAM SODIUM 3 G: 10; 5 INJECTION, POWDER, FOR SOLUTION INTRAVENOUS at 17:20

## 2019-03-15 RX ADMIN — AMPICILLIN SODIUM AND SULBACTAM SODIUM 3 G: 10; 5 INJECTION, POWDER, FOR SOLUTION INTRAVENOUS at 11:35

## 2019-03-15 RX ADMIN — PANTOPRAZOLE SODIUM 40 MG: 40 TABLET, DELAYED RELEASE ORAL at 10:13

## 2019-03-15 RX ADMIN — HEPARIN SODIUM 5000 UNITS: 5000 INJECTION INTRAVENOUS; SUBCUTANEOUS at 05:12

## 2019-03-15 RX ADMIN — FUROSEMIDE 20 MG: 10 INJECTION, SOLUTION INTRAMUSCULAR; INTRAVENOUS at 10:13

## 2019-03-15 RX ADMIN — FUROSEMIDE 20 MG: 10 INJECTION, SOLUTION INTRAMUSCULAR; INTRAVENOUS at 21:45

## 2019-03-15 NOTE — SOCIAL WORK
CM was made aware by [facility] liaison that pt would like referrals sent to [facility] and [id number]  Referrals sent with the understanding that he would be here through the weekend

## 2019-03-15 NOTE — PLAN OF CARE
Problem: OCCUPATIONAL THERAPY ADULT  Goal: Performs self-care activities at highest level of function for planned discharge setting  See evaluation for individualized goals  Description  Treatment Interventions: ADL retraining, Functional transfer training, UE strengthening/ROM, Endurance training, Patient/family training, Equipment evaluation/education, Compensatory technique education  Equipment Recommended: (RW)       See flowsheet documentation for full assessment, interventions and recommendations  Outcome: Progressing  Note:   Limitation: Decreased ADL status, Decreased UE strength, Decreased Safe judgement during ADL, Decreased endurance, Decreased high-level ADLs  Prognosis: Good  Assessment: Pt was seen for skilled OT with focus on completion of self care tasks, functional mobility, review of fall prevention, energy conservation techs, compensatory breathing and review of current plan of care  Pt with flat affect noted upon greeting this tx session  Extended time provided for Pt to self express regarding current health status and need for slow, continuous participation with functional tasks to promote optimal outcome with further rehab  Pt able to achieve EOB positioning with Min A x2 with increased A for O2 and chest tube line managements  Improved activity tolerance noted with sustained usage of BUE with use of shampoo cap and UE self care  Pt with noted increased SOB with activity with SAT at 94% on 6 liters O2  Pt able to tolerate dynamic stand balance activity for more than 5 mins before requiring a seated rest break  See above levels of A required for all functional tasks  Pt displays great rehab potential and will benefit from higher level rehabs to return to previous levels of functioning  Pt may benefit from further rehab with focus on achieving optimal performance levels with all functional tasks   Continue to recommend Inpatient rehab     OT Discharge Recommendation: Short Term Rehab  OT - OK to Discharge: Yes(to rehab when medically stable  )

## 2019-03-15 NOTE — PLAN OF CARE
Problem: Nutrition/Hydration-ADULT  Goal: Nutrient/Hydration intake appropriate for improving, restoring or maintaining nutritional needs  Description  Monitor and assess patient's nutrition/hydration status for malnutrition (ex- brittle hair, bruises, dry skin, pale skin and conjunctiva, muscle wasting, smooth red tongue, and disorientation)  Collaborate with interdisciplinary team and initiate plan and interventions as ordered  Monitor patient's weight and dietary intake as ordered or per policy  Utilize nutrition screening tool and intervene per policy  Determine patient's food preferences and provide high-protein, high-caloric foods as appropriate       INTERVENTIONS:  - Monitor oral intake, urinary output, labs, and treatment plans  - Assess nutrition and hydration status and recommend course of action  - Evaluate amount of meals eaten  - Assist patient with eating if necessary   - Allow adequate time for meals  - Recommend/ encourage appropriate diets, oral nutritional supplements, and vitamin/mineral supplements  - Order, calculate, and assess calorie counts as needed  - Recommend, monitor, and adjust tube feedings and TPN/PPN based on assessed needs  - Assess need for intravenous fluids  - Provide specific nutrition/hydration education as appropriate  - Include patient/family/caregiver in decisions related to nutrition  Outcome: Progressing     Problem: PAIN - ADULT  Goal: Verbalizes/displays adequate comfort level or baseline comfort level  Description  Interventions:  - Encourage patient to monitor pain and request assistance  - Assess pain using appropriate pain scale  - Administer analgesics based on type and severity of pain and evaluate response  - Implement non-pharmacological measures as appropriate and evaluate response  - Consider cultural and social influences on pain and pain management  - Notify physician/advanced practitioner if interventions unsuccessful or patient reports new pain  Outcome: Progressing     Problem: INFECTION - ADULT  Goal: Absence or prevention of progression during hospitalization  Description  INTERVENTIONS:  - Assess and monitor for signs and symptoms of infection  - Monitor lab/diagnostic results  - Monitor all insertion sites, i e  indwelling lines, tubes, and drains  - Monitor endotracheal (as able) and nasal secretions for changes in amount and color  - [place] appropriate cooling/warming therapies per order  - Administer medications as ordered  - Instruct and encourage patient and family to use good hand hygiene technique  - Identify and instruct in appropriate isolation precautions for identified infection/condition  Outcome: Progressing     Problem: SAFETY ADULT  Goal: Patient will remain free of falls  Description  INTERVENTIONS:  - Assess patient frequently for physical needs  -  Identify cognitive and physical deficits and behaviors that affect risk of falls    -  [place] fall precautions as indicated by assessment   - Educate patient/family on patient safety including physical limitations  - Instruct patient to call for assistance with activity based on assessment  - Modify environment to reduce risk of injury  - Consider OT/PT consult to assist with strengthening/mobility  Outcome: Progressing  Goal: Maintain or return to baseline ADL function  Description  INTERVENTIONS:  -  Assess patient's ability to carry out ADLs; assess patient's baseline for ADL function and identify physical deficits which impact ability to perform ADLs (bathing, care of mouth/teeth, toileting, grooming, dressing, etc )  - Assess/evaluate cause of self-care deficits   - Assess range of motion  - Assess patient's mobility; develop plan if impaired  - Assess patient's need for assistive devices and provide as appropriate  - Encourage maximum independence but intervene and supervise when necessary  ¯ Involve family in performance of ADLs  ¯ Assess for home care needs following discharge ¯ Request OT consult to assist with ADL evaluation and planning for discharge  ¯ Provide patient education as appropriate  Outcome: Progressing  Goal: Maintain or return mobility status to optimal level  Description  INTERVENTIONS:  - Assess patient's baseline mobility status (ambulation, transfers, stairs, etc )    - Identify cognitive and physical deficits and behaviors that affect mobility  - Identify mobility aids required to assist with transfers and/or ambulation (gait belt, sit-to-stand, lift, walker, cane, etc )  - [place] fall precautions as indicated by assessment  - Record patient progress and toleration of activity level on Mobility SBAR; progress patient to next Phase/Stage  - Instruct patient to call for assistance with activity based on assessment  - Request Rehabilitation consult to assist with strengthening/weightbearing, etc   Outcome: Progressing     Problem: DISCHARGE PLANNING  Goal: Discharge to home or other facility with appropriate resources  Description  INTERVENTIONS:  - Identify barriers to discharge w/patient and caregiver  - Arrange for needed discharge resources and transportation as appropriate  - Identify discharge learning needs (meds, wound care, etc )  - Arrange for interpretive services to assist at discharge as needed  - Refer to Case Management Department for coordinating discharge planning if the patient needs post-hospital services based on physician/advanced practitioner order or complex needs related to functional status, cognitive ability, or social support system  Outcome: Progressing     Problem: Knowledge Deficit  Goal: Patient/family/caregiver demonstrates understanding of disease process, treatment plan, medications, and discharge instructions  Description  Complete learning assessment and assess knowledge base    Interventions:  - Provide teaching at level of understanding  - Provide teaching via preferred learning methods  Outcome: Progressing     Problem: Prexisting or High Potential for Compromised Skin Integrity  Goal: Skin integrity is maintained or improved  Description  INTERVENTIONS:  - Identify patients at risk for skin breakdown  - Assess and monitor skin integrity  - Assess and monitor nutrition and hydration status  - Monitor labs (i e  albumin)  - Assess for incontinence   - Turn and reposition patient  - Assist with mobility/ambulation  - Relieve pressure over bony prominences  - Avoid friction and shearing  - Provide appropriate hygiene as needed including keeping skin clean and dry  - Evaluate need for skin moisturizer/barrier cream  - Collaborate with interdisciplinary team (i e  Nutrition, Rehabilitation, etc )   - Patient/family teaching  Outcome: Progressing     Problem: DISCHARGE PLANNING - CARE MANAGEMENT  Goal: Discharge to post-acute care or home with appropriate resources  Description  INTERVENTIONS:  - Conduct assessment to determine patient/family and health care team treatment goals, and need for post-acute services based on payer coverage, community resources, and patient preferences, and barriers to discharge  - Address psychosocial, clinical, and financial barriers to discharge as identified in assessment in conjunction with the patient/family and health care team  - Arrange appropriate level of post-acute services according to patient's   needs and preference and payer coverage in collaboration with the physician and health care team  - Communicate with and update the patient/family, physician, and health care team regarding progress on the discharge plan  - Arrange appropriate transportation to post-acute venues   Outcome: Progressing     Problem: Potential for Falls  Goal: Patient will remain free of falls  Description  INTERVENTIONS:  - Assess patient frequently for physical needs  -  Identify cognitive and physical deficits and behaviors that affect risk of falls    -  [place] fall precautions as indicated by assessment   - Educate patient/family on patient safety including physical limitations  - Instruct patient to call for assistance with activity based on assessment  - Modify environment to reduce risk of injury  - Consider OT/PT consult to assist with strengthening/mobility  Outcome: Progressing     Problem: CARDIOVASCULAR - ADULT  Goal: Maintains optimal cardiac output and hemodynamic stability  Description  INTERVENTIONS:  - Monitor I/O, vital signs and rhythm  - Monitor for S/S and trends of decreased cardiac output i e  bleeding, hypotension  - Administer and titrate ordered vasoactive medications to optimize hemodynamic stability  - Assess quality of pulses, skin color and temperature  - Assess for signs of decreased coronary artery perfusion - ex   Angina  - Instruct patient to report change in severity of symptoms  Outcome: Progressing     Problem: RESPIRATORY - ADULT  Goal: Achieves optimal ventilation and oxygenation  Description  INTERVENTIONS:  - Assess for changes in respiratory status  - Assess for changes in mentation and behavior  - Position to facilitate oxygenation and minimize respiratory effort  - Oxygen administration by appropriate delivery method based on oxygen saturation (per order) or ABGs  - Initiate smoking cessation education as indicated  - Encourage broncho-pulmonary hygiene including cough, deep breathe, Incentive Spirometry  - Assess the need for suctioning and aspirate as needed  - Assess and instruct to report SOB or any respiratory difficulty  - Respiratory Therapy support as indicated  Outcome: Progressing     Problem: GENITOURINARY - ADULT  Goal: Maintains or returns to baseline urinary function  Description  INTERVENTIONS:  - Assess urinary function  - Encourage oral fluids to ensure adequate hydration  - Administer IV fluids as ordered to ensure adequate hydration  - Administer ordered medications as needed  - Offer frequent toileting  - Follow urinary retention protocol if ordered  Outcome: Progressing  Goal: Urinary catheter remains patent  Description  INTERVENTIONS:  - Assess patency of urinary catheter  - If patient has a chronic rendon, consider changing catheter if non-functioning  - Follow guidelines for intermittent irrigation of non-functioning urinary catheter  Outcome: Progressing     Problem: METABOLIC, FLUID AND ELECTROLYTES - ADULT  Goal: Electrolytes maintained within normal limits  Description  INTERVENTIONS:  - Monitor labs and assess patient for signs and symptoms of electrolyte imbalances  - Administer electrolyte replacement as ordered  - Monitor response to electrolyte replacements, including repeat lab results as appropriate  - Instruct patient on fluid and nutrition as appropriate  Outcome: Progressing     Problem: SKIN/TISSUE INTEGRITY - ADULT  Goal: Skin integrity remains intact  Description  INTERVENTIONS  - Identify patients at risk for skin breakdown  - Assess and monitor skin integrity  - Assess and monitor nutrition and hydration status  - Monitor labs (i e  albumin)  - Assess for incontinence   - Turn and reposition patient  - Assist with mobility/ambulation  - Relieve pressure over bony prominences  - Avoid friction and shearing  - Provide appropriate hygiene as needed including keeping skin clean and dry  - Evaluate need for skin moisturizer/barrier cream  - Collaborate with interdisciplinary team (i e  Nutrition, Rehabilitation, etc )   - Patient/family teaching  Outcome: Progressing  Goal: Incision(s), wounds(s) or drain site(s) healing without S/S of infection  Description  INTERVENTIONS  - Assess and document risk factors for skin impairment   - Assess and document dressing, incision, wound bed, drain sites and surrounding tissue  - Initiate Nutrition services consult and/or wound management as needed  Outcome: Progressing  Goal: Oral mucous membranes remain intact  Description  INTERVENTIONS  - Assess oral mucosa and hygiene practices  - Implement preventative oral hygiene regimen  - Implement oral medicated treatments as ordered  - Initiate Nutrition services referral as needed  Outcome: Progressing

## 2019-03-15 NOTE — PROGRESS NOTES
Met with patient regarding preferences for rehab when the patient is medically cleared for discharge  At this time they state that they have not yet heart back from "Jw Brian" who is their PCP and long time family friend  However, their 1st choice at this time is [address]  The 2nd choice is [address] in [address] [place] [address]  At this time the patient has stated that his preferences may change when he does hear from his friend, but for now that is what he would like them to be  CM made aware

## 2019-03-15 NOTE — PLAN OF CARE
Problem: Nutrition/Hydration-ADULT  Goal: Nutrient/Hydration intake appropriate for improving, restoring or maintaining nutritional needs  Description  Monitor and assess patient's nutrition/hydration status for malnutrition (ex- brittle hair, bruises, dry skin, pale skin and conjunctiva, muscle wasting, smooth red tongue, and disorientation)  Collaborate with interdisciplinary team and initiate plan and interventions as ordered  Monitor patient's weight and dietary intake as ordered or per policy  Utilize nutrition screening tool and intervene per policy  Determine patient's food preferences and provide high-protein, high-caloric foods as appropriate       INTERVENTIONS:  - Monitor oral intake, urinary output, labs, and treatment plans  - Assess nutrition and hydration status and recommend course of action  - Evaluate amount of meals eaten  - Assist patient with eating if necessary   - Allow adequate time for meals  - Recommend/ encourage appropriate diets, oral nutritional supplements, and vitamin/mineral supplements  - Order, calculate, and assess calorie counts as needed  - Recommend, monitor, and adjust tube feedings and TPN/PPN based on assessed needs  - Assess need for intravenous fluids  - Provide specific nutrition/hydration education as appropriate  - Include patient/family/caregiver in decisions related to nutrition  Outcome: Progressing     Problem: PAIN - ADULT  Goal: Verbalizes/displays adequate comfort level or baseline comfort level  Description  Interventions:  - Encourage patient to monitor pain and request assistance  - Assess pain using appropriate pain scale  - Administer analgesics based on type and severity of pain and evaluate response  - Implement non-pharmacological measures as appropriate and evaluate response  - Consider cultural and social influences on pain and pain management  - Notify physician/advanced practitioner if interventions unsuccessful or patient reports new pain  Outcome: Progressing     Problem: INFECTION - ADULT  Goal: Absence or prevention of progression during hospitalization  Description  INTERVENTIONS:  - Assess and monitor for signs and symptoms of infection  - Monitor lab/diagnostic results  - Monitor all insertion sites, i e  indwelling lines, tubes, and drains  - Monitor endotracheal (as able) and nasal secretions for changes in amount and color  - [place] appropriate cooling/warming therapies per order  - Administer medications as ordered  - Instruct and encourage patient and family to use good hand hygiene technique  - Identify and instruct in appropriate isolation precautions for identified infection/condition  Outcome: Progressing     Problem: SAFETY ADULT  Goal: Patient will remain free of falls  Description  INTERVENTIONS:  - Assess patient frequently for physical needs  -  Identify cognitive and physical deficits and behaviors that affect risk of falls    -  C[place] fall precautions as indicated by assessment   - Educate patient/family on patient safety including physical limitations  - Instruct patient to call for assistance with activity based on assessment  - Modify environment to reduce risk of injury  - Consider OT/PT consult to assist with strengthening/mobility  Outcome: Progressing  Goal: Maintain or return to baseline ADL function  Description  INTERVENTIONS:  -  Assess patient's ability to carry out ADLs; assess patient's baseline for ADL function and identify physical deficits which impact ability to perform ADLs (bathing, care of mouth/teeth, toileting, grooming, dressing, etc )  - Assess/evaluate cause of self-care deficits   - Assess range of motion  - Assess patient's mobility; develop plan if impaired  - Assess patient's need for assistive devices and provide as appropriate  - Encourage maximum independence but intervene and supervise when necessary  ¯ Involve family in performance of ADLs  ¯ Assess for home care needs following discharge ¯ Request OT consult to assist with ADL evaluation and planning for discharge  ¯ Provide patient education as appropriate  Outcome: Progressing  Goal: Maintain or return mobility status to optimal level  Description  INTERVENTIONS:  - Assess patient's baseline mobility status (ambulation, transfers, stairs, etc )    - Identify cognitive and physical deficits and behaviors that affect mobility  - Identify mobility aids required to assist with transfers and/or ambulation (gait belt, sit-to-stand, lift, walker, cane, etc )  - [place] fall precautions as indicated by assessment  - Record patient progress and toleration of activity level on Mobility SBAR; progress patient to next Phase/Stage  - Instruct patient to call for assistance with activity based on assessment  - Request Rehabilitation consult to assist with strengthening/weightbearing, etc   Outcome: Progressing     Problem: DISCHARGE PLANNING  Goal: Discharge to home or other facility with appropriate resources  Description  INTERVENTIONS:  - Identify barriers to discharge w/patient and caregiver  - Arrange for needed discharge resources and transportation as appropriate  - Identify discharge learning needs (meds, wound care, etc )  - Arrange for interpretive services to assist at discharge as needed  - Refer to Case Management Department for coordinating discharge planning if the patient needs post-hospital services based on physician/advanced practitioner order or complex needs related to functional status, cognitive ability, or social support system  Outcome: Progressing     Problem: Knowledge Deficit  Goal: Patient/family/caregiver demonstrates understanding of disease process, treatment plan, medications, and discharge instructions  Description  Complete learning assessment and assess knowledge base    Interventions:  - Provide teaching at level of understanding  - Provide teaching via preferred learning methods  Outcome: Progressing     Problem: Prexisting or High Potential for Compromised Skin Integrity  Goal: Skin integrity is maintained or improved  Description  INTERVENTIONS:  - Identify patients at risk for skin breakdown  - Assess and monitor skin integrity  - Assess and monitor nutrition and hydration status  - Monitor labs (i e  albumin)  - Assess for incontinence   - Turn and reposition patient  - Assist with mobility/ambulation  - Relieve pressure over bony prominences  - Avoid friction and shearing  - Provide appropriate hygiene as needed including keeping skin clean and dry  - Evaluate need for skin moisturizer/barrier cream  - Collaborate with interdisciplinary team (i e  Nutrition, Rehabilitation, etc )   - Patient/family teaching  Outcome: Progressing     Problem: DISCHARGE PLANNING - CARE MANAGEMENT  Goal: Discharge to post-acute care or home with appropriate resources  Description  INTERVENTIONS:  - Conduct assessment to determine patient/family and health care team treatment goals, and need for post-acute services based on payer coverage, community resources, and patient preferences, and barriers to discharge  - Address psychosocial, clinical, and financial barriers to discharge as identified in assessment in conjunction with the patient/family and health care team  - Arrange appropriate level of post-acute services according to patient's   needs and preference and payer coverage in collaboration with the physician and health care team  - Communicate with and update the patient/family, physician, and health care team regarding progress on the discharge plan  - Arrange appropriate transportation to post-acute venues   Outcome: Progressing     Problem: Potential for Falls  Goal: Patient will remain free of falls  Description  INTERVENTIONS:  - Assess patient frequently for physical needs  -  Identify cognitive and physical deficits and behaviors that affect risk of falls    -  [place] fall precautions as indicated by assessment   - Educate patient/family on patient safety including physical limitations  - Instruct patient to call for assistance with activity based on assessment  - Modify environment to reduce risk of injury  - Consider OT/PT consult to assist with strengthening/mobility  Outcome: Progressing     Problem: CARDIOVASCULAR - ADULT  Goal: Maintains optimal cardiac output and hemodynamic stability  Description  INTERVENTIONS:  - Monitor I/O, vital signs and rhythm  - Monitor for S/S and trends of decreased cardiac output i e  bleeding, hypotension  - Administer and titrate ordered vasoactive medications to optimize hemodynamic stability  - Assess quality of pulses, skin color and temperature  - Assess for signs of decreased coronary artery perfusion - ex   Angina  - Instruct patient to report change in severity of symptoms  Outcome: Progressing     Problem: RESPIRATORY - ADULT  Goal: Achieves optimal ventilation and oxygenation  Description  INTERVENTIONS:  - Assess for changes in respiratory status  - Assess for changes in mentation and behavior  - Position to facilitate oxygenation and minimize respiratory effort  - Oxygen administration by appropriate delivery method based on oxygen saturation (per order) or ABGs  - Initiate smoking cessation education as indicated  - Encourage broncho-pulmonary hygiene including cough, deep breathe, Incentive Spirometry  - Assess the need for suctioning and aspirate as needed  - Assess and instruct to report SOB or any respiratory difficulty  - Respiratory Therapy support as indicated  Outcome: Progressing     Problem: GENITOURINARY - ADULT  Goal: Maintains or returns to baseline urinary function  Description  INTERVENTIONS:  - Assess urinary function  - Encourage oral fluids to ensure adequate hydration  - Administer IV fluids as ordered to ensure adequate hydration  - Administer ordered medications as needed  - Offer frequent toileting  - Follow urinary retention protocol if ordered  Outcome: Progressing  Goal: Urinary catheter remains patent  Description  INTERVENTIONS:  - Assess patency of urinary catheter  - If patient has a chronic rendon, consider changing catheter if non-functioning  - Follow guidelines for intermittent irrigation of non-functioning urinary catheter  Outcome: Progressing     Problem: METABOLIC, FLUID AND ELECTROLYTES - ADULT  Goal: Electrolytes maintained within normal limits  Description  INTERVENTIONS:  - Monitor labs and assess patient for signs and symptoms of electrolyte imbalances  - Administer electrolyte replacement as ordered  - Monitor response to electrolyte replacements, including repeat lab results as appropriate  - Instruct patient on fluid and nutrition as appropriate  Outcome: Progressing     Problem: SKIN/TISSUE INTEGRITY - ADULT  Goal: Skin integrity remains intact  Description  INTERVENTIONS  - Identify patients at risk for skin breakdown  - Assess and monitor skin integrity  - Assess and monitor nutrition and hydration status  - Monitor labs (i e  albumin)  - Assess for incontinence   - Turn and reposition patient  - Assist with mobility/ambulation  - Relieve pressure over bony prominences  - Avoid friction and shearing  - Provide appropriate hygiene as needed including keeping skin clean and dry  - Evaluate need for skin moisturizer/barrier cream  - Collaborate with interdisciplinary team (i e  Nutrition, Rehabilitation, etc )   - Patient/family teaching  Outcome: Progressing  Goal: Incision(s), wounds(s) or drain site(s) healing without S/S of infection  Description  INTERVENTIONS  - Assess and document risk factors for skin impairment   - Assess and document dressing, incision, wound bed, drain sites and surrounding tissue  - Initiate Nutrition services consult and/or wound management as needed  Outcome: Progressing  Goal: Oral mucous membranes remain intact  Description  INTERVENTIONS  - Assess oral mucosa and hygiene practices  - Implement preventative oral hygiene regimen  - Implement oral medicated treatments as ordered  - Initiate Nutrition services referral as needed  Outcome: Progressing

## 2019-03-15 NOTE — OCCUPATIONAL THERAPY NOTE
Occupational Therapy Treatment Note:         03/15/19 1004   Restrictions/Precautions   Weight Bearing Precautions Per Order No   Other Precautions Fall Risk;Telemetry;Multiple lines;O2;Pain  (chest tube  )   Pain Assessment   Pain Assessment 0-10   Pain Score 6   Pain Type Acute pain;Chronic pain   Pain Location Back   Pain Orientation Anterior;Bilateral   ADL   Where Assessed Chair   Grooming Assistance 5  Supervision/Setup   Grooming Deficit Setup; Increased time to complete;Wash/dry hands; Wash/dry face;Brushing hair   Grooming Comments cues to engage in activity required  UB Bathing Assistance 4  Minimal Assistance   UB Bathing Deficit Setup   LB Bathing Assistance 3  Moderate Assistance   LB Bathing Deficit Setup;Steadying;Verbal cueing;Supervision/safety; Increased time to complete;Perineal area; Buttocks;Right lower leg including foot; Left lower leg including foot   LB Bathing Comments Increased A required due to noted weakness   UB Dressing Assistance 4  Minimal Assistance   UB Dressing Deficit Setup   LB Dressing Assistance 2  Maximal Assistance   LB Dressing Deficit Setup;Don/doff L sock; Don/doff R sock   LB Dressing Comments Pt with limited functional reach due to fatigue and back pain noted  Toileting Assistance  5  Supervision/Setup   Toileting Deficit Increased time to complete;Use of bedpan/urinal setup   Toileting Comments Pt using hand held urinal at this time  Functional Standing Tolerance   Time 5 mins  Activity dynamic stand balance activity  Bed Mobility   Supine to Sit 4  Minimal assistance   Additional items Assist x 2; Increased time required;LE management;Verbal cues; Bedrails;HOB elevated   Transfers   Sit to Stand 4  Minimal assistance   Additional items Assist x 2   Stand to Sit 4  Minimal assistance   Additional items Assist x 2   Stand pivot 4  Minimal assistance   Additional items Assist x 2   Additional Comments cues for safe use of RW provided      Functional Mobility Functional Mobility 4  Minimal assistance   Additional Comments x2   Additional items Rolling walker   Toilet Transfers   Toilet Transfers Comments unable to assess  Pt will benefit from use of bedside extra wide commode  Cognition   Overall Cognitive Status WFL   Arousal/Participation Responsive; Cooperative   Attention Attends with cues to redirect   Orientation Level Oriented to person;Oriented to place;Oriented to situation   Memory Decreased short term memory;Decreased recall of recent events;Decreased recall of precautions   Following Commands Follows multistep commands with increased time or repetition   Comments Pt unaware of length of time he has been in the hospital and unware of some of his current deficits  Additional Activities   Additional Activities Other (Comment)  (reviewed current plan of care  )   Additional Activities Comments Educated Pt on need for energy conservation and self monitoring fatique levels to encourage optimal carry over  Activity Tolerance   Activity Tolerance Patient limited by fatigue;Patient limited by pain   Medical Staff Made Aware Reported all findings to nursing staff  Assessment   Assessment Pt was seen for skilled OT with focus on completion of self care tasks, functional mobility, review of fall prevention, energy conservation techs, compensatory breathing and review of current plan of care  Pt with flat affect noted upon greeting this tx session  Extended time provided for Pt to self express regarding current health status and need for slow, continuous participation with functional tasks to promote optimal outcome with further rehab  Pt able to achieve EOB positioning with Min A x2 with increased A for O2 and chest tube line managements  Improved activity tolerance noted with sustained usage of BUE with use of shampoo cap and UE self care  Pt with noted increased SOB with activity with SAT at 94% on 6 liters O2   Pt able to tolerate dynamic stand balance activity for more than 5 mins before requiring a seated rest break  See above levels of A required for all functional tasks  Pt displays great rehab potential and will benefit from higher level rehabs to return to previous levels of functioning  Pt may benefit from further rehab with focus on achieving optimal performance levels with all functional tasks  Continue to recommend Inpatient rehab   Plan   Treatment Interventions ADL retraining;Functional transfer training; Endurance training;Cognitive reorientation   Goal Expiration Date 03/24/19   Treatment Day 1   OT Frequency 3-5x/wk   Recommendation   OT Discharge Recommendation Short Term Rehab   Equipment Recommended Bedside commode   OT - OK to Discharge Yes  (to rehab when medically stable  )   Barthel Index   Feeding 10   Bathing 0   Grooming Score 5   Dressing Score 5   Bladder Score 10   Bowels Score 10   Toilet Use Score 5   Transfers (Bed/Chair) Score 10   Mobility (Level Surface) Score 0   Stairs Score 0   Barthel Index Score 55   Modified Robbie Scale   Modified Robbie Scale 4   Margy Ralph, [address]

## 2019-03-15 NOTE — PLAN OF CARE
Problem: PHYSICAL THERAPY ADULT  Goal: Performs mobility at highest level of function for planned discharge setting  See evaluation for individualized goals  Description  Treatment/Interventions: Functional transfer training, LE strengthening/ROM, Elevations, Therapeutic exercise, Endurance training, Patient/family training, Bed mobility, Gait training, Spoke to case management, Family  Equipment Recommended: Brittnee Marti       See flowsheet documentation for full assessment, interventions and recommendations  Outcome: Progressing  Note:   Prognosis: Good  Problem List: Decreased strength, Decreased endurance, Decreased range of motion, Impaired balance, Decreased mobility, Decreased safety awareness, Decreased skin integrity, Obesity, Pain  Assessment: Pt  supine in bed upon my arrival  Pt  reporting increased fatigue, however agreeable to therapeutic intervention  Performance of HEP supine with cues provided for proper completion  Progressed with transfers continuing to require A of therapist with cues for proper technique/hand placement  Progressed with an increased amb  trial with use of RW and A of 2 with cues for LE sequencing  Pt  remains limited by fatigue requiring a seated resting period in between gait trials  Noted O2 saturation on 6L between 94-96% during therapeutic intervention  After additional resting period, pt  continued with a second amb  trial with eventual return to room and repositioned seated in bedside chair  Pt  remained seated in bedside chair with alarm active at end of treatment session  PT will continue to recommend rehab upon d/c for continued improvement of noted impairments above when medically stable  Barriers to Discharge: Inaccessible home environment, Decreased caregiver support  Barriers to Discharge Comments: CARMEN, level of support at home    Recommendation: Short-term skilled PT     PT - OK to Discharge: Yes(if d/c to rehab when medically stable )    See flowsheet documentation for full assessment

## 2019-03-15 NOTE — PHYSICAL THERAPY NOTE
Physical Therapy Progress Note     03/15/19 1000   Pain Assessment   Pain Assessment 0-10   Pain Score 6   Pain Type Acute pain;Chronic pain   Pain Location Back   Pain Orientation Bilateral   Hospital Pain Intervention(s) Ambulation/increased activity;Repositioned   Response to Interventions Tolerated  Restrictions/Precautions   Weight Bearing Precautions Per Order No   Other Precautions Fall Risk;Pain;Multiple lines;O2;Telemetry; Chair Alarm  (chest tube)   General   Chart Reviewed Yes   Response to Previous Treatment Patient reporting fatigue but able to participate  Family/Caregiver Present Yes   Subjective   Subjective Willing to participate in therapy this AM    Bed Mobility   Supine to Sit 4  Minimal assistance   Additional items Assist x 2;HOB elevated; Bedrails;Leg ; Increased time required;Verbal cues;LE management   Transfers   Sit to Stand 4  Minimal assistance   Additional items Assist x 2;Bedrails; Increased time required;Verbal cues   Stand to Sit 4  Minimal assistance   Additional items Assist x 2;Armrests; Increased time required;Verbal cues   Ambulation/Elevation   Gait pattern Wide SUDHIR; Decreased foot clearance; Short stride; Excessively slow; Shuffling; Inconsistent adriana   Gait Assistance 4  Minimal assist   Additional items Assist x 2;Verbal cues; Tactile cues; Other (Comment)  (close chair follow and line management needed)   Assistive Device Rolling walker   Distance 35' x 2 with a seated resting period in between   Balance   Static Sitting Fair +   Dynamic Sitting Fair   Static Standing Fair -   Dynamic Standing Poor +   Ambulatory Poor +   Endurance Deficit   Endurance Deficit Yes   Endurance Deficit Description fatigue   Activity Tolerance   Activity Tolerance Patient limited by fatigue   Nurse Made Aware Yes   Exercises   THR Supine;10 reps;AAROM; Bilateral   Assessment   Prognosis Good   Problem List Decreased strength;Decreased endurance;Decreased range of motion; Impaired balance;Decreased mobility; Decreased safety awareness;Decreased skin integrity;Obesity;Pain   Assessment Pt  supine in bed upon my arrival  Pt  reporting increased fatigue, however agreeable to therapeutic intervention  Performance of HEP supine with cues provided for proper completion  Progressed with transfers continuing to require A of therapist with cues for proper technique/hand placement  Progressed with an increased amb  trial with use of RW and A of 2 with cues for LE sequencing  Pt  remains limited by fatigue requiring a seated resting period in between gait trials  Noted O2 saturation on 6L between 94-96% during therapeutic intervention  After additional resting period, pt  continued with a second amb  trial with eventual return to room and repositioned seated in bedside chair  Pt  remained seated in bedside chair with alarm active at end of treatment session  PT will continue to recommend rehab upon d/c for continued improvement of noted impairments above when medically stable  Barriers to Discharge Inaccessible home environment;Decreased caregiver support   Barriers to Discharge Comments CARMEN, level of support at home  Goals   Patient Goals To get stronger  STG Expiration Date 03/28/19   Treatment Day 1   Plan   Treatment/Interventions Functional transfer training;LE strengthening/ROM; Therapeutic exercise; Endurance training;Bed mobility;Gait training;Spoke to nursing;Spoke to case management;OT;Family   Progress Progressing toward goals   PT Frequency Other (Comment)  (3-5x/wk)   Recommendation   Recommendation Short-term skilled PT   Equipment Recommended Walker  (RW)   PT - OK to Discharge Yes  (if d/c to rehab when medically stable )     Leda Walton PTA

## 2019-03-15 NOTE — PATIENT SAFETY
Progress Note - Disclosure   Latosha Vera 53 y o  male MRN: [id number]  Unit/Bed#: [facility] 2 -01 Encounter: [id number]    Met with Mr Arpit Meredith for the purpose of providing Serious Event disclosure  Introduced myself and reviewed my role as the Patient  at the hospital   Explained how I was made aware of Mr Hanh Pepper forehead laceration and discussed our process of analyzing events such as these to prevent recurrence  Answered all questions and apologized on behalf of the hospital for this occurrence  Provided Mr Arpit Meredith with written disclosure and my contact information  Thanked Mr Arpit Meredith for speaking to me regarding this matter

## 2019-03-15 NOTE — PROGRESS NOTES
Progress Note - Pulmonary   Kaitlynn Gleason 53 y o  male MRN: [id number]  Unit/Bed#: [place] [address] -01 Encounter: [id number]    Assessment/Plan:    1  Severe sepsis with septic shock secondary to liver abscess and empyema  1  ID following  Continue Unasyn per their recommendations  Plan for prolonged IV antibiotics for empyema and liver abscess   2  Plan for total 3 weeks  Today is day #16  3  Will need repeat OLIVIA drain evaluation   4  Chest tube to suction has put out scant to no drainage in 24 hours  Placed on water seal   5  Repeat CXR PA and lateral pending with plans to discontinue chest tube later today if images show improvement of right sided consolidation  2  Acute hypoxic respiratory failure with empyema s/p chest tube insertion  1  Currently on 5L NC and was weaned to 3L in the room  2  No oxygen requirements at baseline  3  Titrate O2 to maintain SpO2 greater than or equal to 88%  4  Pulmonary toilet: incentive spirometry, OOB as tolerated, increase activity as tolerated   5  Had one chest tube removed 3/13/19  6  Hopeful removal of the other one today  3  Abnormal CT secondary to right lung consolidation and effusion  1  Improving slowly  2  Pulmonary toilet as above  3  Continue lasix and antibiotics       Chief Complaint:    "I feel better "    Subjective:    Patient was seen and examined today  Patient is seen sitting comfortably in chair wearing 5L NC after just finishing with physical therapy  Patient states that he feels like he is improving  Denies shortness of breath at rest and only minimally SOB with ambulation and increased exertion  Patient continues to have cough productive of yellow phlegm  Denies fevers or chills  Chest discomfort has also improved  Discussed with nursing staff and there was little to no output in the last 24 hours  Placed on water seal while in the room and decrease O2 requirements to 3L and patient tolerated well       Objective:    Vitals: Blood pressure 123/72, pulse 71, temperature 99 °F (37 2 °C), temperature source Temporal, resp  rate 20, height 5' 8" (1 727 m), weight 102 kg (224 lb 13 9 oz), SpO2 93 %  3L NC,Body mass index is 34 19 kg/m²  Intake/Output Summary (Last 24 hours) at 3/15/2019 1202  Last data filed at 3/15/2019 1050  Gross per 24 hour   Intake 350 ml   Output 1605 ml   Net -1255 ml       Invasive Devices     Peripheral Intravenous Line            Peripheral IV 03/12/19 Left Antecubital 2 days    Peripheral IV 03/14/19 Proximal;Right Forearm 1 day          Drain            Chest Tube 2 Right Other (Comment) 8 5 Fr  8 days    Closed/Suction Drain Midline Abdomen Bulb 10 2 Fr  8 days                Physical Exam:     Physical Exam   Constitutional: He is oriented to person, place, and time  He appears well-developed and well-nourished  No distress  HENT:   Head: Normocephalic and atraumatic  Nose: Nose normal    Mouth/Throat: Oropharynx is clear and moist    Eyes: Pupils are equal, round, and reactive to light  EOM are normal  Right eye exhibits no discharge  Left eye exhibits no discharge  Neck: Normal range of motion  Neck supple  No JVD present  No tracheal deviation present  Cardiovascular: Normal rate, regular rhythm, normal heart sounds and intact distal pulses  No murmur heard  Pulmonary/Chest: Effort normal  He has decreased breath sounds (right lower lung fields)  He has no wheezes  He has rales (right lower lung fields)  Abdominal: Soft  Bowel sounds are normal  He exhibits no distension  There is no tenderness  Musculoskeletal: Normal range of motion  He exhibits no edema, tenderness or deformity  Neurological: He is alert and oriented to person, place, and time  No cranial nerve deficit  Skin: Skin is warm and dry  No rash noted  He is not diaphoretic  No erythema  Psychiatric: He has a normal mood and affect  His behavior is normal  Judgment and thought content normal        Labs:    I have personally reviewed pertinent lab results  , ABG: No results found for: PHART, LSG2ODW, PO2ART, BAY0RPF, G8LFBPMZ, BEART, SOURCE, BNP: No results found for: BNP, CBC:   Lab Results   Component Value Date    WBC 4 85 03/15/2019    HGB 7 6 (L) 03/15/2019    HCT 24 6 (L) 03/15/2019     (H) 03/15/2019     (L) 03/15/2019    MCH 30 9 03/15/2019    MCHC 30 9 (L) 03/15/2019    RDW 19 9 (H) 03/15/2019    MPV 10 1 03/15/2019    NRBC 0 03/15/2019   , CMP:   Lab Results   Component Value Date    SODIUM 137 03/15/2019    K 3 4 (L) 03/15/2019     03/15/2019    CO2 26 03/15/2019    BUN 8 03/15/2019    CREATININE 0 56 (L) 03/15/2019    CALCIUM 8 0 (L) 03/15/2019    EGFR 123 03/15/2019   , PT/INR: No results found for: PT, INR, Troponin: No results found for: TROPONINI    Imaging and other studies: none to review yet today

## 2019-03-15 NOTE — PROGRESS NOTES
Progress Note - [facility] 53 y o  male MRN: [id number]    Unit/Bed#: [place] -01 Encounter: [id number]    Principal Problem:    Severe sepsis with septic shock [facility])  Active Problems:    Heart murmur    Anemia    Failure to thrive in adult    Weight loss    Elevated alkaline phosphatase level    GERD (gastroesophageal reflux disease)    Liver abscess    Appendicitis    Alcohol abuse    Acute retention of urine    Empyema (HCC)  Resolved Problems:    SHANDA (acute kidney injury) ([facility] [address] )    Respiratory failure (HCC)      Assessment/Plan:    1-Severe sepsis with septic shock likely related to liver abscess and right-sided empyema  Continue Unasyn per Infectious Disease recommendations  Sepsis has now improved  The defer worsens of fever and leukocytosis  Plan for 3 week course of IV antibiotics for treatment of empyema through 320 followed by oral antibiotics till radiographic resolution of liver abscess    2-Acute hypoxic respiratory failure secondary to right-sided empyema status post chest tube x2   Patient had a chest tube removed yesterday in ICU  Continue monitoring chest tube  -to intermittent suction  Continue to encourage out of bed, deep breathing and incentive spirometer  Monitor chest tube drainage for now  Pulmonary critical care follow-up  3-Liver abscess status post IR drainage  Continue OLIVIA drain monitoring  Repeat CT scan demonstrates that abscesses has decreased in size  Continue Unasyn per ID input  Plan for 3 week of IV antibiotics followed by oral antibiotics till resolution of liver abscess or when the drain is removed  Patient will follow up in 1 week for drain check  4-Right-sided empyema likely from the liver abscess  Chest tube in place  Continue rest of the management as per pulmonology  · Subacute versus chronic appendicitis  Will need surgical follow-up for eventual appendectomy      5-Anemia  Status post 1 unit of packed red cell transfusion for hemoglobin of 6 8   Currently 7 6  Will continue to monitor    6-Alcoholic cirrhosis  Continue on thiamine and folate  Alcohol cessation encouraged  No evidence of withdrawal    7-Hypokalemia-potassium supplementation      Subjective:  Patient states that he feels well  Suprapubic catheter procedure was canceled for today since ultrasound revealed significant ascites with no bladder distention  Plan is to allow patient to void  Physical Exam:   Vitals: Blood pressure 123/72, pulse 71, temperature 99 °F (37 2 °C), temperature source Temporal, resp  rate 20, height 5' 8" (1 727 m), weight 102 kg (224 lb 13 9 oz), SpO2 93 %  ,Body mass index is 34 19 kg/m²  Gen:  Pleasant, non-tachypnic, non-dyspnic  Conversant  Heart: regular rate and rhythm, S1S2 present, no murmur, rub or gallop  Lungs:  Decreased right basilar breath sounds  Chest tube in place  Abd: soft, non-tender, non-distended  NABS, no guarding, rebound or peritoneal signs  OLIVIA drain in place  Extremities: no clubbing, cyanosis or edema  2+pedal pulses bilaterally  Full range of motion  Neuro: awake, alert and oriented  Cranial nerves 2-12 intact  Strength and sensation grossly intact  Skin: warm and dry: no petechiae, purpura and rash      LABS:   Results from last 7 days   Lab Units 03/15/19  0529 03/14/19  0531 03/13/19  0614 03/11/19  0453   WBC Thousand/uL 4 85 4 71  --  8 28   HEMOGLOBIN g/dL 7 6* 6 8* 7 4* 7 8*   HEMATOCRIT % 24 6* 22 1*  --  25 0*   PLATELETS Thousands/uL 125* 124*  --  149     Results from last 7 days   Lab Units 03/15/19  0529 03/14/19  0531 03/13/19  0447   POTASSIUM mmol/L 3 4* 3 0* 3 1*   CHLORIDE mmol/L 102 102 102   CO2 mmol/L 26 28 27   BUN mg/dL 8 13 12   CREATININE mg/dL 0 56* 0 61 0 56*   CALCIUM mg/dL 8 0* 8 2* 8 0*       Intake/Output Summary (Last 24 hours) at 3/15/2019 1251  Last data filed at 3/15/2019 1050  Gross per 24 hour   Intake 350 ml   Output 1605 ml   Net -1255 ml           Current Facility-Administered Medications:  acetaminophen 650 mg Oral Q6H PRN Fatou Latham MD    ampicillin-sulbactam 3 g Intravenous Q6H Malachi Hunter PA-C Last Rate: 3 g (58/67/27 2366)   folic acid 1 mg Oral Daily Malachi Hunter PA-C    furosemide 20 mg Intravenous Q12H Malachi Hunter PA-C    heparin (porcine) 5,000 Units Subcutaneous [facility] Malachi Hunter PA-C    melatonin 6 mg Oral HS Malachi Hunter PA-C    pantoprazole 40 mg Oral Q24H [facility] Malachi Hunter PA-C    saccharomyces boulardii 250 mg Oral BID Malachi Hunter PA-C    thiamine 100 mg Oral Daily Malachi Hunter PA-C        Family update- offered to update family-patient declined

## 2019-03-16 LAB
ANION GAP SERPL CALCULATED.3IONS-SCNC: 8 MMOL/L (ref 4–13)
BASOPHILS # BLD AUTO: 0.01 THOUSANDS/ΜL (ref 0–0.1)
BASOPHILS NFR BLD AUTO: 0 % (ref 0–1)
BUN SERPL-MCNC: 6 MG/DL (ref 5–25)
CALCIUM SERPL-MCNC: 7.8 MG/DL (ref 8.3–10.1)
CHLORIDE SERPL-SCNC: 101 MMOL/L (ref 100–108)
CO2 SERPL-SCNC: 26 MMOL/L (ref 21–32)
CREAT SERPL-MCNC: 0.54 MG/DL (ref 0.6–1.3)
EOSINOPHIL # BLD AUTO: 0.11 THOUSAND/ΜL (ref 0–0.61)
EOSINOPHIL NFR BLD AUTO: 3 % (ref 0–6)
ERYTHROCYTE [DISTWIDTH] IN BLOOD BY AUTOMATED COUNT: 19.7 % (ref 11.6–15.1)
GFR SERPL CREATININE-BSD FRML MDRD: 125 ML/MIN/1.73SQ M
GLUCOSE SERPL-MCNC: 89 MG/DL (ref 65–140)
HCT VFR BLD AUTO: 25.1 % (ref 36.5–49.3)
HGB BLD-MCNC: 7.9 G/DL (ref 12–17)
IMM GRANULOCYTES # BLD AUTO: 0.02 THOUSAND/UL (ref 0–0.2)
IMM GRANULOCYTES NFR BLD AUTO: 1 % (ref 0–2)
LYMPHOCYTES # BLD AUTO: 0.63 THOUSANDS/ΜL (ref 0.6–4.47)
LYMPHOCYTES NFR BLD AUTO: 15 % (ref 14–44)
MCH RBC QN AUTO: 31.5 PG (ref 26.8–34.3)
MCHC RBC AUTO-ENTMCNC: 31.5 G/DL (ref 31.4–37.4)
MCV RBC AUTO: 100 FL (ref 82–98)
MONOCYTES # BLD AUTO: 0.52 THOUSAND/ΜL (ref 0.17–1.22)
MONOCYTES NFR BLD AUTO: 13 % (ref 4–12)
NEUTROPHILS # BLD AUTO: 2.84 THOUSANDS/ΜL (ref 1.85–7.62)
NEUTS SEG NFR BLD AUTO: 68 % (ref 43–75)
NRBC BLD AUTO-RTO: 0 /100 WBCS
PLATELET # BLD AUTO: 111 THOUSANDS/UL (ref 149–390)
PMV BLD AUTO: 9.5 FL (ref 8.9–12.7)
POTASSIUM SERPL-SCNC: 3.3 MMOL/L (ref 3.5–5.3)
RBC # BLD AUTO: 2.51 MILLION/UL (ref 3.88–5.62)
SODIUM SERPL-SCNC: 135 MMOL/L (ref 136–145)
WBC # BLD AUTO: 4.13 THOUSAND/UL (ref 4.31–10.16)

## 2019-03-16 PROCEDURE — 80048 BASIC METABOLIC PNL TOTAL CA: CPT | Performed by: HOSPITALIST

## 2019-03-16 PROCEDURE — 99254 IP/OBS CNSLTJ NEW/EST MOD 60: CPT | Performed by: UROLOGY

## 2019-03-16 PROCEDURE — 85025 COMPLETE CBC W/AUTO DIFF WBC: CPT | Performed by: HOSPITALIST

## 2019-03-16 PROCEDURE — 99232 SBSQ HOSP IP/OBS MODERATE 35: CPT | Performed by: INTERNAL MEDICINE

## 2019-03-16 PROCEDURE — 99232 SBSQ HOSP IP/OBS MODERATE 35: CPT | Performed by: HOSPITALIST

## 2019-03-16 RX ORDER — FENTANYL CITRATE/PF 50 MCG/ML
25 SYRINGE (ML) INJECTION
Status: DISCONTINUED | OUTPATIENT
Start: 2019-03-16 | End: 2019-03-18

## 2019-03-16 RX ORDER — ONDANSETRON 2 MG/ML
4 INJECTION INTRAMUSCULAR; INTRAVENOUS ONCE AS NEEDED
Status: DISCONTINUED | OUTPATIENT
Start: 2019-03-16 | End: 2019-03-22 | Stop reason: HOSPADM

## 2019-03-16 RX ADMIN — PANTOPRAZOLE SODIUM 40 MG: 40 TABLET, DELAYED RELEASE ORAL at 09:49

## 2019-03-16 RX ADMIN — FOLIC ACID 1 MG: 1 TABLET ORAL at 09:49

## 2019-03-16 RX ADMIN — HEPARIN SODIUM 5000 UNITS: 5000 INJECTION INTRAVENOUS; SUBCUTANEOUS at 05:26

## 2019-03-16 RX ADMIN — FUROSEMIDE 20 MG: 10 INJECTION, SOLUTION INTRAMUSCULAR; INTRAVENOUS at 09:49

## 2019-03-16 RX ADMIN — HEPARIN SODIUM 5000 UNITS: 5000 INJECTION INTRAVENOUS; SUBCUTANEOUS at 13:10

## 2019-03-16 RX ADMIN — HEPARIN SODIUM 5000 UNITS: 5000 INJECTION INTRAVENOUS; SUBCUTANEOUS at 22:27

## 2019-03-16 RX ADMIN — AMPICILLIN SODIUM AND SULBACTAM SODIUM 3 G: 10; 5 INJECTION, POWDER, FOR SOLUTION INTRAVENOUS at 13:10

## 2019-03-16 RX ADMIN — MELATONIN TAB 3 MG 6 MG: 3 TAB at 22:27

## 2019-03-16 RX ADMIN — AMPICILLIN SODIUM AND SULBACTAM SODIUM 3 G: 10; 5 INJECTION, POWDER, FOR SOLUTION INTRAVENOUS at 01:15

## 2019-03-16 RX ADMIN — AMPICILLIN SODIUM AND SULBACTAM SODIUM 3 G: 10; 5 INJECTION, POWDER, FOR SOLUTION INTRAVENOUS at 05:25

## 2019-03-16 RX ADMIN — Medication 100 MG: at 09:49

## 2019-03-16 RX ADMIN — Medication 250 MG: at 17:26

## 2019-03-16 RX ADMIN — Medication 250 MG: at 09:49

## 2019-03-16 RX ADMIN — AMPICILLIN SODIUM AND SULBACTAM SODIUM 3 G: 10; 5 INJECTION, POWDER, FOR SOLUTION INTRAVENOUS at 17:26

## 2019-03-16 NOTE — PROGRESS NOTES
Dr Ericka Gaucher updated out put 1750 ml today and pt not sleeping with evening dose of lasix  Order to hold evening dose

## 2019-03-16 NOTE — PROGRESS NOTES
Interval Progress Note - pulmonary  Lorena Laura 53 y o  male MRN: [id number]  Unit/Bed#: [place] [address] -01 Encounter: [id number]    A limited bedside ultrasound was performed posteriorly and laterally on the right lung  This was to assess the degree of pleural fluid remaining  Chest x-ray was not able to determine help much was persistent consolidation versus pleural effusion  Pleural fluid was identified  There was only a small amount of residual pleural fluid with densely consolidated lung visualized beneath the effusion  Given the significantly diminished chest tube output, ultrasound findings, and patient's clinical progress, will discontinue the second chest tube    Encourage incentive spirometry, increased activity, and will consider repeat imaging if respiratory status worsens    Shelba Meigs, MD

## 2019-03-16 NOTE — PROGRESS NOTES
Progress Note - Pulmonary   Alistair Fermin 53 y o  male MRN: [id number]  Unit/Bed#: [place] [address] -01 Encounter: [id number]    Assessment/Plan:  1  Severe sepsis with septic shock  Resolved  · Antibiotics per Infectious Disease  2  Liver abscess  · OLIVIA drain remains in place  3  Empyema  CT scan shows right lung consolidation or effusion        *    resolved, chest to removed yesterday        *   continue pulmonary toilet and diuresis   4  Acute hypoxic respiratory failure secondary to empyema        *   improving patient remains on 3 L oxygen via nasal cannula  5    Hematuria:  Urology has been consulted  6  New finding of systolic ejection murmur  Consider echocardiogram        ----------------------------------------------------------------------------------------------------------------------    HPI/Interval History:   Patient feels well  No complaints of shortness of breath  Breathing has not changed since chest tube removed  Patient did discontinue his suprapubic catheter and now has hematuria  Urology has been consulted  Vitals:   Blood pressure 125/62, pulse 80, temperature 99 1 °F (37 3 °C), temperature source Temporal, resp  rate 18, height 5' 8" (1 727 m), weight 102 kg (224 lb 13 9 oz), SpO2 96 %  ,Body mass index is 34 19 kg/m²  SpO2: 96 %  SpO2 Activity: At Rest  O2 Device: Nasal cannula    Physical Exam:   Gen:  Pleasant and cooperative  HEENT:  Atraumatic normocephalic  There is a small 1 cm abrasion on the patient's forehead  Pupils equal round reactive to light  Extraocular movements are intact sclerae are anicteric oral mucosa pink and moist  Neck:  Supple no JVD no lymphadenopathy  Chest:  Clear to auscultation anteriorly, breath sounds are diminished at the right base    Cardiac:  Regular rate and rhythm 2/6 systolic ejection murmur best heard at the left sternal border  Abdomen:  Soft nontender with positive bowel sounds  Extremities:  No clubbing cyanosis or edema      Labs:    CBC:  Results from last 7 days   Lab Units 03/16/19  0845   WBC Thousand/uL 4 13*   HEMOGLOBIN g/dL 7 9*   HEMATOCRIT % 25 1*   PLATELETS Thousands/uL 111*         BMP:   Lab Results   Component Value Date    SODIUM 135 (L) 03/16/2019    K 3 3 (L) 03/16/2019    CO2 26 03/16/2019     03/16/2019    BUN 6 03/16/2019    CREATININE 0 54 (L) 03/16/2019    CALCIUM 7 8 (L) 03/16/2019           Imaging studies:  Chest x-ray 15 March 2019    Moderate amount of persistent opacification in the right hemithorax with right chest tubes redemonstrated      LINDSAY Ferguson

## 2019-03-16 NOTE — PROGRESS NOTES
Progress Note - Lucero Rodriguez 53 y o  male MRN: [id number]    Unit/Bed#: [place] 2 -01 Encounter: [id number]    Principal Problem:    Severe sepsis with septic shock [facility])  Active Problems:    Heart murmur    Anemia    Failure to thrive in adult    Weight loss    Elevated alkaline phosphatase level    GERD (gastroesophageal reflux disease)    Liver abscess    Appendicitis    Alcohol abuse    Acute retention of urine    Empyema (HCC)  Resolved Problems:    SHANDA (acute kidney injury) ([address] )    Respiratory failure (HCC)      Assessment/Plan:  1-Severe sepsis with septic shock likely related to liver abscess and right-sided empyema  Continue Unasyn per Infectious Disease recommendations  Sepsis has now improved  Defervescence of fever and leukocytosis  Plan for 3 week course of IV antibiotics for treatment of empyema through 3/20 followed by oral antibiotics till radiographic resolution of liver abscess  Likely will keep him here till antibiotic course completed versus home with antibiotics     2-Acute hypoxic respiratory failure secondary to right-sided empyema status post chest tube x2   Patient has had both his chest tubes removed  The 2nd 1 was yesterday  Breathing is at baseline  However still needs around 2 L of oxygen to maintain saturation of 90%  Continue to encourage out of bed, deep breathing and incentive spirometer  Pulmonary critical care follow-up appreciated      3-Liver abscess status post IR drainage  Continue OLIVIA drain monitoring   Repeat CT scan demonstrates that abscesses has decreased in size  Continue Unasyn per ID input  Plan for 3 week of IV antibiotics followed by oral antibiotics till resolution of liver abscess or when the drain is removed  Patient will follow up in 1 week for drain check      4-Right-sided empyema likely from the liver abscess  Chest tube has been discontinued  Continue rest of the management as per pulmonology    · Subacute versus chronic appendicitis  Will need surgical follow-up for eventual appendectomy      5-Anemia  Status post 1 unit of packed red cell transfusion for hemoglobin of 6 8  Currently 7 9  Will continue to monitor     6-Alcoholic cirrhosis  Continue on thiamine and folate  Alcohol cessation encouraged  No evidence of withdrawal     7-Hypokalemia-potassium supplementation     8-hematuria gross-painless  Patient was due to have a suprapubic catheter earlier but that was canceled since bladder was not found to be distended  Will have Urology re-evaluate  Subjective:  Patient this morning had fresh blood from the urethra-no pain  Will have Urology re-evaluate  2nd chest tube was discontinued yesterday  Breath sounds are still diminished at the right lung base  He still needs around 2 L of supplemental oxygen to maintain saturation of around 90%  Question on whether there was a new murmur heard however I did not appreciate any murmurs  Echocardiogram has been ordered  Physical Exam:   Vitals: Blood pressure 125/62, pulse 80, temperature 99 1 °F (37 3 °C), temperature source Temporal, resp  rate 18, height 5' 8" (1 727 m), weight 102 kg (224 lb 13 9 oz), SpO2 96 %  ,Body mass index is 34 19 kg/m²  Gen:  Pleasant, non-tachypnic, non-dyspnic  Conversant  Heart: regular rate and rhythm, S1S2 present, no murmur, rub or gallop  Lungs:  Decreased air entry right base  Abd: soft, non-tender, non-distended  NABS, no guarding, rebound or peritoneal signs  Extremities: no clubbing, cyanosis or edema  2+pedal pulses bilaterally  Full range of motion  Neuro: awake, alert and oriented  Cranial nerves 2-12 intact  Strength and sensation grossly intact  Skin: warm and dry: no petechiae, purpura and rash      LABS:   Results from last 7 days   Lab Units 03/16/19  0845 03/15/19  0529 03/14/19  0531   WBC Thousand/uL 4 13* 4 85 4 71   HEMOGLOBIN g/dL 7 9* 7 6* 6 8*   HEMATOCRIT % 25 1* 24 6* 22 1*   PLATELETS Thousands/uL 111* 125* 124* Results from last 7 days   Lab Units 03/16/19  0602 03/15/19  0529 03/14/19  0531   POTASSIUM mmol/L 3 3* 3 4* 3 0*   CHLORIDE mmol/L 101 102 102   CO2 mmol/L 26 26 28   BUN mg/dL 6 8 13   CREATININE mg/dL 0 54* 0 56* 0 61   CALCIUM mg/dL 7 8* 8 0* 8 2*       Intake/Output Summary (Last 24 hours) at 3/16/2019 1142  Last data filed at 3/16/2019 1001  Gross per 24 hour   Intake    Output 2200 ml   Net -2200 ml           Current Facility-Administered Medications:  acetaminophen 650 mg Oral Q6H PRN Sowmya Felix MD    ampicillin-sulbactam 3 g Intravenous Q6H Malachi Hunter PA-C Last Rate: Stopped (59/42/76 9775)   folic acid 1 mg Oral Daily Malachi Hunter PA-C    furosemide 20 mg Intravenous Q12H Malachi Hunter PA-C    heparin (porcine) 5,000 Units Subcutaneous [facility] PA-C    melatonin 6 mg Oral HS Malachi Hunter PA-C    pantoprazole 40 mg Oral Q24H [address] Malachi Hunter PA-C    saccharomyces boulardii 250 mg Oral BID Malachi Hunter PA-C    thiamine 100 mg Oral Daily Malachi Hunter PA-C        Family update- family updated

## 2019-03-16 NOTE — NURSING NOTE
Patient ambulated to the bathroom to urinate and then went to sit on the commode  While on the commode he started having an episode of bleeding from the penis  Unsure if there is a laceration of some sort near the opening or actually blood from the bladder  Tessa Reyes NP is aware will put a new consult into urology  Will pass information on to next nurse

## 2019-03-17 LAB
ANION GAP SERPL CALCULATED.3IONS-SCNC: 5 MMOL/L (ref 4–13)
BASOPHILS # BLD AUTO: 0.02 THOUSANDS/ΜL (ref 0–0.1)
BASOPHILS NFR BLD AUTO: 1 % (ref 0–1)
BUN SERPL-MCNC: 5 MG/DL (ref 5–25)
CALCIUM SERPL-MCNC: 8 MG/DL (ref 8.3–10.1)
CHLORIDE SERPL-SCNC: 101 MMOL/L (ref 100–108)
CO2 SERPL-SCNC: 29 MMOL/L (ref 21–32)
CREAT SERPL-MCNC: 0.51 MG/DL (ref 0.6–1.3)
EOSINOPHIL # BLD AUTO: 0.1 THOUSAND/ΜL (ref 0–0.61)
EOSINOPHIL NFR BLD AUTO: 3 % (ref 0–6)
ERYTHROCYTE [DISTWIDTH] IN BLOOD BY AUTOMATED COUNT: 19.5 % (ref 11.6–15.1)
GFR SERPL CREATININE-BSD FRML MDRD: 128 ML/MIN/1.73SQ M
GLUCOSE SERPL-MCNC: 88 MG/DL (ref 65–140)
HCT VFR BLD AUTO: 25.6 % (ref 36.5–49.3)
HGB BLD-MCNC: 8.2 G/DL (ref 12–17)
IMM GRANULOCYTES # BLD AUTO: 0.04 THOUSAND/UL (ref 0–0.2)
IMM GRANULOCYTES NFR BLD AUTO: 1 % (ref 0–2)
LYMPHOCYTES # BLD AUTO: 0.69 THOUSANDS/ΜL (ref 0.6–4.47)
LYMPHOCYTES NFR BLD AUTO: 18 % (ref 14–44)
MCH RBC QN AUTO: 31.7 PG (ref 26.8–34.3)
MCHC RBC AUTO-ENTMCNC: 32 G/DL (ref 31.4–37.4)
MCV RBC AUTO: 99 FL (ref 82–98)
MONOCYTES # BLD AUTO: 0.53 THOUSAND/ΜL (ref 0.17–1.22)
MONOCYTES NFR BLD AUTO: 14 % (ref 4–12)
NEUTROPHILS # BLD AUTO: 2.52 THOUSANDS/ΜL (ref 1.85–7.62)
NEUTS SEG NFR BLD AUTO: 63 % (ref 43–75)
NRBC BLD AUTO-RTO: 0 /100 WBCS
PLATELET # BLD AUTO: 108 THOUSANDS/UL (ref 149–390)
PMV BLD AUTO: 9.8 FL (ref 8.9–12.7)
POTASSIUM SERPL-SCNC: 3 MMOL/L (ref 3.5–5.3)
RBC # BLD AUTO: 2.59 MILLION/UL (ref 3.88–5.62)
SODIUM SERPL-SCNC: 135 MMOL/L (ref 136–145)
WBC # BLD AUTO: 3.9 THOUSAND/UL (ref 4.31–10.16)

## 2019-03-17 PROCEDURE — 80048 BASIC METABOLIC PNL TOTAL CA: CPT | Performed by: HOSPITALIST

## 2019-03-17 PROCEDURE — 99232 SBSQ HOSP IP/OBS MODERATE 35: CPT | Performed by: HOSPITALIST

## 2019-03-17 PROCEDURE — 85025 COMPLETE CBC W/AUTO DIFF WBC: CPT | Performed by: HOSPITALIST

## 2019-03-17 RX ADMIN — HEPARIN SODIUM 5000 UNITS: 5000 INJECTION INTRAVENOUS; SUBCUTANEOUS at 13:19

## 2019-03-17 RX ADMIN — AMPICILLIN SODIUM AND SULBACTAM SODIUM 3 G: 10; 5 INJECTION, POWDER, FOR SOLUTION INTRAVENOUS at 13:00

## 2019-03-17 RX ADMIN — FUROSEMIDE 20 MG: 10 INJECTION, SOLUTION INTRAMUSCULAR; INTRAVENOUS at 08:44

## 2019-03-17 RX ADMIN — AMPICILLIN SODIUM AND SULBACTAM SODIUM 3 G: 10; 5 INJECTION, POWDER, FOR SOLUTION INTRAVENOUS at 05:32

## 2019-03-17 RX ADMIN — HEPARIN SODIUM 5000 UNITS: 5000 INJECTION INTRAVENOUS; SUBCUTANEOUS at 05:32

## 2019-03-17 RX ADMIN — Medication 100 MG: at 08:44

## 2019-03-17 RX ADMIN — AMPICILLIN SODIUM AND SULBACTAM SODIUM 3 G: 10; 5 INJECTION, POWDER, FOR SOLUTION INTRAVENOUS at 01:04

## 2019-03-17 RX ADMIN — FOLIC ACID 1 MG: 1 TABLET ORAL at 08:44

## 2019-03-17 RX ADMIN — PANTOPRAZOLE SODIUM 40 MG: 40 TABLET, DELAYED RELEASE ORAL at 08:44

## 2019-03-17 RX ADMIN — AMPICILLIN SODIUM AND SULBACTAM SODIUM 3 G: 10; 5 INJECTION, POWDER, FOR SOLUTION INTRAVENOUS at 17:15

## 2019-03-17 RX ADMIN — Medication 250 MG: at 17:11

## 2019-03-17 RX ADMIN — MELATONIN TAB 3 MG 6 MG: 3 TAB at 22:51

## 2019-03-17 RX ADMIN — HEPARIN SODIUM 5000 UNITS: 5000 INJECTION INTRAVENOUS; SUBCUTANEOUS at 21:35

## 2019-03-17 RX ADMIN — Medication 250 MG: at 08:44

## 2019-03-17 NOTE — PROGRESS NOTES
Progress Note - Mony Cooley 53 y o  male MRN: [id number]    Unit/Bed#: [place] [facility] -01 Encounter: [id number]    Principal Problem:    Severe sepsis with septic shock [facility])  Active Problems:    Heart murmur    Anemia    Failure to thrive in adult    Weight loss    Elevated alkaline phosphatase level    GERD (gastroesophageal reflux disease)    Liver abscess    Appendicitis    Alcohol abuse    Acute retention of urine    Empyema (HCC)  Resolved Problems:    SHANDA (acute kidney injury) ([address] )    Respiratory failure (HCC)      Assessment/Plan:  1-Severe sepsis with septic shock likely related to liver abscess and right-sided empyema  Continue Unasyn per Infectious Disease recommendations   Sepsis has now improved  Defervescence of fever and leukocytosis  Plan for 3 week course of IV antibiotics for treatment of empyema through 3/20 followed by oral antibiotics till radiographic resolution of liver abscess  Likely will keep him here till antibiotic course completed versus home with antibiotics     2-Acute hypoxic respiratory failure secondary to right-sided empyema status post chest tube x2   Patient has had both his chest tubes removed  The 2nd 1 was yesterday  Breathing is at baseline  However still needs around 2 L of oxygen to maintain saturation of 90%  Continue to encourage out of bed, deep breathing and incentive spirometer  Pulmonary critical care follow-up appreciated      3-Liver abscess status post IR drainage  Continue OLIVIA drain monitoring   Repeat CT scan demonstrates that abscesses has decreased in size  Continue Unasyn per ID input  Plan for 3 week of IV antibiotics followed by oral antibiotics till resolution of liver abscess or when the drain is removed  Patient will follow up in 1 week for drain check      4-Right-sided empyema likely from the liver abscess  Chest tube has been discontinued  Continue rest of the management as per pulmonology      5-Subacute versus chronic appendicitis  Will need surgical follow-up for eventual appendectomy      6-Bvupok-cjbmmy secondary to sepsis  Status post 1 unit of packed red cell transfusion for hemoglobin of 6 8  Currently 8 2  Will continue to monitor     7-Alcoholic cirrhosis  Continue on thiamine and folate  Alcohol cessation encouraged  No evidence of withdrawal     8-Hypokalemia-potassium supplementation     9-hematuria gross-painless  Patient was due to have a suprapubic catheter earlier but that was canceled since bladder was not found to be distended  Hematuria now has resolved  Urology re-evaluation appreciated  Continue to monitor  PT eval  DC plan  Try and wean oxygen otherwise will need eval for home O2    Subjective:  Patient states no further episodes of hematuria  Urology input appreciated  Patient still needing around 2 L of oxygen to maintain saturation of 90%  Physical Exam:   Vitals: Blood pressure 122/75, pulse 80, temperature 99 1 °F (37 3 °C), temperature source Temporal, resp  rate 18, height 5' 8" (1 727 m), weight 102 kg (224 lb 13 9 oz), SpO2 93 %  ,Body mass index is 34 19 kg/m²  Gen:  Pleasant, non-tachypnic, non-dyspnic  Conversant  Heart: regular rate and rhythm, S1S2 present, no murmur, rub or gallop  Lungs:  Bibasilar rales  Abd: soft, non-tender, non-distended  NABS, no guarding, rebound or peritoneal signs  Extremities: no clubbing, cyanosis or edema  2+pedal pulses bilaterally  Full range of motion  Neuro: awake, alert and oriented  Cranial nerves 2-12 intact  Strength and sensation grossly intact  Skin: warm and dry: no petechiae, purpura and rash      LABS:   Results from last 7 days   Lab Units 03/17/19  0531 03/16/19  0845 03/15/19  0529   WBC Thousand/uL 3 90* 4 13* 4 85   HEMOGLOBIN g/dL 8 2* 7 9* 7 6*   HEMATOCRIT % 25 6* 25 1* 24 6*   PLATELETS Thousands/uL 108* 111* 125*     Results from last 7 days   Lab Units 03/17/19  0531 03/16/19  0602 03/15/19  0529   POTASSIUM mmol/L 3 0* 3 3* 3 4* CHLORIDE mmol/L 101 101 102   CO2 mmol/L 29 26 26   BUN mg/dL 5 6 8   CREATININE mg/dL 0 51* 0 54* 0 56*   CALCIUM mg/dL 8 0* 7 8* 8 0*       Intake/Output Summary (Last 24 hours) at 3/17/2019 1142  Last data filed at 3/17/2019 0537  Gross per 24 hour   Intake    Output 1215 ml   Net -1215 ml           Current Facility-Administered Medications:  acetaminophen 650 mg Oral Q6H PRN Fausto Mcneal MD    ampicillin-sulbactam 3 g Intravenous Q6H Malachi Hunter PA-C Last Rate: 3 g (03/17/19 0532)   fentaNYL 25 mcg Intravenous Q5 Min PRN Florin Gómez DO    folic acid 1 mg Oral Daily Malachi Hunter PA-C    furosemide 20 mg Intravenous Q12H Malachi Hunter PA-C    heparin (porcine) 5,000 Units Subcutaneous Q8H [address] Malachi Hunter PA-C    melatonin 6 mg Oral HS Malachi Hunter PA-C    ondansetron 4 mg Intravenous Once PRN Florin Gómez DO    pantoprazole 40 mg Oral Q24H [address] Malachi Hunter PA-C    saccharomyces boulardii 250 mg Oral BID Malachi Hunter PA-C    thiamine 100 mg Oral Daily Malachi Hunter PA-C        Family update- family updated

## 2019-03-17 NOTE — PLAN OF CARE
Problem: Nutrition/Hydration-ADULT  Goal: Nutrient/Hydration intake appropriate for improving, restoring or maintaining nutritional needs  Description  Monitor and assess patient's nutrition/hydration status for malnutrition (ex- brittle hair, bruises, dry skin, pale skin and conjunctiva, muscle wasting, smooth red tongue, and disorientation)  Collaborate with interdisciplinary team and initiate plan and interventions as ordered  Monitor patient's weight and dietary intake as ordered or per policy  Utilize nutrition screening tool and intervene per policy  Determine patient's food preferences and provide high-protein, high-caloric foods as appropriate       INTERVENTIONS:  - Monitor oral intake, urinary output, labs, and treatment plans  - Assess nutrition and hydration status and recommend course of action  - Evaluate amount of meals eaten  - Assist patient with eating if necessary   - Allow adequate time for meals  - Recommend/ encourage appropriate diets, oral nutritional supplements, and vitamin/mineral supplements  - Order, calculate, and assess calorie counts as needed  - Recommend, monitor, and adjust tube feedings and TPN/PPN based on assessed needs  - Assess need for intravenous fluids  - Provide specific nutrition/hydration education as appropriate  - Include patient/family/caregiver in decisions related to nutrition  Outcome: Progressing     Problem: PAIN - ADULT  Goal: Verbalizes/displays adequate comfort level or baseline comfort level  Description  Interventions:  - Encourage patient to monitor pain and request assistance  - Assess pain using appropriate pain scale  - Administer analgesics based on type and severity of pain and evaluate response  - Implement non-pharmacological measures as appropriate and evaluate response  - Consider cultural and social influences on pain and pain management  - Notify physician/advanced practitioner if interventions unsuccessful or patient reports new pain  Outcome: Progressing     Problem: INFECTION - ADULT  Goal: Absence or prevention of progression during hospitalization  Description  INTERVENTIONS:  - Assess and monitor for signs and symptoms of infection  - Monitor lab/diagnostic results  - Monitor all insertion sites, i e  indwelling lines, tubes, and drains  - Monitor endotracheal (as able) and nasal secretions for changes in amount and color  - [place] appropriate cooling/warming therapies per order  - Administer medications as ordered  - Instruct and encourage patient and family to use good hand hygiene technique  - Identify and instruct in appropriate isolation precautions for identified infection/condition  Outcome: Progressing     Problem: SAFETY ADULT  Goal: Patient will remain free of falls  Description  INTERVENTIONS:  - Assess patient frequently for physical needs  -  Identify cognitive and physical deficits and behaviors that affect risk of falls    -  [place] fall precautions as indicated by assessment   - Educate patient/family on patient safety including physical limitations  - Instruct patient to call for assistance with activity based on assessment  - Modify environment to reduce risk of injury  - Consider OT/PT consult to assist with strengthening/mobility  Outcome: Progressing  Goal: Maintain or return to baseline ADL function  Description  INTERVENTIONS:  -  Assess patient's ability to carry out ADLs; assess patient's baseline for ADL function and identify physical deficits which impact ability to perform ADLs (bathing, care of mouth/teeth, toileting, grooming, dressing, etc )  - Assess/evaluate cause of self-care deficits   - Assess range of motion  - Assess patient's mobility; develop plan if impaired  - Assess patient's need for assistive devices and provide as appropriate  - Encourage maximum independence but intervene and supervise when necessary  ¯ Involve family in performance of ADLs  ¯ Assess for home care needs following discharge ¯ Request OT consult to assist with ADL evaluation and planning for discharge  ¯ Provide patient education as appropriate  Outcome: Progressing  Goal: Maintain or return mobility status to optimal level  Description  INTERVENTIONS:  - Assess patient's baseline mobility status (ambulation, transfers, stairs, etc )    - Identify cognitive and physical deficits and behaviors that affect mobility  - Identify mobility aids required to assist with transfers and/or ambulation (gait belt, sit-to-stand, lift, walker, cane, etc )  - [place] fall precautions as indicated by assessment  - Record patient progress and toleration of activity level on Mobility SBAR; progress patient to next Phase/Stage  - Instruct patient to call for assistance with activity based on assessment  - Request Rehabilitation consult to assist with strengthening/weightbearing, etc   Outcome: Progressing     Problem: DISCHARGE PLANNING  Goal: Discharge to home or other facility with appropriate resources  Description  INTERVENTIONS:  - Identify barriers to discharge w/patient and caregiver  - Arrange for needed discharge resources and transportation as appropriate  - Identify discharge learning needs (meds, wound care, etc )  - Arrange for interpretive services to assist at discharge as needed  - Refer to Case Management Department for coordinating discharge planning if the patient needs post-hospital services based on physician/advanced practitioner order or complex needs related to functional status, cognitive ability, or social support system  Outcome: Progressing     Problem: Knowledge Deficit  Goal: Patient/family/caregiver demonstrates understanding of disease process, treatment plan, medications, and discharge instructions  Description  Complete learning assessment and assess knowledge base    Interventions:  - Provide teaching at level of understanding  - Provide teaching via preferred learning methods  Outcome: Progressing     Problem: Prexisting or High Potential for Compromised Skin Integrity  Goal: Skin integrity is maintained or improved  Description  INTERVENTIONS:  - Identify patients at risk for skin breakdown  - Assess and monitor skin integrity  - Assess and monitor nutrition and hydration status  - Monitor labs (i e  albumin)  - Assess for incontinence   - Turn and reposition patient  - Assist with mobility/ambulation  - Relieve pressure over bony prominences  - Avoid friction and shearing  - Provide appropriate hygiene as needed including keeping skin clean and dry  - Evaluate need for skin moisturizer/barrier cream  - Collaborate with interdisciplinary team (i e  Nutrition, Rehabilitation, etc )   - Patient/family teaching  Outcome: Progressing     Problem: DISCHARGE PLANNING - CARE MANAGEMENT  Goal: Discharge to post-acute care or home with appropriate resources  Description  INTERVENTIONS:  - Conduct assessment to determine patient/family and health care team treatment goals, and need for post-acute services based on payer coverage, community resources, and patient preferences, and barriers to discharge  - Address psychosocial, clinical, and financial barriers to discharge as identified in assessment in conjunction with the patient/family and health care team  - Arrange appropriate level of post-acute services according to patient's   needs and preference and payer coverage in collaboration with the physician and health care team  - Communicate with and update the patient/family, physician, and health care team regarding progress on the discharge plan  - Arrange appropriate transportation to post-acute venues   Outcome: Progressing     Problem: Potential for Falls  Goal: Patient will remain free of falls  Description  INTERVENTIONS:  - Assess patient frequently for physical needs  -  Identify cognitive and physical deficits and behaviors that affect risk of falls    -  [place] fall precautions as indicated by assessment   - Educate patient/family on patient safety including physical limitations  - Instruct patient to call for assistance with activity based on assessment  - Modify environment to reduce risk of injury  - Consider OT/PT consult to assist with strengthening/mobility  Outcome: Progressing     Problem: CARDIOVASCULAR - ADULT  Goal: Maintains optimal cardiac output and hemodynamic stability  Description  INTERVENTIONS:  - Monitor I/O, vital signs and rhythm  - Monitor for S/S and trends of decreased cardiac output i e  bleeding, hypotension  - Administer and titrate ordered vasoactive medications to optimize hemodynamic stability  - Assess quality of pulses, skin color and temperature  - Assess for signs of decreased coronary artery perfusion - ex   Angina  - Instruct patient to report change in severity of symptoms  Outcome: Progressing     Problem: RESPIRATORY - ADULT  Goal: Achieves optimal ventilation and oxygenation  Description  INTERVENTIONS:  - Assess for changes in respiratory status  - Assess for changes in mentation and behavior  - Position to facilitate oxygenation and minimize respiratory effort  - Oxygen administration by appropriate delivery method based on oxygen saturation (per order) or ABGs  - Initiate smoking cessation education as indicated  - Encourage broncho-pulmonary hygiene including cough, deep breathe, Incentive Spirometry  - Assess the need for suctioning and aspirate as needed  - Assess and instruct to report SOB or any respiratory difficulty  - Respiratory Therapy support as indicated  Outcome: Progressing     Problem: GENITOURINARY - ADULT  Goal: Maintains or returns to baseline urinary function  Description  INTERVENTIONS:  - Assess urinary function  - Encourage oral fluids to ensure adequate hydration  - Administer IV fluids as ordered to ensure adequate hydration  - Administer ordered medications as needed  - Offer frequent toileting  - Follow urinary retention protocol if ordered  Outcome: Progressing  Goal: Urinary catheter remains patent  Description  INTERVENTIONS:  - Assess patency of urinary catheter  - If patient has a chronic rendon, consider changing catheter if non-functioning  - Follow guidelines for intermittent irrigation of non-functioning urinary catheter  Outcome: Progressing     Problem: METABOLIC, FLUID AND ELECTROLYTES - ADULT  Goal: Electrolytes maintained within normal limits  Description  INTERVENTIONS:  - Monitor labs and assess patient for signs and symptoms of electrolyte imbalances  - Administer electrolyte replacement as ordered  - Monitor response to electrolyte replacements, including repeat lab results as appropriate  - Instruct patient on fluid and nutrition as appropriate  Outcome: Progressing     Problem: SKIN/TISSUE INTEGRITY - ADULT  Goal: Skin integrity remains intact  Description  INTERVENTIONS  - Identify patients at risk for skin breakdown  - Assess and monitor skin integrity  - Assess and monitor nutrition and hydration status  - Monitor labs (i e  albumin)  - Assess for incontinence   - Turn and reposition patient  - Assist with mobility/ambulation  - Relieve pressure over bony prominences  - Avoid friction and shearing  - Provide appropriate hygiene as needed including keeping skin clean and dry  - Evaluate need for skin moisturizer/barrier cream  - Collaborate with interdisciplinary team (i e  Nutrition, Rehabilitation, etc )   - Patient/family teaching  Outcome: Progressing  Goal: Incision(s), wounds(s) or drain site(s) healing without S/S of infection  Description  INTERVENTIONS  - Assess and document risk factors for skin impairment   - Assess and document dressing, incision, wound bed, drain sites and surrounding tissue  - Initiate Nutrition services consult and/or wound management as needed  Outcome: Progressing  Goal: Oral mucous membranes remain intact  Description  INTERVENTIONS  - Assess oral mucosa and hygiene practices  - Implement preventative oral hygiene regimen  - Implement oral medicated treatments as ordered  - Initiate Nutrition services referral as needed  Outcome: Progressing

## 2019-03-17 NOTE — PLAN OF CARE
Problem: Nutrition/Hydration-ADULT  Goal: Nutrient/Hydration intake appropriate for improving, restoring or maintaining nutritional needs  Description  Monitor and assess patient's nutrition/hydration status for malnutrition (ex- brittle hair, bruises, dry skin, pale skin and conjunctiva, muscle wasting, smooth red tongue, and disorientation)  Collaborate with interdisciplinary team and initiate plan and interventions as ordered  Monitor patient's weight and dietary intake as ordered or per policy  Utilize nutrition screening tool and intervene per policy  Determine patient's food preferences and provide high-protein, high-caloric foods as appropriate       INTERVENTIONS:  - Monitor oral intake, urinary output, labs, and treatment plans  - Assess nutrition and hydration status and recommend course of action  - Evaluate amount of meals eaten  - Assist patient with eating if necessary   - Allow adequate time for meals  - Recommend/ encourage appropriate diets, oral nutritional supplements, and vitamin/mineral supplements  - Order, calculate, and assess calorie counts as needed  - Recommend, monitor, and adjust tube feedings and TPN/PPN based on assessed needs  - Assess need for intravenous fluids  - Provide specific nutrition/hydration education as appropriate  - Include patient/family/caregiver in decisions related to nutrition  Outcome: Progressing     Problem: PAIN - ADULT  Goal: Verbalizes/displays adequate comfort level or baseline comfort level  Description  Interventions:  - Encourage patient to monitor pain and request assistance  - Assess pain using appropriate pain scale  - Administer analgesics based on type and severity of pain and evaluate response  - Implement non-pharmacological measures as appropriate and evaluate response  - Consider cultural and social influences on pain and pain management  - Notify physician/advanced practitioner if interventions unsuccessful or patient reports new pain  Outcome: Progressing     Problem: INFECTION - ADULT  Goal: Absence or prevention of progression during hospitalization  Description  INTERVENTIONS:  - Assess and monitor for signs and symptoms of infection  - Monitor lab/diagnostic results  - Monitor all insertion sites, i e  indwelling lines, tubes, and drains  - Monitor endotracheal (as able) and nasal secretions for changes in amount and color  - [place] appropriate cooling/warming therapies per order  - Administer medications as ordered  - Instruct and encourage patient and family to use good hand hygiene technique  - Identify and instruct in appropriate isolation precautions for identified infection/condition  Outcome: Progressing     Problem: SAFETY ADULT  Goal: Patient will remain free of falls  Description  INTERVENTIONS:  - Assess patient frequently for physical needs  -  Identify cognitive and physical deficits and behaviors that affect risk of falls    -  [place] fall precautions as indicated by assessment   - Educate patient/family on patient safety including physical limitations  - Instruct patient to call for assistance with activity based on assessment  - Modify environment to reduce risk of injury  - Consider OT/PT consult to assist with strengthening/mobility  Outcome: Progressing  Goal: Maintain or return to baseline ADL function  Description  INTERVENTIONS:  -  Assess patient's ability to carry out ADLs; assess patient's baseline for ADL function and identify physical deficits which impact ability to perform ADLs (bathing, care of mouth/teeth, toileting, grooming, dressing, etc )  - Assess/evaluate cause of self-care deficits   - Assess range of motion  - Assess patient's mobility; develop plan if impaired  - Assess patient's need for assistive devices and provide as appropriate  - Encourage maximum independence but intervene and supervise when necessary  ¯ Involve family in performance of ADLs  ¯ Assess for home care needs following discharge ¯ Request OT consult to assist with ADL evaluation and planning for discharge  ¯ Provide patient education as appropriate  Outcome: Progressing  Goal: Maintain or return mobility status to optimal level  Description  INTERVENTIONS:  - Assess patient's baseline mobility status (ambulation, transfers, stairs, etc )    - Identify cognitive and physical deficits and behaviors that affect mobility  - Identify mobility aids required to assist with transfers and/or ambulation (gait belt, sit-to-stand, lift, walker, cane, etc )  - [place] fall precautions as indicated by assessment  - Record patient progress and toleration of activity level on Mobility SBAR; progress patient to next Phase/Stage  - Instruct patient to call for assistance with activity based on assessment  - Request Rehabilitation consult to assist with strengthening/weightbearing, etc   Outcome: Progressing     Problem: DISCHARGE PLANNING  Goal: Discharge to home or other facility with appropriate resources  Description  INTERVENTIONS:  - Identify barriers to discharge w/patient and caregiver  - Arrange for needed discharge resources and transportation as appropriate  - Identify discharge learning needs (meds, wound care, etc )  - Arrange for interpretive services to assist at discharge as needed  - Refer to Case Management Department for coordinating discharge planning if the patient needs post-hospital services based on physician/advanced practitioner order or complex needs related to functional status, cognitive ability, or social support system  Outcome: Progressing     Problem: Knowledge Deficit  Goal: Patient/family/caregiver demonstrates understanding of disease process, treatment plan, medications, and discharge instructions  Description  Complete learning assessment and assess knowledge base    Interventions:  - Provide teaching at level of understanding  - Provide teaching via preferred learning methods  Outcome: Progressing     Problem: Prexisting or High Potential for Compromised Skin Integrity  Goal: Skin integrity is maintained or improved  Description  INTERVENTIONS:  - Identify patients at risk for skin breakdown  - Assess and monitor skin integrity  - Assess and monitor nutrition and hydration status  - Monitor labs (i e  albumin)  - Assess for incontinence   - Turn and reposition patient  - Assist with mobility/ambulation  - Relieve pressure over bony prominences  - Avoid friction and shearing  - Provide appropriate hygiene as needed including keeping skin clean and dry  - Evaluate need for skin moisturizer/barrier cream  - Collaborate with interdisciplinary team (i e  Nutrition, Rehabilitation, etc )   - Patient/family teaching  Outcome: Progressing     Problem: DISCHARGE PLANNING - CARE MANAGEMENT  Goal: Discharge to post-acute care or home with appropriate resources  Description  INTERVENTIONS:  - Conduct assessment to determine patient/family and health care team treatment goals, and need for post-acute services based on payer coverage, community resources, and patient preferences, and barriers to discharge  - Address psychosocial, clinical, and financial barriers to discharge as identified in assessment in conjunction with the patient/family and health care team  - Arrange appropriate level of post-acute services according to patient's   needs and preference and payer coverage in collaboration with the physician and health care team  - Communicate with and update the patient/family, physician, and health care team regarding progress on the discharge plan  - Arrange appropriate transportation to post-acute venues   Outcome: Progressing     Problem: Potential for Falls  Goal: Patient will remain free of falls  Description  INTERVENTIONS:  - Assess patient frequently for physical needs  -  Identify cognitive and physical deficits and behaviors that affect risk of falls    -  [place] fall precautions as indicated by assessment   - Educate patient/family on patient safety including physical limitations  - Instruct patient to call for assistance with activity based on assessment  - Modify environment to reduce risk of injury  - Consider OT/PT consult to assist with strengthening/mobility  Outcome: Progressing     Problem: CARDIOVASCULAR - ADULT  Goal: Maintains optimal cardiac output and hemodynamic stability  Description  INTERVENTIONS:  - Monitor I/O, vital signs and rhythm  - Monitor for S/S and trends of decreased cardiac output i e  bleeding, hypotension  - Administer and titrate ordered vasoactive medications to optimize hemodynamic stability  - Assess quality of pulses, skin color and temperature  - Assess for signs of decreased coronary artery perfusion - ex   Angina  - Instruct patient to report change in severity of symptoms  Outcome: Progressing     Problem: RESPIRATORY - ADULT  Goal: Achieves optimal ventilation and oxygenation  Description  INTERVENTIONS:  - Assess for changes in respiratory status  - Assess for changes in mentation and behavior  - Position to facilitate oxygenation and minimize respiratory effort  - Oxygen administration by appropriate delivery method based on oxygen saturation (per order) or ABGs  - Initiate smoking cessation education as indicated  - Encourage broncho-pulmonary hygiene including cough, deep breathe, Incentive Spirometry  - Assess the need for suctioning and aspirate as needed  - Assess and instruct to report SOB or any respiratory difficulty  - Respiratory Therapy support as indicated  Outcome: Progressing     Problem: GENITOURINARY - ADULT  Goal: Maintains or returns to baseline urinary function  Description  INTERVENTIONS:  - Assess urinary function  - Encourage oral fluids to ensure adequate hydration  - Administer IV fluids as ordered to ensure adequate hydration  - Administer ordered medications as needed  - Offer frequent toileting  - Follow urinary retention protocol if ordered  Outcome: Progressing  Goal: Urinary catheter remains patent  Description  INTERVENTIONS:  - Assess patency of urinary catheter  - If patient has a chronic rendon, consider changing catheter if non-functioning  - Follow guidelines for intermittent irrigation of non-functioning urinary catheter  Outcome: Progressing     Problem: METABOLIC, FLUID AND ELECTROLYTES - ADULT  Goal: Electrolytes maintained within normal limits  Description  INTERVENTIONS:  - Monitor labs and assess patient for signs and symptoms of electrolyte imbalances  - Administer electrolyte replacement as ordered  - Monitor response to electrolyte replacements, including repeat lab results as appropriate  - Instruct patient on fluid and nutrition as appropriate  Outcome: Progressing     Problem: SKIN/TISSUE INTEGRITY - ADULT  Goal: Skin integrity remains intact  Description  INTERVENTIONS  - Identify patients at risk for skin breakdown  - Assess and monitor skin integrity  - Assess and monitor nutrition and hydration status  - Monitor labs (i e  albumin)  - Assess for incontinence   - Turn and reposition patient  - Assist with mobility/ambulation  - Relieve pressure over bony prominences  - Avoid friction and shearing  - Provide appropriate hygiene as needed including keeping skin clean and dry  - Evaluate need for skin moisturizer/barrier cream  - Collaborate with interdisciplinary team (i e  Nutrition, Rehabilitation, etc )   - Patient/family teaching  Outcome: Progressing  Goal: Incision(s), wounds(s) or drain site(s) healing without S/S of infection  Description  INTERVENTIONS  - Assess and document risk factors for skin impairment   - Assess and document dressing, incision, wound bed, drain sites and surrounding tissue  - Initiate Nutrition services consult and/or wound management as needed  Outcome: Progressing  Goal: Oral mucous membranes remain intact  Description  INTERVENTIONS  - Assess oral mucosa and hygiene practices  - Implement preventative oral hygiene regimen  - Implement oral medicated treatments as ordered  - Initiate Nutrition services referral as needed  Outcome: Progressing

## 2019-03-18 ENCOUNTER — APPOINTMENT (INPATIENT)
Dept: NON INVASIVE DIAGNOSTICS | Facility: HOSPITAL | Age: 49
DRG: 441 | End: 2019-03-18
Payer: COMMERCIAL

## 2019-03-18 LAB
ANION GAP SERPL CALCULATED.3IONS-SCNC: 6 MMOL/L (ref 4–13)
BUN SERPL-MCNC: 5 MG/DL (ref 5–25)
CALCIUM SERPL-MCNC: 8.1 MG/DL (ref 8.3–10.1)
CHLORIDE SERPL-SCNC: 99 MMOL/L (ref 100–108)
CO2 SERPL-SCNC: 29 MMOL/L (ref 21–32)
CREAT SERPL-MCNC: 0.56 MG/DL (ref 0.6–1.3)
ERYTHROCYTE [DISTWIDTH] IN BLOOD BY AUTOMATED COUNT: 19.3 % (ref 11.6–15.1)
GFR SERPL CREATININE-BSD FRML MDRD: 123 ML/MIN/1.73SQ M
GLUCOSE SERPL-MCNC: 106 MG/DL (ref 65–140)
HCT VFR BLD AUTO: 25.8 % (ref 36.5–49.3)
HGB BLD-MCNC: 8.1 G/DL (ref 12–17)
MAGNESIUM SERPL-MCNC: 1.6 MG/DL (ref 1.6–2.6)
MCH RBC QN AUTO: 31 PG (ref 26.8–34.3)
MCHC RBC AUTO-ENTMCNC: 31.4 G/DL (ref 31.4–37.4)
MCV RBC AUTO: 99 FL (ref 82–98)
PLATELET # BLD AUTO: 98 THOUSANDS/UL (ref 149–390)
PMV BLD AUTO: 9 FL (ref 8.9–12.7)
POTASSIUM SERPL-SCNC: 3.1 MMOL/L (ref 3.5–5.3)
RBC # BLD AUTO: 2.61 MILLION/UL (ref 3.88–5.62)
SODIUM SERPL-SCNC: 134 MMOL/L (ref 136–145)
WBC # BLD AUTO: 3.47 THOUSAND/UL (ref 4.31–10.16)

## 2019-03-18 PROCEDURE — 80048 BASIC METABOLIC PNL TOTAL CA: CPT | Performed by: INTERNAL MEDICINE

## 2019-03-18 PROCEDURE — 85027 COMPLETE CBC AUTOMATED: CPT | Performed by: INTERNAL MEDICINE

## 2019-03-18 PROCEDURE — 93321 DOPPLER ECHO F-UP/LMTD STD: CPT | Performed by: INTERNAL MEDICINE

## 2019-03-18 PROCEDURE — 93308 TTE F-UP OR LMTD: CPT | Performed by: INTERNAL MEDICINE

## 2019-03-18 PROCEDURE — 97116 GAIT TRAINING THERAPY: CPT

## 2019-03-18 PROCEDURE — 99233 SBSQ HOSP IP/OBS HIGH 50: CPT | Performed by: INTERNAL MEDICINE

## 2019-03-18 PROCEDURE — 97530 THERAPEUTIC ACTIVITIES: CPT

## 2019-03-18 PROCEDURE — 97110 THERAPEUTIC EXERCISES: CPT

## 2019-03-18 PROCEDURE — 93308 TTE F-UP OR LMTD: CPT

## 2019-03-18 PROCEDURE — 97535 SELF CARE MNGMENT TRAINING: CPT

## 2019-03-18 PROCEDURE — 99232 SBSQ HOSP IP/OBS MODERATE 35: CPT | Performed by: INTERNAL MEDICINE

## 2019-03-18 PROCEDURE — 93325 DOPPLER ECHO COLOR FLOW MAPG: CPT | Performed by: INTERNAL MEDICINE

## 2019-03-18 PROCEDURE — 83735 ASSAY OF MAGNESIUM: CPT | Performed by: INTERNAL MEDICINE

## 2019-03-18 RX ORDER — FUROSEMIDE 20 MG/1
20 TABLET ORAL
Status: DISCONTINUED | OUTPATIENT
Start: 2019-03-18 | End: 2019-03-19

## 2019-03-18 RX ADMIN — HEPARIN SODIUM 5000 UNITS: 5000 INJECTION INTRAVENOUS; SUBCUTANEOUS at 06:30

## 2019-03-18 RX ADMIN — HEPARIN SODIUM 5000 UNITS: 5000 INJECTION INTRAVENOUS; SUBCUTANEOUS at 14:01

## 2019-03-18 RX ADMIN — AMPICILLIN SODIUM AND SULBACTAM SODIUM 3 G: 10; 5 INJECTION, POWDER, FOR SOLUTION INTRAVENOUS at 18:07

## 2019-03-18 RX ADMIN — AMPICILLIN SODIUM AND SULBACTAM SODIUM 3 G: 10; 5 INJECTION, POWDER, FOR SOLUTION INTRAVENOUS at 00:15

## 2019-03-18 RX ADMIN — FUROSEMIDE 20 MG: 20 TABLET ORAL at 15:46

## 2019-03-18 RX ADMIN — FOLIC ACID 1 MG: 1 TABLET ORAL at 09:50

## 2019-03-18 RX ADMIN — HEPARIN SODIUM 5000 UNITS: 5000 INJECTION INTRAVENOUS; SUBCUTANEOUS at 22:07

## 2019-03-18 RX ADMIN — AMPICILLIN SODIUM AND SULBACTAM SODIUM 3 G: 10; 5 INJECTION, POWDER, FOR SOLUTION INTRAVENOUS at 11:58

## 2019-03-18 RX ADMIN — Medication 100 MG: at 09:50

## 2019-03-18 RX ADMIN — FUROSEMIDE 20 MG: 10 INJECTION, SOLUTION INTRAMUSCULAR; INTRAVENOUS at 09:51

## 2019-03-18 RX ADMIN — MELATONIN TAB 3 MG 6 MG: 3 TAB at 22:07

## 2019-03-18 RX ADMIN — Medication 250 MG: at 09:50

## 2019-03-18 RX ADMIN — AMPICILLIN SODIUM AND SULBACTAM SODIUM 3 G: 10; 5 INJECTION, POWDER, FOR SOLUTION INTRAVENOUS at 06:31

## 2019-03-18 RX ADMIN — PANTOPRAZOLE SODIUM 40 MG: 40 TABLET, DELAYED RELEASE ORAL at 09:50

## 2019-03-18 RX ADMIN — Medication 250 MG: at 18:12

## 2019-03-18 NOTE — PLAN OF CARE
Problem: PHYSICAL THERAPY ADULT  Goal: Performs mobility at highest level of function for planned discharge setting  See evaluation for individualized goals  Description  Treatment/Interventions: Functional transfer training, LE strengthening/ROM, Elevations, Therapeutic exercise, Endurance training, Patient/family training, Bed mobility, Gait training, Spoke to case management, Family  Equipment Recommended: Shira Watson       See flowsheet documentation for full assessment, interventions and recommendations  Outcome: Progressing  Note:   Prognosis: Good  Problem List: Decreased strength, Decreased endurance, Decreased range of motion, Impaired balance, Decreased mobility, Obesity, Decreased skin integrity, Decreased safety awareness  Assessment: Pt  seated in bedside chair upon my arrival  Pt  reporting increased fatigue, however agreeable to therapeutic intervention  Progressed with transfers with cues for hand placement/technique  Progressed with an increased amb  trial with use of RW and A of therapist with second present for close chair follow  Pt  remains limited by fatigue requiring a standing resting period in between gait trials  Pt  returned to room and repositioned seated at EOB  O2 saturation measured seated at EOB after amb  trial on 2L at 96%  Pt  repositioned supine in bed  Performance of HEP supine in bed with cues provided for proper completion  Pt  remained supine in bed at end of treatment session  PT will continue to recommend rehab upon d/c for continued improvement of noted impairments above when medically stable  Barriers to Discharge: Inaccessible home environment, Decreased caregiver support  Barriers to Discharge Comments: CARMEN, level of support at home  Recommendation: Short-term skilled PT     PT - OK to Discharge: Yes(if d/c to rehab when medically stable )    See flowsheet documentation for full assessment

## 2019-03-18 NOTE — PHYSICAL THERAPY NOTE
Physical Therapy Progress Note     03/18/19 1505   Pain Assessment   Pain Assessment No/denies pain   Pain Score No Pain   Hospital Pain Intervention(s) Ambulation/increased activity;Repositioned   Response to Interventions Tolerated  Restrictions/Precautions   Weight Bearing Precautions Per Order No   Other Precautions Fall Risk;Multiple lines;O2   General   Chart Reviewed Yes   Response to Previous Treatment Patient reporting fatigue but able to participate  Family/Caregiver Present Yes   Subjective   Subjective Willing to participate in therapy this PM    Bed Mobility   Sit to Supine 4  Minimal assistance   Additional items Assist x 1;Bedrails;Leg ; Increased time required;Verbal cues;LE management   Transfers   Sit to Stand 4  Minimal assistance   Additional items Assist x 1; Armrests; Increased time required;Verbal cues   Stand to Sit 4  Minimal assistance   Additional items Assist x 1;Bedrails; Increased time required;Verbal cues   Ambulation/Elevation   Gait pattern Decreased foot clearance; Wide SUDHIR; Forward Flexion; Short stride; Shuffling;Excessively slow   Gait Assistance 4  Minimal assist   Additional items Assist x 1;Verbal cues; Tactile cues; Other (Comment)  (with second present for close chair follow)   Assistive Device Rolling walker   Distance 60' x 2 with a standing resting period in between trials  Balance   Static Sitting Fair +   Dynamic Sitting Fair   Static Standing Fair -   Dynamic Standing Poor +   Ambulatory Poor +   Endurance Deficit   Endurance Deficit Yes   Endurance Deficit Description fatigue   Activity Tolerance   Activity Tolerance Patient limited by fatigue   Nurse Made Aware Yes   Exercises   THR Supine;10 reps;AAROM; Bilateral   Assessment   Prognosis Good   Problem List Decreased strength;Decreased endurance;Decreased range of motion; Impaired balance;Decreased mobility;Obesity; Decreased skin integrity; Decreased safety awareness   Assessment Pt  seated in bedside chair upon my arrival  Pt  reporting increased fatigue, however agreeable to therapeutic intervention  Progressed with transfers with cues for hand placement/technique  Progressed with an increased amb  trial with use of RW and A of therapist with second present for close chair follow  Pt  remains limited by fatigue requiring a standing resting period in between gait trials  Pt  returned to room and repositioned seated at EOB  O2 saturation measured seated at EOB after amb  trial on 2L at 96%  Pt  repositioned supine in bed  Performance of HEP supine in bed with cues provided for proper completion  Pt  remained supine in bed at end of treatment session  PT will continue to recommend rehab upon d/c for continued improvement of noted impairments above when medically stable  Barriers to Discharge Inaccessible home environment;Decreased caregiver support   Barriers to Discharge Comments CARMEN, level of support at home  Goals   Patient Goals To go to rehab  STG Expiration Date 03/28/19   Treatment Day 2   Plan   Treatment/Interventions Functional transfer training;LE strengthening/ROM; Therapeutic exercise; Endurance training;Bed mobility;Gait training;Spoke to nursing;Spoke to case management; Family;OT   Progress Progressing toward goals   PT Frequency Other (Comment)  (3-5x/wk)   Recommendation   Recommendation Short-term skilled PT   Equipment Recommended Walker  (RW)   PT - OK to Discharge Yes  (if d/c to rehab when medically stable )     Lizet Rojas, PTA

## 2019-03-18 NOTE — PROGRESS NOTES
Progress Note - Infectious Disease   Lorena Sanchez 53 y o  male MRN: [id number]  Unit/Bed#: [place] [address] -01 Encounter: [id number]      Impression/Plan:  1  Severe sepsis-fever, leukocytosis, lactic acidosis   Likely secondary to liver abscess and right-sided empyema   Admission and repeat blood cultures were all negative   Blood pressures remain stable    Fevers remain improved    No leukocytosis   Procalcitonin has now nearly normalized   Most recent blood cultures also remain negative   -antibiotic plan as below  -continue to monitor temperatures, hemodynamics, CBC     2  Liver abscess-suspect a portal source of infection with the possible appendicitis   No perforation seen on initial CT the abdomen and pelvis   No clear biliary source   Now status post IR drain placement   Cultures all remain negative   Repeat CT abdomen shows significantly decreased size of abscess   Drain was reassessed by IR on 03/06 and is in proper position   No resistant organisms have been isolated, and I suspect this reflects difficult to grow organisms after the patient had already received some antibiotics such as anaerobes and streptococcal species  Patient has tolerated IV Unasyn  Repeat CT shows decreased size of abscess, but has not yet resolved   IR re-evaluated drain with nearly resolved collection   -continue Unasyn at current dose to complete 3 week course of IV antibiotic for treatment of empyema, through 3/20  -monitor drain output   Follow-up drain check this week by IR  -if liver abscess not fully resolved, will plan to transition to oral antibiotics after completion of IV antibiotic until radiographic resolution of liver abscess  3  Right-sided empyema  Ashley Gearing secondary to liver abscess   Status post placement of chest tubes x2   All pleural fluid cultures have been negative   Most recent CT chest concerning for ongoing empyema    both chest tubes have now been removed   -antibiotic plan as above  -pulmonology follow-up  -repeat chest x-ray in about 4 weeks    4  Leukopenia  WBC count has been trending down  May be related to IV antibiotic  Will recheck CBC in a m     If continues to decrease, will likely need to change antibiotic  5  Alcohol abuse-with newly diagnosed cirrhosis this admission, portal hypertension based upon the CT findings   -continue abstinence  -GI follow-up ongoing     6  Probable chronic appendicitis   This may be the etiology for development of liver abscess/empyema   Recent surgery evaluation noted   Patient will ultimately require appendectomy   Surgery follow-up      7  Urethral trauma status post suprapubic catheter  Now status post removal   Patient is urinating on his own      8  Acute hypoxic respiratory failure   In the setting of sepsis, right sided empyema   Patient remains extubated and doing well from a respiratory standpoint      9  Diarrhea   Patient is having loose bowel movements   May be associated with tube feeds, lack of solid food, antibiotic   No associated abdominal pain   No leukocytosis   This has resolved         Antibiotics:  Unasyn 10  Antibiotics 19     Discussed with patient and his girlfriend at bedside  Subjective:  Patient did not require placement of suprapubic catheter  A bedside ultrasound was performed on the right lung with diminished effusion  The 2nd chest tube was removed  Patient denies fevers, chills    He is ambulating but still feels fatigued    Objective:  Vitals:  Temp:  [97 9 °F (36 6 °C)-100 1 °F (37 8 °C)] 100 °F (37 8 °C)  HR:  [81-85] 81  Resp:  [18-20] 18  BP: (121-124)/(65-74) 121/65  SpO2:  [92 %-98 %] 98 %  Temp (24hrs), Av 3 °F (37 4 °C), Min:97 9 °F (36 6 °C), Max:100 1 °F (37 8 °C)  Current: Temperature: 100 °F (37 8 °C)    Physical Exam:   General:  No acute distress  Psychiatric:  Awake and alert  Pulmonary:  Normal respiratory excursion without accessory muscle use  Abdomen:  Soft, nontender, OLIVIA drain remains in place with serosanguineous drainage  Extremities:  No edema  Skin:  No rashes    Lab Results:  I have personally reviewed pertinent labs  Results from last 7 days   Lab Units 03/18/19  0840 03/17/19  0531 03/16/19  0602  03/12/19  0532   POTASSIUM mmol/L 3 1* 3 0* 3 3*   < > 2 9*   CHLORIDE mmol/L 99* 101 101   < > 104   CO2 mmol/L 29 29 26   < > 30   BUN mg/dL 5 5 6   < > 12   CREATININE mg/dL 0 56* 0 51* 0 54*   < > 0 52*   EGFR ml/min/1 73sq m 123 128 125   < > 127   CALCIUM mg/dL 8 1* 8 0* 7 8*   < > 7 8*   AST U/L  --   --   --   --  27   ALT U/L  --   --   --   --  6*   ALK PHOS U/L  --   --   --   --  80    < > = values in this interval not displayed  Results from last 7 days   Lab Units 03/18/19  0840 03/17/19  0531 03/16/19  0845   WBC Thousand/uL 3 47* 3 90* 4 13*   HEMOGLOBIN g/dL 8 1* 8 2* 7 9*   PLATELETS Thousands/uL 98* 108* 111*           Imaging Studies:   I have personally reviewed pertinent imaging study reports and images in PACS  EKG, Pathology, and Other Studies:   I have personally reviewed pertinent reports

## 2019-03-18 NOTE — OCCUPATIONAL THERAPY NOTE
Occupational Therapy Treatment Note:         03/18/19 1450   Transfers   Sit to Stand 4  Minimal assistance   Additional items Assist x 1   Stand to Sit 4  Minimal assistance   Additional items Assist x 1   Stand pivot 4  Minimal assistance   Additional items Assist x 1   Additional Comments cues for safety required  Functional Mobility   Functional Mobility 4  Minimal assistance   Additional Comments x2   Additional items Rolling walker   Cognition   Overall Cognitive Status WFL   Arousal/Participation Alert; Responsive; Cooperative   Attention Attends with cues to redirect   Orientation Level Oriented X4   Memory Decreased recall of precautions;Decreased short term memory   Following Commands Follows multistep commands with increased time or repetition   Comments Pt with F understanding of reviewed information   Additional Activities   Additional Activities Other (Comment)  (reported all findings to nursing staff  )   Additional Activities Comments Pt reports having good understanding  Activity Tolerance   Activity Tolerance Patient limited by fatigue;Patient limited by pain   Medical Staff Made Aware reported all findings to nursing staff  Assessment   Assessment Pt was seen for skilled OT with focus on completion of self care tasks, functional transfers and review of current plan of care  Improvement noted in all areas  Pt with need for Min A to don pants this tx session  Pt with increased fatigue following functional transfers with Min A x1 and cues for safe use of RW  See above levels of A required for all functional tasks  Pt may benefit from further rehab with focus on achieving optimal performance levels with all functional tasks  Continue to recommend Inpatient rehab  SANDI Whittaker   Plan   Treatment Interventions ADL retraining;Functional transfer training; Endurance training;Cognitive reorientation;UE strengthening/ROM   Goal Expiration Date 03/24/19   Treatment Day 2   OT Frequency 3-5x/wk   Recommendation   OT Discharge Recommendation Short Term Rehab   Equipment Recommended Bedside commode   OT - OK to Discharge Yes  (to rehab when medically stable  )   Barthel Index   Feeding 10   Bathing 0   Grooming Score 5   Dressing Score 5   Bladder Score 10   Bowels Score 10   Toilet Use Score 5   Transfers (Bed/Chair) Score 10   Mobility (Level Surface) Score 0   Stairs Score 0   Barthel Index Score 55   Modified Cassia Scale   Modified Cassia Scale 4   Elbert [place], [address]

## 2019-03-18 NOTE — PLAN OF CARE
Problem: OCCUPATIONAL THERAPY ADULT  Goal: Performs self-care activities at highest level of function for planned discharge setting  See evaluation for individualized goals  Description  Treatment Interventions: ADL retraining, Functional transfer training, UE strengthening/ROM, Endurance training, Patient/family training, Equipment evaluation/education, Compensatory technique education  Equipment Recommended: (RW)       See flowsheet documentation for full assessment, interventions and recommendations  Outcome: Progressing  Note:   Limitation: Decreased ADL status, Decreased UE strength, Decreased Safe judgement during ADL, Decreased endurance, Decreased high-level ADLs  Prognosis: Good  Assessment: Pt was seen for skilled OT with focus on completion of self care tasks, functional transfers and review of current plan of care  Improvement noted in all areas  Pt with need for Min A to don pants this tx session  Pt with increased fatigue following functional transfers with Min A x1 and cues for safe use of RW  See above levels of A required for all functional tasks  Pt may benefit from further rehab with focus on achieving optimal performance levels with all functional tasks  Continue to recommend Inpatient rehab   SANDI Calle     OT Discharge Recommendation: Short Term Rehab  OT - OK to Discharge: Yes(to rehab when medically stable  )

## 2019-03-18 NOTE — PROGRESS NOTES
Progress Note - Pulmonary   Jazmyne Montana 53 y o  male MRN: [id number]  Unit/Bed#: [place] [address] -01 Encounter: [id number]      Assessment/Plan:    1  Severe sepsis with shock secondary to liver abscess  1  Continue antibiotics per ID, with plans to continue IV Unasyn through 3/20-with oral abx pending resolution of liver abscess  2  Acute hypoxic respiratory failure secondary to #3-improving  1  Continue titrate oxygen maintain SpO2 greater than equal to 80%  2  Currently on 2-3 L nasal cannula likely continue with discharge, patient likely be going to inpatient rehab  3  Pulmonary toilet:  Incentive new had both increasing ambulation as tolerated  3  Liver abscess  1  OLIVIA drain remains in place  2  Antibiotics per Infectious Disease  3  Will follow up with IR for trach management  4  Abnormal chest CT secondary to right lung consolidation and Empyema   1  Second chest tube removed on 03/15/2019 after bedside ultrasound performed that demonstrated only a small amount of residual pleural fluid with dense consolidation of the lung  2  With lung sounds are diminished with faint rales on the right, with noted significant improvement in hypoxia  3  Antibiotic treatment as indicated above    *repeat CXR in 4 weeks, with outpatient follow up  *we will sign off, please call with questions or concerns     Subjective:     Dina Victoria was seen lying in bed upon entering the room  Denies acute overnight events  Reports that his shortness of breath, chest discomfort as well improving  Denies cough or significant sputum production  Denies:  Fevers, chills, night sweats, nausea vomiting diarrhea, headache, dizziness, bronchospasm hemoptysis    Objective:         Vitals: Blood pressure 121/65, pulse 81, temperature 100 °F (37 8 °C), temperature source Temporal, resp  rate 18, height 5' 8" (1 727 m), weight 102 kg (224 lb 13 9 oz), SpO2 98 %  , 3LNC, Body mass index is 34 19 kg/m²        Intake/Output Summary (Last 24 hours) at 3/18/2019 1102  Last data filed at 3/18/2019 0637  Gross per 24 hour   Intake    Output 950 ml   Net -950 ml         Physical Exam  Gen: Awake, alert, oriented x 3, no acute distress  HEENT: Mucous membranes moist, no oral lesions, no thrush, oxygen via NC  NECK: noaccessory muscle use, JVP not elevated  Cardiac: Regular, single S1, single S2, no murmurs, no rubs, no gallops  Lungs: faint rales in the right base, no rhonchi or wheezes  Abdomen: obese, normoactive bowel sounds, soft nontender, nondistended, no rebound or rigidity, no guarding  Extremities: no cyanosis, no clubbing, no edema    Labs: I have personally reviewed pertinent lab results  , CBC:   Lab Results   Component Value Date    WBC 3 47 (L) 03/18/2019    HGB 8 1 (L) 03/18/2019    HCT 25 8 (L) 03/18/2019    MCV 99 (H) 03/18/2019    PLT 98 (L) 03/18/2019    MCH 31 0 03/18/2019    MCHC 31 4 03/18/2019    RDW 19 3 (H) 03/18/2019    MPV 9 0 03/18/2019   , CMP:   Lab Results   Component Value Date    SODIUM 134 (L) 03/18/2019    K 3 1 (L) 03/18/2019    CL 99 (L) 03/18/2019    CO2 29 03/18/2019    BUN 5 03/18/2019    CREATININE 0 56 (L) 03/18/2019    CALCIUM 8 1 (L) 03/18/2019    EGFR 123 03/18/2019     Imaging and other studies: no new imaging to review      LINDSAY Price

## 2019-03-18 NOTE — PLAN OF CARE
Problem: Nutrition/Hydration-ADULT  Goal: Nutrient/Hydration intake appropriate for improving, restoring or maintaining nutritional needs  Description  Monitor and assess patient's nutrition/hydration status for malnutrition (ex- brittle hair, bruises, dry skin, pale skin and conjunctiva, muscle wasting, smooth red tongue, and disorientation)  Collaborate with interdisciplinary team and initiate plan and interventions as ordered  Monitor patient's weight and dietary intake as ordered or per policy  Utilize nutrition screening tool and intervene per policy  Determine patient's food preferences and provide high-protein, high-caloric foods as appropriate       INTERVENTIONS:  - Monitor oral intake, urinary output, labs, and treatment plans  - Assess nutrition and hydration status and recommend course of action  - Evaluate amount of meals eaten  - Assist patient with eating if necessary   - Allow adequate time for meals  - Recommend/ encourage appropriate diets, oral nutritional supplements, and vitamin/mineral supplements  - Order, calculate, and assess calorie counts as needed  - Recommend, monitor, and adjust tube feedings and TPN/PPN based on assessed needs  - Assess need for intravenous fluids  - Provide specific nutrition/hydration education as appropriate  - Include patient/family/caregiver in decisions related to nutrition  Outcome: Progressing     Problem: PAIN - ADULT  Goal: Verbalizes/displays adequate comfort level or baseline comfort level  Description  Interventions:  - Encourage patient to monitor pain and request assistance  - Assess pain using appropriate pain scale  - Administer analgesics based on type and severity of pain and evaluate response  - Implement non-pharmacological measures as appropriate and evaluate response  - Consider cultural and social influences on pain and pain management  - Notify physician/advanced practitioner if interventions unsuccessful or patient reports new pain  Outcome: Progressing     Problem: INFECTION - ADULT  Goal: Absence or prevention of progression during hospitalization  Description  INTERVENTIONS:  - Assess and monitor for signs and symptoms of infection  - Monitor lab/diagnostic results  - Monitor all insertion sites, i e  indwelling lines, tubes, and drains  - Monitor endotracheal (as able) and nasal secretions for changes in amount and color  - [place] appropriate cooling/warming therapies per order  - Administer medications as ordered  - Instruct and encourage patient and family to use good hand hygiene technique  - Identify and instruct in appropriate isolation precautions for identified infection/condition  Outcome: Progressing     Problem: SAFETY ADULT  Goal: Patient will remain free of falls  Description  INTERVENTIONS:  - Assess patient frequently for physical needs  -  Identify cognitive and physical deficits and behaviors that affect risk of falls    -  [place] fall precautions as indicated by assessment   - Educate patient/family on patient safety including physical limitations  - Instruct patient to call for assistance with activity based on assessment  - Modify environment to reduce risk of injury  - Consider OT/PT consult to assist with strengthening/mobility  Outcome: Progressing  Goal: Maintain or return to baseline ADL function  Description  INTERVENTIONS:  -  Assess patient's ability to carry out ADLs; assess patient's baseline for ADL function and identify physical deficits which impact ability to perform ADLs (bathing, care of mouth/teeth, toileting, grooming, dressing, etc )  - Assess/evaluate cause of self-care deficits   - Assess range of motion  - Assess patient's mobility; develop plan if impaired  - Assess patient's need for assistive devices and provide as appropriate  - Encourage maximum independence but intervene and supervise when necessary  ¯ Involve family in performance of ADLs  ¯ Assess for home care needs following discharge ¯ Request OT consult to assist with ADL evaluation and planning for discharge  ¯ Provide patient education as appropriate  Outcome: Progressing  Goal: Maintain or return mobility status to optimal level  Description  INTERVENTIONS:  - Assess patient's baseline mobility status (ambulation, transfers, stairs, etc )    - Identify cognitive and physical deficits and behaviors that affect mobility  - Identify mobility aids required to assist with transfers and/or ambulation (gait belt, sit-to-stand, lift, walker, cane, etc )  - [place] fall precautions as indicated by assessment  - Record patient progress and toleration of activity level on Mobility SBAR; progress patient to next Phase/Stage  - Instruct patient to call for assistance with activity based on assessment  - Request Rehabilitation consult to assist with strengthening/weightbearing, etc   Outcome: Progressing     Problem: DISCHARGE PLANNING  Goal: Discharge to home or other facility with appropriate resources  Description  INTERVENTIONS:  - Identify barriers to discharge w/patient and caregiver  - Arrange for needed discharge resources and transportation as appropriate  - Identify discharge learning needs (meds, wound care, etc )  - Arrange for interpretive services to assist at discharge as needed  - Refer to Case Management Department for coordinating discharge planning if the patient needs post-hospital services based on physician/advanced practitioner order or complex needs related to functional status, cognitive ability, or social support system  Outcome: Progressing     Problem: Knowledge Deficit  Goal: Patient/family/caregiver demonstrates understanding of disease process, treatment plan, medications, and discharge instructions  Description  Complete learning assessment and assess knowledge base    Interventions:  - Provide teaching at level of understanding  - Provide teaching via preferred learning methods  Outcome: Progressing     Problem: Prexisting or High Potential for Compromised Skin Integrity  Goal: Skin integrity is maintained or improved  Description  INTERVENTIONS:  - Identify patients at risk for skin breakdown  - Assess and monitor skin integrity  - Assess and monitor nutrition and hydration status  - Monitor labs (i e  albumin)  - Assess for incontinence   - Turn and reposition patient  - Assist with mobility/ambulation  - Relieve pressure over bony prominences  - Avoid friction and shearing  - Provide appropriate hygiene as needed including keeping skin clean and dry  - Evaluate need for skin moisturizer/barrier cream  - Collaborate with interdisciplinary team (i e  Nutrition, Rehabilitation, etc )   - Patient/family teaching  Outcome: Progressing     Problem: Potential for Falls  Goal: Patient will remain free of falls  Description  INTERVENTIONS:  - Assess patient frequently for physical needs  -  Identify cognitive and physical deficits and behaviors that affect risk of falls  -  [place] fall precautions as indicated by assessment   - Educate patient/family on patient safety including physical limitations  - Instruct patient to call for assistance with activity based on assessment  - Modify environment to reduce risk of injury  - Consider OT/PT consult to assist with strengthening/mobility  Outcome: Progressing     Problem: CARDIOVASCULAR - ADULT  Goal: Maintains optimal cardiac output and hemodynamic stability  Description  INTERVENTIONS:  - Monitor I/O, vital signs and rhythm  - Monitor for S/S and trends of decreased cardiac output i e  bleeding, hypotension  - Administer and titrate ordered vasoactive medications to optimize hemodynamic stability  - Assess quality of pulses, skin color and temperature  - Assess for signs of decreased coronary artery perfusion - ex   Angina  - Instruct patient to report change in severity of symptoms  Outcome: Progressing     Problem: RESPIRATORY - ADULT  Goal: Achieves optimal ventilation and oxygenation  Description  INTERVENTIONS:  - Assess for changes in respiratory status  - Assess for changes in mentation and behavior  - Position to facilitate oxygenation and minimize respiratory effort  - Oxygen administration by appropriate delivery method based on oxygen saturation (per order) or ABGs  - Initiate smoking cessation education as indicated  - Encourage broncho-pulmonary hygiene including cough, deep breathe, Incentive Spirometry  - Assess the need for suctioning and aspirate as needed  - Assess and instruct to report SOB or any respiratory difficulty  - Respiratory Therapy support as indicated  Outcome: Progressing     Problem: GENITOURINARY - ADULT  Goal: Maintains or returns to baseline urinary function  Description  INTERVENTIONS:  - Assess urinary function  - Encourage oral fluids to ensure adequate hydration  - Administer IV fluids as ordered to ensure adequate hydration  - Administer ordered medications as needed  - Offer frequent toileting  - Follow urinary retention protocol if ordered  Outcome: Progressing  Goal: Urinary catheter remains patent  Description  INTERVENTIONS:  - Assess patency of urinary catheter  - If patient has a chronic rendon, consider changing catheter if non-functioning  - Follow guidelines for intermittent irrigation of non-functioning urinary catheter  Outcome: Progressing     Problem: METABOLIC, FLUID AND ELECTROLYTES - ADULT  Goal: Electrolytes maintained within normal limits  Description  INTERVENTIONS:  - Monitor labs and assess patient for signs and symptoms of electrolyte imbalances  - Administer electrolyte replacement as ordered  - Monitor response to electrolyte replacements, including repeat lab results as appropriate  - Instruct patient on fluid and nutrition as appropriate  Outcome: Progressing     Problem: SKIN/TISSUE INTEGRITY - ADULT  Goal: Skin integrity remains intact  Description  INTERVENTIONS  - Identify patients at risk for skin breakdown  - Assess and monitor skin integrity  - Assess and monitor nutrition and hydration status  - Monitor labs (i e  albumin)  - Assess for incontinence   - Turn and reposition patient  - Assist with mobility/ambulation  - Relieve pressure over bony prominences  - Avoid friction and shearing  - Provide appropriate hygiene as needed including keeping skin clean and dry  - Evaluate need for skin moisturizer/barrier cream  - Collaborate with interdisciplinary team (i e  Nutrition, Rehabilitation, etc )   - Patient/family teaching  Outcome: Progressing  Goal: Incision(s), wounds(s) or drain site(s) healing without S/S of infection  Description  INTERVENTIONS  - Assess and document risk factors for skin impairment   - Assess and document dressing, incision, wound bed, drain sites and surrounding tissue  - Initiate Nutrition services consult and/or wound management as needed  Outcome: Progressing  Goal: Oral mucous membranes remain intact  Description  INTERVENTIONS  - Assess oral mucosa and hygiene practices  - Implement preventative oral hygiene regimen  - Implement oral medicated treatments as ordered  - Initiate Nutrition services referral as needed  Outcome: Progressing

## 2019-03-18 NOTE — PROGRESS NOTES
Progress Note - Lorena Sanchez 53 y o  male MRN: [id number]    Unit/Bed#: [facility] 2 -01 Encounter: [id number]      Assessment/Plan:  1-severe sepsis with septic shock:  Patient initially presented with severe sepsis, with septic shock, and was transferred to the intensive care unit  He had fever, and leukocytosis, and lactic acidosis  He was noted to be hypotensive requiring central line placement, arterial line monitoring, and vasopressor support  Was also electively intubated for airway protection    -Severe sepsis was attributed to the liver abscess as well as the right-sided empyema  Patient clinically improved after drainage of the right lung empyema x2, and drainage of the liver abscess, as well as IV antibiotics  2-liver abscess:  Patient was diagnosed with a liver abscess  No clear biliary source identified  Possibly secondary to chronic appendicitis  -Blood cultures are negative thus far  -IR drainage catheter placed:  Fluid cultures negative thus far   -repeat CT scan with improving size of the abscess  -per Infectious Disease recommendations:  Continue Unasyn for a 3 week course, for duration for empyema:  Last dose 3/20   -id recommends IR continue to monitor drain output, and if liver abscess is not fully resolved upon completion IV antibiotics, patient will be on oral antibiotics until resolution of liver abscess    3-right-sided empyema:  Patient had chest tubes placed for right-sided empyema    Patient was evaluated by the Infectious Disease team and the right-sided empyema was felt to be secondary to the liver abscess    -chest tubes placed x2, with resolution of the fluids, and chest tubes with subsequent removal    -fluid cultures negative thus far   -per ID:  Patient be on Unasyn for a 3 week course, with last dose on 03/20   -per Pulmonary recommendations:  Repeat chest x-ray in 4 weeks, and outpatient pulmonary follow-up    4-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   -thrombocytopenia:  Platelet count stable without bleeding   -coagulopathy with most recent INR 1 5 on 03/14   -ascites:  Continue Lasix   -splenomegaly   -will need outpatient GI follow-up   -with the outpatient surveillance EGD for varices    5-alcoholism:  Patient with a history of alcoholism prior to admission  He was maintained on thiamine and folic acid  No current evidence of alcohol withdrawal, on hospital day 20    6-probable chronic appendicitis:  Patient was evaluated by the surgical team, due to concerns over possible chronic appendicitis, that would be the etiology for the liver abscess    -patient will ultimately require appendectomy  7-urologic:  -patient was noted to have urinary retention, and false urethral passage with inability for Raymundo catheter be placed while he was in the ICU  He had an urgent bedside suprapubic catheter placed during his ICU stay  Patient had inadvertently self removed this catheter    -Patient noted to have urinary retention, and painless, gross hematuria  It was questioned if he had some mild urethral trauma during transfer to the bedside commode      -Patient was followed by the Urology service  Suprapubic catheter placement was planned, but was canceled as the bladder was not distended  -currently voiding without any difficulty:  Continue follow-up postvoid residual    8-status post acute hypoxic respiratory failure:  Patient is noted to be intubated with acute hypoxic respiratory failure secondary to his empyema as well as severe sepsis with septic shock  Patient was successfully extubated on 03/11    -continue to titrate oxygen as needed:  Currently requiring 2-3 L of nasal cannula    9-diarrhea:  Patient notes he had prior diarrhea, which has since resolved  Currently tolerating p o  And passing solid bowel movements  C diff is negative  Likely secondary to tube feedings and antibiotic associated diarrhea    Continue probiotics    10-heart murmur:  Patient had a 2D echocardiogram with a left ventricular ejection fraction of 72%  There are no wall motion abnormalities  He had mild tricuspid regurgitation  11-anemia:  Patient was noted to have anemia  He required transfusion with 1 unit of packed red blood cells for hemoglobin of 6 8    -hemoglobin improved into the 8s  Continue to monitor    12-GERD:  Continue proton pump inhibitor    13-acute kidney injury:  Patient's baseline creatinine appears to range 0 5-2 6  Patient had acute kidney injury with a creatinine peak of 2 02, likely secondary to sepsis, hypotension, and urinary retention    -acute kidney injury resolved and patient's creatinine has returned to baseline    14-forehead laceration:  Patient is noted to have a forehead laceration on 03/14, when he struck his head on the IV pole  Patient was evaluated by the surgical team who performed laceration repair  15-splenomegaly with probable splenic infarct:  Noted on CT scan  16-ileus:  Noted on CT scan:  Incidental finding:  Since resolved  Patient with hypoactive but normal set patch bowel sounds  Tolerating PO  Passing bowel movements  17-hyponatremia:  Likely secondary to anasarca  Continue to monitor on Lasix    18-hypokalemia:  Secondary to Lasix  Replete and recheck    19-thrombocytopenia:  Likely secondary to cirrhosis    20-family:  Updated patient's long-time partner, Rosebud Lipoma, at the bedside  Reviewed all test results, and answered all questions      VTE Pharmacologic Prophylaxis: Heparin  VTE Mechanical Prophylaxis: sequential compression device    Certification Statement: The patient will continue to require additional inpatient hospital stay due to need for further acute intervention for IV antibiotics, rehab placement    Status: inpatient     Total time spent today including interview, exam, review of current chart, radiology reports, lab work, discussion with patient's nurse, , and in infectious disease team:  65 minutes    ===================================================================    Subjective:  Patient denies any current pain anywhere at all  He notes his previous hematuria has resolved  He is passing yellow urine  He denies any difficulty passing his urine  He denies any abdominal pain, nausea, vomiting, diarrhea or constipation  He notes he is tolerating p o  With a solid diet  He is passing bowel movements  He denies any shortness of breath,  He notes chest congestion, and dry cough  He denies any dizziness or lightheadedness  He denies any other complaints  Physical Exam:   Temp:  [97 9 °F (36 6 °C)-100 1 °F (37 8 °C)] 100 °F (37 8 °C)  HR:  [81-85] 81  Resp:  [18-20] 18  BP: (121-124)/(65-74) 121/65    Gen:  Pleasant, non-tachypnic, non-dyspnic  Conversant  Heart: regular rate and rhythm, S1S2 present, no murmur, rub or gallop  Lungs:  Bilateral scattered rhonchi  No wheezing or crackles  cough and seated at the bedside  No accessory muscle use or respiratory distress  Abd: soft, non-tender with palpation, non-distended  Hypoactive, normal pitch bowel sounds present, no guarding, rebound or peritoneal signs  Extremities: no clubbing, cyanosis or edema  2+pedal pulses bilaterally  Neuro: awake, alert and oriented  Fluent and goal directed speech  Follows commands  Moving all 4 extremities  Skin: warm and dry: no petechiae, purpura and rash      LABS:   Results from last 7 days   Lab Units 03/18/19  0840 03/17/19  0531 03/16/19  0845   WBC Thousand/uL 3 47* 3 90* 4 13*   HEMOGLOBIN g/dL 8 1* 8 2* 7 9*   HEMATOCRIT % 25 8* 25 6* 25 1*   PLATELETS Thousands/uL 98* 108* 111*     Results from last 7 days   Lab Units 03/18/19  0840 03/17/19  0531 03/16/19  0602   POTASSIUM mmol/L 3 1* 3 0* 3 3*   CHLORIDE mmol/L 99* 101 101   CO2 mmol/L 29 29 26   BUN mg/dL 5 5 6   CREATININE mg/dL 0 56* 0 51* 0 54*   CALCIUM mg/dL 8 1* 8 0* 7 [address] [place] [address] Data:  3/15: Chest x-ray: Moderate amount of persistent opacification in the right hemithorax with right chest tubes redemonstrated  3/12:  CT chest/abdomen/pelvis:  Hepatic abscess is again noted and is decreased in size however not resolved  Right middle and lower lobe consolidations are again noted with air bronchograms  A pigtail catheter and a pleural drainage catheter noted within the right pleural space   There is a small to moderate right pleural effusion which demonstrates thickened enhancing pleura concerning for an empyema   Dependent atelectasis is noted in the   left lung base with a small effusion   No air bronchograms on the left     Oral contrast was administered; the bowel loops are diffusely dilated throughout the abdomen; favor ileus over obstruction  Moderate ascites  Persistent splenomegaly with an of triangular area of decreased splenic enhancement similar to the prior exam concerning for a splenic infarct  Appendix appears similar to the recent prior exam     3/10:  Chest x-ray:Decreased airspace disease and improved aeration left lung base  3/10:  Chest x-ray:  Persistent bilateral basilar opacities and effusions, right greater than left  Pigtail chest drain on the right unchanged in position  No pneumothorax  3/6:  Chest x-ray:Persistent left basilar consolidation and worsening right basilar consolidation and effusion   No pneumothorax with right chest tube in place    3/4:  CT abdomen/pelvis:  Multiloculated hepatic abscess, significantly decreased in size since February 25, 2019 but not completely collapsed   A pigtail drainage catheter is seen within the central portion of the abscess, the epicenter of which is in the caudate lobe of the   liver    Hepatic cirrhosis   Portal hypertension as evidenced by splenomegaly as well as perisplenic and periceliac varices   Main portal vein, splenic vein, and superior mesenteric veins are patent   Spontaneous splenorenal shunt is again noted   Interval development of extensive mesenteric edema and body wall edema as well as small to moderate volume of ascites, predominantly in the lower abdomen and pelvis  A right-sided chest tube is present and there is loculated hydropneumothorax in the base of the right hemithorax   Compressive atelectasis is noted at right lung base and dependent atelectasis noted left lung base  Enteric tube is noted within the stomach   The region of the gastroesophageal junction is inseparable from the abscess along the undersurface of the caudate lobe of the liver  As on previous examination there is abnormal thickening of the appendix   Although there is periappendiceal edema, this is no more significant than the mesenteric edema seen throughout the abdomen in this patient with interval development of ascites     The possibility that this represents acute appendicitis or possibly mucinous appendiceal neoplasm cannot be excluded and interval clinical and imaging follow-up of this finding is recommended  3/1:  Chest x-ray:  1   Significant improvement in right hemithorax opacification from effusion/atelectasis, this is small-to-moderate on the final image  2   Retrocardiac opacity  3   Appropriate right IJ catheter    2/28:  Chest x-ray:  Right-sided pleural effusion/atelectasis suspected   Superimposed infection could be considered in the appropriate clinical setting    2/25:  CT chest/abdomen/pelvis:  Fluid-filled and dilated appendix with appendiceal wall thickening and periappendiceal fat stranding and fluid, suspicious for acute appendicitis in the appropriate clinical setting  Hepatic cirrhosis and splenomegaly, with associated perisplenic and mesenteric varices suggesting a component of portal hypertension    Enlargement of the caudate lobe which contains an ill-defined and heterogeneous mass lesion with large internal areas of hypodensity and internal septations as described, this measures approximately 10 5 x 9 4 x 11 8 cm in size (axial image 55, series   2)   Primary differential consideration is hepatic abscess in the appropriate clinical setting, although hepatic neoplasm such as hepatocellular carcinoma is also considered   Correlation with the patient's symptoms and laboratory values recommended  Prominent lymph nodes in the upper abdomen and retroperitoneum, largest measures approximately 1 4 cm in size, possibly reactive  Partially distended bladder   Mild circumferential bladder wall thickening noted, probably exaggerated by underdistention   Superimposed cystitis could be considered in the appropriate clinical setting  Microbiology:  3/10:  Blood cultures negative x2  3/6:  Pleural fluid culture:  No growth  2/28 pleural fluid culture:  Negative AFB  2/28:  Right pleural fluid:  No growth  2/28 right pleural fungal culture:  No growth  2/28:  Blood cultures:  Negative x2  2/26:  C diff:  Negative  2/26:  Negative fecal occult blood  2/26:  Negative enteric pathogen PCR  2/25:  Blood cultures negative x2      Procedure:  3/14:  Forehead laceration repair by surgery  3/6 interventional radiology chest tube placement   3/1:  Central line placement  2/28:  Chest tube placement  2/28:  Arterial line placement  2/28: Intubation  2/28: Interventional Radiology CT-guided liver abscess drain placement    ---------------------------------------------------------------------------------------------------------------  This note has been constructed using a voice recognition system

## 2019-03-18 NOTE — PLAN OF CARE
Problem: Nutrition/Hydration-ADULT  Goal: Nutrient/Hydration intake appropriate for improving, restoring or maintaining nutritional needs  Description  Monitor and assess patient's nutrition/hydration status for malnutrition (ex- brittle hair, bruises, dry skin, pale skin and conjunctiva, muscle wasting, smooth red tongue, and disorientation)  Collaborate with interdisciplinary team and initiate plan and interventions as ordered  Monitor patient's weight and dietary intake as ordered or per policy  Utilize nutrition screening tool and intervene per policy  Determine patient's food preferences and provide high-protein, high-caloric foods as appropriate       INTERVENTIONS:  - Monitor oral intake, urinary output, labs, and treatment plans  - Assess nutrition and hydration status and recommend course of action  - Evaluate amount of meals eaten  - Assist patient with eating if necessary   - Allow adequate time for meals  - Recommend/ encourage appropriate diets, oral nutritional supplements, and vitamin/mineral supplements  - Order, calculate, and assess calorie counts as needed  - Recommend, monitor, and adjust tube feedings and TPN/PPN based on assessed needs  - Assess need for intravenous fluids  - Provide specific nutrition/hydration education as appropriate  - Include patient/family/caregiver in decisions related to nutrition  Outcome: Progressing     Problem: PAIN - ADULT  Goal: Verbalizes/displays adequate comfort level or baseline comfort level  Description  Interventions:  - Encourage patient to monitor pain and request assistance  - Assess pain using appropriate pain scale  - Administer analgesics based on type and severity of pain and evaluate response  - Implement non-pharmacological measures as appropriate and evaluate response  - Consider cultural and social influences on pain and pain management  - Notify physician/advanced practitioner if interventions unsuccessful or patient reports new pain  Outcome: Progressing     Problem: INFECTION - ADULT  Goal: Absence or prevention of progression during hospitalization  Description  INTERVENTIONS:  - Assess and monitor for signs and symptoms of infection  - Monitor lab/diagnostic results  - Monitor all insertion sites, i e  indwelling lines, tubes, and drains  - Monitor endotracheal (as able) and nasal secretions for changes in amount and color  - [place] appropriate cooling/warming therapies per order  - Administer medications as ordered  - Instruct and encourage patient and family to use good hand hygiene technique  - Identify and instruct in appropriate isolation precautions for identified infection/condition  Outcome: Progressing     Problem: SAFETY ADULT  Goal: Patient will remain free of falls  Description  INTERVENTIONS:  - Assess patient frequently for physical needs  -  Identify cognitive and physical deficits and behaviors that affect risk of falls    -  [place] fall precautions as indicated by assessment   - Educate patient/family on patient safety including physical limitations  - Instruct patient to call for assistance with activity based on assessment  - Modify environment to reduce risk of injury  - Consider OT/PT consult to assist with strengthening/mobility  Outcome: Progressing  Goal: Maintain or return to baseline ADL function  Description  INTERVENTIONS:  -  Assess patient's ability to carry out ADLs; assess patient's baseline for ADL function and identify physical deficits which impact ability to perform ADLs (bathing, care of mouth/teeth, toileting, grooming, dressing, etc )  - Assess/evaluate cause of self-care deficits   - Assess range of motion  - Assess patient's mobility; develop plan if impaired  - Assess patient's need for assistive devices and provide as appropriate  - Encourage maximum independence but intervene and supervise when necessary  ¯ Involve family in performance of ADLs  ¯ Assess for home care needs following discharge ¯ Request OT consult to assist with ADL evaluation and planning for discharge  ¯ Provide patient education as appropriate  Outcome: Progressing  Goal: Maintain or return mobility status to optimal level  Description  INTERVENTIONS:  - Assess patient's baseline mobility status (ambulation, transfers, stairs, etc )    - Identify cognitive and physical deficits and behaviors that affect mobility  - Identify mobility aids required to assist with transfers and/or ambulation (gait belt, sit-to-stand, lift, walker, cane, etc )  - [place] fall precautions as indicated by assessment  - Record patient progress and toleration of activity level on Mobility SBAR; progress patient to next Phase/Stage  - Instruct patient to call for assistance with activity based on assessment  - Request Rehabilitation consult to assist with strengthening/weightbearing, etc   Outcome: Progressing     Problem: DISCHARGE PLANNING  Goal: Discharge to home or other facility with appropriate resources  Description  INTERVENTIONS:  - Identify barriers to discharge w/patient and caregiver  - Arrange for needed discharge resources and transportation as appropriate  - Identify discharge learning needs (meds, wound care, etc )  - Arrange for interpretive services to assist at discharge as needed  - Refer to Case Management Department for coordinating discharge planning if the patient needs post-hospital services based on physician/advanced practitioner order or complex needs related to functional status, cognitive ability, or social support system  Outcome: Progressing     Problem: Knowledge Deficit  Goal: Patient/family/caregiver demonstrates understanding of disease process, treatment plan, medications, and discharge instructions  Description  Complete learning assessment and assess knowledge base    Interventions:  - Provide teaching at level of understanding  - Provide teaching via preferred learning methods  Outcome: Progressing     Problem: Prexisting or High Potential for Compromised Skin Integrity  Goal: Skin integrity is maintained or improved  Description  INTERVENTIONS:  - Identify patients at risk for skin breakdown  - Assess and monitor skin integrity  - Assess and monitor nutrition and hydration status  - Monitor labs (i e  albumin)  - Assess for incontinence   - Turn and reposition patient  - Assist with mobility/ambulation  - Relieve pressure over bony prominences  - Avoid friction and shearing  - Provide appropriate hygiene as needed including keeping skin clean and dry  - Evaluate need for skin moisturizer/barrier cream  - Collaborate with interdisciplinary team (i e  Nutrition, Rehabilitation, etc )   - Patient/family teaching  Outcome: Progressing     Problem: DISCHARGE PLANNING - CARE MANAGEMENT  Goal: Discharge to post-acute care or home with appropriate resources  Description  INTERVENTIONS:  - Conduct assessment to determine patient/family and health care team treatment goals, and need for post-acute services based on payer coverage, community resources, and patient preferences, and barriers to discharge  - Address psychosocial, clinical, and financial barriers to discharge as identified in assessment in conjunction with the patient/family and health care team  - Arrange appropriate level of post-acute services according to patient's   needs and preference and payer coverage in collaboration with the physician and health care team  - Communicate with and update the patient/family, physician, and health care team regarding progress on the discharge plan  - Arrange appropriate transportation to post-acute venues   Outcome: Progressing     Problem: Potential for Falls  Goal: Patient will remain free of falls  Description  INTERVENTIONS:  - Assess patient frequently for physical needs  -  Identify cognitive and physical deficits and behaviors that affect risk of falls    -  [place] fall precautions as indicated by assessment   - Educate patient/family on patient safety including physical limitations  - Instruct patient to call for assistance with activity based on assessment  - Modify environment to reduce risk of injury  - Consider OT/PT consult to assist with strengthening/mobility  Outcome: Progressing     Problem: CARDIOVASCULAR - ADULT  Goal: Maintains optimal cardiac output and hemodynamic stability  Description  INTERVENTIONS:  - Monitor I/O, vital signs and rhythm  - Monitor for S/S and trends of decreased cardiac output i e  bleeding, hypotension  - Administer and titrate ordered vasoactive medications to optimize hemodynamic stability  - Assess quality of pulses, skin color and temperature  - Assess for signs of decreased coronary artery perfusion - ex   Angina  - Instruct patient to report change in severity of symptoms  Outcome: Progressing     Problem: RESPIRATORY - ADULT  Goal: Achieves optimal ventilation and oxygenation  Description  INTERVENTIONS:  - Assess for changes in respiratory status  - Assess for changes in mentation and behavior  - Position to facilitate oxygenation and minimize respiratory effort  - Oxygen administration by appropriate delivery method based on oxygen saturation (per order) or ABGs  - Initiate smoking cessation education as indicated  - Encourage broncho-pulmonary hygiene including cough, deep breathe, Incentive Spirometry  - Assess the need for suctioning and aspirate as needed  - Assess and instruct to report SOB or any respiratory difficulty  - Respiratory Therapy support as indicated  Outcome: Progressing     Problem: GENITOURINARY - ADULT  Goal: Maintains or returns to baseline urinary function  Description  INTERVENTIONS:  - Assess urinary function  - Encourage oral fluids to ensure adequate hydration  - Administer IV fluids as ordered to ensure adequate hydration  - Administer ordered medications as needed  - Offer frequent toileting  - Follow urinary retention protocol if ordered  Outcome: Progressing  Goal: Urinary catheter remains patent  Description  INTERVENTIONS:  - Assess patency of urinary catheter  - If patient has a chronic rendon, consider changing catheter if non-functioning  - Follow guidelines for intermittent irrigation of non-functioning urinary catheter  Outcome: Progressing     Problem: METABOLIC, FLUID AND ELECTROLYTES - ADULT  Goal: Electrolytes maintained within normal limits  Description  INTERVENTIONS:  - Monitor labs and assess patient for signs and symptoms of electrolyte imbalances  - Administer electrolyte replacement as ordered  - Monitor response to electrolyte replacements, including repeat lab results as appropriate  - Instruct patient on fluid and nutrition as appropriate  Outcome: Progressing     Problem: SKIN/TISSUE INTEGRITY - ADULT  Goal: Skin integrity remains intact  Description  INTERVENTIONS  - Identify patients at risk for skin breakdown  - Assess and monitor skin integrity  - Assess and monitor nutrition and hydration status  - Monitor labs (i e  albumin)  - Assess for incontinence   - Turn and reposition patient  - Assist with mobility/ambulation  - Relieve pressure over bony prominences  - Avoid friction and shearing  - Provide appropriate hygiene as needed including keeping skin clean and dry  - Evaluate need for skin moisturizer/barrier cream  - Collaborate with interdisciplinary team (i e  Nutrition, Rehabilitation, etc )   - Patient/family teaching  Outcome: Progressing  Goal: Incision(s), wounds(s) or drain site(s) healing without S/S of infection  Description  INTERVENTIONS  - Assess and document risk factors for skin impairment   - Assess and document dressing, incision, wound bed, drain sites and surrounding tissue  - Initiate Nutrition services consult and/or wound management as needed  Outcome: Progressing  Goal: Oral mucous membranes remain intact  Description  INTERVENTIONS  - Assess oral mucosa and hygiene practices  - Implement preventative oral hygiene regimen  - Implement oral medicated treatments as ordered  - Initiate Nutrition services referral as needed  Outcome: Progressing

## 2019-03-19 ENCOUNTER — APPOINTMENT (INPATIENT)
Dept: RADIOLOGY | Facility: HOSPITAL | Age: 49
DRG: 441 | End: 2019-03-19
Attending: RADIOLOGY
Payer: COMMERCIAL

## 2019-03-19 LAB
ALBUMIN SERPL BCP-MCNC: 2.3 G/DL (ref 3.5–5)
ALP SERPL-CCNC: 92 U/L (ref 46–116)
ALT SERPL W P-5'-P-CCNC: 9 U/L (ref 12–78)
ANION GAP SERPL CALCULATED.3IONS-SCNC: 7 MMOL/L (ref 4–13)
AST SERPL W P-5'-P-CCNC: 39 U/L (ref 5–45)
BILIRUB DIRECT SERPL-MCNC: 1.13 MG/DL (ref 0–0.2)
BILIRUB SERPL-MCNC: 1.61 MG/DL (ref 0.2–1)
BUN SERPL-MCNC: 5 MG/DL (ref 5–25)
CALCIUM SERPL-MCNC: 8.3 MG/DL (ref 8.3–10.1)
CHLORIDE SERPL-SCNC: 99 MMOL/L (ref 100–108)
CO2 SERPL-SCNC: 29 MMOL/L (ref 21–32)
CREAT SERPL-MCNC: 0.53 MG/DL (ref 0.6–1.3)
ERYTHROCYTE [DISTWIDTH] IN BLOOD BY AUTOMATED COUNT: 19.1 % (ref 11.6–15.1)
GFR SERPL CREATININE-BSD FRML MDRD: 126 ML/MIN/1.73SQ M
GLUCOSE SERPL-MCNC: 102 MG/DL (ref 65–140)
GLUCOSE SERPL-MCNC: 91 MG/DL (ref 65–140)
HCT VFR BLD AUTO: 27 % (ref 36.5–49.3)
HGB BLD-MCNC: 8.5 G/DL (ref 12–17)
MCH RBC QN AUTO: 31.5 PG (ref 26.8–34.3)
MCHC RBC AUTO-ENTMCNC: 31.5 G/DL (ref 31.4–37.4)
MCV RBC AUTO: 100 FL (ref 82–98)
PLATELET # BLD AUTO: 105 THOUSANDS/UL (ref 149–390)
PMV BLD AUTO: 9.6 FL (ref 8.9–12.7)
POTASSIUM SERPL-SCNC: 3.1 MMOL/L (ref 3.5–5.3)
PROT SERPL-MCNC: 7.9 G/DL (ref 6.4–8.2)
RBC # BLD AUTO: 2.7 MILLION/UL (ref 3.88–5.62)
SODIUM SERPL-SCNC: 135 MMOL/L (ref 136–145)
WBC # BLD AUTO: 3.4 THOUSAND/UL (ref 4.31–10.16)

## 2019-03-19 PROCEDURE — 49424 ASSESS CYST CONTRAST INJECT: CPT | Performed by: RADIOLOGY

## 2019-03-19 PROCEDURE — 80076 HEPATIC FUNCTION PANEL: CPT | Performed by: INTERNAL MEDICINE

## 2019-03-19 PROCEDURE — 97530 THERAPEUTIC ACTIVITIES: CPT

## 2019-03-19 PROCEDURE — 99232 SBSQ HOSP IP/OBS MODERATE 35: CPT | Performed by: INTERNAL MEDICINE

## 2019-03-19 PROCEDURE — 85027 COMPLETE CBC AUTOMATED: CPT | Performed by: INTERNAL MEDICINE

## 2019-03-19 PROCEDURE — 82948 REAGENT STRIP/BLOOD GLUCOSE: CPT

## 2019-03-19 PROCEDURE — 49424 ASSESS CYST CONTRAST INJECT: CPT

## 2019-03-19 PROCEDURE — 97535 SELF CARE MNGMENT TRAINING: CPT

## 2019-03-19 PROCEDURE — 76080 X-RAY EXAM OF FISTULA: CPT

## 2019-03-19 PROCEDURE — 80048 BASIC METABOLIC PNL TOTAL CA: CPT | Performed by: INTERNAL MEDICINE

## 2019-03-19 PROCEDURE — BF101ZZ FLUOROSCOPY OF BILE DUCTS USING LOW OSMOLAR CONTRAST: ICD-10-PCS | Performed by: RADIOLOGY

## 2019-03-19 PROCEDURE — 76080 X-RAY EXAM OF FISTULA: CPT | Performed by: RADIOLOGY

## 2019-03-19 PROCEDURE — 97116 GAIT TRAINING THERAPY: CPT

## 2019-03-19 RX ORDER — FUROSEMIDE 20 MG/1
20 TABLET ORAL DAILY
Status: DISCONTINUED | OUTPATIENT
Start: 2019-03-20 | End: 2019-03-22 | Stop reason: HOSPADM

## 2019-03-19 RX ORDER — POTASSIUM CHLORIDE 20 MEQ/1
40 TABLET, EXTENDED RELEASE ORAL 2 TIMES DAILY
Status: DISCONTINUED | OUTPATIENT
Start: 2019-03-19 | End: 2019-03-20

## 2019-03-19 RX ADMIN — IOHEXOL 4 ML: 300 INJECTION, SOLUTION INTRAVENOUS at 14:23

## 2019-03-19 RX ADMIN — AMPICILLIN SODIUM AND SULBACTAM SODIUM 3 G: 10; 5 INJECTION, POWDER, FOR SOLUTION INTRAVENOUS at 18:28

## 2019-03-19 RX ADMIN — Medication 100 MG: at 09:16

## 2019-03-19 RX ADMIN — FOLIC ACID 1 MG: 1 TABLET ORAL at 09:15

## 2019-03-19 RX ADMIN — ACETAMINOPHEN 650 MG: 325 TABLET ORAL at 23:33

## 2019-03-19 RX ADMIN — PANTOPRAZOLE SODIUM 40 MG: 40 TABLET, DELAYED RELEASE ORAL at 09:16

## 2019-03-19 RX ADMIN — FUROSEMIDE 20 MG: 20 TABLET ORAL at 09:16

## 2019-03-19 RX ADMIN — MELATONIN TAB 3 MG 6 MG: 3 TAB at 23:33

## 2019-03-19 RX ADMIN — AMPICILLIN SODIUM AND SULBACTAM SODIUM 3 G: 10; 5 INJECTION, POWDER, FOR SOLUTION INTRAVENOUS at 06:16

## 2019-03-19 RX ADMIN — Medication 250 MG: at 09:15

## 2019-03-19 RX ADMIN — Medication 250 MG: at 18:28

## 2019-03-19 RX ADMIN — AMPICILLIN SODIUM AND SULBACTAM SODIUM 3 G: 10; 5 INJECTION, POWDER, FOR SOLUTION INTRAVENOUS at 13:14

## 2019-03-19 RX ADMIN — ENOXAPARIN SODIUM 40 MG: 40 INJECTION SUBCUTANEOUS at 14:30

## 2019-03-19 RX ADMIN — POTASSIUM CHLORIDE 40 MEQ: 1500 TABLET, EXTENDED RELEASE ORAL at 09:15

## 2019-03-19 RX ADMIN — AMPICILLIN SODIUM AND SULBACTAM SODIUM 3 G: 10; 5 INJECTION, POWDER, FOR SOLUTION INTRAVENOUS at 00:35

## 2019-03-19 RX ADMIN — HEPARIN SODIUM 5000 UNITS: 5000 INJECTION INTRAVENOUS; SUBCUTANEOUS at 05:31

## 2019-03-19 NOTE — PROGRESS NOTES
Progress Note - Littleton Israel 53 y o  male MRN: [id number]    Unit/Bed#: [facility] 2 -01 Encounter: [id number]      Assessment/Plan:  1-severe sepsis with septic shock:  Patient initially presented with severe sepsis, with septic shock, and was transferred to the intensive care unit  He had fever, and leukocytosis, and lactic acidosis  He was noted to be hypotensive requiring central line placement, arterial line monitoring, and vasopressor support  Was also electively intubated for airway protection               -Severe sepsis was attributed to the liver abscess as well as the right-sided empyema  Patient clinically improved after drainage of the right lung empyema x2, and drainage of the liver abscess, as well as IV antibiotics     2-liver abscess:  Patient was diagnosed with a liver abscess  No clear biliary source identified  Possibly secondary to chronic appendicitis  -Blood cultures are negative thus far  -IR drainage catheter placed:  Fluid cultures negative thus far              -repeat CT scan with improving size of the abscess  -per Infectious Disease recommendations:  Continue Unasyn for a 3 week course, for duration for empyema:  Last dose 3/20              -last day of IV abx tomorrow: Will ask IR to assess liver abscess and eval for cont tube/oral abx vs dc      3-right-sided empyema:  Patient had chest tubes placed for right-sided empyema    Patient was evaluated by the Infectious Disease team and the right-sided empyema was felt to be secondary to the liver abscess               -chest tubes placed x2, with resolution of the fluids, and chest tubes with subsequent removal               -fluid cultures negative thus far              -per ID:  Patient be on Unasyn for a 3 week course, with last dose on 03/20              -per Pulmonary recommendations:  Repeat chest x-ray in 4 weeks to be sure lung infiltrates are resolving, and outpatient pulmonary follow-up  Patient with subsequent right lower lobe consolidation secondary to the prior empyema and instrumentation      4-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              -thrombocytopenia:  Platelet count stable without bleeding              -coagulopathy with most recent INR 1 5 on 03/14              -ascites:  Continue Lasix              -splenomegaly              -will need outpatient GI follow-up              -with the outpatient surveillance EGD for varices     5-alcoholism:  Patient with a history of alcoholism prior to admission  He was maintained on thiamine and folic acid  No current evidence of alcohol withdrawal     6-probable chronic appendicitis:  Patient was evaluated by the surgical team, due to concerns over possible chronic appendicitis, that would be the etiology for the liver abscess               -patient will ultimately require appendectomy      7-urologic:  -patient was noted to have urinary retention, and false urethral passage with inability for Raymundo catheter be placed while he was in the ICU  He had an urgent bedside suprapubic catheter placed during his ICU stay  Patient had inadvertently self removed this catheter               -Patient noted to have urinary retention, and painless, gross hematuria  It was questioned if he had some mild urethral trauma during transfer to the bedside commode                 -Patient was followed by the Urology service  Suprapubic catheter placement was planned, but was canceled as the bladder was not distended  -currently voiding without any difficulty:  Continue follow-up postvoid residual     8-status post acute hypoxic respiratory failure:  Patient is noted to be intubated with acute hypoxic respiratory failure secondary to his empyema as well as severe sepsis with septic shock    Patient was successfully extubated on 03/11               -continue to titrate oxygen as needed:  Currently requiring 1-2 L of nasal cannula     9-status post diarrhea:  Patient notes he had prior diarrhea, which has since resolved  Currently tolerating p o  And passing solid bowel movements  C diff is negative  Likely secondary to tube feedings and antibiotic associated diarrhea  Continue probiotics     10-heart murmur:  Patient had a 2D echocardiogram with a left ventricular ejection fraction of 72%  There are no wall motion abnormalities  He had mild tricuspid regurgitation      11-anemia:  Patient was noted to have anemia  He required transfusion with 1 unit of packed red blood cells for hemoglobin of 6 8               -hemoglobin improved into the 8s  Continue to monitor     12-GERD:  Continue proton pump inhibitor     13-status post acute kidney injury:  Patient's baseline creatinine appears to range 0 5-2 6  Patient had acute kidney injury with a creatinine peak of 2 02, likely secondary to sepsis, hypotension, and urinary retention               -acute kidney injury resolved and patient's creatinine has returned to baseline     14-forehead laceration:  Patient is noted to have a forehead laceration on 03/14, when he struck his head on the IV pole  Patient was evaluated by the surgical team who performed laceration repair     15-splenomegaly with probable splenic infarct:  Noted on CT scan      16-ileus:  Noted on CT scan:  Incidental finding:  Since resolved  Patient with hypoactive but normal set patch bowel sounds  Tolerating PO  Passing bowel movements     17-hyponatremia:  Likely secondary to anasarca  Continue to monitor on Lasix     18-hypokalemia:  Secondary to Lasix  Replete again and recheck     19-thrombocytopenia:  Likely secondary to cirrhosis     20-family:  Updated patient's brother Tania Cunningham at the bedside, with patient's permission  Answered all questions    Discussed treatment plan    Discussed with patient's nurse  Discussed with   Anticipate discharge to inpatient rehab on Thursday     VTE Pharmacologic Prophylaxis: Heparin  VTE Mechanical Prophylaxis: sequential compression device        Certification Statement: The patient will continue to require additional inpatient hospital stay due to need for further acute intervention for IR re-evaluation of liver catheter, IV antibiotics    Status: inpatient     ===================================================================    Subjective:  Patient notes he feels much better  He denies any pain anywhere at all  He denies any headache, chest pain, or pleuritic chest pain  He denies any pain at the abdominal drain site  He denies any back pain  He denies any extremity pain  Patient notes he just ambulating around the hallway with physical therapy  He denies any shortness of breath  He notes he had mild dyspnea on exertion but much better than previously  He denies any cough  He denies any nausea, vomiting, diarrhea  He is tolerating p o  He denies any dizziness or lightheadedness  Patient notes he has been urinating copious amounts on the Lasix, and request consideration for this to be decreased or stopped  Physical Exam:   Temp:  [98 9 °F (37 2 °C)-100 1 °F (37 8 °C)] 99 4 °F (37 4 °C)  HR:  [76-88] 76  Resp:  [18] 18  BP: (124-125)/(63-73) 124/73    Gen:  Pleasant, non-tachypnic, non-dyspnic  Conversant  Heart: regular rate and rhythm, S1S2 present, no murmur, rub or gallop  Lungs:  Decreased breath sounds at right base, however otherwise clear to ausculatation bilaterally  No wheezing, crackles, or rhonchi  No accessory muscle use or respiratory distress  Abd: soft, non-tender, non-distended  NABS, no guarding, rebound or peritoneal signs  Extremities: no clubbing, cyanosis or edema  2+pedal pulses bilaterally  Full range of motion  Neuro: awake, alert and oriented  Fluent speech  No tremor or asterixis  Skin: warm and dry: no petechiae, purpura and rash  No duong  Or Handy Hsieh sign      LABS:   Results from last 7 days   Lab Units 03/19/19  0530 03/18/19  0840 03/17/19  0531   WBC Thousand/uL 3 40* 3 47* 3 90*   HEMOGLOBIN g/dL 8 5* 8 1* 8 2*   HEMATOCRIT % 27 0* 25 8* 25 6*   PLATELETS Thousands/uL 105* 98* 108*     Results from last 7 days   Lab Units 03/19/19  0530 03/18/19  0840 03/17/19  0531   POTASSIUM mmol/L 3 1* 3 1* 3 0*   CHLORIDE mmol/L 99* 99* 101   CO2 mmol/L 29 29 29   BUN mg/dL 5 5 5   CREATININE mg/dL 0 53* 0 56* 0 51*   CALCIUM mg/dL 8 3 8 1* 8 [address] [place] [address] Data:  3/15:  Chest x-ray: Moderate amount of persistent opacification in the right hemithorax with right chest tubes redemonstrated      3/12:  CT chest/abdomen/pelvis:  Hepatic abscess is again noted and is decreased in size however not resolved  Right middle and lower lobe consolidations are again noted with air bronchograms  A pigtail catheter and a pleural drainage catheter noted within the right pleural space   There is a small to moderate right pleural effusion which demonstrates thickened enhancing pleura concerning for an empyema   Dependent atelectasis is noted in the   left lung base with a small effusion   No air bronchograms on the left     Oral contrast was administered; the bowel loops are diffusely dilated throughout the abdomen; favor ileus over obstruction  Moderate ascites  Persistent splenomegaly with an of triangular area of decreased splenic enhancement similar to the prior exam concerning for a splenic infarct  Appendix appears similar to the recent prior exam      3/10:  Chest x-ray:Decreased airspace disease and improved aeration left lung base      3/10:  Chest x-ray:  Persistent bilateral basilar opacities and effusions, right greater than left  Pigtail chest drain on the right unchanged in position   No pneumothorax      3/6:  Chest x-ray:Persistent left basilar consolidation and worsening right basilar consolidation and effusion   No pneumothorax with right chest tube in place     3/4:  CT abdomen/pelvis:  Multiloculated hepatic abscess, significantly decreased in size since February 25, 2019 but not completely collapsed   A pigtail drainage catheter is seen within the central portion of the abscess, the epicenter of which is in the caudate lobe of the   liver  Hepatic cirrhosis   Portal hypertension as evidenced by splenomegaly as well as perisplenic and periceliac varices   Main portal vein, splenic vein, and superior mesenteric veins are patent   Spontaneous splenorenal shunt is again noted  Interval development of extensive mesenteric edema and body wall edema as well as small to moderate volume of ascites, predominantly in the lower abdomen and pelvis  A right-sided chest tube is present and there is loculated hydropneumothorax in the base of the right hemithorax   Compressive atelectasis is noted at right lung base and dependent atelectasis noted left lung base  Enteric tube is noted within the stomach   The region of the gastroesophageal junction is inseparable from the abscess along the undersurface of the caudate lobe of the liver  As on previous examination there is abnormal thickening of the appendix   Although there is periappendiceal edema, this is no more significant than the mesenteric edema seen throughout the abdomen in this patient with interval development of ascites     The possibility that this represents acute appendicitis or possibly mucinous appendiceal neoplasm cannot be excluded and interval clinical and imaging follow-up of this finding is recommended      3/1:  Chest x-ray:  1   Significant improvement in right hemithorax opacification from effusion/atelectasis, this is small-to-moderate on the final image  2   Retrocardiac opacity    3   Appropriate right IJ catheter     2/28:  Chest x-ray:  Right-sided pleural effusion/atelectasis suspected   Superimposed infection could be considered in the appropriate clinical setting     2/25:  CT chest/abdomen/pelvis:  Fluid-filled and dilated appendix with appendiceal wall thickening and periappendiceal fat stranding and fluid, suspicious for acute appendicitis in the appropriate clinical setting  Hepatic cirrhosis and splenomegaly, with associated perisplenic and mesenteric varices suggesting a component of portal hypertension  Enlargement of the caudate lobe which contains an ill-defined and heterogeneous mass lesion with large internal areas of hypodensity and internal septations as described, this measures approximately 10 5 x 9 4 x 11 8 cm in size (axial image 55, series   2)   Primary differential consideration is hepatic abscess in the appropriate clinical setting, although hepatic neoplasm such as hepatocellular carcinoma is also considered   Correlation with the patient's symptoms and laboratory values recommended  Prominent lymph nodes in the upper abdomen and retroperitoneum, largest measures approximately 1 4 cm in size, possibly reactive  Partially distended bladder   Mild circumferential bladder wall thickening noted, probably exaggerated by underdistention   Superimposed cystitis could be considered in the appropriate clinical setting  Microbiology:  3/10:  Blood cultures negative x2  3/6:  Pleural fluid culture:  No growth  2/28 pleural fluid culture:  Negative AFB  2/28:  Right pleural fluid:  No growth  2/28 right pleural fungal culture:  No growth  2/28:  Blood cultures:  Negative x2  2/26:  C diff:  Negative  2/26:  Negative fecal occult blood  2/26:  Negative enteric pathogen PCR  2/25:  Blood cultures negative x2        Procedure:  3/14:  Forehead laceration repair by surgery  3/6 interventional radiology chest tube placement   3/1:  Central line placement  2/28:  Chest tube placement  2/28:  Arterial line placement  2/28: Intubation  2/28:   Interventional Radiology CT-guided liver abscess drain placement            ---------------------------------------------------------------------------------------------------------------  This note has been constructed using a voice recognition system

## 2019-03-19 NOTE — PLAN OF CARE
Problem: Nutrition/Hydration-ADULT  Goal: Nutrient/Hydration intake appropriate for improving, restoring or maintaining nutritional needs  Description  Monitor and assess patient's nutrition/hydration status for malnutrition (ex- brittle hair, bruises, dry skin, pale skin and conjunctiva, muscle wasting, smooth red tongue, and disorientation)  Collaborate with interdisciplinary team and initiate plan and interventions as ordered  Monitor patient's weight and dietary intake as ordered or per policy  Utilize nutrition screening tool and intervene per policy  Determine patient's food preferences and provide high-protein, high-caloric foods as appropriate       INTERVENTIONS:  - Monitor oral intake, urinary output, labs, and treatment plans  - Assess nutrition and hydration status and recommend course of action  - Evaluate amount of meals eaten  - Assist patient with eating if necessary   - Allow adequate time for meals  - Recommend/ encourage appropriate diets, oral nutritional supplements, and vitamin/mineral supplements  - Order, calculate, and assess calorie counts as needed  - Recommend, monitor, and adjust tube feedings and TPN/PPN based on assessed needs  - Assess need for intravenous fluids  - Provide specific nutrition/hydration education as appropriate  - Include patient/family/caregiver in decisions related to nutrition  Outcome: Progressing     Problem: PAIN - ADULT  Goal: Verbalizes/displays adequate comfort level or baseline comfort level  Description  Interventions:  - Encourage patient to monitor pain and request assistance  - Assess pain using appropriate pain scale  - Administer analgesics based on type and severity of pain and evaluate response  - Implement non-pharmacological measures as appropriate and evaluate response  - Consider cultural and social influences on pain and pain management  - Notify physician/advanced practitioner if interventions unsuccessful or patient reports new pain  Outcome: Progressing     Problem: INFECTION - ADULT  Goal: Absence or prevention of progression during hospitalization  Description  INTERVENTIONS:  - Assess and monitor for signs and symptoms of infection  - Monitor lab/diagnostic results  - Monitor all insertion sites, i e  indwelling lines, tubes, and drains  - Monitor endotracheal (as able) and nasal secretions for changes in amount and color  - [place] appropriate cooling/warming therapies per order  - Administer medications as ordered  - Instruct and encourage patient and family to use good hand hygiene technique  - Identify and instruct in appropriate isolation precautions for identified infection/condition  Outcome: Progressing     Problem: SAFETY ADULT  Goal: Patient will remain free of falls  Description  INTERVENTIONS:  - Assess patient frequently for physical needs  -  Identify cognitive and physical deficits and behaviors that affect risk of falls    -  [place] fall precautions as indicated by assessment   - Educate patient/family on patient safety including physical limitations  - Instruct patient to call for assistance with activity based on assessment  - Modify environment to reduce risk of injury  - Consider OT/PT consult to assist with strengthening/mobility  Outcome: Progressing  Goal: Maintain or return to baseline ADL function  Description  INTERVENTIONS:  -  Assess patient's ability to carry out ADLs; assess patient's baseline for ADL function and identify physical deficits which impact ability to perform ADLs (bathing, care of mouth/teeth, toileting, grooming, dressing, etc )  - Assess/evaluate cause of self-care deficits   - Assess range of motion  - Assess patient's mobility; develop plan if impaired  - Assess patient's need for assistive devices and provide as appropriate  - Encourage maximum independence but intervene and supervise when necessary  ¯ Involve family in performance of ADLs  ¯ Assess for home care needs following discharge ¯ Request OT consult to assist with ADL evaluation and planning for discharge  ¯ Provide patient education as appropriate  Outcome: Progressing  Goal: Maintain or return mobility status to optimal level  Description  INTERVENTIONS:  - Assess patient's baseline mobility status (ambulation, transfers, stairs, etc )    - Identify cognitive and physical deficits and behaviors that affect mobility  - Identify mobility aids required to assist with transfers and/or ambulation (gait belt, sit-to-stand, lift, walker, cane, etc )  - [place] fall precautions as indicated by assessment  - Record patient progress and toleration of activity level on Mobility SBAR; progress patient to next Phase/Stage  - Instruct patient to call for assistance with activity based on assessment  - Request Rehabilitation consult to assist with strengthening/weightbearing, etc   Outcome: Progressing     Problem: DISCHARGE PLANNING  Goal: Discharge to home or other facility with appropriate resources  Description  INTERVENTIONS:  - Identify barriers to discharge w/patient and caregiver  - Arrange for needed discharge resources and transportation as appropriate  - Identify discharge learning needs (meds, wound care, etc )  - Arrange for interpretive services to assist at discharge as needed  - Refer to Case Management Department for coordinating discharge planning if the patient needs post-hospital services based on physician/advanced practitioner order or complex needs related to functional status, cognitive ability, or social support system  Outcome: Progressing     Problem: Knowledge Deficit  Goal: Patient/family/caregiver demonstrates understanding of disease process, treatment plan, medications, and discharge instructions  Description  Complete learning assessment and assess knowledge base    Interventions:  - Provide teaching at level of understanding  - Provide teaching via preferred learning methods  Outcome: Progressing     Problem: Prexisting or High Potential for Compromised Skin Integrity  Goal: Skin integrity is maintained or improved  Description  INTERVENTIONS:  - Identify patients at risk for skin breakdown  - Assess and monitor skin integrity  - Assess and monitor nutrition and hydration status  - Monitor labs (i e  albumin)  - Assess for incontinence   - Turn and reposition patient  - Assist with mobility/ambulation  - Relieve pressure over bony prominences  - Avoid friction and shearing  - Provide appropriate hygiene as needed including keeping skin clean and dry  - Evaluate need for skin moisturizer/barrier cream  - Collaborate with interdisciplinary team (i e  Nutrition, Rehabilitation, etc )   - Patient/family teaching  Outcome: Progressing     Problem: Potential for Falls  Goal: Patient will remain free of falls  Description  INTERVENTIONS:  - Assess patient frequently for physical needs  -  Identify cognitive and physical deficits and behaviors that affect risk of falls  -  [place] fall precautions as indicated by assessment   - Educate patient/family on patient safety including physical limitations  - Instruct patient to call for assistance with activity based on assessment  - Modify environment to reduce risk of injury  - Consider OT/PT consult to assist with strengthening/mobility  Outcome: Progressing     Problem: CARDIOVASCULAR - ADULT  Goal: Maintains optimal cardiac output and hemodynamic stability  Description  INTERVENTIONS:  - Monitor I/O, vital signs and rhythm  - Monitor for S/S and trends of decreased cardiac output i e  bleeding, hypotension  - Administer and titrate ordered vasoactive medications to optimize hemodynamic stability  - Assess quality of pulses, skin color and temperature  - Assess for signs of decreased coronary artery perfusion - ex   Angina  - Instruct patient to report change in severity of symptoms  Outcome: Progressing     Problem: RESPIRATORY - ADULT  Goal: Achieves optimal ventilation and oxygenation  Description  INTERVENTIONS:  - Assess for changes in respiratory status  - Assess for changes in mentation and behavior  - Position to facilitate oxygenation and minimize respiratory effort  - Oxygen administration by appropriate delivery method based on oxygen saturation (per order) or ABGs  - Initiate smoking cessation education as indicated  - Encourage broncho-pulmonary hygiene including cough, deep breathe, Incentive Spirometry  - Assess the need for suctioning and aspirate as needed  - Assess and instruct to report SOB or any respiratory difficulty  - Respiratory Therapy support as indicated  Outcome: Progressing     Problem: GENITOURINARY - ADULT  Goal: Maintains or returns to baseline urinary function  Description  INTERVENTIONS:  - Assess urinary function  - Encourage oral fluids to ensure adequate hydration  - Administer IV fluids as ordered to ensure adequate hydration  - Administer ordered medications as needed  - Offer frequent toileting  - Follow urinary retention protocol if ordered  Outcome: Progressing  Goal: Urinary catheter remains patent  Description  INTERVENTIONS:  - Assess patency of urinary catheter  - If patient has a chronic rendon, consider changing catheter if non-functioning  - Follow guidelines for intermittent irrigation of non-functioning urinary catheter  Outcome: Progressing     Problem: METABOLIC, FLUID AND ELECTROLYTES - ADULT  Goal: Electrolytes maintained within normal limits  Description  INTERVENTIONS:  - Monitor labs and assess patient for signs and symptoms of electrolyte imbalances  - Administer electrolyte replacement as ordered  - Monitor response to electrolyte replacements, including repeat lab results as appropriate  - Instruct patient on fluid and nutrition as appropriate  Outcome: Progressing     Problem: SKIN/TISSUE INTEGRITY - ADULT  Goal: Skin integrity remains intact  Description  INTERVENTIONS  - Identify patients at risk for skin breakdown  - Assess and monitor skin integrity  - Assess and monitor nutrition and hydration status  - Monitor labs (i e  albumin)  - Assess for incontinence   - Turn and reposition patient  - Assist with mobility/ambulation  - Relieve pressure over bony prominences  - Avoid friction and shearing  - Provide appropriate hygiene as needed including keeping skin clean and dry  - Evaluate need for skin moisturizer/barrier cream  - Collaborate with interdisciplinary team (i e  Nutrition, Rehabilitation, etc )   - Patient/family teaching  Outcome: Progressing  Goal: Incision(s), wounds(s) or drain site(s) healing without S/S of infection  Description  INTERVENTIONS  - Assess and document risk factors for skin impairment   - Assess and document dressing, incision, wound bed, drain sites and surrounding tissue  - Initiate Nutrition services consult and/or wound management as needed  Outcome: Progressing  Goal: Oral mucous membranes remain intact  Description  INTERVENTIONS  - Assess oral mucosa and hygiene practices  - Implement preventative oral hygiene regimen  - Implement oral medicated treatments as ordered  - Initiate Nutrition services referral as needed  Outcome: Progressing

## 2019-03-19 NOTE — PLAN OF CARE
Problem: Nutrition/Hydration-ADULT  Goal: Nutrient/Hydration intake appropriate for improving, restoring or maintaining nutritional needs  Description  Monitor and assess patient's nutrition/hydration status for malnutrition (ex- brittle hair, bruises, dry skin, pale skin and conjunctiva, muscle wasting, smooth red tongue, and disorientation)  Collaborate with interdisciplinary team and initiate plan and interventions as ordered  Monitor patient's weight and dietary intake as ordered or per policy  Utilize nutrition screening tool and intervene per policy  Determine patient's food preferences and provide high-protein, high-caloric foods as appropriate       INTERVENTIONS:  - Monitor oral intake, urinary output, labs, and treatment plans  - Assess nutrition and hydration status and recommend course of action  - Evaluate amount of meals eaten  - Assist patient with eating if necessary   - Allow adequate time for meals  - Recommend/ encourage appropriate diets, oral nutritional supplements, and vitamin/mineral supplements  - Order, calculate, and assess calorie counts as needed  - Recommend, monitor, and adjust tube feedings and TPN/PPN based on assessed needs  - Assess need for intravenous fluids  - Provide specific nutrition/hydration education as appropriate  - Include patient/family/caregiver in decisions related to nutrition  Outcome: Progressing     Problem: PAIN - ADULT  Goal: Verbalizes/displays adequate comfort level or baseline comfort level  Description  Interventions:  - Encourage patient to monitor pain and request assistance  - Assess pain using appropriate pain scale  - Administer analgesics based on type and severity of pain and evaluate response  - Implement non-pharmacological measures as appropriate and evaluate response  - Consider cultural and social influences on pain and pain management  - Notify physician/advanced practitioner if interventions unsuccessful or patient reports new pain  Outcome: Progressing     Problem: INFECTION - ADULT  Goal: Absence or prevention of progression during hospitalization  Description  INTERVENTIONS:  - Assess and monitor for signs and symptoms of infection  - Monitor lab/diagnostic results  - Monitor all insertion sites, i e  indwelling lines, tubes, and drains  - Monitor endotracheal (as able) and nasal secretions for changes in amount and color  - [place] appropriate cooling/warming therapies per order  - Administer medications as ordered  - Instruct and encourage patient and family to use good hand hygiene technique  - Identify and instruct in appropriate isolation precautions for identified infection/condition  Outcome: Progressing     Problem: SAFETY ADULT  Goal: Patient will remain free of falls  Description  INTERVENTIONS:  - Assess patient frequently for physical needs  -  Identify cognitive and physical deficits and behaviors that affect risk of falls    -  [place] fall precautions as indicated by assessment   - Educate patient/family on patient safety including physical limitations  - Instruct patient to call for assistance with activity based on assessment  - Modify environment to reduce risk of injury  - Consider OT/PT consult to assist with strengthening/mobility  Outcome: Progressing  Goal: Maintain or return to baseline ADL function  Description  INTERVENTIONS:  -  Assess patient's ability to carry out ADLs; assess patient's baseline for ADL function and identify physical deficits which impact ability to perform ADLs (bathing, care of mouth/teeth, toileting, grooming, dressing, etc )  - Assess/evaluate cause of self-care deficits   - Assess range of motion  - Assess patient's mobility; develop plan if impaired  - Assess patient's need for assistive devices and provide as appropriate  - Encourage maximum independence but intervene and supervise when necessary  ¯ Involve family in performance of ADLs  ¯ Assess for home care needs following discharge ¯ Request OT consult to assist with ADL evaluation and planning for discharge  ¯ Provide patient education as appropriate  Outcome: Progressing  Goal: Maintain or return mobility status to optimal level  Description  INTERVENTIONS:  - Assess patient's baseline mobility status (ambulation, transfers, stairs, etc )    - Identify cognitive and physical deficits and behaviors that affect mobility  - Identify mobility aids required to assist with transfers and/or ambulation (gait belt, sit-to-stand, lift, walker, cane, etc )  - [place] fall precautions as indicated by assessment  - Record patient progress and toleration of activity level on Mobility SBAR; progress patient to next Phase/Stage  - Instruct patient to call for assistance with activity based on assessment  - Request Rehabilitation consult to assist with strengthening/weightbearing, etc   Outcome: Progressing     Problem: DISCHARGE PLANNING  Goal: Discharge to home or other facility with appropriate resources  Description  INTERVENTIONS:  - Identify barriers to discharge w/patient and caregiver  - Arrange for needed discharge resources and transportation as appropriate  - Identify discharge learning needs (meds, wound care, etc )  - Arrange for interpretive services to assist at discharge as needed  - Refer to Case Management Department for coordinating discharge planning if the patient needs post-hospital services based on physician/advanced practitioner order or complex needs related to functional status, cognitive ability, or social support system  Outcome: Progressing     Problem: Knowledge Deficit  Goal: Patient/family/caregiver demonstrates understanding of disease process, treatment plan, medications, and discharge instructions  Description  Complete learning assessment and assess knowledge base    Interventions:  - Provide teaching at level of understanding  - Provide teaching via preferred learning methods  Outcome: Progressing     Problem: Prexisting or High Potential for Compromised Skin Integrity  Goal: Skin integrity is maintained or improved  Description  INTERVENTIONS:  - Identify patients at risk for skin breakdown  - Assess and monitor skin integrity  - Assess and monitor nutrition and hydration status  - Monitor labs (i e  albumin)  - Assess for incontinence   - Turn and reposition patient  - Assist with mobility/ambulation  - Relieve pressure over bony prominences  - Avoid friction and shearing  - Provide appropriate hygiene as needed including keeping skin clean and dry  - Evaluate need for skin moisturizer/barrier cream  - Collaborate with interdisciplinary team (i e  Nutrition, Rehabilitation, etc )   - Patient/family teaching  Outcome: Progressing     Problem: DISCHARGE PLANNING - CARE MANAGEMENT  Goal: Discharge to post-acute care or home with appropriate resources  Description  INTERVENTIONS:  - Conduct assessment to determine patient/family and health care team treatment goals, and need for post-acute services based on payer coverage, community resources, and patient preferences, and barriers to discharge  - Address psychosocial, clinical, and financial barriers to discharge as identified in assessment in conjunction with the patient/family and health care team  - Arrange appropriate level of post-acute services according to patient's   needs and preference and payer coverage in collaboration with the physician and health care team  - Communicate with and update the patient/family, physician, and health care team regarding progress on the discharge plan  - Arrange appropriate transportation to post-acute venues   Outcome: Progressing     Problem: Potential for Falls  Goal: Patient will remain free of falls  Description  INTERVENTIONS:  - Assess patient frequently for physical needs  -  Identify cognitive and physical deficits and behaviors that affect risk of falls    -  [place] fall precautions as indicated by assessment   - Educate patient/family on patient safety including physical limitations  - Instruct patient to call for assistance with activity based on assessment  - Modify environment to reduce risk of injury  - Consider OT/PT consult to assist with strengthening/mobility  Outcome: Progressing     Problem: CARDIOVASCULAR - ADULT  Goal: Maintains optimal cardiac output and hemodynamic stability  Description  INTERVENTIONS:  - Monitor I/O, vital signs and rhythm  - Monitor for S/S and trends of decreased cardiac output i e  bleeding, hypotension  - Administer and titrate ordered vasoactive medications to optimize hemodynamic stability  - Assess quality of pulses, skin color and temperature  - Assess for signs of decreased coronary artery perfusion - ex   Angina  - Instruct patient to report change in severity of symptoms  Outcome: Progressing     Problem: RESPIRATORY - ADULT  Goal: Achieves optimal ventilation and oxygenation  Description  INTERVENTIONS:  - Assess for changes in respiratory status  - Assess for changes in mentation and behavior  - Position to facilitate oxygenation and minimize respiratory effort  - Oxygen administration by appropriate delivery method based on oxygen saturation (per order) or ABGs  - Initiate smoking cessation education as indicated  - Encourage broncho-pulmonary hygiene including cough, deep breathe, Incentive Spirometry  - Assess the need for suctioning and aspirate as needed  - Assess and instruct to report SOB or any respiratory difficulty  - Respiratory Therapy support as indicated  Outcome: Progressing     Problem: GENITOURINARY - ADULT  Goal: Maintains or returns to baseline urinary function  Description  INTERVENTIONS:  - Assess urinary function  - Encourage oral fluids to ensure adequate hydration  - Administer IV fluids as ordered to ensure adequate hydration  - Administer ordered medications as needed  - Offer frequent toileting  - Follow urinary retention protocol if ordered  Outcome: Progressing  Goal: Urinary catheter remains patent  Description  INTERVENTIONS:  - Assess patency of urinary catheter  - If patient has a chronic rendon, consider changing catheter if non-functioning  - Follow guidelines for intermittent irrigation of non-functioning urinary catheter  Outcome: Progressing     Problem: METABOLIC, FLUID AND ELECTROLYTES - ADULT  Goal: Electrolytes maintained within normal limits  Description  INTERVENTIONS:  - Monitor labs and assess patient for signs and symptoms of electrolyte imbalances  - Administer electrolyte replacement as ordered  - Monitor response to electrolyte replacements, including repeat lab results as appropriate  - Instruct patient on fluid and nutrition as appropriate  Outcome: Progressing     Problem: SKIN/TISSUE INTEGRITY - ADULT  Goal: Skin integrity remains intact  Description  INTERVENTIONS  - Identify patients at risk for skin breakdown  - Assess and monitor skin integrity  - Assess and monitor nutrition and hydration status  - Monitor labs (i e  albumin)  - Assess for incontinence   - Turn and reposition patient  - Assist with mobility/ambulation  - Relieve pressure over bony prominences  - Avoid friction and shearing  - Provide appropriate hygiene as needed including keeping skin clean and dry  - Evaluate need for skin moisturizer/barrier cream  - Collaborate with interdisciplinary team (i e  Nutrition, Rehabilitation, etc )   - Patient/family teaching  Outcome: Progressing  Goal: Incision(s), wounds(s) or drain site(s) healing without S/S of infection  Description  INTERVENTIONS  - Assess and document risk factors for skin impairment   - Assess and document dressing, incision, wound bed, drain sites and surrounding tissue  - Initiate Nutrition services consult and/or wound management as needed  Outcome: Progressing  Goal: Oral mucous membranes remain intact  Description  INTERVENTIONS  - Assess oral mucosa and hygiene practices  - Implement preventative oral hygiene regimen  - Implement oral medicated treatments as ordered  - Initiate Nutrition services referral as needed  Outcome: Progressing

## 2019-03-19 NOTE — PROGRESS NOTES
Progress Note - Infectious Disease   [facility] 53 y o  male MRN: [id number]  Unit/Bed#: [place] [address] -01 Encounter: [id number]    Impression/Plan:  1  Severe sepsis-fever, leukocytosis, lactic acidosis   Likely secondary to liver abscess and right-sided empyema   Admission and repeat blood cultures were all negative   Blood pressures remain stable    Fevers remain improved    No leukocytosis   Procalcitonin has now nearly normalized   Most recent blood cultures also remain negative   -antibiotic plan as below  -continue to monitor temperatures, hemodynamics, CBC     2  Liver abscess-suspect a portal source of infection with the possible appendicitis   No perforation seen on initial CT the abdomen and pelvis   No clear biliary source   Now status post IR drain placement   Cultures all remain negative   Repeat CT abdomen shows significantly decreased size of abscess   Drain was reassessed by IR on 03/06 and is in proper position   No resistant organisms have been isolated, and I suspect this reflects difficult to grow organisms after the patient had already received some antibiotics such as anaerobes and streptococcal species  Patient has tolerated IV Unasyn  Repeat CT shows decreased size of abscess, but has not yet resolved   IR re-evaluated drain last week with nearly resolved collection   -continue Unasyn at current dose to complete 3 week course of IV antibiotic for treatment of empyema, through 3/20  -monitor drain output  IR drain check this week    Will follow up findings   -if liver abscess not fully resolved, will plan to transition to oral antibiotics after completion of IV antibiotic until radiographic resolution of liver abscess      3  Right-sided empyema  Ritchie Patiño secondary to liver abscess   Status post placement of chest tubes x2   All pleural fluid cultures have been negative   Most recent CT chest concerning for ongoing empyema   both chest tubes have now been removed   -antibiotic plan as above  -pulmonology follow-up  -repeat chest x-ray in about 4 weeks     4  Leukopenia  WBC count has been trending down  Remains stable today  May be related to IV antibiotic  Will recheck CBC in a m  If continues to decrease, will likely need to change antibiotic      5  Alcohol abuse-with newly diagnosed cirrhosis this admission, portal hypertension based upon the CT findings   -continue abstinence  -GI follow-up ongoing     6  Probable chronic appendicitis   This may be the etiology for development of liver abscess/empyema   Recent surgery evaluation noted   Patient will ultimately require appendectomy   Surgery follow-up      7  Urethral trauma status post suprapubic catheter   Now status post removal   Patient is urinating on his own      8  Acute hypoxic respiratory failure   In the setting of sepsis, right sided empyema   Patient remains extubated and doing well from a respiratory standpoint      9  Diarrhea   Patient is having loose bowel movements   May be associated with tube feeds, lack of solid food, antibiotic   No associated abdominal pain   No leukocytosis   This has resolved         Antibiotics:  Unasyn 11  Antibiotics 20     Discussed with patient and Dr Terrell Gave  Subjective:  Patient feels well today  Ambulated without difficulty  Tolerating oral intake  No diarrhea  No nausea or vomiting  Denies shortness of breath or cough      Objective:  Vitals:  Temp:  [98 9 °F (37 2 °C)-100 1 °F (37 8 °C)] 99 4 °F (37 4 °C)  HR:  [76-88] 76  Resp:  [18] 18  BP: (124-125)/(63-73) 124/73  SpO2:  [90 %-96 %] 94 %  Temp (24hrs), Av 5 °F (37 5 °C), Min:98 9 °F (37 2 °C), Max:100 1 °F (37 8 °C)  Current: Temperature: 99 4 °F (37 4 °C)    Physical Exam:   General:  No acute distress  Psychiatric:  Awake and alert  Pulmonary:  Normal respiratory excursion without accessory muscle use  Abdomen:  Soft, nontender  OLIVIA drain remains in place with some serosanguineous drainage  Extremities:  No edema  Skin:  No rashes    Lab Results:  I have personally reviewed pertinent labs  Results from last 7 days   Lab Units 03/19/19  0530 03/18/19  0840 03/17/19  0531   POTASSIUM mmol/L 3 1* 3 1* 3 0*   CHLORIDE mmol/L 99* 99* 101   CO2 mmol/L 29 29 29   BUN mg/dL 5 5 5   CREATININE mg/dL 0 53* 0 56* 0 51*   EGFR ml/min/1 73sq m 126 123 128   CALCIUM mg/dL 8 3 8 1* 8 0*   AST U/L 39  --   --    ALT U/L 9*  --   --    ALK PHOS U/L 92  --   --      Results from last 7 days   Lab Units 03/19/19  0530 03/18/19  0840 03/17/19  0531   WBC Thousand/uL 3 40* 3 47* 3 90*   HEMOGLOBIN g/dL 8 5* 8 1* 8 2*   PLATELETS Thousands/uL 105* 98* 108*           Imaging Studies:   I have personally reviewed pertinent imaging study reports and images in PACS  EKG, Pathology, and Other Studies:   I have personally reviewed pertinent reports

## 2019-03-19 NOTE — OCCUPATIONAL THERAPY NOTE
Occupational Therapy Treatment Note:         03/19/19 0830   Restrictions/Precautions   Weight Bearing Precautions Per Order No   Other Precautions Fall Risk;Multiple lines;O2   Pain Assessment   Pain Assessment No/denies pain   Pain Score No Pain   Pain Type Acute pain;Chronic pain   Pain Location Back   Pain Orientation Bilateral   ADL   Where Assessed Edge of bed   Grooming Assistance 5  Supervision/Setup   Grooming Deficit Setup   UB Bathing Assistance 4  Minimal Assistance   UB Bathing Deficit Setup;Verbal cueing;Supervision/safety; Increased time to complete; Chest;Right arm;Left arm   LB Bathing Assistance 4  Minimal Assistance   LB Bathing Deficit Setup;Steadying;Verbal cueing;Supervision/safety; Increased time to complete;Perineal area; Buttocks;Right lower leg including foot; Left lower leg including foot   LB Bathing Comments increased A required to achieve safe balance with activity  UB Dressing Assistance 4  Minimal Assistance   Regan Anderson 61; Requires assistive device for steadying;Steadying;Verbal cueing; Increased time to complete;Supervision/safety; Don/doff R sock; Don/doff L sock; Thread LLE into pants; Thread RLE into pants;Pull up over hips   LB Dressing Comments cues for unilateral support of RW provided  Toileting Assistance  4  Minimal Assistance   Toileting Deficit Setup;Steadying;Verbal cueing;Supervison/safety; Increased time to complete;Perineal hygiene;Clothing management down;Clothing management up;Grab bar use   Toileting Comments Pt will benefit from further review  Functional Standing Tolerance   Time 5 mins   Activity functional mobility  Comments Improved stand tolerance noted with SAT remaining at 93% on 1 liter   Bed Mobility   Supine to Sit 4  Minimal assistance   Additional items Assist x 1   Additional Comments increased A provided for line management      Transfers   Sit to Stand 4 Minimal assistance   Additional items Assist x 1;Bedrails;Armrests; Verbal cues   Stand to Sit 4  Minimal assistance   Additional items Assist x 1;Verbal cues;Armrests; Bedrails   Stand pivot 4  Minimal assistance   Additional items Assist x 1; Armrests; Bedrails; Increased time required;Verbal cues   Toilet transfer 4  Minimal assistance   Additional items Assist x 1; Armrests; Increased time required;Standard toilet   Additional Comments Pt will benefit from further review of RW safety with functional tasks  Functional Mobility   Functional Mobility 4  Minimal assistance   Additional Comments x1   Additional items Rolling walker   Toilet Transfers   Toilet Transfer From Bed   Toilet Transfer Type To and from   Toilet Transfer to Standard toilet   Toilet Transfer Technique Stand pivot; Ambulating   Toilet Transfers Verbal cues; Minimal assistance   Toilet Transfers Comments Pt will benefit from further review    Cognition   Overall Cognitive Status [facility]   Arousal/Participation Alert; Responsive; Cooperative   Attention Attends with cues to redirect   Orientation Level Oriented X4   Memory Decreased short term memory;Decreased recall of precautions;Decreased recall of recent events   Following Commands Follows multistep commands with increased time or repetition   Comments Pt with some questionable carry over of reviewed plan of care  Redirection provided  Additional Activities   Additional Activities Other (Comment)  (reviewed energy conservation techs)   Additional Activities Comments Pt reports having good understanding yet insists on, "getting things over with"  Activity Tolerance   Activity Tolerance Patient limited by fatigue   Medical Staff Made Aware Reported all findings to nursing staff  Assessment   Assessment Pt was seen for skilled OT with focus on completion of self care tasks, functional mobility, review of compensatory breathing/energy conservation techs and review of current plan of care   Pt with flat affect and increased fatigue upon greeting this morning  Extended time provided for Pt to self express current plan of care  Pt agreeable to completion of ADL routine while seated EOB  Min A overall to achieve EOB positioning  Pt able to return demonstration of reviewed controlled breathing techs with good quality  Min A overall for UE self care with increased A for drain line management and cues to take rest breaks in between steps of activity  Encouraged rest break prior to completion of LE self care  Pt stated, "what else"  Encouraged self expression regarding current plan of care and need to self monitor fatigue levels  See above levels of A required for all functional tasks  Pt may benefit from further rehab with focus on achieving optimal performance levels with all functional tasks  Continue to recommend Inpatient rehab   Plan   Treatment Interventions ADL retraining;Functional transfer training;UE strengthening/ROM; Endurance training;Cognitive reorientation   Goal Expiration Date 03/24/19   Treatment Day 3   OT Frequency 3-5x/wk   Recommendation   OT Discharge Recommendation Short Term Rehab  (yes to ST rehab when medically stable  )   Equipment Recommended Bedside commode   OT - OK to Discharge Yes   Barthel Index   Feeding 10   Bathing 0   Grooming Score 5   Dressing Score 5   Bladder Score 10   Bowels Score 10   Toilet Use Score 5   Transfers (Bed/Chair) Score 10   Mobility (Level Surface) Score 0   Stairs Score 0   Barthel Index Score 55   Modified [place] Scale   Modified Robbie Scale 4   Brennon Thacker, [address]

## 2019-03-19 NOTE — PLAN OF CARE
Problem: OCCUPATIONAL THERAPY ADULT  Goal: Performs self-care activities at highest level of function for planned discharge setting  See evaluation for individualized goals  Description  Treatment Interventions: ADL retraining, Functional transfer training, UE strengthening/ROM, Endurance training, Patient/family training, Equipment evaluation/education, Compensatory technique education  Equipment Recommended: (RW)       See flowsheet documentation for full assessment, interventions and recommendations  Outcome: Progressing  Note:   Limitation: Decreased ADL status, Decreased UE strength, Decreased Safe judgement during ADL, Decreased endurance, Decreased high-level ADLs  Prognosis: Good  Assessment: Pt was seen for skilled OT with focus on completion of self care tasks, functional mobility, review of compensatory breathing/energy conservation techs and review of current plan of care  Pt with flat affect and increased fatigue upon greeting this morning  Extended time provided for Pt to self express current plan of care  Pt agreeable to completion of ADL routine while seated EOB  Min A overall to achieve EOB positioning  Pt able to return demonstration of reviewed controlled breathing techs with good quality  Min A overall for UE self care with increased A for drain line management and cues to take rest breaks in between steps of activity  Encouraged rest break prior to completion of LE self care  Pt stated, "what else"  Encouraged self expression regarding current plan of care and need to self monitor fatigue levels  See above levels of A required for all functional tasks  Pt may benefit from further rehab with focus on achieving optimal performance levels with all functional tasks  Continue to recommend Inpatient rehab     OT Discharge Recommendation: Short Term Rehab(yes to ST rehab when medically stable  )  OT - OK to Discharge:  Yes

## 2019-03-19 NOTE — PLAN OF CARE
Problem: PHYSICAL THERAPY ADULT  Goal: Performs mobility at highest level of function for planned discharge setting  See evaluation for individualized goals  Description  Treatment/Interventions: Functional transfer training, LE strengthening/ROM, Elevations, Therapeutic exercise, Endurance training, Patient/family training, Bed mobility, Gait training, Spoke to case management, Family  Equipment Recommended: Rodgers Cranker       See flowsheet documentation for full assessment, interventions and recommendations  Outcome: Progressing  Note:   Prognosis: Good  Problem List: Decreased strength, Decreased range of motion, Decreased endurance, Impaired balance, Decreased mobility, Decreased safety awareness, Decreased skin integrity, Obesity  Assessment: Pt  seated at EOB upon my arrival  Pt  reporting increased fatigue at this time, however agreeable to therapeutic intervention  Noted O2 saturation at 96% on 1L prior to therapeutic intervention  Progressed with transfers with cues for hand placement/technique  Progressed with an increased amb  trial with use of RW and [place] of therapist with cues for LE sequencing  Remains limited by fatigue requiring a standing resting period in between gait trials  Returned to room and repositioned seated at EOB  Noted O2 saturation at 92% on 1L after completion of increased amb  trial  Pt  returned to supine in bed with A of therapist at end of treatment session  PT will continue to recommend d/c to rehab when medically stable for continued improvement of noted impairments above  Barriers to Discharge: Inaccessible home environment, Decreased caregiver support  Barriers to Discharge Comments: CARMEN, level of support at home  Recommendation: Short-term skilled PT     PT - OK to Discharge: Yes(if d/c to rehab when medically stable )    See flowsheet documentation for full assessment

## 2019-03-19 NOTE — PHYSICAL THERAPY NOTE
Physical Therapy Progress Note     03/19/19 0848   Pain Assessment   Pain Assessment No/denies pain   Pain Score No Pain   [facility]ospital Pain Intervention(s) Ambulation/increased activity;Repositioned   Response to Interventions Tolerated  Restrictions/Precautions   Weight Bearing Precautions Per Order No   Other Precautions Fall Risk;Multiple lines;O2   General   Chart Reviewed Yes   Response to Previous Treatment Patient reporting fatigue but able to participate  Family/Caregiver Present Yes   Subjective   Subjective Willing to participate in therapy this AM    Bed Mobility   Supine to Sit 4  Minimal assistance   Additional items Assist x 1;HOB elevated; Bedrails;Leg ; Increased time required;Verbal cues;LE management   Sit to Supine 4  Minimal assistance   Additional items Assist x 1;Bedrails;Leg ; Increased time required;Verbal cues;LE management   Transfers   Sit to Stand 4  Minimal assistance   Additional items Assist x 1;Bedrails; Increased time required;Verbal cues   Stand to Sit 4  Minimal assistance   Additional items Assist x 1;Bedrails; Increased time required;Verbal cues   Ambulation/Elevation   Gait pattern Decreased foot clearance; Forward Flexion; Short stride; Step to;Excessively slow; Shuffling; Inconsistent adriana   Gait Assistance 4  Minimal assist   Additional items Assist x 1;Verbal cues; Tactile cues   Assistive Device Rolling walker   Distance 75' x 2 with a standing resting period in between trials   Balance   Static Sitting Fair +   Dynamic Sitting Fair   Static Standing Fair -   Dynamic Standing Poor +   Ambulatory Poor +   Endurance Deficit   Endurance Deficit Yes   Endurance Deficit Description fatigue   Activity Tolerance   Activity Tolerance Patient limited by fatigue   Nurse Made Aware Yes   Assessment   Prognosis Good   Problem List Decreased strength;Decreased range of motion;Decreased endurance; Impaired balance;Decreased mobility; Decreased safety awareness;Decreased skin integrity;Obesity   Assessment Pt  seated at EOB upon my arrival  Pt  reporting increased fatigue at this time, however agreeable to therapeutic intervention  Noted O2 saturation at 96% on 1L prior to therapeutic intervention  Progressed with transfers with cues for hand placement/technique  Progressed with an increased amb  trial with use of RW and [place] of therapist with cues for LE sequencing  Remains limited by fatigue requiring a standing resting period in between gait trials  Returned to room and repositioned seated at EOB  Noted O2 saturation at 92% on 1L after completion of increased amb  trial  Pt  returned to supine in bed with A of therapist at end of treatment session  PT will continue to recommend d/c to rehab when medically stable for continued improvement of noted impairments above  Barriers to Discharge Inaccessible home environment;Decreased caregiver support   Barriers to Discharge Comments CARMEN, level of support at home  Goals   Patient Goals To get stronger  STG Expiration Date 03/28/19   Treatment Day 3   Plan   Treatment/Interventions Functional transfer training;LE strengthening/ROM; Endurance training;Bed mobility;Gait training;Spoke to nursing;Spoke to case management;OT;Family   Progress Progressing toward goals   PT Frequency Other (Comment)  (3-5x/wk)   Recommendation   Recommendation Short-term skilled PT   Equipment Recommended Walker  (RW)   PT - OK to Discharge Yes  (if d/c to rehab when medically stable )     Cass Clas, PTA

## 2019-03-19 NOTE — SPEECH THERAPY NOTE
Speech Language/Pathology  Speech clear, fully alert  Pt screened for swallowing  Denies any difficulty swallowing, except for large pills (potassium)  Observed drinking liquids wnl  States he does not like the food here and refuses to eat it  He has been eating food that the family brings in  Continue diet as tolerated  Will d/c order

## 2019-03-20 LAB
ANION GAP SERPL CALCULATED.3IONS-SCNC: 7 MMOL/L (ref 4–13)
BUN SERPL-MCNC: 6 MG/DL (ref 5–25)
CALCIUM SERPL-MCNC: 8.6 MG/DL (ref 8.3–10.1)
CHLORIDE SERPL-SCNC: 101 MMOL/L (ref 100–108)
CO2 SERPL-SCNC: 27 MMOL/L (ref 21–32)
CREAT SERPL-MCNC: 0.62 MG/DL (ref 0.6–1.3)
GFR SERPL CREATININE-BSD FRML MDRD: 118 ML/MIN/1.73SQ M
GLUCOSE SERPL-MCNC: 86 MG/DL (ref 65–140)
MAGNESIUM SERPL-MCNC: 1.7 MG/DL (ref 1.6–2.6)
POTASSIUM SERPL-SCNC: 3.8 MMOL/L (ref 3.5–5.3)
SODIUM SERPL-SCNC: 135 MMOL/L (ref 136–145)

## 2019-03-20 PROCEDURE — 83735 ASSAY OF MAGNESIUM: CPT | Performed by: INTERNAL MEDICINE

## 2019-03-20 PROCEDURE — 80048 BASIC METABOLIC PNL TOTAL CA: CPT | Performed by: INTERNAL MEDICINE

## 2019-03-20 PROCEDURE — 99232 SBSQ HOSP IP/OBS MODERATE 35: CPT | Performed by: INTERNAL MEDICINE

## 2019-03-20 RX ORDER — POTASSIUM CHLORIDE 20 MEQ/1
20 TABLET, EXTENDED RELEASE ORAL DAILY
Status: DISCONTINUED | OUTPATIENT
Start: 2019-03-21 | End: 2019-03-22 | Stop reason: HOSPADM

## 2019-03-20 RX ADMIN — AMPICILLIN SODIUM AND SULBACTAM SODIUM 3 G: 10; 5 INJECTION, POWDER, FOR SOLUTION INTRAVENOUS at 18:20

## 2019-03-20 RX ADMIN — AMPICILLIN SODIUM AND SULBACTAM SODIUM 3 G: 10; 5 INJECTION, POWDER, FOR SOLUTION INTRAVENOUS at 00:09

## 2019-03-20 RX ADMIN — ENOXAPARIN SODIUM 40 MG: 40 INJECTION SUBCUTANEOUS at 08:47

## 2019-03-20 RX ADMIN — PANTOPRAZOLE SODIUM 40 MG: 40 TABLET, DELAYED RELEASE ORAL at 08:47

## 2019-03-20 RX ADMIN — AMPICILLIN SODIUM AND SULBACTAM SODIUM 3 G: 10; 5 INJECTION, POWDER, FOR SOLUTION INTRAVENOUS at 12:26

## 2019-03-20 RX ADMIN — FUROSEMIDE 20 MG: 20 TABLET ORAL at 08:47

## 2019-03-20 RX ADMIN — Medication 250 MG: at 08:47

## 2019-03-20 RX ADMIN — FOLIC ACID 1 MG: 1 TABLET ORAL at 08:48

## 2019-03-20 RX ADMIN — AMPICILLIN SODIUM AND SULBACTAM SODIUM 3 G: 10; 5 INJECTION, POWDER, FOR SOLUTION INTRAVENOUS at 06:18

## 2019-03-20 RX ADMIN — Medication 250 MG: at 18:20

## 2019-03-20 RX ADMIN — Medication 100 MG: at 08:47

## 2019-03-20 NOTE — PLAN OF CARE
Problem: Nutrition/Hydration-ADULT  Goal: Nutrient/Hydration intake appropriate for improving, restoring or maintaining nutritional needs  Description  Monitor and assess patient's nutrition/hydration status for malnutrition (ex- brittle hair, bruises, dry skin, pale skin and conjunctiva, muscle wasting, smooth red tongue, and disorientation)  Collaborate with interdisciplinary team and initiate plan and interventions as ordered  Monitor patient's weight and dietary intake as ordered or per policy  Utilize nutrition screening tool and intervene per policy  Determine patient's food preferences and provide high-protein, high-caloric foods as appropriate       INTERVENTIONS:  - Monitor oral intake, urinary output, labs, and treatment plans  - Assess nutrition and hydration status and recommend course of action  - Evaluate amount of meals eaten  - Assist patient with eating if necessary   - Allow adequate time for meals  - Recommend/ encourage appropriate diets, oral nutritional supplements, and vitamin/mineral supplements  - Order, calculate, and assess calorie counts as needed  - Recommend, monitor, and adjust tube feedings and TPN/PPN based on assessed needs  - Assess need for intravenous fluids  - Provide specific nutrition/hydration education as appropriate  - Include patient/family/caregiver in decisions related to nutrition  Outcome: Progressing     Problem: PAIN - ADULT  Goal: Verbalizes/displays adequate comfort level or baseline comfort level  Description  Interventions:  - Encourage patient to monitor pain and request assistance  - Assess pain using appropriate pain scale  - Administer analgesics based on type and severity of pain and evaluate response  - Implement non-pharmacological measures as appropriate and evaluate response  - Consider cultural and social influences on pain and pain management  - Notify physician/advanced practitioner if interventions unsuccessful or patient reports new pain  Outcome: Progressing     Problem: INFECTION - ADULT  Goal: Absence or prevention of progression during hospitalization  Description  INTERVENTIONS:  - Assess and monitor for signs and symptoms of infection  - Monitor lab/diagnostic results  - Monitor all insertion sites, i e  indwelling lines, tubes, and drains  - Monitor endotracheal (as able) and nasal secretions for changes in amount and color  - [place] appropriate cooling/warming therapies per order  - Administer medications as ordered  - Instruct and encourage patient and family to use good hand hygiene technique  - Identify and instruct in appropriate isolation precautions for identified infection/condition  Outcome: Progressing     Problem: SAFETY ADULT  Goal: Patient will remain free of falls  Description  INTERVENTIONS:  - Assess patient frequently for physical needs  -  Identify cognitive and physical deficits and behaviors that affect risk of falls    -  [place] fall precautions as indicated by assessment   - Educate patient/family on patient safety including physical limitations  - Instruct patient to call for assistance with activity based on assessment  - Modify environment to reduce risk of injury  - Consider OT/PT consult to assist with strengthening/mobility  Outcome: Progressing  Goal: Maintain or return to baseline ADL function  Description  INTERVENTIONS:  -  Assess patient's ability to carry out ADLs; assess patient's baseline for ADL function and identify physical deficits which impact ability to perform ADLs (bathing, care of mouth/teeth, toileting, grooming, dressing, etc )  - Assess/evaluate cause of self-care deficits   - Assess range of motion  - Assess patient's mobility; develop plan if impaired  - Assess patient's need for assistive devices and provide as appropriate  - Encourage maximum independence but intervene and supervise when necessary  ¯ Involve family in performance of ADLs  ¯ Assess for home care needs following discharge ¯ Request OT consult to assist with ADL evaluation and planning for discharge  ¯ Provide patient education as appropriate  Outcome: Progressing  Goal: Maintain or return mobility status to optimal level  Description  INTERVENTIONS:  - Assess patient's baseline mobility status (ambulation, transfers, stairs, etc )    - Identify cognitive and physical deficits and behaviors that affect mobility  - Identify mobility aids required to assist with transfers and/or ambulation (gait belt, sit-to-stand, lift, walker, cane, etc )  - [place] fall precautions as indicated by assessment  - Record patient progress and toleration of activity level on Mobility SBAR; progress patient to next Phase/Stage  - Instruct patient to call for assistance with activity based on assessment  - Request Rehabilitation consult to assist with strengthening/weightbearing, etc   Outcome: Progressing     Problem: DISCHARGE PLANNING  Goal: Discharge to home or other facility with appropriate resources  Description  INTERVENTIONS:  - Identify barriers to discharge w/patient and caregiver  - Arrange for needed discharge resources and transportation as appropriate  - Identify discharge learning needs (meds, wound care, etc )  - Arrange for interpretive services to assist at discharge as needed  - Refer to Case Management Department for coordinating discharge planning if the patient needs post-hospital services based on physician/advanced practitioner order or complex needs related to functional status, cognitive ability, or social support system  Outcome: Progressing     Problem: Knowledge Deficit  Goal: Patient/family/caregiver demonstrates understanding of disease process, treatment plan, medications, and discharge instructions  Description  Complete learning assessment and assess knowledge base    Interventions:  - Provide teaching at level of understanding  - Provide teaching via preferred learning methods  Outcome: Progressing     Problem: Prexisting or High Potential for Compromised Skin Integrity  Goal: Skin integrity is maintained or improved  Description  INTERVENTIONS:  - Identify patients at risk for skin breakdown  - Assess and monitor skin integrity  - Assess and monitor nutrition and hydration status  - Monitor labs (i e  albumin)  - Assess for incontinence   - Turn and reposition patient  - Assist with mobility/ambulation  - Relieve pressure over bony prominences  - Avoid friction and shearing  - Provide appropriate hygiene as needed including keeping skin clean and dry  - Evaluate need for skin moisturizer/barrier cream  - Collaborate with interdisciplinary team (i e  Nutrition, Rehabilitation, etc )   - Patient/family teaching  Outcome: Progressing     Problem: Potential for Falls  Goal: Patient will remain free of falls  Description  INTERVENTIONS:  - Assess patient frequently for physical needs  -  Identify cognitive and physical deficits and behaviors that affect risk of falls  -  [place] fall precautions as indicated by assessment   - Educate patient/family on patient safety including physical limitations  - Instruct patient to call for assistance with activity based on assessment  - Modify environment to reduce risk of injury  - Consider OT/PT consult to assist with strengthening/mobility  Outcome: Progressing     Problem: CARDIOVASCULAR - ADULT  Goal: Maintains optimal cardiac output and hemodynamic stability  Description  INTERVENTIONS:  - Monitor I/O, vital signs and rhythm  - Monitor for S/S and trends of decreased cardiac output i e  bleeding, hypotension  - Administer and titrate ordered vasoactive medications to optimize hemodynamic stability  - Assess quality of pulses, skin color and temperature  - Assess for signs of decreased coronary artery perfusion - ex   Angina  - Instruct patient to report change in severity of symptoms  Outcome: Progressing     Problem: RESPIRATORY - ADULT  Goal: Achieves optimal ventilation and oxygenation  Description  INTERVENTIONS:  - Assess for changes in respiratory status  - Assess for changes in mentation and behavior  - Position to facilitate oxygenation and minimize respiratory effort  - Oxygen administration by appropriate delivery method based on oxygen saturation (per order) or ABGs  - Initiate smoking cessation education as indicated  - Encourage broncho-pulmonary hygiene including cough, deep breathe, Incentive Spirometry  - Assess the need for suctioning and aspirate as needed  - Assess and instruct to report SOB or any respiratory difficulty  - Respiratory Therapy support as indicated  Outcome: Progressing     Problem: GENITOURINARY - ADULT  Goal: Maintains or returns to baseline urinary function  Description  INTERVENTIONS:  - Assess urinary function  - Encourage oral fluids to ensure adequate hydration  - Administer IV fluids as ordered to ensure adequate hydration  - Administer ordered medications as needed  - Offer frequent toileting  - Follow urinary retention protocol if ordered  Outcome: Progressing  Goal: Urinary catheter remains patent  Description  INTERVENTIONS:  - Assess patency of urinary catheter  - If patient has a chronic rendon, consider changing catheter if non-functioning  - Follow guidelines for intermittent irrigation of non-functioning urinary catheter  Outcome: Progressing     Problem: METABOLIC, FLUID AND ELECTROLYTES - ADULT  Goal: Electrolytes maintained within normal limits  Description  INTERVENTIONS:  - Monitor labs and assess patient for signs and symptoms of electrolyte imbalances  - Administer electrolyte replacement as ordered  - Monitor response to electrolyte replacements, including repeat lab results as appropriate  - Instruct patient on fluid and nutrition as appropriate  Outcome: Progressing     Problem: SKIN/TISSUE INTEGRITY - ADULT  Goal: Skin integrity remains intact  Description  INTERVENTIONS  - Identify patients at risk for skin breakdown  - Assess and monitor skin integrity  - Assess and monitor nutrition and hydration status  - Monitor labs (i e  albumin)  - Assess for incontinence   - Turn and reposition patient  - Assist with mobility/ambulation  - Relieve pressure over bony prominences  - Avoid friction and shearing  - Provide appropriate hygiene as needed including keeping skin clean and dry  - Evaluate need for skin moisturizer/barrier cream  - Collaborate with interdisciplinary team (i e  Nutrition, Rehabilitation, etc )   - Patient/family teaching  Outcome: Progressing  Goal: Incision(s), wounds(s) or drain site(s) healing without S/S of infection  Description  INTERVENTIONS  - Assess and document risk factors for skin impairment   - Assess and document dressing, incision, wound bed, drain sites and surrounding tissue  - Initiate Nutrition services consult and/or wound management as needed  Outcome: Progressing  Goal: Oral mucous membranes remain intact  Description  INTERVENTIONS  - Assess oral mucosa and hygiene practices  - Implement preventative oral hygiene regimen  - Implement oral medicated treatments as ordered  - Initiate Nutrition services referral as needed  Outcome: Progressing

## 2019-03-20 NOTE — PLAN OF CARE
Problem: Nutrition/Hydration-ADULT  Goal: Nutrient/Hydration intake appropriate for improving, restoring or maintaining nutritional needs  Description  Monitor and assess patient's nutrition/hydration status for malnutrition (ex- brittle hair, bruises, dry skin, pale skin and conjunctiva, muscle wasting, smooth red tongue, and disorientation)  Collaborate with interdisciplinary team and initiate plan and interventions as ordered  Monitor patient's weight and dietary intake as ordered or per policy  Utilize nutrition screening tool and intervene per policy  Determine patient's food preferences and provide high-protein, high-caloric foods as appropriate       INTERVENTIONS:  - Monitor oral intake, urinary output, labs, and treatment plans  - Assess nutrition and hydration status and recommend course of action  - Evaluate amount of meals eaten  - Assist patient with eating if necessary   - Allow adequate time for meals  - Recommend/ encourage appropriate diets, oral nutritional supplements, and vitamin/mineral supplements  - Order, calculate, and assess calorie counts as needed  - Recommend, monitor, and adjust tube feedings and TPN/PPN based on assessed needs  - Assess need for intravenous fluids  - Provide specific nutrition/hydration education as appropriate  - Include patient/family/caregiver in decisions related to nutrition  Outcome: Progressing     Problem: PAIN - ADULT  Goal: Verbalizes/displays adequate comfort level or baseline comfort level  Description  Interventions:  - Encourage patient to monitor pain and request assistance  - Assess pain using appropriate pain scale  - Administer analgesics based on type and severity of pain and evaluate response  - Implement non-pharmacological measures as appropriate and evaluate response  - Consider cultural and social influences on pain and pain management  - Notify physician/advanced practitioner if interventions unsuccessful or patient reports new pain  Outcome: Progressing     Problem: INFECTION - ADULT  Goal: Absence or prevention of progression during hospitalization  Description  INTERVENTIONS:  - Assess and monitor for signs and symptoms of infection  - Monitor lab/diagnostic results  - Monitor all insertion sites, i e  indwelling lines, tubes, and drains  - Monitor endotracheal (as able) and nasal secretions for changes in amount and color  - [place] appropriate cooling/warming therapies per order  - Administer medications as ordered  - Instruct and encourage patient and family to use good hand hygiene technique  - Identify and instruct in appropriate isolation precautions for identified infection/condition  Outcome: Progressing     Problem: SAFETY ADULT  Goal: Patient will remain free of falls  Description  INTERVENTIONS:  - Assess patient frequently for physical needs  -  Identify cognitive and physical deficits and behaviors that affect risk of falls    -  [place] fall precautions as indicated by assessment   - Educate patient/family on patient safety including physical limitations  - Instruct patient to call for assistance with activity based on assessment  - Modify environment to reduce risk of injury  - Consider OT/PT consult to assist with strengthening/mobility  Outcome: Progressing  Goal: Maintain or return to baseline ADL function  Description  INTERVENTIONS:  -  Assess patient's ability to carry out ADLs; assess patient's baseline for ADL function and identify physical deficits which impact ability to perform ADLs (bathing, care of mouth/teeth, toileting, grooming, dressing, etc )  - Assess/evaluate cause of self-care deficits   - Assess range of motion  - Assess patient's mobility; develop plan if impaired  - Assess patient's need for assistive devices and provide as appropriate  - Encourage maximum independence but intervene and supervise when necessary  ¯ Involve family in performance of ADLs  ¯ Assess for home care needs following discharge ¯ Request OT consult to assist with ADL evaluation and planning for discharge  ¯ Provide patient education as appropriate  Outcome: Progressing  Goal: Maintain or return mobility status to optimal level  Description  INTERVENTIONS:  - Assess patient's baseline mobility status (ambulation, transfers, stairs, etc )    - Identify cognitive and physical deficits and behaviors that affect mobility  - Identify mobility aids required to assist with transfers and/or ambulation (gait belt, sit-to-stand, lift, walker, cane, etc )  - [place] fall precautions as indicated by assessment  - Record patient progress and toleration of activity level on Mobility SBAR; progress patient to next Phase/Stage  - Instruct patient to call for assistance with activity based on assessment  - Request Rehabilitation consult to assist with strengthening/weightbearing, etc   Outcome: Progressing     Problem: DISCHARGE PLANNING  Goal: Discharge to home or other facility with appropriate resources  Description  INTERVENTIONS:  - Identify barriers to discharge w/patient and caregiver  - Arrange for needed discharge resources and transportation as appropriate  - Identify discharge learning needs (meds, wound care, etc )  - Arrange for interpretive services to assist at discharge as needed  - Refer to Case Management Department for coordinating discharge planning if the patient needs post-hospital services based on physician/advanced practitioner order or complex needs related to functional status, cognitive ability, or social support system  Outcome: Progressing     Problem: Knowledge Deficit  Goal: Patient/family/caregiver demonstrates understanding of disease process, treatment plan, medications, and discharge instructions  Description  Complete learning assessment and assess knowledge base    Interventions:  - Provide teaching at level of understanding  - Provide teaching via preferred learning methods  Outcome: Progressing     Problem: Prexisting or High Potential for Compromised Skin Integrity  Goal: Skin integrity is maintained or improved  Description  INTERVENTIONS:  - Identify patients at risk for skin breakdown  - Assess and monitor skin integrity  - Assess and monitor nutrition and hydration status  - Monitor labs (i e  albumin)  - Assess for incontinence   - Turn and reposition patient  - Assist with mobility/ambulation  - Relieve pressure over bony prominences  - Avoid friction and shearing  - Provide appropriate hygiene as needed including keeping skin clean and dry  - Evaluate need for skin moisturizer/barrier cream  - Collaborate with interdisciplinary team (i e  Nutrition, Rehabilitation, etc )   - Patient/family teaching  Outcome: Progressing     Problem: Potential for Falls  Goal: Patient will remain free of falls  Description  INTERVENTIONS:  - Assess patient frequently for physical needs  -  Identify cognitive and physical deficits and behaviors that affect risk of falls  -  [place] fall precautions as indicated by assessment   - Educate patient/family on patient safety including physical limitations  - Instruct patient to call for assistance with activity based on assessment  - Modify environment to reduce risk of injury  - Consider OT/PT consult to assist with strengthening/mobility  Outcome: Progressing     Problem: CARDIOVASCULAR - ADULT  Goal: Maintains optimal cardiac output and hemodynamic stability  Description  INTERVENTIONS:  - Monitor I/O, vital signs and rhythm  - Monitor for S/S and trends of decreased cardiac output i e  bleeding, hypotension  - Administer and titrate ordered vasoactive medications to optimize hemodynamic stability  - Assess quality of pulses, skin color and temperature  - Assess for signs of decreased coronary artery perfusion - ex   Angina  - Instruct patient to report change in severity of symptoms  Outcome: Progressing     Problem: RESPIRATORY - ADULT  Goal: Achieves optimal ventilation and oxygenation  Description  INTERVENTIONS:  - Assess for changes in respiratory status  - Assess for changes in mentation and behavior  - Position to facilitate oxygenation and minimize respiratory effort  - Oxygen administration by appropriate delivery method based on oxygen saturation (per order) or ABGs  - Initiate smoking cessation education as indicated  - Encourage broncho-pulmonary hygiene including cough, deep breathe, Incentive Spirometry  - Assess the need for suctioning and aspirate as needed  - Assess and instruct to report SOB or any respiratory difficulty  - Respiratory Therapy support as indicated  Outcome: Progressing     Problem: GENITOURINARY - ADULT  Goal: Maintains or returns to baseline urinary function  Description  INTERVENTIONS:  - Assess urinary function  - Encourage oral fluids to ensure adequate hydration  - Administer IV fluids as ordered to ensure adequate hydration  - Administer ordered medications as needed  - Offer frequent toileting  - Follow urinary retention protocol if ordered  Outcome: Progressing  Goal: Urinary catheter remains patent  Description  INTERVENTIONS:  - Assess patency of urinary catheter  - If patient has a chronic rendon, consider changing catheter if non-functioning  - Follow guidelines for intermittent irrigation of non-functioning urinary catheter  Outcome: Progressing     Problem: METABOLIC, FLUID AND ELECTROLYTES - ADULT  Goal: Electrolytes maintained within normal limits  Description  INTERVENTIONS:  - Monitor labs and assess patient for signs and symptoms of electrolyte imbalances  - Administer electrolyte replacement as ordered  - Monitor response to electrolyte replacements, including repeat lab results as appropriate  - Instruct patient on fluid and nutrition as appropriate  Outcome: Progressing     Problem: SKIN/TISSUE INTEGRITY - ADULT  Goal: Skin integrity remains intact  Description  INTERVENTIONS  - Identify patients at risk for skin breakdown  - Assess and monitor skin integrity  - Assess and monitor nutrition and hydration status  - Monitor labs (i e  albumin)  - Assess for incontinence   - Turn and reposition patient  - Assist with mobility/ambulation  - Relieve pressure over bony prominences  - Avoid friction and shearing  - Provide appropriate hygiene as needed including keeping skin clean and dry  - Evaluate need for skin moisturizer/barrier cream  - Collaborate with interdisciplinary team (i e  Nutrition, Rehabilitation, etc )   - Patient/family teaching  Outcome: Progressing  Goal: Incision(s), wounds(s) or drain site(s) healing without S/S of infection  Description  INTERVENTIONS  - Assess and document risk factors for skin impairment   - Assess and document dressing, incision, wound bed, drain sites and surrounding tissue  - Initiate Nutrition services consult and/or wound management as needed  Outcome: Progressing  Goal: Oral mucous membranes remain intact  Description  INTERVENTIONS  - Assess oral mucosa and hygiene practices  - Implement preventative oral hygiene regimen  - Implement oral medicated treatments as ordered  - Initiate Nutrition services referral as needed  Outcome: Progressing

## 2019-03-20 NOTE — SOCIAL WORK
Giovana [facility] application faxed to [phone]  Waiting to see if they can accept, as well as if  [facility] can accept  Updated the patient and his significant other, Daya

## 2019-03-20 NOTE — PROGRESS NOTES
Progress Note - Lay Hernandez 53 y o  male MRN: [id number]    Unit/Bed#: [facility] 68 2 -01 Encounter: [id number]      Assessment/Plan:  1-status post severe sepsis with septic shock:  Patient initially presented with severe sepsis, with septic shock, and was transferred to the intensive care unit  Arceliathusasha Burris had fever, and leukocytosis, and lactic acidosis   He was noted to be hypotensive requiring central line placement, arterial line monitoring, and vasopressor support   Was also electively intubated for airway protection               -Severe sepsis was attributed to the liver abscess as well as the right-sided empyema   Patient clinically improved after drainage of the right lung empyema x2, and drainage of the liver abscess, as well as IV antibiotics    -B)Fever:  Pt had fever with Tmax 101 7 last night  Has since defeversed  No cultures sent  Pt denies any new symptoms:  Possibly due to removal of drain  -pt with chronic appendicitis:  Cont to eval   Will d/w ID      2-liver abscess:  Patient was diagnosed with a liver abscess   No clear biliary source identified   Possibly secondary to chronic appendicitis                 -Blood cultures are negative thus far               -IR drainage catheter placed:  Fluid cultures negative thus far              -EPBXWWD re-evaluated by intervention Radiology on 03/19, the abscess had resolved and his catheter was removed               -per Infectious Disease recommendations:  Continue Unasyn for a 3 week course, for duration for empyema:  Last dose today              -has patient's liver abscess had resolved upon recheck by intervention Radiology yesterday, anticipate he will not need additional oral antibiotics at discharge      3-right-sided empyema:  Patient had chest tubes placed for right-sided empyema   Patient was evaluated by the Infectious Disease team and the right-sided empyema was felt to be secondary to the liver abscess               -chest tubes placed x2, with resolution of the fluids, and chest tubes with subsequent removal               -fluid cultures negative thus far              -per ID:  Patient be on Unasyn for a 3 week course, with last dose today              -per Pulmonary recommendations:  Repeat chest x-ray in 4 weeks to be sure lung infiltrates are resolving, and outpatient pulmonary follow-up    Patient with subsequent right lower lobe consolidation secondary to the prior empyema and instrumentation      4-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              -thrombocytopenia:  Platelet count stable without bleeding              -coagulopathy with most recent INR 1 5 on 03/14              -ascites:  Continue Lasix-had decreased dose               -[id number]              -will need outpatient GI follow-up              -will refer for outpatient surveillance EGD for varices     5-alcoholism:  Patient with a history of alcoholism prior to admission  [facility] and folic [facility]   No current evidence of alcohol withdrawal     6-probable chronic appendicitis:  Patient was evaluated by the surgical team, due to concerns over possible chronic appendicitis, that would be the etiology for the liver abscess               -patient will ultimately require appendectomy      7-urologic:  -patient was noted to have urinary retention, and false urethral passage with inability for Raymundo catheter be placed while he was in the ICU  Chaim Rinaldi had an urgent bedside suprapubic catheter placed during his ICU stay  Ayleen Pérez had inadvertently self removed this catheter               -WHIRJYX noted to have urinary retention, and painless, gross hematuria   It was questioned if he had some mild urethral trauma during transfer to the bedside commode                 -Patient was followed by the Urology service   Suprapubic catheter placement was planned, but was canceled as the bladder was not distended               -currently voiding without any difficulty:  Continue follow-up postvoid residual     8-status post acute hypoxic respiratory failure:  Patient is noted to be intubated with acute hypoxic respiratory failure secondary to his empyema as well as severe sepsis with septic shock   Patient was successfully extubated on 03/11               -continue to titrate oxygen as needed:  Currently requiring 1-2 L of nasal cannula     9-status post diarrhea:  Patient notes he had prior diarrhea, which has since resolved   Currently tolerating p o  And passing solid bowel movements   C diff is negative   Likely secondary to tube feedings and antibiotic associated diarrhea   Continue probiotics     10-heart murmur:  Patient had a 2D echocardiogram with a left ventricular ejection fraction of 72%   There are no wall motion abnormalities   He had mild tricuspid regurgitation      11-anemia:  Patient was noted to have anemia   He required transfusion with 1 unit of packed red blood cells for hemoglobin of 6 8               -hemoglobin improved into the 8s   Continue to monitor     12-GERD:  Continue proton pump inhibitor     13-status post acute kidney injury:  Patient's baseline creatinine appears to range 0 5-2 6   Patient had acute kidney injury with a creatinine peak of 2 02, likely secondary to sepsis, hypotension, and urinary retention               -acute kidney injury resolved and patient's creatinine has returned to baseline  Is 0 62 today     14-forehead laceration:  Patient is noted to have a forehead laceration on 03/14, when he struck his head on the IV pole   Patient was evaluated by the surgical team who performed laceration repair     15-splenomegaly with probable splenic infarct:  Noted on CT scan      16-status post ileus:  Noted on CT scan:  Incidental finding:  Since resolved   Patient with hypoactive but normal set patch bowel sounds   Tolerating PO   Passing bowel movements       17-hyponatremia:  Likely secondary to anasarca   Continue to monitor on Lasix     18-hypokalemia:  Secondary to Lasix   Repleted and normalized     19-thrombocytopenia:  Likely secondary to cirrhosis    20-family:  Updated patient's  significant other Daya at the bedside     Discussed with patient's nurse  Discussed with   Anticipate discharge to inpatient rehab on Thursday- depending on future fevers     VTE Pharmacologic Prophylaxis: Heparin  VTE Mechanical Prophylaxis: sequential compression device        Certification Statement: The patient will continue to require additional inpatient hospital stay due to need for further acute intervention for new fever, patient with sepsis  Short-term rehab placement    Status: inpatient     ===================================================================    Subjective:  Patient notes that he feels well  He denies any current fever or chills  He notes yesterday evening he awakened and felt chilled and clammy  He notes his room was extremely cold  Patient denies any pain anywhere at all  He denies any headache, chest pain, back pain, abdominal pain  He denies any shortness of breath  He notes he continues to have a dry cough  Denies any worsening of his cough, or chest congestion  Denies any abdominal pain, nausea, vomiting, diarrhea  His drain was removed yesterday by interventional Radiology  Denies any dizziness or lightheadedness  Denies any other complaints  Denies any difficulty passing his urine  Physical Exam:   Temp:  [99 7 °F (37 6 °C)-101 7 °F (38 7 °C)] 99 9 °F (37 7 °C)  HR:  [80-95] 80  Resp:  [18] 18  BP: (123-129)/(66-78) 123/78    Gen:  Pleasant, non-tachypnic, non-dyspnic  Conversant  Heart: regular rate and rhythm, S1S2 present, no murmur, rub or gallop  Lungs:  Decreased air movement right lower lung with coarse breath sounds and rhonchi  Other lung fields clear to ausculatation bilaterally  No accessory muscle use or respiratory distress  Abd: soft, non-tender, non-distended  NABS, no guarding, rebound or peritoneal signs  Extremities: no clubbing, cyanosis or edema  2+pedal pulses bilaterally  Neuro: awake, alert  Fluent speech  Moving all 4 extremities  Skin: warm and dry: no petechiae, purpura and rash  LABS:   Results from last 7 days   Lab Units 03/19/19  0530 03/18/19  0840 03/17/19  0531   WBC Thousand/uL 3 40* 3 47* 3 90*   HEMOGLOBIN g/dL 8 5* 8 1* 8 2*   HEMATOCRIT % 27 0* 25 8* 25 6*   PLATELETS Thousands/uL 105* 98* 108*     Results from last 7 days   Lab Units 03/20/19  0503 03/19/19  0530 03/18/19  0840   POTASSIUM mmol/L 3 8 3 1* 3 1*   CHLORIDE mmol/L 101 99* 99*   CO2 mmol/L 27 29 29   BUN mg/dL 6 5 5   CREATININE mg/dL 0 62 0 53* 0 56*   CALCIUM mg/dL 8 6 8 3 8 [facility]:    3/15:  Chest x-ray: Moderate amount of persistent opacification in the right hemithorax with right chest tubes redemonstrated      3/12:  CT chest/abdomen/pelvis:  Hepatic abscess is again noted and is decreased in size however not resolved  Right middle and lower lobe consolidations are again noted with air bronchograms  A pigtail catheter and a pleural drainage catheter noted within the right pleural space   There is a small to moderate right pleural effusion which demonstrates thickened enhancing pleura concerning for an empyema   Dependent atelectasis is noted in the   left lung base with a small effusion   No air bronchograms on the left     Oral contrast was administered; the bowel loops are diffusely dilated throughout the abdomen; favor ileus over obstruction  Moderate ascites  Persistent splenomegaly with an of triangular area of decreased splenic enhancement similar to the prior exam concerning for a splenic infarct    Appendix appears similar to the recent prior exam      3/10:  Chest x-ray:Decreased airspace disease and improved aeration left lung base      3/10:  Chest x-ray:  Persistent bilateral basilar opacities and effusions, right greater than left   Pigtail chest drain on the right unchanged in position  No pneumothorax      3/6:  Chest x-ray:Persistent left basilar consolidation and worsening right basilar consolidation and effusion   No pneumothorax with right chest tube in place     3/4:  CT abdomen/pelvis:  Multiloculated hepatic abscess, significantly decreased in size since February 25, 2019 but not completely collapsed   A pigtail drainage catheter is seen within the central portion of the abscess, the epicenter of which is in the caudate lobe of the   liver  Hepatic cirrhosis   Portal hypertension as evidenced by splenomegaly as well as perisplenic and periceliac varices   Main portal vein, splenic vein, and superior mesenteric veins are patent   Spontaneous splenorenal shunt is again noted  Interval development of extensive mesenteric edema and body wall edema as well as small to moderate volume of ascites, predominantly in the lower abdomen and pelvis  A right-sided chest tube is present and there is loculated hydropneumothorax in the base of the right hemithorax   Compressive atelectasis is noted at right lung base and dependent atelectasis noted left lung base  Enteric tube is noted within the stomach   The region of the gastroesophageal junction is inseparable from the abscess along the undersurface of the caudate lobe of the liver  As on previous examination there is abnormal thickening of the appendix   Although there is periappendiceal edema, this is no more significant than the mesenteric edema seen throughout the abdomen in this patient with interval development of ascites     The possibility that this represents acute appendicitis or possibly mucinous appendiceal neoplasm cannot be excluded and interval clinical and imaging follow-up of this finding is recommended      3/1:  Chest x-ray:  1   Significant improvement in right hemithorax opacification from effusion/atelectasis, this is small-to-moderate on the final image    2   Retrocardiac opacity  3   Appropriate right IJ catheter     2/28:  Chest x-ray:  Right-sided pleural effusion/atelectasis suspected   Superimposed infection could be considered in the appropriate clinical setting     2/25:  CT chest/abdomen/pelvis:  Fluid-filled and dilated appendix with appendiceal wall thickening and periappendiceal fat stranding and fluid, suspicious for acute appendicitis in the appropriate clinical setting  Hepatic cirrhosis and splenomegaly, with associated perisplenic and mesenteric varices suggesting a component of portal hypertension  Enlargement of the caudate lobe which contains an ill-defined and heterogeneous mass lesion with large internal areas of hypodensity and internal septations as described, this measures approximately 10 5 x 9 4 x 11 8 cm in size (axial image 55, series   2)   Primary differential consideration is hepatic abscess in the appropriate clinical setting, although hepatic neoplasm such as hepatocellular carcinoma is also considered   Correlation with the patient's symptoms and laboratory values recommended  Prominent lymph nodes in the upper abdomen and retroperitoneum, largest measures approximately 1 4 cm in size, possibly reactive  Partially distended bladder   Mild circumferential bladder wall thickening noted, probably exaggerated by underdistention   Superimposed cystitis could be considered in the appropriate clinical setting      Microbiology:  3/10:  Blood cultures negative x2  3/6:  Pleural fluid culture:  No growth  2/28 pleural fluid culture:  Negative AFB  2/28:  Right pleural fluid:  No growth  2/28 right pleural fungal culture:  No growth  2/28:  Blood cultures:  Negative x2  2/26:  C diff:  Negative  2/26:  Negative fecal occult blood  2/26:  Negative enteric pathogen PCR  2/25:  Blood cultures negative x2        Procedure:  3/14:  Forehead laceration repair by surgery  3/6 interventional radiology chest tube placement   3/1:  Central line placement  2/28:  Chest tube placement  2/28:  Arterial line placement  2/28:  Intubation  2/28: [address] [facility] Radiology CT-guided liver abscess drain placement            ---------------------------------------------------------------------------------------------------------------  This note has been constructed using a voice recognition system

## 2019-03-21 PROBLEM — J86.9 EMPYEMA (HCC): Status: RESOLVED | Noted: 2019-03-01 | Resolved: 2019-03-21

## 2019-03-21 PROBLEM — R65.21 SEVERE SEPSIS WITH SEPTIC SHOCK (HCC): Status: RESOLVED | Noted: 2019-03-01 | Resolved: 2019-03-21

## 2019-03-21 PROBLEM — A41.9 SEVERE SEPSIS WITH SEPTIC SHOCK (HCC): Status: RESOLVED | Noted: 2019-03-01 | Resolved: 2019-03-21

## 2019-03-21 PROBLEM — K75.0 LIVER ABSCESS: Status: RESOLVED | Noted: 2019-02-26 | Resolved: 2019-03-21

## 2019-03-21 PROBLEM — R33.8 ACUTE RETENTION OF URINE: Status: RESOLVED | Noted: 2019-03-01 | Resolved: 2019-03-21

## 2019-03-21 LAB
ALBUMIN SERPL BCP-MCNC: 2.2 G/DL (ref 3.5–5)
ALP SERPL-CCNC: 94 U/L (ref 46–116)
ALT SERPL W P-5'-P-CCNC: 8 U/L (ref 12–78)
ANION GAP SERPL CALCULATED.3IONS-SCNC: 9 MMOL/L (ref 4–13)
AST SERPL W P-5'-P-CCNC: 36 U/L (ref 5–45)
BILIRUB SERPL-MCNC: 1.68 MG/DL (ref 0.2–1)
BUN SERPL-MCNC: 6 MG/DL (ref 5–25)
CALCIUM SERPL-MCNC: 8.5 MG/DL (ref 8.3–10.1)
CHLORIDE SERPL-SCNC: 100 MMOL/L (ref 100–108)
CO2 SERPL-SCNC: 24 MMOL/L (ref 21–32)
CREAT SERPL-MCNC: 0.55 MG/DL (ref 0.6–1.3)
ERYTHROCYTE [DISTWIDTH] IN BLOOD BY AUTOMATED COUNT: 18.7 % (ref 11.6–15.1)
GFR SERPL CREATININE-BSD FRML MDRD: 124 ML/MIN/1.73SQ M
GLUCOSE SERPL-MCNC: 83 MG/DL (ref 65–140)
HCT VFR BLD AUTO: 26.6 % (ref 36.5–49.3)
HGB BLD-MCNC: 8.5 G/DL (ref 12–17)
MCH RBC QN AUTO: 31.6 PG (ref 26.8–34.3)
MCHC RBC AUTO-ENTMCNC: 32 G/DL (ref 31.4–37.4)
MCV RBC AUTO: 99 FL (ref 82–98)
PLATELET # BLD AUTO: 96 THOUSANDS/UL (ref 149–390)
PMV BLD AUTO: 9.5 FL (ref 8.9–12.7)
POTASSIUM SERPL-SCNC: 3.8 MMOL/L (ref 3.5–5.3)
PROT SERPL-MCNC: 8.1 G/DL (ref 6.4–8.2)
RBC # BLD AUTO: 2.69 MILLION/UL (ref 3.88–5.62)
SODIUM SERPL-SCNC: 133 MMOL/L (ref 136–145)
WBC # BLD AUTO: 4.01 THOUSAND/UL (ref 4.31–10.16)

## 2019-03-21 PROCEDURE — 97535 SELF CARE MNGMENT TRAINING: CPT

## 2019-03-21 PROCEDURE — 97530 THERAPEUTIC ACTIVITIES: CPT

## 2019-03-21 PROCEDURE — 97110 THERAPEUTIC EXERCISES: CPT

## 2019-03-21 PROCEDURE — 80053 COMPREHEN METABOLIC PANEL: CPT | Performed by: INTERNAL MEDICINE

## 2019-03-21 PROCEDURE — 99232 SBSQ HOSP IP/OBS MODERATE 35: CPT | Performed by: INTERNAL MEDICINE

## 2019-03-21 PROCEDURE — 97116 GAIT TRAINING THERAPY: CPT

## 2019-03-21 PROCEDURE — 99239 HOSP IP/OBS DSCHRG MGMT >30: CPT | Performed by: INTERNAL MEDICINE

## 2019-03-21 PROCEDURE — 85027 COMPLETE CBC AUTOMATED: CPT | Performed by: INTERNAL MEDICINE

## 2019-03-21 RX ORDER — SACCHAROMYCES BOULARDII 250 MG
250 CAPSULE ORAL 2 TIMES DAILY
Qty: 20 CAPSULE | Refills: 0 | Status: SHIPPED | OUTPATIENT
Start: 2019-03-21 | End: 2019-03-29 | Stop reason: HOSPADM

## 2019-03-21 RX ORDER — PANTOPRAZOLE SODIUM 40 MG/1
40 TABLET, DELAYED RELEASE ORAL
Qty: 30 TABLET | Refills: 0 | Status: ON HOLD | OUTPATIENT
Start: 2019-03-22 | End: 2019-03-29 | Stop reason: SDUPTHER

## 2019-03-21 RX ORDER — FOLIC ACID 1 MG/1
1 TABLET ORAL DAILY
Qty: 30 TABLET | Refills: 0 | Status: SHIPPED | OUTPATIENT
Start: 2019-03-22 | End: 2019-03-29 | Stop reason: HOSPADM

## 2019-03-21 RX ORDER — LANOLIN ALCOHOL/MO/W.PET/CERES
6 CREAM (GRAM) TOPICAL
Qty: 30 TABLET | Refills: 0 | Status: ON HOLD | OUTPATIENT
Start: 2019-03-21 | End: 2019-03-29 | Stop reason: SDUPTHER

## 2019-03-21 RX ORDER — LANOLIN ALCOHOL/MO/W.PET/CERES
100 CREAM (GRAM) TOPICAL DAILY
Qty: 30 TABLET | Refills: 0 | Status: SHIPPED | OUTPATIENT
Start: 2019-03-22 | End: 2019-03-29 | Stop reason: HOSPADM

## 2019-03-21 RX ADMIN — MELATONIN TAB 3 MG 6 MG: 3 TAB at 22:08

## 2019-03-21 RX ADMIN — ENOXAPARIN SODIUM 40 MG: 40 INJECTION SUBCUTANEOUS at 10:06

## 2019-03-21 RX ADMIN — Medication 100 MG: at 10:07

## 2019-03-21 RX ADMIN — AMPICILLIN SODIUM AND SULBACTAM SODIUM 3 G: 10; 5 INJECTION, POWDER, FOR SOLUTION INTRAVENOUS at 00:36

## 2019-03-21 RX ADMIN — Medication 250 MG: at 10:07

## 2019-03-21 RX ADMIN — FOLIC ACID 1 MG: 1 TABLET ORAL at 10:07

## 2019-03-21 RX ADMIN — POTASSIUM CHLORIDE 20 MEQ: 1500 TABLET, EXTENDED RELEASE ORAL at 10:07

## 2019-03-21 RX ADMIN — Medication 250 MG: at 18:31

## 2019-03-21 RX ADMIN — MELATONIN TAB 3 MG 6 MG: 3 TAB at 00:36

## 2019-03-21 RX ADMIN — FUROSEMIDE 20 MG: 20 TABLET ORAL at 10:07

## 2019-03-21 RX ADMIN — PANTOPRAZOLE SODIUM 40 MG: 40 TABLET, DELAYED RELEASE ORAL at 10:07

## 2019-03-21 RX ADMIN — AMPICILLIN SODIUM AND SULBACTAM SODIUM 3 G: 10; 5 INJECTION, POWDER, FOR SOLUTION INTRAVENOUS at 06:15

## 2019-03-21 NOTE — DISCHARGE INSTRUCTIONS
·   Discharge instructions:    Please take your medication as directed    Rehab center:  Please check CBC, BMP in 2 days    Please monitor for fever, chills, fluid retention  Check daily weights  Please return to the ER with any problems at all, especially chest pain, difficulty breathing, fever, chills, worsening cough, abdominal pain, abdominal distension, or fluid retention  Please schedule appointment to see the surgeon in 2 weeks for evaluation of your chronic appendicitis and possible future surgery

## 2019-03-21 NOTE — DISCHARGE SUMMARY
Discharge Summary - [facility] 53 y o  male MRN: [id number]    Aide 69 2 MED SURG Room / Bed: [place] 2 /[facility] 2 M* Encounter: [id number]    BRIEF OVERVIEW      Admission Date: 2/25/2019       Discharge Date: 3/21/2019    Primary Diagnoses  Principal Problem:    Severe sepsis with septic shock [facility])  Active Problems:    Heart murmur    Anemia    Failure to thrive in adult    Weight loss    Elevated alkaline phosphatase level    GERD (gastroesophageal reflux disease)    Liver abscess    Appendicitis    Alcohol abuse    Acute retention of urine    Empyema (HCC)  Resolved Problems:    SHANDA (acute kidney injury) ([facility] [address] )    Respiratory failure ([facility] [address] )      Service:  [facility], [facility]  Consulting Providers   Urology: dr Vita John  Surgery: dr Maite Ramirez and associates  ENT: dr Lucian Parkinson  ID: dr Brynn Forrester; Dr ROMERO [facility] Studies:  3/15:  Chest x-ray: Moderate amount of persistent opacification in the right hemithorax with right chest tubes redemonstrated      3/12:  CT chest/abdomen/pelvis:  Hepatic abscess is again noted and is decreased in size however not resolved  Right middle and lower lobe consolidations are again noted with air bronchograms  A pigtail catheter and a pleural drainage catheter noted within the right pleural space   There is a small to moderate right pleural effusion which demonstrates thickened enhancing pleura concerning for an empyema   Dependent atelectasis is noted in the   left lung base with a small effusion   No air bronchograms on the left     Oral contrast was administered; the bowel loops are diffusely dilated throughout the abdomen; favor ileus over obstruction  Moderate ascites  Persistent splenomegaly with an of triangular area of decreased splenic enhancement similar to the prior exam concerning for a splenic infarct    Appendix appears similar to the recent prior exam      3/10:  Chest x-ray:Decreased airspace disease and improved aeration left lung base      3/10:  Chest x-ray:  Persistent bilateral basilar opacities and effusions, right greater than left  Pigtail chest drain on the right unchanged in position  No pneumothorax      3/6:  Chest x-ray:Persistent left basilar consolidation and worsening right basilar consolidation and effusion   No pneumothorax with right chest tube in place     3/4:  CT abdomen/pelvis:  Multiloculated hepatic abscess, significantly decreased in size since February 25, 2019 but not completely collapsed   A pigtail drainage catheter is seen within the central portion of the abscess, the epicenter of which is in the caudate lobe of the   liver  Hepatic cirrhosis   Portal hypertension as evidenced by splenomegaly as well as perisplenic and periceliac varices   Main portal vein, splenic vein, and superior mesenteric veins are patent   Spontaneous splenorenal shunt is again noted  Interval development of extensive mesenteric edema and body wall edema as well as small to moderate volume of ascites, predominantly in the lower abdomen and pelvis  A right-sided chest tube is present and there is loculated hydropneumothorax in the base of the right hemithorax   Compressive atelectasis is noted at right lung base and dependent atelectasis noted left lung base  Enteric tube is noted within the stomach   The region of the gastroesophageal junction is inseparable from the abscess along the undersurface of the caudate lobe of the liver    As on previous examination there is abnormal thickening of the appendix   Although there is periappendiceal edema, this is no more significant than the mesenteric edema seen throughout the abdomen in this patient with interval development of ascites     The possibility that this represents acute appendicitis or possibly mucinous appendiceal neoplasm cannot be excluded and interval clinical and imaging follow-up of this finding is recommended      3/1:  Chest x-ray:  1   Significant improvement in right hemithorax opacification from effusion/atelectasis, this is small-to-moderate on the final image  2   Retrocardiac opacity  3   Appropriate right IJ catheter     2/28:  Chest x-ray:  Right-sided pleural effusion/atelectasis suspected   Superimposed infection could be considered in the appropriate clinical setting     2/25:  CT chest/abdomen/pelvis:  Fluid-filled and dilated appendix with appendiceal wall thickening and periappendiceal fat stranding and fluid, suspicious for acute appendicitis in the appropriate clinical setting  Hepatic cirrhosis and splenomegaly, with associated perisplenic and mesenteric varices suggesting a component of portal hypertension  Enlargement of the caudate lobe which contains an ill-defined and heterogeneous mass lesion with large internal areas of hypodensity and internal septations as described, this measures approximately 10 5 x 9 4 x 11 8 cm in size (axial image 55, series   2)   Primary differential consideration is hepatic abscess in the appropriate clinical setting, although hepatic neoplasm such as hepatocellular carcinoma is also considered   Correlation with the patient's symptoms and laboratory values recommended  Prominent lymph nodes in the upper abdomen and retroperitoneum, largest measures approximately 1 4 cm in size, possibly reactive  Partially distended bladder   Mild circumferential bladder wall thickening noted, probably exaggerated by underdistention   Superimposed cystitis could be considered in the appropriate clinical setting      Microbiology:  3/10:  Blood cultures negative x2  3/6:  Pleural fluid culture:  No growth  2/28 pleural fluid culture:  Negative AFB  2/28:  Right pleural fluid:  No growth  2/28 right pleural fungal culture:  No growth  2/28:  Blood cultures:  Negative x2  2/26:  C diff:  Negative  2/26:  Negative fecal occult blood  2/26:  Negative enteric pathogen PCR  2/25:  Blood cultures negative x2        Procedure:  3/14:  Forehead laceration repair by surgery  3/6 interventional radiology chest tube placement   3/1:  Central line placement  2/28:  Chest tube placement  2/28:  Arterial line placement  2/28:  Intubation  2/28:  Interventional Radiology CT-guided liver abscess drain placement      Results from last 7 days   Lab Units 03/21/19  0539 03/19/19  0530 03/18/19  0840   WBC Thousand/uL 4 01* 3 40* 3 47*   HEMOGLOBIN g/dL 8 5* 8 5* 8 1*   HEMATOCRIT % 26 6* 27 0* 25 8*   PLATELETS Thousands/uL 96* 105* 98*     Results from last 7 days   Lab Units 03/21/19  0539 03/20/19  0503 03/19/19  0530   POTASSIUM mmol/L 3 8 3 8 3 1*   CHLORIDE mmol/L 100 101 99*   CO2 mmol/L 24 27 29   BUN mg/dL 6 6 5   CREATININE mg/dL 0 55* 0 62 0 53*   CALCIUM mg/dL 8 5 8 6 8 3       History and Physical Exam:  Please refer to the Admission H&P note    Hospital Course by Problem  1-status post severe sepsis with septic shock:  Patient initially presented with severe sepsis, with septic shock, and was transferred to the intensive care unit  [facility] had fever, and leukocytosis, and lactic acidosis   He was noted to be hypotensive requiring central line placement, arterial line monitoring, and vasopressor support   Was also electively intubated for airway protection               -Severe sepsis was attributed to the liver abscess as well as the right-sided empyema   Patient clinically improved after drainage of the right lung empyema x2, and drainage of the liver abscess, as well as IV antibiotics               -B)Fever:  Pt had fever with Tmax 101 7 on 3/19  None since  No leukocytosis  since defeversed  Pt denies any new symptoms:  d/w ID  May be dc off abx      2-liver abscess:  Patient was diagnosed with a liver abscess   No clear biliary source identified   Possibly secondary to chronic appendicitis                  -Blood cultures are negative thus far               -IR drainage catheter placed:  Fluid cultures negative thus far              -TXBQBAE re-evaluated by intervention Radiology on 03/19, the abscess had resolved and his catheter was removed               -per Infectious Disease recommendations:  pt completed  Unasyn for a 3 week course, for duration for empyema:  Last dose 3/20              -has patient's liver abscess had resolved upon recheck by intervention Radiology, and his drainage catheter was removed  Per ID:  He will not require additional antibiotics at discharge  ,    3-right-sided empyema:  Patient had chest tubes placed for right-sided empyema   Patient was evaluated by the Infectious Disease team and the right-sided empyema was felt to be secondary to the liver abscess               -chest tubes placed x2, with resolution of the fluids, and chest tubes with subsequent removal               -fluid cultures negative thus far              -per ID:  Patient completed a 3 week course of Unasyn              -per Pulmonary recommendations:  Repeat chest x-ray in 4 weeks to be sure lung infiltrates are resolving, and outpatient pulmonary follow-up   Patient with subsequent right lower lobe consolidation secondary to the prior empyema and instrumentation      4-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              -thrombocytopenia:  Platelet count stable without bleeding              -coagulopathy with most recent INR 1 5 on 03/14              -ascites:  Patient diuresed with Lasix and his anasarca improved  Patient wishes the Lasix be discontinued, because at the dose of 20 mg only once daily he still has copious urination all day and all night:  Will hold Lasix temporarily at this time and monitor his fluid status                -[id number]              -UMNF need outpatient GI follow-up              -KEQ refer for outpatient surveillance EGD for varices     5-alcoholism:  Patient with a history of alcoholism prior to admission  [facility] was maintained on thiamine and folic acid   No current evidence of alcohol withdrawal     6-probable chronic appendicitis:  Patient was evaluated by the surgical team, due to concerns over possible chronic appendicitis, that would be the etiology for the liver abscess               -[id number] will ultimately require appendectomy  -follow-up with surgery as an outpatient     7-urologic:  -patient was noted to have urinary retention, and false urethral passage with inability for Raymundo catheter be placed while he was in the ICU  Nadia Crowe had an urgent bedside suprapubic catheter placed during his ICU stay  Lars Dominguez had inadvertently self removed this catheter               -HEBWLXV noted to have urinary retention, and painless, gross hematuria   It was questioned if he had some mild urethral trauma during transfer to the bedside commode                 -Patient was followed by the Urology service   Suprapubic catheter placement was planned, but was canceled as the bladder was not distended               -currently voiding without any difficulty:  Continue follow-up postvoid residual     8-status post acute hypoxic respiratory failure:  Patient is noted to be intubated with acute hypoxic respiratory failure secondary to his empyema as well as severe sepsis with septic shock   Patient was successfully extubated on 03/11               -continue to titrate oxygen as needed:  Currently requiring 1-2 L of nasal cannula  Will continue be followed at the rehab center     9-status post diarrhea:  Patient notes he had prior diarrhea, which has since resolved   Currently tolerating p o   And passing solid bowel movements   C diff is negative   Likely secondary to tube feedings and antibiotic associated diarrhea   Continue probiotics     10-heart murmur:  Patient had a 2D echocardiogram with a left ventricular ejection fraction of 72%   There are no wall motion abnormalities   He had mild tricuspid regurgitation      11-anemia:  Patient was noted to have anemia  Nadia Crowe required transfusion with 1 unit of packed red blood cells for hemoglobin of 6 8               -hemoglobin improved into the 8s   Continue to monitor     12-GERD:  Continue proton pump inhibitor     13-status post acute kidney injury:  Patient's baseline creatinine appears to range 0 5-2 6   Patient had acute kidney injury with a creatinine peak of 2 02, likely secondary to sepsis, hypotension, and urinary retention               -acute kidney injury resolved and patient's creatinine has returned to baseline  Is 0 62 today     14-forehead laceration:  Patient is noted to have a forehead laceration on 03/14, when he struck his head on the IV pole   Patient was evaluated by the surgical team who performed laceration repair     15-splenomegaly with probable splenic infarct:  Noted on CT scan      16-status post ileus:  Noted on CT scan:  Incidental finding:  Since resolved   Patient with hypoactive but normal set patch bowel sounds   Tolerating PO   Passing bowel movements     17-hyponatremia:  Likely secondary to anasarca   Continue to monitor on Lasix     18-hypokalemia:  Secondary to Lasix   Repleted and normalized     19-thrombocytopenia:  Likely secondary to cirrhosis     20-family:  Updated patient's  significant other Daya at the bedside  Reviewed all test results and treatment plan     Discussed with patient's nurse  Discussed with   Discussed with infectious disease: Dr Migue Arciniega     VTE Pharmacologic Prophylaxis: Heparin  VTE Mechanical Prophylaxis: sequential compression device      Discharge Condition: Improved  Discharge Disposition:   Short-term rehab    Discharge Note and Physical Exam:   Patient denies any current complaints  Denies any pain anywhere  Denies any shortness of breath at rest   He noted he did get some dyspnea with exertion, and was ambulating around the floor, doing laps  He notes he has a scant, dry cough which has improved  He denies any chest pain    He denies any abdominal pain, nausea, vomiting, diarrhea  He is tolerating p o  Denies any dizziness or lightheadedness  Notes he feels tired  He relates he is urinating copious amounts all day and all night and wishes the diuretic to be held  Vitals:    03/21/19 1523   BP: 120/64   Pulse: 80   Resp: 18   Temp: 98 1 °F (36 7 °C)   SpO2: 98%     General:  Pleasant, non-tachypnic, non-dyspnic  Conversant  Heart: Regular rate and rhythm, S1S2 present  No murmur, rub or gallop  Lungs:  Decreased air movement right base, with coarse right lower lobe breath sounds  Otherwise clear to auscultation bilaterally, no wheezing, rhonchi, or crackles  Good air movement  No accessory muscle use or respiratory distress  Abdomen: soft, non-tender, non-distended, NABS  No rebound or guarding  No mass or peritoneal signs  Extremities: no clubbing, cyanosis or edema  2+ pedal pulses bilaterally  Neurologic:  Awake and alert  Fluent speech  Moving all 4 extremities  Fluent speech  Gait noted to be steady  Skin: warm and dry  No petechiae, purpura or rash  Discharge Medications   Please see Medical Reconciliation Discharge Form    Discharge Follow Up Appointments:   Michelle Coello MD: 1 week  GI: Dr Courtney Martel: 2 weeks  Surgery: dr Dylon Paiz: 2 weeks  PUlmonary: dr pope: with cxr 4 weeks    Discharge  Statement   Total Time Spent today including physical exam, discussion with patient and nurse, , infectious disease team,, and discharge arrangements/care = 55 minutes      This note has been constructed using a voice recognition system

## 2019-03-21 NOTE — PLAN OF CARE
Problem: Nutrition/Hydration-ADULT  Goal: Nutrient/Hydration intake appropriate for improving, restoring or maintaining nutritional needs  Description  Monitor and assess patient's nutrition/hydration status for malnutrition (ex- brittle hair, bruises, dry skin, pale skin and conjunctiva, muscle wasting, smooth red tongue, and disorientation)  Collaborate with interdisciplinary team and initiate plan and interventions as ordered  Monitor patient's weight and dietary intake as ordered or per policy  Utilize nutrition screening tool and intervene per policy  Determine patient's food preferences and provide high-protein, high-caloric foods as appropriate       INTERVENTIONS:  - Monitor oral intake, urinary output, labs, and treatment plans  - Assess nutrition and hydration status and recommend course of action  - Evaluate amount of meals eaten  - Assist patient with eating if necessary   - Allow adequate time for meals  - Recommend/ encourage appropriate diets, oral nutritional supplements, and vitamin/mineral supplements  - Order, calculate, and assess calorie counts as needed  - Recommend, monitor, and adjust tube feedings and TPN/PPN based on assessed needs  - Assess need for intravenous fluids  - Provide specific nutrition/hydration education as appropriate  - Include patient/family/caregiver in decisions related to nutrition  Outcome: Progressing     Problem: PAIN - ADULT  Goal: Verbalizes/displays adequate comfort level or baseline comfort level  Description  Interventions:  - Encourage patient to monitor pain and request assistance  - Assess pain using appropriate pain scale  - Administer analgesics based on type and severity of pain and evaluate response  - Implement non-pharmacological measures as appropriate and evaluate response  - Consider cultural and social influences on pain and pain management  - Notify physician/advanced practitioner if interventions unsuccessful or patient reports new pain  Outcome: Progressing     Problem: INFECTION - ADULT  Goal: Absence or prevention of progression during hospitalization  Description  INTERVENTIONS:  - Assess and monitor for signs and symptoms of infection  - Monitor lab/diagnostic results  - Monitor all insertion sites, i e  indwelling lines, tubes, and drains  - Monitor endotracheal (as able) and nasal secretions for changes in amount and color  - [place] appropriate cooling/warming therapies per order  - Administer medications as ordered  - Instruct and encourage patient and family to use good hand hygiene technique  - Identify and instruct in appropriate isolation precautions for identified infection/condition  Outcome: Progressing     Problem: SAFETY ADULT  Goal: Patient will remain free of falls  Description  INTERVENTIONS:  - Assess patient frequently for physical needs  -  Identify cognitive and physical deficits and behaviors that affect risk of falls    -  [place] fall precautions as indicated by assessment   - Educate patient/family on patient safety including physical limitations  - Instruct patient to call for assistance with activity based on assessment  - Modify environment to reduce risk of injury  - Consider OT/PT consult to assist with strengthening/mobility  Outcome: Progressing  Goal: Maintain or return to baseline ADL function  Description  INTERVENTIONS:  -  Assess patient's ability to carry out ADLs; assess patient's baseline for ADL function and identify physical deficits which impact ability to perform ADLs (bathing, care of mouth/teeth, toileting, grooming, dressing, etc )  - Assess/evaluate cause of self-care deficits   - Assess range of motion  - Assess patient's mobility; develop plan if impaired  - Assess patient's need for assistive devices and provide as appropriate  - Encourage maximum independence but intervene and supervise when necessary  ¯ Involve family in performance of ADLs  ¯ Assess for home care needs following discharge ¯ Request OT consult to assist with ADL evaluation and planning for discharge  ¯ Provide patient education as appropriate  Outcome: Progressing  Goal: Maintain or return mobility status to optimal level  Description  INTERVENTIONS:  - Assess patient's baseline mobility status (ambulation, transfers, stairs, etc )    - Identify cognitive and physical deficits and behaviors that affect mobility  - Identify mobility aids required to assist with transfers and/or ambulation (gait belt, sit-to-stand, lift, walker, cane, etc )  - [place] fall precautions as indicated by assessment  - Record patient progress and toleration of activity level on Mobility SBAR; progress patient to next Phase/Stage  - Instruct patient to call for assistance with activity based on assessment  - Request Rehabilitation consult to assist with strengthening/weightbearing, etc   Outcome: Progressing     Problem: DISCHARGE PLANNING  Goal: Discharge to home or other facility with appropriate resources  Description  INTERVENTIONS:  - Identify barriers to discharge w/patient and caregiver  - Arrange for needed discharge resources and transportation as appropriate  - Identify discharge learning needs (meds, wound care, etc )  - Arrange for interpretive services to assist at discharge as needed  - Refer to Case Management Department for coordinating discharge planning if the patient needs post-hospital services based on physician/advanced practitioner order or complex needs related to functional status, cognitive ability, or social support system  Outcome: Progressing     Problem: Knowledge Deficit  Goal: Patient/family/caregiver demonstrates understanding of disease process, treatment plan, medications, and discharge instructions  Description  Complete learning assessment and assess knowledge base    Interventions:  - Provide teaching at level of understanding  - Provide teaching via preferred learning methods  Outcome: Progressing     Problem: Prexisting or High Potential for Compromised Skin Integrity  Goal: Skin integrity is maintained or improved  Description  INTERVENTIONS:  - Identify patients at risk for skin breakdown  - Assess and monitor skin integrity  - Assess and monitor nutrition and hydration status  - Monitor labs (i e  albumin)  - Assess for incontinence   - Turn and reposition patient  - Assist with mobility/ambulation  - Relieve pressure over bony prominences  - Avoid friction and shearing  - Provide appropriate hygiene as needed including keeping skin clean and dry  - Evaluate need for skin moisturizer/barrier cream  - Collaborate with interdisciplinary team (i e  Nutrition, Rehabilitation, etc )   - Patient/family teaching  Outcome: Progressing     Problem: DISCHARGE PLANNING - CARE MANAGEMENT  Goal: Discharge to post-acute care or home with appropriate resources  Description  INTERVENTIONS:  - Conduct assessment to determine patient/family and health care team treatment goals, and need for post-acute services based on payer coverage, community resources, and patient preferences, and barriers to discharge  - Address psychosocial, clinical, and financial barriers to discharge as identified in assessment in conjunction with the patient/family and health care team  - Arrange appropriate level of post-acute services according to patient's   needs and preference and payer coverage in collaboration with the physician and health care team  - Communicate with and update the patient/family, physician, and health care team regarding progress on the discharge plan  - Arrange appropriate transportation to post-acute venues   Outcome: Progressing     Problem: Potential for Falls  Goal: Patient will remain free of falls  Description  INTERVENTIONS:  - Assess patient frequently for physical needs  -  Identify cognitive and physical deficits and behaviors that affect risk of falls    -  [place] fall precautions as indicated by assessment   - Educate patient/family on patient safety including physical limitations  - Instruct patient to call for assistance with activity based on assessment  - Modify environment to reduce risk of injury  - Consider OT/PT consult to assist with strengthening/mobility  Outcome: Progressing     Problem: CARDIOVASCULAR - ADULT  Goal: Maintains optimal cardiac output and hemodynamic stability  Description  INTERVENTIONS:  - Monitor I/O, vital signs and rhythm  - Monitor for S/S and trends of decreased cardiac output i e  bleeding, hypotension  - Administer and titrate ordered vasoactive medications to optimize hemodynamic stability  - Assess quality of pulses, skin color and temperature  - Assess for signs of decreased coronary artery perfusion - ex   Angina  - Instruct patient to report change in severity of symptoms  Outcome: Progressing     Problem: RESPIRATORY - ADULT  Goal: Achieves optimal ventilation and oxygenation  Description  INTERVENTIONS:  - Assess for changes in respiratory status  - Assess for changes in mentation and behavior  - Position to facilitate oxygenation and minimize respiratory effort  - Oxygen administration by appropriate delivery method based on oxygen saturation (per order) or ABGs  - Initiate smoking cessation education as indicated  - Encourage broncho-pulmonary hygiene including cough, deep breathe, Incentive Spirometry  - Assess the need for suctioning and aspirate as needed  - Assess and instruct to report SOB or any respiratory difficulty  - Respiratory Therapy support as indicated  Outcome: Progressing     Problem: GENITOURINARY - ADULT  Goal: Maintains or returns to baseline urinary function  Description  INTERVENTIONS:  - Assess urinary function  - Encourage oral fluids to ensure adequate hydration  - Administer IV fluids as ordered to ensure adequate hydration  - Administer ordered medications as needed  - Offer frequent toileting  - Follow urinary retention protocol if ordered  Outcome: Progressing  Goal: Urinary catheter remains patent  Description  INTERVENTIONS:  - Assess patency of urinary catheter  - If patient has a chronic rendon, consider changing catheter if non-functioning  - Follow guidelines for intermittent irrigation of non-functioning urinary catheter  Outcome: Progressing     Problem: METABOLIC, FLUID AND ELECTROLYTES - ADULT  Goal: Electrolytes maintained within normal limits  Description  INTERVENTIONS:  - Monitor labs and assess patient for signs and symptoms of electrolyte imbalances  - Administer electrolyte replacement as ordered  - Monitor response to electrolyte replacements, including repeat lab results as appropriate  - Instruct patient on fluid and nutrition as appropriate  Outcome: Progressing     Problem: SKIN/TISSUE INTEGRITY - ADULT  Goal: Skin integrity remains intact  Description  INTERVENTIONS  - Identify patients at risk for skin breakdown  - Assess and monitor skin integrity  - Assess and monitor nutrition and hydration status  - Monitor labs (i e  albumin)  - Assess for incontinence   - Turn and reposition patient  - Assist with mobility/ambulation  - Relieve pressure over bony prominences  - Avoid friction and shearing  - Provide appropriate hygiene as needed including keeping skin clean and dry  - Evaluate need for skin moisturizer/barrier cream  - Collaborate with interdisciplinary team (i e  Nutrition, Rehabilitation, etc )   - Patient/family teaching  Outcome: Progressing  Goal: Incision(s), wounds(s) or drain site(s) healing without S/S of infection  Description  INTERVENTIONS  - Assess and document risk factors for skin impairment   - Assess and document dressing, incision, wound bed, drain sites and surrounding tissue  - Initiate Nutrition services consult and/or wound management as needed  Outcome: Progressing  Goal: Oral mucous membranes remain intact  Description  INTERVENTIONS  - Assess oral mucosa and hygiene practices  - Implement preventative oral hygiene regimen  - Implement oral medicated treatments as ordered  - Initiate Nutrition services referral as needed  Outcome: Progressing

## 2019-03-21 NOTE — SOCIAL WORK
The patient is accepted at [facility], they can take today, he is agreeable to this plan  [facility] transport set up for today at 1700 with [facility]  They are aware [facility] will need payment upfront  [facility] to call the s/o, Verónica Ye

## 2019-03-21 NOTE — OCCUPATIONAL THERAPY NOTE
Occupational Therapy Treatment Note:         03/21/19 1130   Restrictions/Precautions   Weight Bearing Precautions Per Order No   Other Precautions Fall Risk;Pain;Cognitive   Pain Assessment   Pain Assessment 0-10   Pain Score 3   Pain Type Acute pain;Chronic pain   Pain Location Abdomen   Pain Orientation Bilateral   ADL   Where Assessed Chair   Grooming Assistance 5  Supervision/Setup   Grooming Deficit Setup   UB Bathing Assistance 5  Supervision/Setup   UB Bathing Deficit Setup   UB Bathing Comments Increased SOB noted with activity  LB Bathing Assistance 4  Minimal Assistance   LB Bathing Deficit Setup;Steadying;Verbal cueing;Supervision/safety; Increased time to complete; Buttocks; Perineal area; Left lower leg including foot;Right lower leg including foot   LB Bathing Comments increased A required to maintain safe balance with functional reach  UB Dressing Assistance 5  Supervision/Setup   UB Dressing Deficit Setup   LB Dressing Assistance 4  Minimal Assistance   LB Dressing Deficit Setup;Steadying; Requires assistive device for steadying;Verbal cueing;Supervision/safety; Increased time to complete; Don/doff R sock; Don/doff L sock; Thread RLE into pants; Thread LLE into pants;Pull up over hips   LB Dressing Comments cues for safety with clothing management required  Toileting Assistance  4  Minimal Assistance   Toileting Deficit Setup;Steadying;Verbal cueing;Supervison/safety; Increased time to complete; Bedside commode;Perineal hygiene;Clothing management down;Clothing management up   Toileting Comments Pt may benefit from use of commode over standard toilet height  Functional Standing Tolerance   Time 4 mins   Activity self care tasks  Comments Pt with need for frequent rest breaks due to noted fatigue levels  Bed Mobility   Sit to Supine 4  Minimal assistance   Additional items Assist x 1;Bedrails; Increased time required;LE management;Verbal cues   Additional Comments increased a to bring BLE into bed     Transfers   Sit to Stand 4  Minimal assistance   Additional items Assist x 1; Armrests; Bedrails; Increased time required;Verbal cues   Stand to Sit 4  Minimal assistance   Additional items Assist x 1;Bedrails;Armrests; Increased time required;Verbal cues   Stand pivot 4  Minimal assistance   Additional items Assist x 1; Armrests; Bedrails; Increased time required;Verbal cues   Toilet transfer 4  Minimal assistance   Additional items Assist x 1;Bedrails;Armrests; Increased time required;Verbal cues; Commode   Additional Comments Pt with need for cues for consistency with use of RW  Functional Mobility   Functional Mobility 4  Minimal assistance   Additional Comments x1   Additional items Rolling walker   Toilet Transfers   Toilet Transfer From Bed   Toilet Transfer Type To and from   Toilet Transfer to Standard toilet   Toilet Transfer Technique Stand pivot; Ambulating   Toilet Transfers Verbal cues; Minimal assistance   Toilet Transfers Comments Pt may benefit from use of commode over standard toielet  Cognition   Overall Cognitive Status WFL   Arousal/Participation Alert; Responsive   Attention Attends with cues to redirect   Orientation Level Oriented X4   Memory Decreased short term memory;Decreased recall of recent events   Following Commands Follows multistep commands with increased time or repetition   Comments Pt with slowed processing noted at times  Off topic comments sometimes noted  Additional Activities   Additional Activities Other (Comment)  (reviewed current plan of care  )   Additional Activities Comments Pt reports having good understanding  Activity Tolerance   Activity Tolerance Patient limited by fatigue;Patient limited by pain   Medical Staff Made Aware reported all findings to nursing staff      Assessment   Assessment Pt was seen for skilled OT with focus on completion of self care tasks, functional transfers and review of RW safety, energy conservation techs, compensatory breathing and review of current plan of care  Pt with need for Min A overall and cues for appropriate use of RW to complete functional tasks instance  Increased A for LE self care to maintain safe balance with clothing management  Pt may benefit from raised toilet seat or commode over standard height toilet with noted c/o of increased abdominal pain with functional reach  No LOB noted with functional tasks instance  SAT remained at 91% on RA following functional tasks  Pt will benefit from further rehab with focus on achieving optimal performance levels with all functional tasks  Plan   Treatment Interventions ADL retraining;Functional transfer training;UE strengthening/ROM; Endurance training   Goal Expiration Date 03/24/19   Treatment Day 4   OT Frequency 3-5x/wk   Recommendation   OT Discharge Recommendation Short Term Rehab   Equipment Recommended Bedside commode   OT - OK to Discharge Yes  (to STR when medically stable  )   Barthel Index   Feeding 10   Bathing 0   Grooming Score 5   Dressing Score 5   Bladder Score 10   Bowels Score 10   Toilet Use Score 5   Transfers (Bed/Chair) Score 10   Mobility (Level Surface) Score 0   Stairs Score 0   Barthel Index Score 55   Modified Orient Scale   Modified Orient Scale 4   Jefferson Raffy, [address]

## 2019-03-21 NOTE — PROGRESS NOTES
PHYSICAL MEDICINE AND REHABILITATION   PREADMISSION ASSESSMENT     Projected [facility] Diagnoses:  Impairment of mobility, safety and Activities of Daily Living (ADLs) due to Debility:  16  Debility (Non-cardiac/Non-pulmonary)   Etiologic: severe sepsis with septic shock secondary to liver abscess   Date of Onset: 2/25/19   Date of surgery: see below    2/28/19: chest tube insertion  3/6/19: Abscess catheter replacement, right pigtail chest tube  3/12/19: Liver abscess drain check    PATIENT INFORMATION  Name: Princess Campos Phone #: [phone] (home)   Address: [address]: 1970 Age: 53 y o  SS#   Marital Status: Single  Ethnicity:    Employment Status: unemployed  Extended Emergency Contact Information  Primary Emergency Contact: Milo [place] Phone: [phone]  Relation: Brother  Secondary Emergency Contact: Mary Ann Caldwell  Home Phone: [phone]  Relation: Brother  Advance Directive: Level 1 Full Code, Unknown Advanced Directive     INSURANCE/COVERAGE:     Primary Payor: [employer] PENDING / Plan: [employer] PENDING / Product Type: Self Pay /   Secondary Payer: PRIVATE PAY   Payer Contact:  Payer Contact:   Contact Phone:  Contact Phone:   Spoke with both Miguel Kamara as well as PATHS - patient is likely to get approved by [employer]  Authorization #:   Coverage Dates:  LCD:   Medicare Days:  Medical Record #: [id number]    REFERRAL SOURCE:   Referring provider: Zachariah Ray MD  Referring facility: [address] [place] [address]  Room: [place] [address]/[address]*  PCP: Cholo Handley MD PCP phone number: [phone]    MEDICAL INFORMATION  HPI: Patient is a 53year old male who presented to the [place] [address]  as a direct admission from his PCP office on 2/25/19 with a failure to thrive diagnosis  The patient has lost 30 lbs since [place])   Per the PCP office note, the patient presented with persistent weight loss, poor appetite and intermittent diarrhea as well as right upper quadrant pain  He notes he started feeling sick about 2 months ago with a cough  Because the patient did not have insurance, he was hesitant on seeking any medical care  Lab work revealed a low hemoglobin level and was then recommended to go to the hospital as a direct admission for further evaluation  CT scan chest abdomen pelvis revealed possible acute appendicitis, hepatic cirrhosis, splenomegaly, perisplenic and mesenteric varices suggestive of portal hypertension  CT scan also revealed liver mass and prominent lymph nodes in the upper abdomen and retroperitoneum and ultimately required OLIVIA drain placement  On 2/28/19 the patient was a rapid response secondary to an increased cough, tachypnea, and requiring the use of 4L of oxygen  The patient was noted to then be hypoxic and in respiratory distress  Per Critical care on 3/13/19 he required BiPAP as was then "electively intubated prior to the patient going to IR for liver abscess drain  He was hypotensive requiring a central line and IV pressors  He was continued on broad spectrum antibiotics with a consult to infectious disease  Once back from IR had chest x-ray that revealed a right sided pleural effusion requiring chest tube drainage secondary to empyema  During hospital stay a second chest tube was placed to assist with proper drainage  He required multiple doses of TPA to the chest tube with adequate drainage  As per his liver drain he went to IR for tube checks and is scheduled for repeat tube chest in 1 week  He maintained on mechanical ventilation after his procedures and was difficult to wean from the ventilator due to tachypnea  He was extubated on 3/11 and has been on nasal cannula since then  As per his hypotension has resolved once treated on antibiotics    His antibiotics are managed by infectious disease and plan to stay on antibiotics until live abscess completely drained  He had a repeat CT c/a/p that showed improvement of the liver abscess and compressive atelectasis of the right lung"  The patient was then transferred out to the [facility] floor with only 1 chest tube, as well as a OLIVIA liver abscess drain  Infectious Disease had been following the patient throughout his hospital course as the patient did have fevers as well as leukocytosis and lactic acidosis  It was felt that that this was secondary to his liver abscess and right sided empyema  They have also noted that they suspect a portal source of infection is with the possible appendicitis  The patient did have cultures done which have been negative  He was on IV antibiotics, however he has since completed his 3 week course of IV Unasyn  It is noted that the patient does have probable chronic appendicitis which may ultimately require an appendectomy, however the patient is to follow up without outpatient surgery regarding that  At this time the patients attending is clearing the patient for discharge  Both PT/OT are recommending inpatient acute rehab  The patient will transfer to [address] [place] [address] in [facility] later on today  Past Medical History:   Past Surgical History: Allergies:     No past medical history on file   Past Surgical History:   Procedure Laterality Date    IR ABSCESS/SEROMA DRAINAGE  2/28/2019    IR CHEST TUBE  3/6/2019    IR SUPRAPUBIC TUBE  3/14/2019     No Known Allergies      Comorbidities: sepsis, liver abscess, right sided empyema, 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, chronic appendicitis, urinary retention, acute hypoxic respiratory failure, GERD, SHANDA, thrombocytopenia     CURRENT VITAL SIGNS:   Temp:  [98 5 °F (36 9 °C)-99 4 °F (37 4 °C)] 99 4 °F (37 4 °C)  HR:  [81-82] 81  Resp:  [18] 18  BP: (135-136)/(66-74) 136/74   Intake/Output Summary (Last 24 hours) at 3/21/2019 1501  Last data filed at 3/21/2019 0619  Gross per 24 hour Intake    Output 2775 ml   Net -2775 ml        LABORATORY RESULTS:      Lab Results   Component Value Date    HGB 8 5 (L) 03/21/2019    HCT 26 6 (L) 03/21/2019    WBC 4 01 (L) 03/21/2019     Lab Results   Component Value Date    BUN 6 03/21/2019    K 3 8 03/21/2019     03/21/2019    CREATININE 0 55 (L) 03/21/2019     Lab Results   Component Value Date    PROTIME 18 3 (H) 03/14/2019    INR 1 51 (H) 03/14/2019        DIAGNOSTIC STUDIES:  Xr Chest Portable    Result Date: 3/1/2019  Impression: 1  Significant improvement in right hemithorax opacification from effusion/atelectasis, this is small-to-moderate on the final image  2   Retrocardiac opacity  3   Appropriate right IJ catheter  The study was marked in [facility] for immediate notification  Workstation performed: [id number]     Xr Chest Portable    Result Date: 3/1/2019  Impression: 1  Significant improvement in right hemithorax opacification from effusion/atelectasis, this is small-to-moderate on the final image  2   Retrocardiac opacity  3   Appropriate right IJ catheter  The study was marked in [facility] for immediate notification  Workstation performed: [id number]     Xr Chest Portable    Result Date: 3/1/2019  Impression: 1  Significant improvement in right hemithorax opacification from effusion/atelectasis, this is small-to-moderate on the final image  2   Retrocardiac opacity  3   Appropriate right IJ catheter  The study was marked in [facility] for immediate notification  Workstation performed: [id number]     Xr Chest Portable    Result Date: 2/28/2019  Impression: Right-sided pleural effusion/atelectasis suspected  Superimposed infection could be considered in the appropriate clinical setting  Other findings as above  No significant interval change from the most recent prior    Workstation performed: [id number]     Xr Chest Portable    Result Date: 2/28/2019  Impression: Suspected moderate-sized right pleural effusion/atelectasis as described, possibly a hepatic hydrothorax  Superimposed infection could be considered in the appropriate clinical setting  Other findings as above  Workstation performed: [id number]     Ct Chest Abdomen Pelvis W Contrast    Result Date: 2/25/2019  Impression: Fluid-filled and dilated appendix with appendiceal wall thickening and periappendiceal fat stranding and fluid, suspicious for acute appendicitis in the appropriate clinical setting  Hepatic cirrhosis and splenomegaly, with associated perisplenic and mesenteric varices suggesting a component of portal hypertension  Enlargement of the caudate lobe which contains an ill-defined and heterogeneous mass lesion with large internal areas of hypodensity and internal septations as described, this measures approximately 10 5 x 9 4 x 11 8 cm in size (axial image 55, series 2)  Primary differential consideration is hepatic abscess in the appropriate clinical setting, although hepatic neoplasm such as hepatocellular carcinoma is also considered  Correlation with the patient's symptoms and laboratory values recommended  Prominent lymph nodes in the upper abdomen and retroperitoneum, largest measures approximately 1 4 cm in size, possibly reactive  Partially distended bladder  Mild circumferential bladder wall thickening noted, probably exaggerated by underdistention  Superimposed cystitis could be considered in the appropriate clinical setting  Other findings as above  Findings discussed with Dr Jeronimo Marie by Dr Russ Rushing at 11:45 PM on 2/25/2019   Workstation performed: [id number]     Ir Abscess/seroma Drainage    Result Date: 3/1/2019  Impression: Impression: Successful image-guided percutaneous drainage of liver abscess with placement of 10-Scottish drain Workstation performed: [id number]       PRECAUTIONS/SPECIAL NEEDS:  Tobacco:   Social History     Tobacco Use   Smoking Status Former Smoker   Smokeless Tobacco Never Used   , Alcohol:    Social History     Substance and Sexual Activity Alcohol Use Yes   , Anticoagulation:  lovenox, Edema Management, Safety Concerns, Pain Management, IV: Type: peripheral Location: right antecubital Reason: medications and fluids, Language Preference: English and fall precautions    MEDICATIONS:     Current Facility-Administered Medications:     acetaminophen (TYLENOL) tablet 650 mg, 650 mg, Oral, Q6H PRN, Geneva Cruz MD, 650 mg at 03/19/19 2333    ampicillin-sulbactam (UNASYN) 3 g in sodium chloride 0 9 % 100 mL IVPB, 3 g, Intravenous, Q6H, Malachi Hunter PA-C, Last Rate: 200 mL/hr at 03/21/19 0615, 3 g at 03/21/19 0615    enoxaparin (LOVENOX) subcutaneous injection 40 mg, 40 mg, Subcutaneous, Q24H [address], Seth Viramontes MD, 40 mg at 02/83/58 7464    folic acid (FOLVITE) tablet 1 mg, 1 mg, Oral, Daily, Malachi Hunter PA-C, 1 mg at 03/21/19 1007    furosemide (LASIX) tablet 20 mg, 20 mg, Oral, Daily, Seth Viramontes MD, 20 mg at 03/21/19 1007    melatonin tablet 6 mg, 6 mg, Oral, HS, Malachi Hunter PA-C, 6 mg at 03/21/19 0036    ondansetron (ZOFRAN) injection 4 mg, 4 mg, Intravenous, Once PRN, Tio Cintron DO    pantoprazole (PROTONIX) EC tablet 40 mg, 40 mg, Oral, Q24H [address], Malachi Hunter PA-C, 40 mg at 03/21/19 1007    potassium chloride (K-DUR,KLOR-CON) CR tablet 20 mEq, 20 mEq, Oral, Daily, Seth Viramontes MD, 20 mEq at 03/21/19 1007    saccharomyces boulardii (FLORASTOR) capsule 250 mg, 250 mg, Oral, BID, Malachi Hunter PA-C, 250 mg at 03/21/19 1007    thiamine (VITAMIN B1) tablet 100 mg, 100 mg, Oral, Daily, Malachi Hunter PA-C, 100 mg at 03/21/19 1007    SKIN INTEGRITY:   mid anterior groin incision (0 5cm x 0 5cm), and facial laceration    PRIOR LEVEL OF FUNCTION:  He lives in [facility]  Deven lives with his significant other  Self Care: Independent, Indoor Mobility: Independent, Stairs (in/outdoor):  Independent and Cognition: Independent    HOME ENVIRONMENT:  The living area: bedroom on 2nd floor and bathroom on 2nd floor  There are 9-10 CARMEN from the front, and 4 CARMEN from the back  The patient will not have 24 hour supervision/physical assistance available upon discharge  The patient does have a significant other who can help assist at time of discharge  In addition, his PCP is also his long time family friend and involved in his care as well  PREVIOUS DME:  Equipment in home (previous DME): None    FUNCTIONAL STATUS:  Physical Therapy Occupational Therapy Speech Therapy   3/21/19    Transfers   Sit to Stand 4  Minimal assistance   Additional items Assist x 1; Armrests; Increased time required;Verbal cues   Stand to Sit 4  Minimal assistance   Additional items Assist x 1; Armrests; Increased time required;Verbal cues   Ambulation/Elevation   Gait pattern Decreased foot clearance; Forward Flexion; Short stride; Shuffling; Inconsistent adriana; Excessively slow   Gait Assistance 4  Minimal assist   Additional items Assist x 1;Verbal cues; Tactile cues   Assistive Device Rolling walker   Distance 55' x 2 limited by fatigue with standing rest in between   Balance   Static Sitting Fair +   Dynamic Sitting Fair   Static Standing Fair -   Dynamic Standing Poor +   Ambulatory Poor +   Endurance Deficit   Endurance Deficit Yes   Endurance Deficit Description fatigue   Activity Tolerance   Activity Tolerance Patient limited by fatigue   Nurse Made Aware Yes   Exercises   THR Sitting;10 reps;AAROM; Bilateral   Assessment   Prognosis Good   Problem List Decreased strength;Decreased range of motion;Decreased endurance; Impaired balance;Decreased mobility; Decreased skin integrity; Decreased safety awareness   Assessment Pt  seated in bedside chair upon my arrival  Pt  reporting increased fatigue, as just finished amb  trial with RN prior to therapist arrival  Pt  however agreeable to therapeutic intervention  Performance of HEP seated in bedside chair with cues provided for proper completion   Progressed with transfers continuing to require A of therapist with cues for hand placement/technique  Noted O2 saturation at 92% on RA prior to amb  trial  Progressed with an amb  trial with use of RW and [place] of therapist with cues for LE sequencing  Limited by fatigue requiring a standing resting period in between trials  Returned to room and repositioned seated in bedside chair  O2 saturation measured seated at 89% on RA, requiring additional time to return to 92%  Informed RN at end of treatment session  PT will continue to recommend d/c to rehab when medically stable for continued improvement of noted impairments above  3/21/19  ADL   Where Assessed Chair   Grooming Assistance 5  Supervision/Setup   Grooming Deficit Setup   UB Bathing Assistance 5  Supervision/Setup   UB Bathing Deficit Setup   UB Bathing Comments Increased SOB noted with activity  LB Bathing Assistance 4  Minimal Assistance   LB Bathing Deficit Setup;Steadying;Verbal cueing;Supervision/safety; Increased time to complete; Buttocks; Perineal area; Left lower leg including foot;Right lower leg including foot   LB Bathing Comments increased A required to maintain safe balance with functional reach  UB Dressing Assistance 5  Supervision/Setup   UB Dressing Deficit Setup   LB Dressing Assistance 4  Minimal Assistance   LB Dressing Deficit Setup;Steadying; Requires assistive device for steadying;Verbal cueing;Supervision/safety; Increased time to complete; Don/doff R sock; Don/doff L sock; Thread RLE into pants; Thread LLE into pants;Pull up over hips   LB Dressing Comments cues for safety with clothing management required  Toileting Assistance  4  Minimal Assistance   Toileting Deficit Setup;Steadying;Verbal cueing;Supervison/safety; Increased time to complete; Bedside commode;Perineal hygiene;Clothing management down;Clothing management up   Toileting Comments Pt may benefit from use of commode over standard toilet height      Functional Standing Tolerance   Time 4 mins   Activity self care tasks  Comments Pt with need for frequent rest breaks due to noted fatigue levels  Bed Mobility   Sit to Supine 4  Minimal assistance   Additional items Assist x 1;Bedrails; Increased time required;LE management;Verbal cues   Additional Comments increased a to bring BLE into bed  Transfers   Sit to Stand 4  Minimal assistance   Additional items Assist x 1; Armrests; Bedrails; Increased time required;Verbal cues   Stand to Sit 4  Minimal assistance   Additional items Assist x 1;Bedrails;Armrests; Increased time required;Verbal cues   Stand pivot 4  Minimal assistance   Additional items Assist x 1; Armrests; Bedrails; Increased time required;Verbal cues   Toilet transfer 4  Minimal assistance   Additional items Assist x 1;Bedrails;Armrests; Increased time required;Verbal cues; Commode   Additional Comments Pt with need for cues for consistency with use of RW  Functional Mobility   Functional Mobility 4  Minimal assistance   Additional Comments x1   Additional items Rolling walker   Toilet Transfers   Toilet Transfer From Bed   Toilet Transfer Type To and from   Toilet Transfer to Standard toilet   Toilet Transfer Technique Stand pivot; Ambulating   Toilet Transfers Verbal cues; Minimal assistance   Toilet Transfers Comments Pt may benefit from use of commode over standard toielet  Cognition   Overall Cognitive Status WFL   Arousal/Participation Alert; Responsive   Attention Attends with cues to redirect   Orientation Level Oriented X4   Memory Decreased short term memory;Decreased recall of recent events   Following Commands Follows multistep commands with increased time or repetition   Comments Pt with slowed processing noted at times  Off topic comments sometimes noted  Additional Activities   Additional Activities Other (Comment)  (reviewed current plan of care  )   Additional Activities Comments Pt reports having good understanding      Activity Tolerance   Activity Tolerance Patient limited by fatigue;Patient limited by pain   Medical Staff Made Aware reported all findings to nursing staff  Assessment   Assessment Pt was seen for skilled OT with focus on completion of self care tasks, functional transfers and review of RW safety, energy conservation techs, compensatory breathing and review of current plan of care  Pt with need for Min A overall and cues for appropriate use of RW to complete functional tasks instance  Increased A for LE self care to maintain safe balance with clothing management  Pt may benefit from raised toilet seat or commode over standard height toilet with noted c/o of increased abdominal pain with functional reach  No LOB noted with functional tasks instance  SAT remained at 91% on RA following functional tasks  Pt will benefit from further rehab with focus on achieving optimal performance levels with all functional tasks  CURRENT GAP IN FUNCTION     Prior to Admission:     Functional Status: Patient was independent with mobility/ambulation, transfers, ADL's, IADL's  Estimated length of stay: 10 to 14 days    Anticipated Post-Discharge Disposition/Treatment  Disposition: Return to previous home/apartment  Outpatient Services: Physical Therapy (PT) and Occupational Therapy (OT)    BARRIERS TO DISCHARGE  Lovenox, Pain, Balance Difficulty, Fatigue, Home Accessibility, Caregiver Accessibility, Financial Resources, Equipment Needs and Resource Availability    INTERVENTIONS FOR DISCHARGE  Adaptive equipment, Patient/Family/Caregiver Education, [employer], Support Group, Financial Assistance, Arrange DME needs, Home Modifcations, Medication Changes per MD recommendations, Therapy exercises, Center of balance support  and Energy conservation education     REQUIRED THERAPY:  Patient will require PT and OT 90 minutes each per day, five days per week to achieve rehab goals       REQUIRED FUNCTIONAL AND MEDICAL MANAGEMENT FOR INPATIENT REHABILITATION:  Skin:  mid anterior groin incision (0 5cm x 0 5cm), and face laceration , Pain Management: Overall pain is well controlled, Deep Vein Thrombosis (DVT) Prophylaxis:  heparin, and further internam medicine management of additional medical conditions while on the ARC, PT/OT intervention, patient/family education and training, and any needed consults PRN    RECOMMENDED LEVEL OF CARE:  Patient is a 53year old male who presented to the [place] [address]  as a direct admission from his PCP office on 2/25/19 with a failure to thrive diagnosis  The patient has lost 30 lbs since Christmas)  After an extensive workup, the patient was found to have a liver abscess as well as a right sided empyema, in addition to multiple other co-morbidities  Prior to admission the patient was fully independent with both ADLS and IADLS as well as functional mobility, without the use of any DME  At this time the patient requires min assist x 1 for both transfers and ambulation with the use of a rolling walker  He was able to ambulate 55 feet x 2 today  The patient at this time also requires supervision with UB ADLS and min assist with LB ADLS  At this time close medical management and PM&R management is recommended for the patient while on the ARC to help monitor labs as well as other medical conditions  Nursing management will be required to monitor bowel and bladder function to prevent any incontinent episodes and skin breakdown as well as education on any medication changes  Inpatient acute rehab is recommended at this time for the patient to maximize overall strength, endurance, self care, and mobility upon discharge to home with the support of his family

## 2019-03-21 NOTE — QUICK NOTE
Discussed with patient and nurse removal of his two sutures from his forehead laceration  Pt was in agreement with the treatment plan  Laceration was healing well, well approximated, and without discharge  Two sutures removed using a suture removal kit  All the patient's questions were answered

## 2019-03-21 NOTE — PROGRESS NOTES
Progress Note - Infectious Disease   Andreslara Soto 53 y o  male MRN: [id number]  Unit/Bed#: [place] [address] -01 Encounter: [id number]      Impression/Plan:  1  Liver abscess-suspect a portal source of infection with the possible appendicitis   No perforation seen on initial CT the abdomen and pelvis   No clear biliary source   Now status post IR drain placement   Cultures all remain negative   Repeat CT abdomen shows significantly decreased size of abscess   Drain was reassessed by IR on 03/06 and is in proper position   No resistant organisms have been isolated, and I suspect this reflects difficult to grow organisms after the patient had already received some antibiotics such as anaerobes and streptococcal species   IR performed drain check on 03/19 with resolved liver abscess  Drain was removed  -monitor off anymore antibiotics as patient has received 3 week course of IV antibiotics as drain has been removed with radiographic resolution of liver abscess     2  Right-sided empyema   Likely secondary to liver abscess   Status post placement of chest tubes x2   All pleural fluid cultures have been negative   Most recent CT chest concerning for ongoing empyema  Both chest tubes have now been removed  Patient has now completed 3 week course of IV Unasyn   -observe off anymore antibiotics  -pulmonology follow-up  -repeat chest x-ray in about 4 weeks    3  Isolated fever  Occurred on 03/19  This may have occurred due to recent procedure as patient's drain was just removed  No fever since  Patient with no new complaints  WBC count is now normalizing  Patient completed 3 week course of IV antibiotic  Monitor off any more antibiotics and no clinical evidence of ongoing infection      4  Leukopenia   WBC count has been trending down  This may have been related to IV Unasyn  Now WBC count is improving after discontinuation of antibiotics       5   Alcohol abuse-with newly diagnosed cirrhosis this admission, portal hypertension based upon the CT findings   -continue abstinence  -GI follow-up ongoing     6  Probable chronic appendicitis   This may be the etiology for development of liver abscess/empyema   Recent surgery evaluation noted  Tree Sharp will ultimately require appendectomy   Recommend outpatient surgery follow-up      7  Urethral trauma status post suprapubic catheter   Now status post removal   Patient is urinating on his own      8  Diarrhea   Patient is having loose bowel movements   May be associated with tube feeds, lack of solid food, antibiotic   No associated abdominal pain   No leukocytosis   This has resolved         Antibiotics:  Completed IV Unasyn x3 weeks     Discussed with patient and Dr Bryanna Katz, with patient and his girlfriend at bedside  Okay for discharge to rehab from ID standpoint  Subjective:  Patient feels well today  He ambulated with PT earlier this morning  Denies any urinary complaints other than he is urinating frequently  No fevers, chills, nausea, vomiting, diarrhea  Tolerating oral intake  No shortness of breath  No abdominal pain  Drain was removed by IR on  as liver abscess was resolved  Of note, patient did have an isolated fever on the evening of 3/19  He did not feel feverish and fevers have since improved  Objective:  Vitals:  Temp:  [98 5 °F (36 9 °C)-99 9 °F (37 7 °C)] 99 4 °F (37 4 °C)  HR:  [81-82] 81  Resp:  [16-18] 18  BP: (124-136)/(66-74) 136/74  SpO2:  [93 %-96 %] 93 %  Temp (24hrs), Av 2 °F (37 3 °C), Min:98 5 °F (36 9 °C), Max:99 9 °F (37 7 °C)  Current: Temperature: 99 4 °F (37 4 °C)    Physical Exam:   General:  No acute distress  Psychiatric:  Awake and alert  Pulmonary:  Normal respiratory excursion without accessory muscle use  Abdomen:  Soft, nontender  Extremities:  No edema  Skin:  No rashes    Lab Results:  I have personally reviewed pertinent labs    Results from last 7 days   Lab Units 19  0539 19  0502 19  1389 POTASSIUM mmol/L 3 8 3 8 3 1*   CHLORIDE mmol/L 100 101 99*   CO2 mmol/L 24 27 29   BUN mg/dL 6 6 5   CREATININE mg/dL 0 55* 0 62 0 53*   EGFR ml/min/1 73sq m 124 118 126   CALCIUM mg/dL 8 5 8 6 8 3   AST U/L 36  --  39   ALT U/L 8*  --  9*   ALK PHOS U/L 94  --  92     Results from last 7 days   Lab Units 03/21/19  0539 03/19/19  0530 03/18/19  0840   WBC Thousand/uL 4 01* 3 40* 3 47*   HEMOGLOBIN g/dL 8 5* 8 5* 8 1*   PLATELETS Thousands/uL 96* 105* 98*           Imaging Studies:   I have personally reviewed pertinent imaging study reports and images in PACS  EKG, Pathology, and Other Studies:   I have personally reviewed pertinent reports  IR report reviewed

## 2019-03-21 NOTE — PHYSICAL THERAPY NOTE
Physical Therapy Progress Note     03/21/19 1105   Pain Assessment   Pain Assessment No/denies pain   Pain Score No Pain   Hospital Pain Intervention(s) Ambulation/increased activity;Repositioned   Response to Interventions Tolerated  Restrictions/Precautions   Weight Bearing Precautions Per Order No   Other Precautions Fall Risk   General   Chart Reviewed Yes   Response to Previous Treatment Patient reporting fatigue but able to participate  Family/Caregiver Present Yes   Subjective   Subjective Willing to participate in therapy this AM    Transfers   Sit to Stand 4  Minimal assistance   Additional items Assist x 1; Armrests; Increased time required;Verbal cues   Stand to Sit 4  Minimal assistance   Additional items Assist x 1; Armrests; Increased time required;Verbal cues   Ambulation/Elevation   Gait pattern Decreased foot clearance; Forward Flexion; Short stride; Shuffling; Inconsistent adriana; Excessively slow   Gait Assistance 4  Minimal assist   Additional items Assist x 1;Verbal cues; Tactile cues   Assistive Device Rolling walker   Distance 55' x 2 limited by fatigue with standing rest in between   Balance   Static Sitting Fair +   Dynamic Sitting Fair   Static Standing Fair -   Dynamic Standing Poor +   Ambulatory Poor +   Endurance Deficit   Endurance Deficit Yes   Endurance Deficit Description fatigue   Activity Tolerance   Activity Tolerance Patient limited by fatigue   Nurse Made Aware Yes   Exercises   THR Sitting;10 reps;AAROM; Bilateral   Assessment   Prognosis Good   Problem List Decreased strength;Decreased range of motion;Decreased endurance; Impaired balance;Decreased mobility; Decreased skin integrity; Decreased safety awareness   Assessment Pt  seated in bedside chair upon my arrival  Pt  reporting increased fatigue, as just finished amb  trial with RN prior to therapist arrival  Pt  however agreeable to therapeutic intervention   Performance of HEP seated in bedside chair with cues provided for proper completion  Progressed with transfers continuing to require A of therapist with cues for hand placement/technique  Noted O2 saturation at 92% on RA prior to amb  trial  Progressed with an amb  trial with use of RW and [place] of therapist with cues for LE sequencing  Limited by fatigue requiring a standing resting period in between trials  Returned to room and repositioned seated in bedside chair  O2 saturation measured seated at 89% on RA, requiring additional time to return to 92%  Informed RN at end of treatment session  PT will continue to recommend d/c to rehab when medically stable for continued improvement of noted impairments above  Barriers to Discharge Inaccessible home environment;Decreased caregiver support   Barriers to Discharge Comments CARMEN, level of support at home   Goals   Patient Goals To get stronger  STG Expiration Date 03/28/19   Treatment Day 4   Plan   Treatment/Interventions Functional transfer training;LE strengthening/ROM; Endurance training;Gait training; Therapeutic exercise;Spoke to nursing;Spoke to case management;OT   Progress Slow progress, decreased activity tolerance   PT Frequency Other (Comment)  (3-5x/wk)   Recommendation   Recommendation Short-term skilled PT   Equipment Recommended Walker  (RW)   PT - OK to Discharge Yes  (if d/c to rehab when medically stable )     Krunal Espinoza, PTA

## 2019-03-21 NOTE — PLAN OF CARE
Problem: PHYSICAL THERAPY ADULT  Goal: Performs mobility at highest level of function for planned discharge setting  See evaluation for individualized goals  Description  Treatment/Interventions: Functional transfer training, LE strengthening/ROM, Elevations, Therapeutic exercise, Endurance training, Patient/family training, Bed mobility, Gait training, Spoke to case management, Family  Equipment Recommended: Desmond Elam       See flowsheet documentation for full assessment, interventions and recommendations  Outcome: Progressing  Note:   Prognosis: Good  Problem List: Decreased strength, Decreased range of motion, Decreased endurance, Impaired balance, Decreased mobility, Decreased skin integrity, Decreased safety awareness  Assessment: Pt  seated in bedside chair upon my arrival  Pt  reporting increased fatigue, as just finished amb  trial with RN prior to therapist arrival  Pt  however agreeable to therapeutic intervention  Performance of HEP seated in bedside chair with cues provided for proper completion  Progressed with transfers continuing to require A of therapist with cues for hand placement/technique  Noted O2 saturation at 92% on RA prior to amb  trial  Progressed with an amb  trial with use of RW and [place] of therapist with cues for LE sequencing  Limited by fatigue requiring a standing resting period in between trials  Returned to room and repositioned seated in bedside chair  O2 saturation measured seated at 89% on RA, requiring additional time to return to 92%  Informed RN at end of treatment session  PT will continue to recommend d/c to rehab when medically stable for continued improvement of noted impairments above    Barriers to Discharge: Inaccessible home environment, Decreased caregiver support  Barriers to Discharge Comments: [place], level of support at home  Recommendation: Short-term skilled PT     PT - OK to Discharge: Yes(if d/c to rehab when medically stable )    See flowsheet documentation for full assessment

## 2019-03-21 NOTE — PLAN OF CARE
Problem: OCCUPATIONAL THERAPY ADULT  Goal: Performs self-care activities at highest level of function for planned discharge setting  See evaluation for individualized goals  Description  Treatment Interventions: ADL retraining, Functional transfer training, UE strengthening/ROM, Endurance training, Patient/family training, Equipment evaluation/education, Compensatory technique education  Equipment Recommended: (RW)       See flowsheet documentation for full assessment, interventions and recommendations  Outcome: Progressing  Note:   Limitation: Decreased ADL status, Decreased UE strength, Decreased Safe judgement during ADL, Decreased endurance, Decreased high-level ADLs  Prognosis: Good  Assessment: Pt was seen for skilled OT with focus on completion of self care tasks, functional transfers and review of RW safety, energy conservation techs, compensatory breathing and review of current plan of care  Pt with need for Min A overall and cues for appropriate use of RW to complete functional tasks instance  Increased A for LE self care to maintain safe balance with clothing management  Pt may benefit from raised toilet seat or commode over standard height toilet with noted c/o of increased abdominal pain with functional reach  No LOB noted with functional tasks instance  SAT remained at 91% on RA following functional tasks  Pt will benefit from further rehab with focus on achieving optimal performance levels with all functional tasks        OT Discharge Recommendation: Short Term Rehab  OT - OK to Discharge: Yes(to STR when medically stable  )

## 2019-03-22 ENCOUNTER — HOSPITAL ENCOUNTER (INPATIENT)
Facility: HOSPITAL | Age: 49
LOS: 7 days | Discharge: HOME | DRG: 871 | End: 2019-03-29
Payer: COMMERCIAL

## 2019-03-22 VITALS
BODY MASS INDEX: 34.08 KG/M2 | HEIGHT: 68 IN | RESPIRATION RATE: 20 BRPM | HEART RATE: 79 BPM | TEMPERATURE: 98.8 F | SYSTOLIC BLOOD PRESSURE: 124 MMHG | DIASTOLIC BLOOD PRESSURE: 68 MMHG | WEIGHT: 224.87 LBS | OXYGEN SATURATION: 91 %

## 2019-03-22 DIAGNOSIS — E55.9 VITAMIN D INSUFFICIENCY: ICD-10-CM

## 2019-03-22 DIAGNOSIS — Z74.09 IMPAIRED MOBILITY AND ADLS: ICD-10-CM

## 2019-03-22 DIAGNOSIS — A41.9 SEVERE SEPSIS (HCC): ICD-10-CM

## 2019-03-22 DIAGNOSIS — Z78.9 IMPAIRED MOBILITY AND ADLS: ICD-10-CM

## 2019-03-22 DIAGNOSIS — R65.20 SEVERE SEPSIS (HCC): ICD-10-CM

## 2019-03-22 DIAGNOSIS — F10.10 ALCOHOL ABUSE: ICD-10-CM

## 2019-03-22 DIAGNOSIS — D64.9 ANEMIA, UNSPECIFIED TYPE: Primary | ICD-10-CM

## 2019-03-22 DIAGNOSIS — Z91.89 AT RISK FOR COPING DIFFICULTY: ICD-10-CM

## 2019-03-22 PROCEDURE — 97530 THERAPEUTIC ACTIVITIES: CPT

## 2019-03-22 PROCEDURE — 97535 SELF CARE MNGMENT TRAINING: CPT

## 2019-03-22 PROCEDURE — 99238 HOSP IP/OBS DSCHRG MGMT 30/<: CPT | Performed by: INTERNAL MEDICINE

## 2019-03-22 PROCEDURE — 99222 1ST HOSP IP/OBS MODERATE 55: CPT

## 2019-03-22 PROCEDURE — 97165 OT EVAL LOW COMPLEX 30 MIN: CPT

## 2019-03-22 PROCEDURE — 97116 GAIT TRAINING THERAPY: CPT

## 2019-03-22 PROCEDURE — 97162 PT EVAL MOD COMPLEX 30 MIN: CPT

## 2019-03-22 RX ORDER — FUROSEMIDE 20 MG/1
20 TABLET ORAL DAILY
Status: DISCONTINUED | OUTPATIENT
Start: 2019-03-23 | End: 2019-03-22

## 2019-03-22 RX ORDER — FUROSEMIDE 20 MG/1
20 TABLET ORAL DAILY
Status: DISCONTINUED | OUTPATIENT
Start: 2019-03-23 | End: 2019-03-23

## 2019-03-22 RX ORDER — FOLIC ACID 1 MG/1
1 TABLET ORAL DAILY
Status: DISCONTINUED | OUTPATIENT
Start: 2019-03-23 | End: 2019-03-29 | Stop reason: HOSPADM

## 2019-03-22 RX ORDER — POTASSIUM CHLORIDE 20 MEQ/1
20 TABLET, EXTENDED RELEASE ORAL DAILY
Status: DISCONTINUED | OUTPATIENT
Start: 2019-03-23 | End: 2019-03-23

## 2019-03-22 RX ORDER — LANOLIN ALCOHOL/MO/W.PET/CERES
6 CREAM (GRAM) TOPICAL
Status: DISCONTINUED | OUTPATIENT
Start: 2019-03-22 | End: 2019-03-29 | Stop reason: HOSPADM

## 2019-03-22 RX ORDER — PANTOPRAZOLE SODIUM 40 MG/1
40 TABLET, DELAYED RELEASE ORAL
Status: DISCONTINUED | OUTPATIENT
Start: 2019-03-23 | End: 2019-03-29 | Stop reason: HOSPADM

## 2019-03-22 RX ORDER — POTASSIUM CHLORIDE 20 MEQ/1
20 TABLET, EXTENDED RELEASE ORAL DAILY
Status: DISCONTINUED | OUTPATIENT
Start: 2019-03-23 | End: 2019-03-22

## 2019-03-22 RX ORDER — POLYETHYLENE GLYCOL 3350 17 G/17G
17 POWDER, FOR SOLUTION ORAL DAILY PRN
Status: DISCONTINUED | OUTPATIENT
Start: 2019-03-22 | End: 2019-03-29 | Stop reason: HOSPADM

## 2019-03-22 RX ORDER — BISACODYL 10 MG
10 SUPPOSITORY, RECTAL RECTAL DAILY PRN
Status: DISCONTINUED | OUTPATIENT
Start: 2019-03-22 | End: 2019-03-25

## 2019-03-22 RX ORDER — THIAMINE MONONITRATE (VIT B1) 100 MG
100 TABLET ORAL DAILY
Status: DISCONTINUED | OUTPATIENT
Start: 2019-03-23 | End: 2019-03-29 | Stop reason: HOSPADM

## 2019-03-22 RX ORDER — SACCHAROMYCES BOULARDII 250 MG
250 CAPSULE ORAL 2 TIMES DAILY
Status: DISCONTINUED | OUTPATIENT
Start: 2019-03-22 | End: 2019-03-29 | Stop reason: HOSPADM

## 2019-03-22 RX ORDER — ACETAMINOPHEN 325 MG/1
650 TABLET ORAL EVERY 6 HOURS PRN
Status: DISCONTINUED | OUTPATIENT
Start: 2019-03-22 | End: 2019-03-23

## 2019-03-22 RX ADMIN — MELATONIN 6 MG: at 21:42

## 2019-03-22 RX ADMIN — FOLIC ACID 1 MG: 1 TABLET ORAL at 08:27

## 2019-03-22 RX ADMIN — Medication 100 MG: at 08:27

## 2019-03-22 RX ADMIN — Medication 250 MG: at 08:27

## 2019-03-22 NOTE — PLAN OF CARE
Problem: Nutrition/Hydration-ADULT  Goal: Nutrient/Hydration intake appropriate for improving, restoring or maintaining nutritional needs  Description  Monitor and assess patient's nutrition/hydration status for malnutrition (ex- brittle hair, bruises, dry skin, pale skin and conjunctiva, muscle wasting, smooth red tongue, and disorientation)  Collaborate with interdisciplinary team and initiate plan and interventions as ordered  Monitor patient's weight and dietary intake as ordered or per policy  Utilize nutrition screening tool and intervene per policy  Determine patient's food preferences and provide high-protein, high-caloric foods as appropriate       INTERVENTIONS:  - Monitor oral intake, urinary output, labs, and treatment plans  - Assess nutrition and hydration status and recommend course of action  - Evaluate amount of meals eaten  - Assist patient with eating if necessary   - Allow adequate time for meals  - Recommend/ encourage appropriate diets, oral nutritional supplements, and vitamin/mineral supplements  - Order, calculate, and assess calorie counts as needed  - Recommend, monitor, and adjust tube feedings and TPN/PPN based on assessed needs  - Assess need for intravenous fluids  - Provide specific nutrition/hydration education as appropriate  - Include patient/family/caregiver in decisions related to nutrition  Outcome: Adequate for Discharge     Problem: PAIN - ADULT  Goal: Verbalizes/displays adequate comfort level or baseline comfort level  Description  Interventions:  - Encourage patient to monitor pain and request assistance  - Assess pain using appropriate pain scale  - Administer analgesics based on type and severity of pain and evaluate response  - Implement non-pharmacological measures as appropriate and evaluate response  - Consider cultural and social influences on pain and pain management  - Notify physician/advanced practitioner if interventions unsuccessful or patient reports new pain  Outcome: Adequate for Discharge     Problem: INFECTION - ADULT  Goal: Absence or prevention of progression during hospitalization  Description  INTERVENTIONS:  - Assess and monitor for signs and symptoms of infection  - Monitor lab/diagnostic results  - Monitor all insertion sites, i e  indwelling lines, tubes, and drains  - Monitor endotracheal (as able) and nasal secretions for changes in amount and color  - [place] appropriate cooling/warming therapies per order  - Administer medications as ordered  - Instruct and encourage patient and family to use good hand hygiene technique  - Identify and instruct in appropriate isolation precautions for identified infection/condition  Outcome: Adequate for Discharge     Problem: SAFETY ADULT  Goal: Patient will remain free of falls  Description  INTERVENTIONS:  - Assess patient frequently for physical needs  -  Identify cognitive and physical deficits and behaviors that affect risk of falls    -  [place] fall precautions as indicated by assessment   - Educate patient/family on patient safety including physical limitations  - Instruct patient to call for assistance with activity based on assessment  - Modify environment to reduce risk of injury  - Consider OT/PT consult to assist with strengthening/mobility  Outcome: Adequate for Discharge  Goal: Maintain or return to baseline ADL function  Description  INTERVENTIONS:  -  Assess patient's ability to carry out ADLs; assess patient's baseline for ADL function and identify physical deficits which impact ability to perform ADLs (bathing, care of mouth/teeth, toileting, grooming, dressing, etc )  - Assess/evaluate cause of self-care deficits   - Assess range of motion  - Assess patient's mobility; develop plan if impaired  - Assess patient's need for assistive devices and provide as appropriate  - Encourage maximum independence but intervene and supervise when necessary  ¯ Involve family in performance of ADLs  ¯ Assess for home care needs following discharge   ¯ Request OT consult to assist with ADL evaluation and planning for discharge  ¯ Provide patient education as appropriate  Outcome: Adequate for Discharge  Goal: Maintain or return mobility status to optimal level  Description  INTERVENTIONS:  - Assess patient's baseline mobility status (ambulation, transfers, stairs, etc )    - Identify cognitive and physical deficits and behaviors that affect mobility  - Identify mobility aids required to assist with transfers and/or ambulation (gait belt, sit-to-stand, lift, walker, cane, etc )  - [place] fall precautions as indicated by assessment  - Record patient progress and toleration of activity level on Mobility SBAR; progress patient to next Phase/Stage  - Instruct patient to call for assistance with activity based on assessment  - Request Rehabilitation consult to assist with strengthening/weightbearing, etc   Outcome: Adequate for Discharge     Problem: Knowledge Deficit  Goal: Patient/family/caregiver demonstrates understanding of disease process, treatment plan, medications, and discharge instructions  Description  Complete learning assessment and assess knowledge base    Interventions:  - Provide teaching at level of understanding  - Provide teaching via preferred learning methods  Outcome: Adequate for Discharge     Problem: Prexisting or High Potential for Compromised Skin Integrity  Goal: Skin integrity is maintained or improved  Description  INTERVENTIONS:  - Identify patients at risk for skin breakdown  - Assess and monitor skin integrity  - Assess and monitor nutrition and hydration status  - Monitor labs (i e  albumin)  - Assess for incontinence   - Turn and reposition patient  - Assist with mobility/ambulation  - Relieve pressure over bony prominences  - Avoid friction and shearing  - Provide appropriate hygiene as needed including keeping skin clean and dry  - Evaluate need for skin moisturizer/barrier cream  - Collaborate with interdisciplinary team (i e  Nutrition, Rehabilitation, etc )   - Patient/family teaching  Outcome: Adequate for Discharge     Problem: DISCHARGE PLANNING - CARE MANAGEMENT  Goal: Discharge to post-acute care or home with appropriate resources  Description  INTERVENTIONS:  - Conduct assessment to determine patient/family and health care team treatment goals, and need for post-acute services based on payer coverage, community resources, and patient preferences, and barriers to discharge  - Address psychosocial, clinical, and financial barriers to discharge as identified in assessment in conjunction with the patient/family and health care team  - Arrange appropriate level of post-acute services according to patient's   needs and preference and payer coverage in collaboration with the physician and health care team  - Communicate with and update the patient/family, physician, and health care team regarding progress on the discharge plan  - Arrange appropriate transportation to post-acute venues   Outcome: Adequate for Discharge     Problem: Potential for Falls  Goal: Patient will remain free of falls  Description  INTERVENTIONS:  - Assess patient frequently for physical needs  -  Identify cognitive and physical deficits and behaviors that affect risk of falls    -  [place] fall precautions as indicated by assessment   - Educate patient/family on patient safety including physical limitations  - Instruct patient to call for assistance with activity based on assessment  - Modify environment to reduce risk of injury  - Consider OT/PT consult to assist with strengthening/mobility  Outcome: Adequate for Discharge     Problem: CARDIOVASCULAR - ADULT  Goal: Maintains optimal cardiac output and hemodynamic stability  Description  INTERVENTIONS:  - Monitor I/O, vital signs and rhythm  - Monitor for S/S and trends of decreased cardiac output i e  bleeding, hypotension  - Administer and titrate ordered vasoactive medications to optimize hemodynamic stability  - Assess quality of pulses, skin color and temperature  - Assess for signs of decreased coronary artery perfusion - ex   Angina  - Instruct patient to report change in severity of symptoms  Outcome: Adequate for Discharge     Problem: RESPIRATORY - ADULT  Goal: Achieves optimal ventilation and oxygenation  Description  INTERVENTIONS:  - Assess for changes in respiratory status  - Assess for changes in mentation and behavior  - Position to facilitate oxygenation and minimize respiratory effort  - Oxygen administration by appropriate delivery method based on oxygen saturation (per order) or ABGs  - Initiate smoking cessation education as indicated  - Encourage broncho-pulmonary hygiene including cough, deep breathe, Incentive Spirometry  - Assess the need for suctioning and aspirate as needed  - Assess and instruct to report SOB or any respiratory difficulty  - Respiratory Therapy support as indicated  Outcome: Adequate for Discharge     Problem: GENITOURINARY - ADULT  Goal: Maintains or returns to baseline urinary function  Description  INTERVENTIONS:  - Assess urinary function  - Encourage oral fluids to ensure adequate hydration  - Administer IV fluids as ordered to ensure adequate hydration  - Administer ordered medications as needed  - Offer frequent toileting  - Follow urinary retention protocol if ordered  Outcome: Adequate for Discharge  Goal: Urinary catheter remains patent  Description  INTERVENTIONS:  - Assess patency of urinary catheter  - If patient has a chronic rendon, consider changing catheter if non-functioning  - Follow guidelines for intermittent irrigation of non-functioning urinary catheter  Outcome: Adequate for Discharge     Problem: METABOLIC, FLUID AND ELECTROLYTES - ADULT  Goal: Electrolytes maintained within normal limits  Description  INTERVENTIONS:  - Monitor labs and assess patient for signs and symptoms of electrolyte imbalances  - Administer electrolyte replacement as ordered  - Monitor response to electrolyte replacements, including repeat lab results as appropriate  - Instruct patient on fluid and nutrition as appropriate  Outcome: Adequate for Discharge     Problem: SKIN/TISSUE INTEGRITY - ADULT  Goal: Skin integrity remains intact  Description  INTERVENTIONS  - Identify patients at risk for skin breakdown  - Assess and monitor skin integrity  - Assess and monitor nutrition and hydration status  - Monitor labs (i e  albumin)  - Assess for incontinence   - Turn and reposition patient  - Assist with mobility/ambulation  - Relieve pressure over bony prominences  - Avoid friction and shearing  - Provide appropriate hygiene as needed including keeping skin clean and dry  - Evaluate need for skin moisturizer/barrier cream  - Collaborate with interdisciplinary team (i e  Nutrition, Rehabilitation, etc )   - Patient/family teaching  Outcome: Adequate for Discharge  Goal: Incision(s), wounds(s) or drain site(s) healing without S/S of infection  Description  INTERVENTIONS  - Assess and document risk factors for skin impairment   - Assess and document dressing, incision, wound bed, drain sites and surrounding tissue  - Initiate Nutrition services consult and/or wound management as needed  Outcome: Adequate for Discharge  Goal: Oral mucous membranes remain intact  Description  INTERVENTIONS  - Assess oral mucosa and hygiene practices  - Implement preventative oral hygiene regimen  - Implement oral medicated treatments as ordered  - Initiate Nutrition services referral as needed  Outcome: Adequate for Discharge

## 2019-03-22 NOTE — PLAN OF CARE
Problem: Nutrition/Hydration-ADULT  Goal: Nutrient/Hydration intake appropriate for improving, restoring or maintaining nutritional needs  Description  Monitor and assess patient's nutrition/hydration status for malnutrition (ex- brittle hair, bruises, dry skin, pale skin and conjunctiva, muscle wasting, smooth red tongue, and disorientation)  Collaborate with interdisciplinary team and initiate plan and interventions as ordered  Monitor patient's weight and dietary intake as ordered or per policy  Utilize nutrition screening tool and intervene per policy  Determine patient's food preferences and provide high-protein, high-caloric foods as appropriate       INTERVENTIONS:  - Monitor oral intake, urinary output, labs, and treatment plans  - Assess nutrition and hydration status and recommend course of action  - Evaluate amount of meals eaten  - Assist patient with eating if necessary   - Allow adequate time for meals  - Recommend/ encourage appropriate diets, oral nutritional supplements, and vitamin/mineral supplements  - Order, calculate, and assess calorie counts as needed  - Recommend, monitor, and adjust tube feedings and TPN/PPN based on assessed needs  - Assess need for intravenous fluids  - Provide specific nutrition/hydration education as appropriate  - Include patient/family/caregiver in decisions related to nutrition  Outcome: Progressing     Problem: PAIN - ADULT  Goal: Verbalizes/displays adequate comfort level or baseline comfort level  Description  Interventions:  - Encourage patient to monitor pain and request assistance  - Assess pain using appropriate pain scale  - Administer analgesics based on type and severity of pain and evaluate response  - Implement non-pharmacological measures as appropriate and evaluate response  - Consider cultural and social influences on pain and pain management  - Notify physician/advanced practitioner if interventions unsuccessful or patient reports new pain  Outcome: Progressing     Problem: INFECTION - ADULT  Goal: Absence or prevention of progression during hospitalization  Description  INTERVENTIONS:  - Assess and monitor for signs and symptoms of infection  - Monitor lab/diagnostic results  - Monitor all insertion sites, i e  indwelling lines, tubes, and drains  - Monitor endotracheal (as able) and nasal secretions for changes in amount and color  - [place] appropriate cooling/warming therapies per order  - Administer medications as ordered  - Instruct and encourage patient and family to use good hand hygiene technique  - Identify and instruct in appropriate isolation precautions for identified infection/condition  Outcome: Progressing     Problem: SAFETY ADULT  Goal: Patient will remain free of falls  Description  INTERVENTIONS:  - Assess patient frequently for physical needs  -  Identify cognitive and physical deficits and behaviors that affect risk of falls    -  [place] fall precautions as indicated by assessment   - Educate patient/family on patient safety including physical limitations  - Instruct patient to call for assistance with activity based on assessment  - Modify environment to reduce risk of injury  - Consider OT/PT consult to assist with strengthening/mobility  Outcome: Progressing  Goal: Maintain or return to baseline ADL function  Description  INTERVENTIONS:  -  Assess patient's ability to carry out ADLs; assess patient's baseline for ADL function and identify physical deficits which impact ability to perform ADLs (bathing, care of mouth/teeth, toileting, grooming, dressing, etc )  - Assess/evaluate cause of self-care deficits   - Assess range of motion  - Assess patient's mobility; develop plan if impaired  - Assess patient's need for assistive devices and provide as appropriate  - Encourage maximum independence but intervene and supervise when necessary  ¯ Involve family in performance of ADLs  ¯ Assess for home care needs following discharge ¯ Request OT consult to assist with ADL evaluation and planning for discharge  ¯ Provide patient education as appropriate  Outcome: Progressing  Goal: Maintain or return mobility status to optimal level  Description  INTERVENTIONS:  - Assess patient's baseline mobility status (ambulation, transfers, stairs, etc )    - Identify cognitive and physical deficits and behaviors that affect mobility  - Identify mobility aids required to assist with transfers and/or ambulation (gait belt, sit-to-stand, lift, walker, cane, etc )  - [place] fall precautions as indicated by assessment  - Record patient progress and toleration of activity level on Mobility SBAR; progress patient to next Phase/Stage  - Instruct patient to call for assistance with activity based on assessment  - Request Rehabilitation consult to assist with strengthening/weightbearing, etc   Outcome: Progressing     Problem: Knowledge Deficit  Goal: Patient/family/caregiver demonstrates understanding of disease process, treatment plan, medications, and discharge instructions  Description  Complete learning assessment and assess knowledge base    Interventions:  - Provide teaching at level of understanding  - Provide teaching via preferred learning methods  Outcome: Progressing     Problem: Prexisting or High Potential for Compromised Skin Integrity  Goal: Skin integrity is maintained or improved  Description  INTERVENTIONS:  - Identify patients at risk for skin breakdown  - Assess and monitor skin integrity  - Assess and monitor nutrition and hydration status  - Monitor labs (i e  albumin)  - Assess for incontinence   - Turn and reposition patient  - Assist with mobility/ambulation  - Relieve pressure over bony prominences  - Avoid friction and shearing  - Provide appropriate hygiene as needed including keeping skin clean and dry  - Evaluate need for skin moisturizer/barrier cream  - Collaborate with interdisciplinary team (i e  Nutrition, Rehabilitation, etc )   - Patient/family teaching  Outcome: Progressing     Problem: Potential for Falls  Goal: Patient will remain free of falls  Description  INTERVENTIONS:  - Assess patient frequently for physical needs  -  Identify cognitive and physical deficits and behaviors that affect risk of falls  -  [place] fall precautions as indicated by assessment   - Educate patient/family on patient safety including physical limitations  - Instruct patient to call for assistance with activity based on assessment  - Modify environment to reduce risk of injury  - Consider OT/PT consult to assist with strengthening/mobility  Outcome: Progressing     Problem: CARDIOVASCULAR - ADULT  Goal: Maintains optimal cardiac output and hemodynamic stability  Description  INTERVENTIONS:  - Monitor I/O, vital signs and rhythm  - Monitor for S/S and trends of decreased cardiac output i e  bleeding, hypotension  - Administer and titrate ordered vasoactive medications to optimize hemodynamic stability  - Assess quality of pulses, skin color and temperature  - Assess for signs of decreased coronary artery perfusion - ex   Angina  - Instruct patient to report change in severity of symptoms  Outcome: Progressing     Problem: RESPIRATORY - ADULT  Goal: Achieves optimal ventilation and oxygenation  Description  INTERVENTIONS:  - Assess for changes in respiratory status  - Assess for changes in mentation and behavior  - Position to facilitate oxygenation and minimize respiratory effort  - Oxygen administration by appropriate delivery method based on oxygen saturation (per order) or ABGs  - Initiate smoking cessation education as indicated  - Encourage broncho-pulmonary hygiene including cough, deep breathe, Incentive Spirometry  - Assess the need for suctioning and aspirate as needed  - Assess and instruct to report SOB or any respiratory difficulty  - Respiratory Therapy support as indicated  Outcome: Progressing     Problem: GENITOURINARY - ADULT  Goal: Maintains or returns to baseline urinary function  Description  INTERVENTIONS:  - Assess urinary function  - Encourage oral fluids to ensure adequate hydration  - Administer IV fluids as ordered to ensure adequate hydration  - Administer ordered medications as needed  - Offer frequent toileting  - Follow urinary retention protocol if ordered  Outcome: Progressing  Goal: Urinary catheter remains patent  Description  INTERVENTIONS:  - Assess patency of urinary catheter  - If patient has a chronic rendon, consider changing catheter if non-functioning  - Follow guidelines for intermittent irrigation of non-functioning urinary catheter  Outcome: Progressing     Problem: METABOLIC, FLUID AND ELECTROLYTES - ADULT  Goal: Electrolytes maintained within normal limits  Description  INTERVENTIONS:  - Monitor labs and assess patient for signs and symptoms of electrolyte imbalances  - Administer electrolyte replacement as ordered  - Monitor response to electrolyte replacements, including repeat lab results as appropriate  - Instruct patient on fluid and nutrition as appropriate  Outcome: Progressing     Problem: SKIN/TISSUE INTEGRITY - ADULT  Goal: Skin integrity remains intact  Description  INTERVENTIONS  - Identify patients at risk for skin breakdown  - Assess and monitor skin integrity  - Assess and monitor nutrition and hydration status  - Monitor labs (i e  albumin)  - Assess for incontinence   - Turn and reposition patient  - Assist with mobility/ambulation  - Relieve pressure over bony prominences  - Avoid friction and shearing  - Provide appropriate hygiene as needed including keeping skin clean and dry  - Evaluate need for skin moisturizer/barrier cream  - Collaborate with interdisciplinary team (i e  Nutrition, Rehabilitation, etc )   - Patient/family teaching  Outcome: Progressing  Goal: Incision(s), wounds(s) or drain site(s) healing without S/S of infection  Description  INTERVENTIONS  - Assess and document risk factors for skin impairment   - Assess and document dressing, incision, wound bed, drain sites and surrounding tissue  - Initiate Nutrition services consult and/or wound management as needed  Outcome: Progressing  Goal: Oral mucous membranes remain intact  Description  INTERVENTIONS  - Assess oral mucosa and hygiene practices  - Implement preventative oral hygiene regimen  - Implement oral medicated treatments as ordered  - Initiate Nutrition services referral as needed  Outcome: Progressing

## 2019-03-22 NOTE — DISCHARGE SUMMARY
Discharge Summary - [facility] 53 y o  male MRN: [id number]    [facility] 69 2 MED SURG Room / Bed: [place] [address] 2 [facility] 2 M* Encounter: [id number]    BRIEF OVERVIEW    Addendum:  Patient was initially discharged on 03/21/2019:  Due to transportation issues patient's discharge was rescheduled for 3/22:    Admission Date: 2/25/2019       Discharge Date: 3/22/2019     Primary Diagnoses  Principal Problem:    Severe sepsis with septic shock (HCC)  Active Problems:    Heart murmur    Anemia    Failure to thrive in adult    Weight loss    Elevated alkaline phosphatase level    GERD (gastroesophageal reflux disease)    Liver abscess    Appendicitis    Alcohol abuse    Acute retention of urine    Empyema (HCC)  Resolved Problems:    SHANDA (acute kidney injury) ([facility] [address] )    Respiratory failure ([facility] [address] )        Service:  [facility] Internal Medicine, Dr Kalp[facility] Vel[facility]      Consulting Providers   Urology: dr Jm Gorman  Surgery: dr Chris Adame and associates  ENT: dr Lory Ellis  ID: dr Lenin Alejandro; Dr Magali Vásquez Studies:  3/15:  Chest x-ray: Moderate amount of persistent opacification in the right hemithorax with right chest tubes redemonstrated      3/12:  CT chest/abdomen/pelvis:  Hepatic abscess is again noted and is decreased in size however not resolved  Right middle and lower lobe consolidations are again noted with air bronchograms  A pigtail catheter and a pleural drainage catheter noted within the right pleural space   There is a small to moderate right pleural effusion which demonstrates thickened enhancing pleura concerning for an empyema   Dependent atelectasis is noted in the   left lung base with a small effusion   No air bronchograms on the left     Oral contrast was administered; the bowel loops are diffusely dilated throughout the abdomen; favor ileus over obstruction  Moderate ascites    Persistent splenomegaly with an of triangular area of decreased splenic enhancement similar to the prior exam concerning for a splenic infarct  Appendix appears similar to the recent prior exam      3/10:  Chest x-ray:Decreased airspace disease and improved aeration left lung base      3/10:  Chest x-ray:  Persistent bilateral basilar opacities and effusions, right greater than left  Pigtail chest drain on the right unchanged in position  No pneumothorax      3/6:  Chest x-ray:Persistent left basilar consolidation and worsening right basilar consolidation and effusion   No pneumothorax with right chest tube in place     3/4:  CT abdomen/pelvis:  Multiloculated hepatic abscess, significantly decreased in size since February 25, 2019 but not completely collapsed   A pigtail drainage catheter is seen within the central portion of the abscess, the epicenter of which is in the caudate lobe of the   liver  Hepatic cirrhosis   Portal hypertension as evidenced by splenomegaly as well as perisplenic and periceliac varices   Main portal vein, splenic vein, and superior mesenteric veins are patent   Spontaneous splenorenal shunt is again noted  Interval development of extensive mesenteric edema and body wall edema as well as small to moderate volume of ascites, predominantly in the lower abdomen and pelvis  A right-sided chest tube is present and there is loculated hydropneumothorax in the base of the right hemithorax   Compressive atelectasis is noted at right lung base and dependent atelectasis noted left lung base  Enteric tube is noted within the stomach   The region of the gastroesophageal junction is inseparable from the abscess along the undersurface of the caudate lobe of the liver    As on previous examination there is abnormal thickening of the appendix   Although there is periappendiceal edema, this is no more significant than the mesenteric edema seen throughout the abdomen in this patient with interval development of ascites     The possibility that this represents acute appendicitis or possibly mucinous appendiceal neoplasm cannot be excluded and interval clinical and imaging follow-up of this finding is recommended      3/1:  Chest x-ray:  1   Significant improvement in right hemithorax opacification from effusion/atelectasis, this is small-to-moderate on the final image  2   Retrocardiac opacity  3   Appropriate right IJ catheter     2/28:  Chest x-ray:  Right-sided pleural effusion/atelectasis suspected   Superimposed infection could be considered in the appropriate clinical setting     2/25:  CT chest/abdomen/pelvis:  Fluid-filled and dilated appendix with appendiceal wall thickening and periappendiceal fat stranding and fluid, suspicious for acute appendicitis in the appropriate clinical setting  Hepatic cirrhosis and splenomegaly, with associated perisplenic and mesenteric varices suggesting a component of portal hypertension  Enlargement of the caudate lobe which contains an ill-defined and heterogeneous mass lesion with large internal areas of hypodensity and internal septations as described, this measures approximately 10 5 x 9 4 x 11 8 cm in size (axial image 55, series   2)   Primary differential consideration is hepatic abscess in the appropriate clinical setting, although hepatic neoplasm such as hepatocellular carcinoma is also considered   Correlation with the patient's symptoms and laboratory values recommended  Prominent lymph nodes in the upper abdomen and retroperitoneum, largest measures approximately 1 4 cm in size, possibly reactive  Partially distended bladder   Mild circumferential bladder wall thickening noted, probably exaggerated by underdistention   Superimposed cystitis could be considered in the appropriate clinical setting      Microbiology:  3/10:  Blood cultures negative x2  3/6:  Pleural fluid culture:  No growth  2/28 pleural fluid culture:  Negative AFB  2/28:  Right pleural fluid:  No growth  2/28 right pleural fungal culture:  No growth  2/28:  Blood cultures:  Negative x2  2/26:  C diff:  Negative  2/26:  Negative fecal occult blood  2/26:  Negative enteric pathogen PCR  2/25:  Blood cultures negative x2        Procedure:  3/14:  Forehead laceration repair by surgery  3/6 interventional radiology chest tube placement   3/1:  Central line placement  2/28:  Chest tube placement  2/28:  Arterial line placement  2/28:  Intubation  2/28:  Interventional Radiology CT-guided liver abscess drain placement               Results from last 7 days   Lab Units 03/21/19  0539 03/19/19  0530 03/18/19  0840   WBC Thousand/uL 4 01* 3 40* 3 47*   HEMOGLOBIN g/dL 8 5* 8 5* 8 1*   HEMATOCRIT % 26 6* 27 0* 25 8*   PLATELETS Thousands/uL 96* 105* 98*             Results from last 7 days   Lab Units 03/21/19  0539 03/20/19  0503 03/19/19  0530   POTASSIUM mmol/L 3 8 3 8 3 1*   CHLORIDE mmol/L 100 101 99*   CO2 mmol/L 24 27 29   BUN mg/dL 6 6 5   CREATININE mg/dL 0 55* 0 62 0 53*   CALCIUM mg/dL 8 5 8 6 8 3         History and Physical Exam:  Please refer to the Admission H&P note     Hospital Course by Problem  1-status post severe sepsis with septic shock:  Patient initially presented with severe sepsis, with septic shock, and was transferred to the intensive care unit  Link Mancilla had fever, and leukocytosis, and lactic acidosis   He was noted to be hypotensive requiring central line placement, arterial line monitoring, and vasopressor support   Was also electively intubated for airway protection               -Severe sepsis was attributed to the liver abscess as well as the right-sided empyema   Patient clinically improved after drainage of the right lung empyema x2, and drainage of the liver abscess, as well as IV antibiotics              -B)Fever:  Pt had fever with Tmax 101 7 on 3/19  None since  No leukocytosis  since defeversed   Pt denies any new symptoms:  d/w ID  May be dc off abx  No additional fever    No additional signs or symptoms concerning for new infection      2-liver abscess:  Patient was diagnosed with a liver abscess   No clear biliary source identified   Possibly secondary to chronic appendicitis                 -Blood cultures are negative thus far               -IR drainage catheter placed:  Fluid cultures negative thus far              -DGYSUWJ re-evaluated by intervention Radiology on 03/19, the abscess had resolved and his catheter was removed               -per Infectious Disease recommendations:  pt completed  Unasyn for a 3 week course, for duration for empyema:  Last dose 3/20              -has patient's liver abscess had resolved upon recheck by intervention Radiology, and his drainage catheter was removed  Per ID:  He will not require additional antibiotics at discharge  ,   -patient is currently pain-free, and comfortable      3-right-sided empyema:  Patient had chest tubes placed for right-sided empyema   Patient was evaluated by the Infectious Disease team and the right-sided empyema was felt to be secondary to the liver abscess               -chest tubes placed x2, with resolution of the fluids, and chest tubes with subsequent removal               -fluid cultures negative thus far              -per ID:  Patient completed a 3 week course of Unasyn              -per Pulmonary recommendations:  Repeat chest x-ray in 4 weeks to be sure lung infiltrates are resolving, and outpatient pulmonary follow-up   Patient with subsequent right lower lobe consolidation secondary to the prior empyema and instrumentation  -patient currently denies any shortness of breath  Notes previous cough has improved  No new pulmonary symptoms      4-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              -thrombocytopenia:  Platelet count stable without bleeding              -coagulopathy with most recent INR 1 5 on 03/14              -ascites:  Patient diuresed with Lasix and his anasarca improved    Patient wishes the Lasix be discontinued, because at the dose of 20 mg only once daily he still has copious urination all day and all night:  Will hold Lasix temporarily at this time and monitor his fluid status              -[id number]              -NYYV need outpatient GI follow-up              -will refer for outpatient surveillance EGD for varices     5-alcoholism:  Patient with a history of alcoholism prior to admission  Rosemary Bear was maintained on thiamine and folic acid   No current evidence of alcohol withdrawal     6-probable chronic appendicitis:  Patient was evaluated by the surgical team, due to concerns over possible chronic appendicitis, that would be the etiology for the liver abscess               -IYAWODV will ultimately require appendectomy  -follow-up with surgery as an outpatient     7-urologic:  -patient was noted to have urinary retention, and false urethral passage with inability for Raymundo catheter be placed while he was in the ICU  Rosemary Bear had an urgent bedside suprapubic catheter placed during his ICU stay  Mary Kate Goins had inadvertently self removed this catheter               -ABKBDND noted to have urinary retention, and painless, gross hematuria   It was questioned if he had some mild urethral trauma during transfer to the bedside commode                 -Patient was followed by the Urology service   Suprapubic catheter placement was planned, but was canceled as the bladder was not distended               -currently voiding without any difficulty:  Continue follow-up postvoid residual at the short-term rehab center      8-status post acute hypoxic respiratory failure:  Patient is noted to be intubated with acute hypoxic respiratory failure secondary to his empyema as well as severe sepsis with septic shock   Patient was successfully extubated on 03/11               -continue to titrate oxygen as needed:  Patient has since been titrated off his oxygen  He is currently 91-93% on room air    Continue to monitor at the rehab center      9-status post diarrhea:  Patient notes he had prior diarrhea, which has since resolved   Currently tolerating p o  And passing solid bowel movements   C diff is negative   Likely secondary to tube feedings and antibiotic associated diarrhea   Continue probiotics     10-heart murmur:  Patient had a 2D echocardiogram with a left ventricular ejection fraction of 72%   There are no wall motion abnormalities   He had mild tricuspid regurgitation      11-anemia:  Patient was noted to have anemia   He required transfusion with 1 unit of packed red blood cells for hemoglobin of 6 8               -hemoglobin improved into the 8s   Continue to monitor     12-GERD:  Continue proton pump inhibitor     13-status post acute kidney injury:  Patient's baseline creatinine appears to range 0 5-2 6   Patient had acute kidney injury with a creatinine peak of 2 02, likely secondary to sepsis, hypotension, and urinary retention               -acute kidney injury resolved and patient's creatinine has returned to baseline   Is 0 62 today     14-forehead laceration:  Patient is noted to have a forehead laceration on 03/14, when he struck his head on the IV pole   Patient was evaluated by the surgical team who performed laceration repair     15-splenomegaly with probable splenic infarct:  Noted on CT scan      16-status post ileus:  Noted on CT scan:  Incidental finding:  Since resolved   Patient with hypoactive but normal set patch bowel sounds   Tolerating PO   Passing bowel movements     17-hyponatremia:  Likely secondary to anasarca   Continue to monitor on Lasix     18-hypokalemia:  Secondary to Lasix   Repleted and normalized     19-thrombocytopenia:  Likely secondary to cirrhosis     20-family:  Updated patient's  brother at the bedside    Reviewed all test results and treatment plan     Discussed with patient's nurse  Discussed with        VTE Pharmacologic Prophylaxis: Heparin  VTE Mechanical Prophylaxis: sequential compression device     Discharge Condition: Improved  Discharge Disposition: Short-term rehab     Discharge Note and Physical Exam:   Pt denies any pain anywhere  Denies any chest pain, pleuritic chest pain, abdominal pain  Denies any sob  Notes prior cough improved  Denies any dyspnea on exertion  Denies any abdominal pain, nausea, vomiting, diarrhea  He is tolerating p o  Denies any dizziness or lightheadedness  Vitals:    03/22/19 0739   BP: 124/68   Pulse: 79   Resp: 20   Temp: 98 8 °F (37 1 °C)   SpO2: 91%     General:  Pleasant, non-tachypnic, non-dyspnic  Conversant  Brother at the bedside, with patient's permission  Heart: Regular rate and rhythm, S1S2 present  No murmur, rub or gallop  Lungs:  Decreased air movement right base  However no wheezes, crackles, rhonchi  Other lung fields clear to auscultation bilaterally with good air movement  No accessory muscle use or respiratory distress  Abdomen: soft, non-tender, non-distended, NABS  No rebound or guarding  No mass or peritoneal signs  Extremities: no clubbing, cyanosis or edema  2+ pedal pulses bilaterally  Neurologic:  Awake alert  Fluent speech  Moving all 4 extremities  Skin: warm and dry  No petechiae, purpura or rash  Discharge Medications   Please see Medical Reconciliation Discharge Form      Discharge Follow Up Appointments:   Tim Palacio MD: 1 week  GI: Dr Luis E May: 2 weeks  Surgery: Dr Wilian Ball: 2 weeks  PUlmonary: Dr Destinee Mehta: with cxr 4 weeks           Discharge  Statement   Total Time Spent today including physical exam, discussion with patient and his brother, and discharge arrangements/care = 28 minutes      This note has been constructed using a voice recognition system

## 2019-03-23 PROBLEM — Z91.89 AT RISK FOR VENOUS THROMBOEMBOLISM (VTE): Status: ACTIVE | Noted: 2019-03-23

## 2019-03-23 PROBLEM — Z87.09 HISTORY OF PLEURAL EMPYEMA: Status: ACTIVE | Noted: 2019-03-23

## 2019-03-23 PROBLEM — D61.818 PANCYTOPENIA (HCC): Status: ACTIVE | Noted: 2019-03-23

## 2019-03-23 PROBLEM — K70.31 ALCOHOLIC CIRRHOSIS OF LIVER WITH ASCITES (HCC): Status: ACTIVE | Noted: 2019-03-23

## 2019-03-23 PROBLEM — Z87.898 HISTORY OF DIARRHEA: Status: ACTIVE | Noted: 2019-03-23

## 2019-03-23 PROBLEM — R33.9 URINARY RETENTION: Status: ACTIVE | Noted: 2019-03-01

## 2019-03-23 PROBLEM — G89.29 CHRONIC BACK PAIN: Status: ACTIVE | Noted: 2019-03-23

## 2019-03-23 PROBLEM — R17 ELEVATED BILIRUBIN: Status: ACTIVE | Noted: 2019-03-23

## 2019-03-23 PROBLEM — R79.1 ELEVATED INR: Status: ACTIVE | Noted: 2019-03-23

## 2019-03-23 PROBLEM — G47.00 INSOMNIA: Status: ACTIVE | Noted: 2019-03-23

## 2019-03-23 PROBLEM — Z91.89 AT RISK FOR COPING DIFFICULTY: Status: ACTIVE | Noted: 2019-03-23

## 2019-03-23 PROBLEM — Z86.19 HISTORY OF SEPSIS: Status: ACTIVE | Noted: 2019-03-23

## 2019-03-23 PROBLEM — M54.9 CHRONIC BACK PAIN: Status: ACTIVE | Noted: 2019-03-23

## 2019-03-23 PROBLEM — E55.9 VITAMIN D INSUFFICIENCY: Status: ACTIVE | Noted: 2019-03-23

## 2019-03-23 PROBLEM — E87.1 HYPONATREMIA: Status: ACTIVE | Noted: 2019-03-23

## 2019-03-23 LAB
25(OH)D3 SERPL-MCNC: 21.5 NG/ML (ref 30–100)
ALBUMIN SERPL BCP-MCNC: 2.3 G/DL (ref 3.5–5)
ALP SERPL-CCNC: 94 U/L (ref 46–116)
ALT SERPL W P-5'-P-CCNC: 14 U/L (ref 12–78)
ANION GAP SERPL CALCULATED.3IONS-SCNC: 6 MMOL/L (ref 4–13)
AST SERPL W P-5'-P-CCNC: 38 U/L (ref 5–45)
BASOPHILS # BLD AUTO: 0.01 THOUSANDS/ΜL (ref 0–0.1)
BASOPHILS NFR BLD AUTO: 0 % (ref 0–1)
BILIRUB SERPL-MCNC: 1.54 MG/DL (ref 0.2–1)
BUN SERPL-MCNC: 6 MG/DL (ref 5–25)
CALCIUM SERPL-MCNC: 8.3 MG/DL (ref 8.3–10.1)
CHLORIDE SERPL-SCNC: 102 MMOL/L (ref 100–108)
CO2 SERPL-SCNC: 23 MMOL/L (ref 21–32)
CREAT SERPL-MCNC: 0.48 MG/DL (ref 0.6–1.3)
EOSINOPHIL # BLD AUTO: 0.17 THOUSAND/ΜL (ref 0–0.61)
EOSINOPHIL NFR BLD AUTO: 4 % (ref 0–6)
ERYTHROCYTE [DISTWIDTH] IN BLOOD BY AUTOMATED COUNT: 18.1 % (ref 11.6–15.1)
GFR SERPL CREATININE-BSD FRML MDRD: 131 ML/MIN/1.73SQ M
GLUCOSE SERPL-MCNC: 87 MG/DL (ref 65–140)
HCT VFR BLD AUTO: 28 % (ref 36.5–49.3)
HGB BLD-MCNC: 8.9 G/DL (ref 12–17)
IMM GRANULOCYTES # BLD AUTO: 0.04 THOUSAND/UL (ref 0–0.2)
IMM GRANULOCYTES NFR BLD AUTO: 1 % (ref 0–2)
INR PPP: 1.56 (ref 0.86–1.17)
LYMPHOCYTES # BLD AUTO: 0.82 THOUSANDS/ΜL (ref 0.6–4.47)
LYMPHOCYTES NFR BLD AUTO: 18 % (ref 14–44)
MCH RBC QN AUTO: 31 PG (ref 26.8–34.3)
MCHC RBC AUTO-ENTMCNC: 31.8 G/DL (ref 31.4–37.4)
MCV RBC AUTO: 98 FL (ref 82–98)
MONOCYTES # BLD AUTO: 0.56 THOUSAND/ΜL (ref 0.17–1.22)
MONOCYTES NFR BLD AUTO: 12 % (ref 4–12)
NEUTROPHILS # BLD AUTO: 2.95 THOUSANDS/ΜL (ref 1.85–7.62)
NEUTS SEG NFR BLD AUTO: 65 % (ref 43–75)
NRBC BLD AUTO-RTO: 0 /100 WBCS
PLATELET # BLD AUTO: 107 THOUSANDS/UL (ref 149–390)
PMV BLD AUTO: 10.4 FL (ref 8.9–12.7)
POTASSIUM SERPL-SCNC: 3.5 MMOL/L (ref 3.5–5.3)
PROT SERPL-MCNC: 8.3 G/DL (ref 6.4–8.2)
PROTHROMBIN TIME: 18.8 SECONDS (ref 11.8–14.2)
RBC # BLD AUTO: 2.87 MILLION/UL (ref 3.88–5.62)
SODIUM SERPL-SCNC: 131 MMOL/L (ref 136–145)
WBC # BLD AUTO: 4.55 THOUSAND/UL (ref 4.31–10.16)

## 2019-03-23 PROCEDURE — 82306 VITAMIN D 25 HYDROXY: CPT

## 2019-03-23 PROCEDURE — 97530 THERAPEUTIC ACTIVITIES: CPT

## 2019-03-23 PROCEDURE — 92523 SPEECH SOUND LANG COMPREHEN: CPT

## 2019-03-23 PROCEDURE — 97127 HB COGNITIVE SKILLS DEVELOPMENT, EACH 15 MUNUTES: CPT

## 2019-03-23 PROCEDURE — 85025 COMPLETE CBC W/AUTO DIFF WBC: CPT

## 2019-03-23 PROCEDURE — 85610 PROTHROMBIN TIME: CPT

## 2019-03-23 PROCEDURE — 97535 SELF CARE MNGMENT TRAINING: CPT

## 2019-03-23 PROCEDURE — 97112 NEUROMUSCULAR REEDUCATION: CPT

## 2019-03-23 PROCEDURE — 97116 GAIT TRAINING THERAPY: CPT

## 2019-03-23 PROCEDURE — 97110 THERAPEUTIC EXERCISES: CPT

## 2019-03-23 PROCEDURE — G0515 COGNITIVE SKILLS DEVELOPMENT: HCPCS

## 2019-03-23 PROCEDURE — 80053 COMPREHEN METABOLIC PANEL: CPT

## 2019-03-23 RX ORDER — ACETAMINOPHEN 325 MG/1
500 TABLET ORAL EVERY 6 HOURS PRN
Status: DISCONTINUED | OUTPATIENT
Start: 2019-03-23 | End: 2019-03-29 | Stop reason: HOSPADM

## 2019-03-23 RX ORDER — MELATONIN
2000 DAILY
Status: DISCONTINUED | OUTPATIENT
Start: 2019-03-23 | End: 2019-03-29 | Stop reason: HOSPADM

## 2019-03-23 RX ADMIN — VITAMIN D, TAB 1000IU (100/BT) 2000 UNITS: 25 TAB at 12:44

## 2019-03-23 RX ADMIN — Medication 250 MG: at 09:17

## 2019-03-23 RX ADMIN — THIAMINE HCL TAB 100 MG 100 MG: 100 TAB at 09:19

## 2019-03-23 RX ADMIN — MELATONIN 6 MG: at 22:31

## 2019-03-23 RX ADMIN — FOLIC ACID 1 MG: 1 TABLET ORAL at 09:16

## 2019-03-23 RX ADMIN — ENOXAPARIN SODIUM 40 MG: 40 INJECTION SUBCUTANEOUS at 09:16

## 2019-03-23 RX ADMIN — Medication 250 MG: at 17:34

## 2019-03-24 PROCEDURE — 97530 THERAPEUTIC ACTIVITIES: CPT

## 2019-03-24 PROCEDURE — 97112 NEUROMUSCULAR REEDUCATION: CPT

## 2019-03-24 PROCEDURE — G0515 COGNITIVE SKILLS DEVELOPMENT: HCPCS

## 2019-03-24 PROCEDURE — 97127 HB COGNITIVE SKILLS DEVELOPMENT, EACH 15 MUNUTES: CPT

## 2019-03-24 PROCEDURE — 97535 SELF CARE MNGMENT TRAINING: CPT

## 2019-03-24 PROCEDURE — 97110 THERAPEUTIC EXERCISES: CPT

## 2019-03-24 RX ADMIN — Medication 250 MG: at 16:32

## 2019-03-24 RX ADMIN — FOLIC ACID 1 MG: 1 TABLET ORAL at 08:02

## 2019-03-24 RX ADMIN — ACETAMINOPHEN 488 MG: 325 TABLET ORAL at 06:37

## 2019-03-24 RX ADMIN — THIAMINE HCL TAB 100 MG 100 MG: 100 TAB at 08:02

## 2019-03-24 RX ADMIN — MELATONIN 6 MG: at 22:27

## 2019-03-24 RX ADMIN — PANTOPRAZOLE SODIUM 40 MG: 40 TABLET, DELAYED RELEASE ORAL at 06:37

## 2019-03-24 RX ADMIN — ENOXAPARIN SODIUM 40 MG: 40 INJECTION SUBCUTANEOUS at 08:02

## 2019-03-24 RX ADMIN — VITAMIN D, TAB 1000IU (100/BT) 2000 UNITS: 25 TAB at 08:01

## 2019-03-24 RX ADMIN — Medication 250 MG: at 08:02

## 2019-03-25 PROBLEM — K36 CHRONIC APPENDICITIS: Status: ACTIVE | Noted: 2019-02-26

## 2019-03-25 LAB
ANION GAP SERPL CALCULATED.3IONS-SCNC: 5 MMOL/L (ref 4–13)
BASOPHILS # BLD AUTO: 0.01 THOUSANDS/ΜL (ref 0–0.1)
BASOPHILS NFR BLD AUTO: 0 % (ref 0–1)
BUN SERPL-MCNC: 5 MG/DL (ref 5–25)
CALCIUM SERPL-MCNC: 8.1 MG/DL (ref 8.3–10.1)
CHLORIDE SERPL-SCNC: 104 MMOL/L (ref 100–108)
CO2 SERPL-SCNC: 24 MMOL/L (ref 21–32)
CREAT SERPL-MCNC: 0.46 MG/DL (ref 0.6–1.3)
EOSINOPHIL # BLD AUTO: 0.13 THOUSAND/ΜL (ref 0–0.61)
EOSINOPHIL NFR BLD AUTO: 3 % (ref 0–6)
ERYTHROCYTE [DISTWIDTH] IN BLOOD BY AUTOMATED COUNT: 17.9 % (ref 11.6–15.1)
GFR SERPL CREATININE-BSD FRML MDRD: 134 ML/MIN/1.73SQ M
GLUCOSE SERPL-MCNC: 87 MG/DL (ref 65–140)
HCT VFR BLD AUTO: 28.1 % (ref 36.5–49.3)
HGB BLD-MCNC: 8.9 G/DL (ref 12–17)
IMM GRANULOCYTES # BLD AUTO: 0.03 THOUSAND/UL (ref 0–0.2)
IMM GRANULOCYTES NFR BLD AUTO: 1 % (ref 0–2)
LYMPHOCYTES # BLD AUTO: 0.85 THOUSANDS/ΜL (ref 0.6–4.47)
LYMPHOCYTES NFR BLD AUTO: 18 % (ref 14–44)
MCH RBC QN AUTO: 30.9 PG (ref 26.8–34.3)
MCHC RBC AUTO-ENTMCNC: 31.7 G/DL (ref 31.4–37.4)
MCV RBC AUTO: 98 FL (ref 82–98)
MONOCYTES # BLD AUTO: 0.52 THOUSAND/ΜL (ref 0.17–1.22)
MONOCYTES NFR BLD AUTO: 11 % (ref 4–12)
NEUTROPHILS # BLD AUTO: 3.28 THOUSANDS/ΜL (ref 1.85–7.62)
NEUTS SEG NFR BLD AUTO: 67 % (ref 43–75)
NRBC BLD AUTO-RTO: 0 /100 WBCS
PLATELET # BLD AUTO: 108 THOUSANDS/UL (ref 149–390)
PMV BLD AUTO: 9.9 FL (ref 8.9–12.7)
POTASSIUM SERPL-SCNC: 3.8 MMOL/L (ref 3.5–5.3)
RBC # BLD AUTO: 2.88 MILLION/UL (ref 3.88–5.62)
SODIUM SERPL-SCNC: 133 MMOL/L (ref 136–145)
WBC # BLD AUTO: 4.82 THOUSAND/UL (ref 4.31–10.16)

## 2019-03-25 PROCEDURE — 97116 GAIT TRAINING THERAPY: CPT

## 2019-03-25 PROCEDURE — 85025 COMPLETE CBC W/AUTO DIFF WBC: CPT | Performed by: PHYSICIAN ASSISTANT

## 2019-03-25 PROCEDURE — 80048 BASIC METABOLIC PNL TOTAL CA: CPT | Performed by: PHYSICIAN ASSISTANT

## 2019-03-25 PROCEDURE — 97112 NEUROMUSCULAR REEDUCATION: CPT

## 2019-03-25 PROCEDURE — 97530 THERAPEUTIC ACTIVITIES: CPT

## 2019-03-25 PROCEDURE — 97110 THERAPEUTIC EXERCISES: CPT

## 2019-03-25 PROCEDURE — 99232 SBSQ HOSP IP/OBS MODERATE 35: CPT

## 2019-03-25 PROCEDURE — 97535 SELF CARE MNGMENT TRAINING: CPT

## 2019-03-25 RX ORDER — BISACODYL 10 MG
10 SUPPOSITORY, RECTAL RECTAL DAILY PRN
Status: DISCONTINUED | OUTPATIENT
Start: 2019-03-25 | End: 2019-03-29 | Stop reason: HOSPADM

## 2019-03-25 RX ADMIN — FOLIC ACID 1 MG: 1 TABLET ORAL at 10:11

## 2019-03-25 RX ADMIN — VITAMIN D, TAB 1000IU (100/BT) 2000 UNITS: 25 TAB at 10:11

## 2019-03-25 RX ADMIN — PANTOPRAZOLE SODIUM 40 MG: 40 TABLET, DELAYED RELEASE ORAL at 06:35

## 2019-03-25 RX ADMIN — THIAMINE HCL TAB 100 MG 100 MG: 100 TAB at 10:14

## 2019-03-25 RX ADMIN — Medication 250 MG: at 17:43

## 2019-03-25 RX ADMIN — MELATONIN 6 MG: at 21:55

## 2019-03-25 RX ADMIN — Medication 250 MG: at 10:10

## 2019-03-25 RX ADMIN — ENOXAPARIN SODIUM 40 MG: 40 INJECTION SUBCUTANEOUS at 10:11

## 2019-03-26 PROCEDURE — G0515 COGNITIVE SKILLS DEVELOPMENT: HCPCS

## 2019-03-26 PROCEDURE — G0515 COGNITIVE SKILLS DEVELOPMENT: HCPCS | Performed by: OCCUPATIONAL THERAPY ASSISTANT

## 2019-03-26 PROCEDURE — 97110 THERAPEUTIC EXERCISES: CPT | Performed by: OCCUPATIONAL THERAPY ASSISTANT

## 2019-03-26 PROCEDURE — 99232 SBSQ HOSP IP/OBS MODERATE 35: CPT

## 2019-03-26 PROCEDURE — 97530 THERAPEUTIC ACTIVITIES: CPT

## 2019-03-26 PROCEDURE — 97530 THERAPEUTIC ACTIVITIES: CPT | Performed by: OCCUPATIONAL THERAPY ASSISTANT

## 2019-03-26 PROCEDURE — 97110 THERAPEUTIC EXERCISES: CPT

## 2019-03-26 PROCEDURE — 97112 NEUROMUSCULAR REEDUCATION: CPT

## 2019-03-26 PROCEDURE — 97116 GAIT TRAINING THERAPY: CPT

## 2019-03-26 RX ORDER — DEXTROSE MONOHYDRATE 25 G/50ML
25 INJECTION, SOLUTION INTRAVENOUS ONCE
Status: DISCONTINUED | OUTPATIENT
Start: 2019-03-26 | End: 2019-03-26

## 2019-03-26 RX ADMIN — PANTOPRAZOLE SODIUM 40 MG: 40 TABLET, DELAYED RELEASE ORAL at 05:53

## 2019-03-26 RX ADMIN — ENOXAPARIN SODIUM 40 MG: 40 INJECTION SUBCUTANEOUS at 08:13

## 2019-03-26 RX ADMIN — Medication 250 MG: at 17:26

## 2019-03-26 RX ADMIN — MELATONIN 6 MG: at 21:38

## 2019-03-26 RX ADMIN — FOLIC ACID 1 MG: 1 TABLET ORAL at 08:10

## 2019-03-26 RX ADMIN — Medication 250 MG: at 08:13

## 2019-03-26 RX ADMIN — VITAMIN D, TAB 1000IU (100/BT) 2000 UNITS: 25 TAB at 08:11

## 2019-03-26 RX ADMIN — THIAMINE HCL TAB 100 MG 100 MG: 100 TAB at 08:10

## 2019-03-27 PROCEDURE — 97530 THERAPEUTIC ACTIVITIES: CPT

## 2019-03-27 PROCEDURE — 97110 THERAPEUTIC EXERCISES: CPT

## 2019-03-27 PROCEDURE — 97116 GAIT TRAINING THERAPY: CPT

## 2019-03-27 PROCEDURE — 99232 SBSQ HOSP IP/OBS MODERATE 35: CPT

## 2019-03-27 PROCEDURE — 97112 NEUROMUSCULAR REEDUCATION: CPT

## 2019-03-27 RX ADMIN — ENOXAPARIN SODIUM 40 MG: 40 INJECTION SUBCUTANEOUS at 08:34

## 2019-03-27 RX ADMIN — PANTOPRAZOLE SODIUM 40 MG: 40 TABLET, DELAYED RELEASE ORAL at 05:22

## 2019-03-27 RX ADMIN — VITAMIN D, TAB 1000IU (100/BT) 2000 UNITS: 25 TAB at 08:35

## 2019-03-27 RX ADMIN — Medication 250 MG: at 08:35

## 2019-03-27 RX ADMIN — Medication 250 MG: at 17:22

## 2019-03-27 RX ADMIN — MELATONIN 6 MG: at 21:16

## 2019-03-27 RX ADMIN — FOLIC ACID 1 MG: 1 TABLET ORAL at 08:35

## 2019-03-27 RX ADMIN — THIAMINE HCL TAB 100 MG 100 MG: 100 TAB at 08:35

## 2019-03-28 LAB
ALBUMIN SERPL BCP-MCNC: 2.4 G/DL (ref 3.5–5)
ALP SERPL-CCNC: 129 U/L (ref 46–116)
ALT SERPL W P-5'-P-CCNC: 12 U/L (ref 12–78)
ANION GAP SERPL CALCULATED.3IONS-SCNC: 6 MMOL/L (ref 4–13)
AST SERPL W P-5'-P-CCNC: 35 U/L (ref 5–45)
BASOPHILS # BLD AUTO: 0.01 THOUSANDS/ΜL (ref 0–0.1)
BASOPHILS NFR BLD AUTO: 0 % (ref 0–1)
BILIRUB DIRECT SERPL-MCNC: 0.85 MG/DL (ref 0–0.2)
BILIRUB SERPL-MCNC: 1.31 MG/DL (ref 0.2–1)
BUN SERPL-MCNC: 6 MG/DL (ref 5–25)
CALCIUM SERPL-MCNC: 8.6 MG/DL (ref 8.3–10.1)
CHLORIDE SERPL-SCNC: 104 MMOL/L (ref 100–108)
CO2 SERPL-SCNC: 25 MMOL/L (ref 21–32)
CREAT SERPL-MCNC: 0.49 MG/DL (ref 0.6–1.3)
EOSINOPHIL # BLD AUTO: 0.15 THOUSAND/ΜL (ref 0–0.61)
EOSINOPHIL NFR BLD AUTO: 4 % (ref 0–6)
ERYTHROCYTE [DISTWIDTH] IN BLOOD BY AUTOMATED COUNT: 17.2 % (ref 11.6–15.1)
GFR SERPL CREATININE-BSD FRML MDRD: 130 ML/MIN/1.73SQ M
GLUCOSE SERPL-MCNC: 82 MG/DL (ref 65–140)
HCT VFR BLD AUTO: 27.3 % (ref 36.5–49.3)
HGB BLD-MCNC: 8.7 G/DL (ref 12–17)
IMM GRANULOCYTES # BLD AUTO: 0.02 THOUSAND/UL (ref 0–0.2)
IMM GRANULOCYTES NFR BLD AUTO: 1 % (ref 0–2)
INR PPP: 1.35 (ref 0.86–1.17)
LYMPHOCYTES # BLD AUTO: 0.78 THOUSANDS/ΜL (ref 0.6–4.47)
LYMPHOCYTES NFR BLD AUTO: 19 % (ref 14–44)
MCH RBC QN AUTO: 31.2 PG (ref 26.8–34.3)
MCHC RBC AUTO-ENTMCNC: 31.9 G/DL (ref 31.4–37.4)
MCV RBC AUTO: 98 FL (ref 82–98)
MONOCYTES # BLD AUTO: 0.42 THOUSAND/ΜL (ref 0.17–1.22)
MONOCYTES NFR BLD AUTO: 10 % (ref 4–12)
NEUTROPHILS # BLD AUTO: 2.66 THOUSANDS/ΜL (ref 1.85–7.62)
NEUTS SEG NFR BLD AUTO: 66 % (ref 43–75)
NRBC BLD AUTO-RTO: 0 /100 WBCS
PLATELET # BLD AUTO: 108 THOUSANDS/UL (ref 149–390)
PMV BLD AUTO: 9.9 FL (ref 8.9–12.7)
POTASSIUM SERPL-SCNC: 3.5 MMOL/L (ref 3.5–5.3)
PROT SERPL-MCNC: 8.5 G/DL (ref 6.4–8.2)
PROTHROMBIN TIME: 16.8 SECONDS (ref 11.8–14.2)
RBC # BLD AUTO: 2.79 MILLION/UL (ref 3.88–5.62)
SODIUM SERPL-SCNC: 135 MMOL/L (ref 136–145)
WBC # BLD AUTO: 4.04 THOUSAND/UL (ref 4.31–10.16)

## 2019-03-28 PROCEDURE — 97535 SELF CARE MNGMENT TRAINING: CPT

## 2019-03-28 PROCEDURE — 85610 PROTHROMBIN TIME: CPT

## 2019-03-28 PROCEDURE — 97110 THERAPEUTIC EXERCISES: CPT

## 2019-03-28 PROCEDURE — 80076 HEPATIC FUNCTION PANEL: CPT

## 2019-03-28 PROCEDURE — 99232 SBSQ HOSP IP/OBS MODERATE 35: CPT

## 2019-03-28 PROCEDURE — 80048 BASIC METABOLIC PNL TOTAL CA: CPT | Performed by: NURSE PRACTITIONER

## 2019-03-28 PROCEDURE — 85025 COMPLETE CBC W/AUTO DIFF WBC: CPT | Performed by: NURSE PRACTITIONER

## 2019-03-28 PROCEDURE — 97530 THERAPEUTIC ACTIVITIES: CPT

## 2019-03-28 PROCEDURE — 97116 GAIT TRAINING THERAPY: CPT

## 2019-03-28 RX ADMIN — FOLIC ACID 1 MG: 1 TABLET ORAL at 08:54

## 2019-03-28 RX ADMIN — THIAMINE HCL TAB 100 MG 100 MG: 100 TAB at 08:54

## 2019-03-28 RX ADMIN — Medication 250 MG: at 08:54

## 2019-03-28 RX ADMIN — ENOXAPARIN SODIUM 40 MG: 40 INJECTION SUBCUTANEOUS at 08:54

## 2019-03-28 RX ADMIN — VITAMIN D, TAB 1000IU (100/BT) 2000 UNITS: 25 TAB at 08:54

## 2019-03-28 RX ADMIN — MELATONIN 6 MG: at 22:02

## 2019-03-28 RX ADMIN — PANTOPRAZOLE SODIUM 40 MG: 40 TABLET, DELAYED RELEASE ORAL at 05:49

## 2019-03-28 RX ADMIN — Medication 250 MG: at 19:09

## 2019-03-29 VITALS
OXYGEN SATURATION: 92 % | BODY MASS INDEX: 30.74 KG/M2 | WEIGHT: 202.82 LBS | TEMPERATURE: 98.5 F | DIASTOLIC BLOOD PRESSURE: 67 MMHG | SYSTOLIC BLOOD PRESSURE: 125 MMHG | HEART RATE: 81 BPM | RESPIRATION RATE: 18 BRPM | HEIGHT: 68 IN

## 2019-03-29 PROBLEM — Z87.898 HISTORY OF DIARRHEA: Status: RESOLVED | Noted: 2019-03-23 | Resolved: 2019-03-29

## 2019-03-29 PROBLEM — F10.11 HISTORY OF ALCOHOL ABUSE: Status: ACTIVE | Noted: 2019-02-26

## 2019-03-29 PROBLEM — G47.00 INSOMNIA: Status: RESOLVED | Noted: 2019-03-23 | Resolved: 2019-03-29

## 2019-03-29 PROBLEM — R62.7 FAILURE TO THRIVE IN ADULT: Status: RESOLVED | Noted: 2019-02-25 | Resolved: 2019-03-29

## 2019-03-29 PROBLEM — R05.9 COUGH: Status: RESOLVED | Noted: 2019-02-06 | Resolved: 2019-03-29

## 2019-03-29 PROBLEM — Z87.898 HISTORY OF URINARY RETENTION: Status: ACTIVE | Noted: 2019-03-01

## 2019-03-29 PROBLEM — Z91.89 AT RISK FOR VENOUS THROMBOEMBOLISM (VTE): Status: RESOLVED | Noted: 2019-03-23 | Resolved: 2019-03-29

## 2019-03-29 PROCEDURE — 99239 HOSP IP/OBS DSCHRG MGMT >30: CPT

## 2019-03-29 RX ORDER — PANTOPRAZOLE SODIUM 40 MG/1
40 TABLET, DELAYED RELEASE ORAL
Qty: 30 TABLET | Refills: 1 | Status: SHIPPED | OUTPATIENT
Start: 2019-03-29 | End: 2019-04-03 | Stop reason: ALTCHOICE

## 2019-03-29 RX ORDER — CHOLECALCIFEROL (VITAMIN D3) 125 MCG
5 CAPSULE ORAL
Qty: 30 TABLET | Refills: 0
Start: 2019-03-29 | End: 2021-04-28 | Stop reason: ALTCHOICE

## 2019-03-29 RX ADMIN — Medication 250 MG: at 08:31

## 2019-03-29 RX ADMIN — VITAMIN D, TAB 1000IU (100/BT) 2000 UNITS: 25 TAB at 08:31

## 2019-03-29 RX ADMIN — THIAMINE HCL TAB 100 MG 100 MG: 100 TAB at 08:32

## 2019-03-29 RX ADMIN — FOLIC ACID 1 MG: 1 TABLET ORAL at 08:32

## 2019-04-01 ENCOUNTER — TRANSITIONAL CARE MANAGEMENT (OUTPATIENT)
Dept: FAMILY MEDICINE CLINIC | Facility: CLINIC | Age: 49
End: 2019-04-01

## 2019-04-01 LAB — FUNGUS SPEC CULT: NORMAL

## 2019-04-02 ENCOUNTER — TELEPHONE (OUTPATIENT)
Dept: NEUROLOGY | Facility: CLINIC | Age: 49
End: 2019-04-02

## 2019-04-03 ENCOUNTER — EVALUATION (OUTPATIENT)
Dept: PHYSICAL THERAPY | Facility: MEDICAL CENTER | Age: 49
End: 2019-04-03
Payer: COMMERCIAL

## 2019-04-03 ENCOUNTER — OFFICE VISIT (OUTPATIENT)
Dept: FAMILY MEDICINE CLINIC | Facility: CLINIC | Age: 49
End: 2019-04-03

## 2019-04-03 VITALS
HEART RATE: 88 BPM | HEIGHT: 68 IN | SYSTOLIC BLOOD PRESSURE: 136 MMHG | DIASTOLIC BLOOD PRESSURE: 76 MMHG | OXYGEN SATURATION: 97 % | TEMPERATURE: 98.4 F | BODY MASS INDEX: 29.7 KG/M2 | WEIGHT: 196 LBS

## 2019-04-03 DIAGNOSIS — K70.31 ALCOHOLIC CIRRHOSIS OF LIVER WITH ASCITES (HCC): ICD-10-CM

## 2019-04-03 DIAGNOSIS — R79.1 ELEVATED INR: ICD-10-CM

## 2019-04-03 DIAGNOSIS — Z74.09 IMPAIRED MOBILITY AND ADLS: ICD-10-CM

## 2019-04-03 DIAGNOSIS — Z78.9 IMPAIRED MOBILITY AND ADLS: ICD-10-CM

## 2019-04-03 DIAGNOSIS — K36 CHRONIC APPENDICITIS: ICD-10-CM

## 2019-04-03 DIAGNOSIS — R33.9 URINARY RETENTION: ICD-10-CM

## 2019-04-03 DIAGNOSIS — Z87.09 HISTORY OF PLEURAL EMPYEMA: ICD-10-CM

## 2019-04-03 DIAGNOSIS — Z87.898 HISTORY OF URINARY RETENTION: ICD-10-CM

## 2019-04-03 DIAGNOSIS — Z74.09 IMPAIRED MOBILITY AND ADLS: Primary | ICD-10-CM

## 2019-04-03 DIAGNOSIS — E55.9 VITAMIN D INSUFFICIENCY: ICD-10-CM

## 2019-04-03 DIAGNOSIS — K35.33 APPENDICITIS WITH ABSCESS: ICD-10-CM

## 2019-04-03 DIAGNOSIS — Z78.9 IMPAIRED MOBILITY AND ADLS: Primary | ICD-10-CM

## 2019-04-03 DIAGNOSIS — Z86.19 HISTORY OF SEPSIS: ICD-10-CM

## 2019-04-03 DIAGNOSIS — K75.0 HEPATIC ABSCESS: Primary | ICD-10-CM

## 2019-04-03 DIAGNOSIS — D61.818 PANCYTOPENIA (HCC): Primary | ICD-10-CM

## 2019-04-03 DIAGNOSIS — E87.1 HYPONATREMIA: ICD-10-CM

## 2019-04-03 DIAGNOSIS — K21.9 GASTROESOPHAGEAL REFLUX DISEASE WITHOUT ESOPHAGITIS: ICD-10-CM

## 2019-04-03 DIAGNOSIS — D64.9 ANEMIA, UNSPECIFIED TYPE: ICD-10-CM

## 2019-04-03 DIAGNOSIS — K75.0 HEPATIC ABSCESS: ICD-10-CM

## 2019-04-03 DIAGNOSIS — D61.818 PANCYTOPENIA (HCC): ICD-10-CM

## 2019-04-03 PROBLEM — Z91.89 AT RISK FOR COPING DIFFICULTY: Status: RESOLVED | Noted: 2019-03-23 | Resolved: 2019-04-03

## 2019-04-03 PROBLEM — R63.4 WEIGHT LOSS: Status: RESOLVED | Noted: 2019-02-25 | Resolved: 2019-04-03

## 2019-04-03 PROCEDURE — 97112 NEUROMUSCULAR REEDUCATION: CPT | Performed by: PHYSICAL THERAPIST

## 2019-04-03 PROCEDURE — 97162 PT EVAL MOD COMPLEX 30 MIN: CPT | Performed by: PHYSICAL THERAPIST

## 2019-04-03 RX ORDER — OMEPRAZOLE 20 MG/1
40 CAPSULE, DELAYED RELEASE ORAL DAILY
Start: 2019-04-03 | End: 2019-04-29 | Stop reason: ALTCHOICE

## 2019-04-04 ENCOUNTER — PATIENT OUTREACH (OUTPATIENT)
Dept: FAMILY MEDICINE CLINIC | Facility: CLINIC | Age: 49
End: 2019-04-04

## 2019-04-04 DIAGNOSIS — Z78.9 NEED FOR FOLLOW-UP BY SOCIAL WORKER: Primary | ICD-10-CM

## 2019-04-05 ENCOUNTER — TELEPHONE (OUTPATIENT)
Dept: UROLOGY | Facility: MEDICAL CENTER | Age: 49
End: 2019-04-05

## 2019-04-05 ENCOUNTER — PATIENT OUTREACH (OUTPATIENT)
Dept: CASE MANAGEMENT | Facility: OTHER | Age: 49
End: 2019-04-05

## 2019-04-10 ENCOUNTER — OFFICE VISIT (OUTPATIENT)
Dept: PHYSICAL THERAPY | Facility: MEDICAL CENTER | Age: 49
End: 2019-04-10
Payer: COMMERCIAL

## 2019-04-10 DIAGNOSIS — Z78.9 IMPAIRED MOBILITY AND ADLS: Primary | ICD-10-CM

## 2019-04-10 DIAGNOSIS — Z74.09 IMPAIRED MOBILITY AND ADLS: Primary | ICD-10-CM

## 2019-04-10 PROBLEM — R33.9 URINARY RETENTION: Status: ACTIVE | Noted: 2019-04-10

## 2019-04-10 PROCEDURE — 97110 THERAPEUTIC EXERCISES: CPT | Performed by: PHYSICAL THERAPIST

## 2019-04-11 ENCOUNTER — APPOINTMENT (OUTPATIENT)
Dept: RADIOLOGY | Facility: MEDICAL CENTER | Age: 49
End: 2019-04-11

## 2019-04-11 ENCOUNTER — OFFICE VISIT (OUTPATIENT)
Dept: UROLOGY | Facility: CLINIC | Age: 49
End: 2019-04-11
Payer: COMMERCIAL

## 2019-04-11 VITALS
SYSTOLIC BLOOD PRESSURE: 140 MMHG | DIASTOLIC BLOOD PRESSURE: 80 MMHG | HEIGHT: 68 IN | HEART RATE: 83 BPM | BODY MASS INDEX: 29.55 KG/M2 | WEIGHT: 195 LBS

## 2019-04-11 DIAGNOSIS — Z87.09 HISTORY OF PLEURAL EMPYEMA: ICD-10-CM

## 2019-04-11 DIAGNOSIS — R33.9 URINARY RETENTION: Primary | ICD-10-CM

## 2019-04-11 LAB — POST-VOID RESIDUAL VOLUME, ML POC: 17 ML

## 2019-04-11 PROCEDURE — 51798 US URINE CAPACITY MEASURE: CPT | Performed by: PHYSICIAN ASSISTANT

## 2019-04-11 PROCEDURE — 71046 X-RAY EXAM CHEST 2 VIEWS: CPT

## 2019-04-11 PROCEDURE — 99213 OFFICE O/P EST LOW 20 MIN: CPT | Performed by: PHYSICIAN ASSISTANT

## 2019-04-12 ENCOUNTER — APPOINTMENT (OUTPATIENT)
Dept: LAB | Facility: MEDICAL CENTER | Age: 49
End: 2019-04-12

## 2019-04-12 DIAGNOSIS — D61.818 PANCYTOPENIA (HCC): ICD-10-CM

## 2019-04-12 DIAGNOSIS — K70.31 ALCOHOLIC CIRRHOSIS OF LIVER WITH ASCITES (HCC): ICD-10-CM

## 2019-04-12 DIAGNOSIS — E87.1 HYPONATREMIA: ICD-10-CM

## 2019-04-12 DIAGNOSIS — Z86.19 HISTORY OF SEPSIS: ICD-10-CM

## 2019-04-12 DIAGNOSIS — K75.0 HEPATIC ABSCESS: ICD-10-CM

## 2019-04-12 DIAGNOSIS — E55.9 VITAMIN D INSUFFICIENCY: ICD-10-CM

## 2019-04-12 DIAGNOSIS — D64.9 ANEMIA, UNSPECIFIED TYPE: ICD-10-CM

## 2019-04-12 DIAGNOSIS — R79.1 ELEVATED INR: ICD-10-CM

## 2019-04-12 LAB
25(OH)D3 SERPL-MCNC: 31.2 NG/ML (ref 30–100)
ALBUMIN SERPL BCP-MCNC: 3.1 G/DL (ref 3.5–5)
ALP SERPL-CCNC: 161 U/L (ref 46–116)
ALT SERPL W P-5'-P-CCNC: 21 U/L (ref 12–78)
ANION GAP SERPL CALCULATED.3IONS-SCNC: 7 MMOL/L (ref 4–13)
AST SERPL W P-5'-P-CCNC: 56 U/L (ref 5–45)
BASOPHILS # BLD AUTO: 0.03 THOUSANDS/ΜL (ref 0–0.1)
BASOPHILS NFR BLD AUTO: 1 % (ref 0–1)
BILIRUB SERPL-MCNC: 1.46 MG/DL (ref 0.2–1)
BUN SERPL-MCNC: 7 MG/DL (ref 5–25)
CALCIUM SERPL-MCNC: 9.6 MG/DL (ref 8.3–10.1)
CHLORIDE SERPL-SCNC: 105 MMOL/L (ref 100–108)
CO2 SERPL-SCNC: 25 MMOL/L (ref 21–32)
CREAT SERPL-MCNC: 0.62 MG/DL (ref 0.6–1.3)
EOSINOPHIL # BLD AUTO: 0.2 THOUSAND/ΜL (ref 0–0.61)
EOSINOPHIL NFR BLD AUTO: 7 % (ref 0–6)
ERYTHROCYTE [DISTWIDTH] IN BLOOD BY AUTOMATED COUNT: 16.2 % (ref 11.6–15.1)
FERRITIN SERPL-MCNC: 863 NG/ML (ref 8–388)
FOLATE SERPL-MCNC: >20 NG/ML (ref 3.1–17.5)
GFR SERPL CREATININE-BSD FRML MDRD: 118 ML/MIN/1.73SQ M
GLUCOSE SERPL-MCNC: 88 MG/DL (ref 65–140)
HCT VFR BLD AUTO: 34.9 % (ref 36.5–49.3)
HGB BLD-MCNC: 11.1 G/DL (ref 12–17)
IMM GRANULOCYTES # BLD AUTO: 0.02 THOUSAND/UL (ref 0–0.2)
IMM GRANULOCYTES NFR BLD AUTO: 1 % (ref 0–2)
INR PPP: 1.21 (ref 0.86–1.17)
IRON SERPL-MCNC: 98 UG/DL (ref 65–175)
LYMPHOCYTES # BLD AUTO: 0.67 THOUSANDS/ΜL (ref 0.6–4.47)
LYMPHOCYTES NFR BLD AUTO: 22 % (ref 14–44)
MCH RBC QN AUTO: 31.2 PG (ref 26.8–34.3)
MCHC RBC AUTO-ENTMCNC: 31.8 G/DL (ref 31.4–37.4)
MCV RBC AUTO: 98 FL (ref 82–98)
MONOCYTES # BLD AUTO: 0.32 THOUSAND/ΜL (ref 0.17–1.22)
MONOCYTES NFR BLD AUTO: 11 % (ref 4–12)
NEUTROPHILS # BLD AUTO: 1.8 THOUSANDS/ΜL (ref 1.85–7.62)
NEUTS SEG NFR BLD AUTO: 58 % (ref 43–75)
NRBC BLD AUTO-RTO: 0 /100 WBCS
PLATELET # BLD AUTO: 101 THOUSANDS/UL (ref 149–390)
PMV BLD AUTO: 10.1 FL (ref 8.9–12.7)
POTASSIUM SERPL-SCNC: 3.8 MMOL/L (ref 3.5–5.3)
PROT SERPL-MCNC: 9.7 G/DL (ref 6.4–8.2)
PROTHROMBIN TIME: 15.4 SECONDS (ref 11.8–14.2)
RBC # BLD AUTO: 3.56 MILLION/UL (ref 3.88–5.62)
SODIUM SERPL-SCNC: 137 MMOL/L (ref 136–145)
TIBC SERPL-MCNC: 188 UG/DL (ref 250–450)
VIT B12 SERPL-MCNC: 1321 PG/ML (ref 100–900)
WBC # BLD AUTO: 3.04 THOUSAND/UL (ref 4.31–10.16)

## 2019-04-12 PROCEDURE — 83540 ASSAY OF IRON: CPT

## 2019-04-12 PROCEDURE — 82746 ASSAY OF FOLIC ACID SERUM: CPT | Performed by: FAMILY MEDICINE

## 2019-04-12 PROCEDURE — 83550 IRON BINDING TEST: CPT

## 2019-04-12 PROCEDURE — 80053 COMPREHEN METABOLIC PANEL: CPT

## 2019-04-12 PROCEDURE — 36415 COLL VENOUS BLD VENIPUNCTURE: CPT

## 2019-04-12 PROCEDURE — 85610 PROTHROMBIN TIME: CPT

## 2019-04-12 PROCEDURE — 82728 ASSAY OF FERRITIN: CPT

## 2019-04-12 PROCEDURE — 82306 VITAMIN D 25 HYDROXY: CPT

## 2019-04-12 PROCEDURE — 82607 VITAMIN B-12: CPT

## 2019-04-12 PROCEDURE — 85025 COMPLETE CBC W/AUTO DIFF WBC: CPT

## 2019-04-16 LAB
MYCOBACTERIUM SPEC CULT: NORMAL
RHODAMINE-AURAMINE STN SPEC: NORMAL

## 2019-04-17 ENCOUNTER — APPOINTMENT (OUTPATIENT)
Dept: PHYSICAL THERAPY | Facility: MEDICAL CENTER | Age: 49
End: 2019-04-17
Payer: COMMERCIAL

## 2019-04-18 ENCOUNTER — TELEPHONE (OUTPATIENT)
Dept: GASTROENTEROLOGY | Facility: CLINIC | Age: 49
End: 2019-04-18

## 2019-04-18 ENCOUNTER — TELEPHONE (OUTPATIENT)
Dept: GASTROENTEROLOGY | Facility: MEDICAL CENTER | Age: 49
End: 2019-04-18
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
## 2019-04-23 ENCOUNTER — CONSULT (OUTPATIENT)
Dept: SURGERY | Facility: MEDICAL CENTER | Age: 49
End: 2019-04-23
Payer: COMMERCIAL

## 2019-04-23 VITALS
DIASTOLIC BLOOD PRESSURE: 92 MMHG | HEIGHT: 68 IN | RESPIRATION RATE: 16 BRPM | TEMPERATURE: 98.1 F | WEIGHT: 195 LBS | BODY MASS INDEX: 29.55 KG/M2 | SYSTOLIC BLOOD PRESSURE: 150 MMHG | HEART RATE: 80 BPM

## 2019-04-23 DIAGNOSIS — K75.0 HEPATIC ABSCESS: ICD-10-CM

## 2019-04-23 DIAGNOSIS — K35.33 APPENDICITIS WITH ABSCESS: Primary | ICD-10-CM

## 2019-04-23 DIAGNOSIS — K70.31 ALCOHOLIC CIRRHOSIS OF LIVER WITH ASCITES (HCC): ICD-10-CM

## 2019-04-23 PROCEDURE — 99214 OFFICE O/P EST MOD 30 MIN: CPT | Performed by: SURGERY

## 2019-04-24 ENCOUNTER — OFFICE VISIT (OUTPATIENT)
Dept: PHYSICAL THERAPY | Facility: MEDICAL CENTER | Age: 49
End: 2019-04-24
Payer: COMMERCIAL

## 2019-04-24 DIAGNOSIS — Z78.9 IMPAIRED MOBILITY AND ADLS: Primary | ICD-10-CM

## 2019-04-24 DIAGNOSIS — Z74.09 IMPAIRED MOBILITY AND ADLS: Primary | ICD-10-CM

## 2019-04-24 PROCEDURE — 97110 THERAPEUTIC EXERCISES: CPT | Performed by: PHYSICAL THERAPIST

## 2019-04-29 ENCOUNTER — OFFICE VISIT (OUTPATIENT)
Dept: PULMONOLOGY | Facility: CLINIC | Age: 49
End: 2019-04-29
Payer: COMMERCIAL

## 2019-04-29 VITALS
BODY MASS INDEX: 29.4 KG/M2 | HEIGHT: 68 IN | DIASTOLIC BLOOD PRESSURE: 74 MMHG | SYSTOLIC BLOOD PRESSURE: 132 MMHG | HEART RATE: 83 BPM | TEMPERATURE: 98.4 F | RESPIRATION RATE: 16 BRPM | OXYGEN SATURATION: 96 % | WEIGHT: 194 LBS

## 2019-04-29 DIAGNOSIS — J90 PLEURAL EFFUSION ON RIGHT: Primary | ICD-10-CM

## 2019-04-29 PROBLEM — Z86.19 HISTORY OF SEPSIS: Status: RESOLVED | Noted: 2019-03-23 | Resolved: 2019-04-29

## 2019-04-29 PROBLEM — Z87.898 HISTORY OF URINARY RETENTION: Status: RESOLVED | Noted: 2019-03-01 | Resolved: 2019-04-29

## 2019-04-29 PROCEDURE — 99214 OFFICE O/P EST MOD 30 MIN: CPT | Performed by: INTERNAL MEDICINE

## 2019-05-01 ENCOUNTER — OFFICE VISIT (OUTPATIENT)
Dept: FAMILY MEDICINE CLINIC | Facility: CLINIC | Age: 49
End: 2019-05-01
Payer: COMMERCIAL

## 2019-05-01 ENCOUNTER — APPOINTMENT (OUTPATIENT)
Dept: PHYSICAL THERAPY | Facility: MEDICAL CENTER | Age: 49
End: 2019-05-01
Payer: COMMERCIAL

## 2019-05-01 VITALS
WEIGHT: 193.6 LBS | OXYGEN SATURATION: 97 % | SYSTOLIC BLOOD PRESSURE: 130 MMHG | HEART RATE: 75 BPM | BODY MASS INDEX: 29.34 KG/M2 | HEIGHT: 68 IN | DIASTOLIC BLOOD PRESSURE: 70 MMHG | TEMPERATURE: 98.5 F

## 2019-05-01 DIAGNOSIS — R10.12 LEFT UPPER QUADRANT PAIN: Primary | ICD-10-CM

## 2019-05-01 LAB
SL AMB  POCT GLUCOSE, UA: NEGATIVE
SL AMB LEUKOCYTE ESTERASE,UA: NEGATIVE
SL AMB POCT BILIRUBIN,UA: NEGATIVE
SL AMB POCT BLOOD,UA: NEGATIVE
SL AMB POCT CLARITY,UA: CLEAR
SL AMB POCT COLOR,UA: ABNORMAL
SL AMB POCT KETONES,UA: NEGATIVE
SL AMB POCT NITRITE,UA: NEGATIVE
SL AMB POCT PH,UA: 5
SL AMB POCT SPECIFIC GRAVITY,UA: 1.03
SL AMB POCT URINE PROTEIN: ABNORMAL
SL AMB POCT UROBILINOGEN: 0.2

## 2019-05-01 PROCEDURE — 81002 URINALYSIS NONAUTO W/O SCOPE: CPT | Performed by: FAMILY MEDICINE

## 2019-05-01 PROCEDURE — 99214 OFFICE O/P EST MOD 30 MIN: CPT | Performed by: FAMILY MEDICINE

## 2019-05-01 RX ORDER — PANTOPRAZOLE SODIUM 40 MG/1
40 TABLET, DELAYED RELEASE ORAL DAILY
COMMUNITY
End: 2019-06-04 | Stop reason: SDUPTHER

## 2019-05-03 ENCOUNTER — APPOINTMENT (OUTPATIENT)
Dept: LAB | Facility: HOSPITAL | Age: 49
End: 2019-05-03
Payer: COMMERCIAL

## 2019-05-03 ENCOUNTER — HOSPITAL ENCOUNTER (OUTPATIENT)
Dept: CT IMAGING | Facility: HOSPITAL | Age: 49
Discharge: HOME | End: 2019-05-03
Payer: COMMERCIAL

## 2019-05-03 ENCOUNTER — APPOINTMENT (OUTPATIENT)
Dept: PHYSICAL THERAPY | Facility: MEDICAL CENTER | Age: 49
End: 2019-05-03
Payer: COMMERCIAL

## 2019-05-03 ENCOUNTER — OFFICE VISIT (OUTPATIENT)
Dept: FAMILY MEDICINE CLINIC | Facility: CLINIC | Age: 49
End: 2019-05-03
Payer: COMMERCIAL

## 2019-05-03 VITALS
RESPIRATION RATE: 18 BRPM | DIASTOLIC BLOOD PRESSURE: 72 MMHG | TEMPERATURE: 97.8 F | SYSTOLIC BLOOD PRESSURE: 132 MMHG | HEIGHT: 68 IN | WEIGHT: 191 LBS | OXYGEN SATURATION: 98 % | BODY MASS INDEX: 28.95 KG/M2 | HEART RATE: 74 BPM

## 2019-05-03 DIAGNOSIS — E87.1 HYPONATREMIA: ICD-10-CM

## 2019-05-03 DIAGNOSIS — K75.0 HEPATIC ABSCESS: ICD-10-CM

## 2019-05-03 DIAGNOSIS — K36 CHRONIC APPENDICITIS: ICD-10-CM

## 2019-05-03 DIAGNOSIS — Z87.09 HISTORY OF PLEURAL EMPYEMA: ICD-10-CM

## 2019-05-03 DIAGNOSIS — E80.6 HYPERBILIRUBINEMIA: ICD-10-CM

## 2019-05-03 DIAGNOSIS — D61.818 PANCYTOPENIA (HCC): ICD-10-CM

## 2019-05-03 DIAGNOSIS — K70.31 ALCOHOLIC CIRRHOSIS OF LIVER WITH ASCITES (HCC): ICD-10-CM

## 2019-05-03 DIAGNOSIS — K36 CHRONIC APPENDICITIS: Primary | ICD-10-CM

## 2019-05-03 LAB
ALBUMIN SERPL BCP-MCNC: 3.4 G/DL (ref 3.5–5)
ALP SERPL-CCNC: 150 U/L (ref 46–116)
ALT SERPL W P-5'-P-CCNC: 22 U/L (ref 12–78)
ANION GAP SERPL CALCULATED.3IONS-SCNC: 8 MMOL/L (ref 4–13)
AST SERPL W P-5'-P-CCNC: 46 U/L (ref 5–45)
BASOPHILS # BLD AUTO: 0.02 THOUSANDS/ΜL (ref 0–0.1)
BASOPHILS NFR BLD AUTO: 1 % (ref 0–1)
BILIRUB SERPL-MCNC: 1.39 MG/DL (ref 0.2–1)
BUN SERPL-MCNC: 10 MG/DL (ref 5–25)
CALCIUM ALBUM COR SERPL-MCNC: 10.7 MG/DL (ref 8.3–10.1)
CALCIUM SERPL-MCNC: 10.2 MG/DL (ref 8.3–10.1)
CHLORIDE SERPL-SCNC: 103 MMOL/L (ref 100–108)
CO2 SERPL-SCNC: 26 MMOL/L (ref 21–32)
CREAT SERPL-MCNC: 0.72 MG/DL (ref 0.6–1.3)
EOSINOPHIL # BLD AUTO: 0.32 THOUSAND/ΜL (ref 0–0.61)
EOSINOPHIL NFR BLD AUTO: 11 % (ref 0–6)
ERYTHROCYTE [DISTWIDTH] IN BLOOD BY AUTOMATED COUNT: 14.1 % (ref 11.6–15.1)
GFR SERPL CREATININE-BSD FRML MDRD: 111 ML/MIN/1.73SQ M
GLUCOSE P FAST SERPL-MCNC: 90 MG/DL (ref 65–99)
HCT VFR BLD AUTO: 39.2 % (ref 36.5–49.3)
HGB BLD-MCNC: 12.7 G/DL (ref 12–17)
IMM GRANULOCYTES # BLD AUTO: 0.01 THOUSAND/UL (ref 0–0.2)
IMM GRANULOCYTES NFR BLD AUTO: 0 % (ref 0–2)
LYMPHOCYTES # BLD AUTO: 0.7 THOUSANDS/ΜL (ref 0.6–4.47)
LYMPHOCYTES NFR BLD AUTO: 24 % (ref 14–44)
MCH RBC QN AUTO: 31.4 PG (ref 26.8–34.3)
MCHC RBC AUTO-ENTMCNC: 32.4 G/DL (ref 31.4–37.4)
MCV RBC AUTO: 97 FL (ref 82–98)
MONOCYTES # BLD AUTO: 0.39 THOUSAND/ΜL (ref 0.17–1.22)
MONOCYTES NFR BLD AUTO: 14 % (ref 4–12)
NEUTROPHILS # BLD AUTO: 1.44 THOUSANDS/ΜL (ref 1.85–7.62)
NEUTS SEG NFR BLD AUTO: 50 % (ref 43–75)
NRBC BLD AUTO-RTO: 0 /100 WBCS
PLATELET # BLD AUTO: 72 THOUSANDS/UL (ref 149–390)
PMV BLD AUTO: 10.4 FL (ref 8.9–12.7)
POTASSIUM SERPL-SCNC: 3.9 MMOL/L (ref 3.5–5.3)
PROT SERPL-MCNC: 9.4 G/DL (ref 6.4–8.2)
RBC # BLD AUTO: 4.04 MILLION/UL (ref 3.88–5.62)
SODIUM SERPL-SCNC: 137 MMOL/L (ref 136–145)
WBC # BLD AUTO: 2.88 THOUSAND/UL (ref 4.31–10.16)

## 2019-05-03 PROCEDURE — 85025 COMPLETE CBC W/AUTO DIFF WBC: CPT

## 2019-05-03 PROCEDURE — 74177 CT ABD & PELVIS W/CONTRAST: CPT

## 2019-05-03 PROCEDURE — 99213 OFFICE O/P EST LOW 20 MIN: CPT | Performed by: FAMILY MEDICINE

## 2019-05-03 PROCEDURE — 80053 COMPREHEN METABOLIC PANEL: CPT

## 2019-05-03 PROCEDURE — 36415 COLL VENOUS BLD VENIPUNCTURE: CPT

## 2019-05-03 PROCEDURE — 3008F BODY MASS INDEX DOCD: CPT | Performed by: FAMILY MEDICINE

## 2019-05-03 RX ADMIN — IOHEXOL 100 ML: 350 INJECTION, SOLUTION INTRAVENOUS at 13:27

## 2019-05-03 RX ADMIN — IOHEXOL 50 ML: 240 INJECTION, SOLUTION INTRATHECAL; INTRAVASCULAR; INTRAVENOUS; ORAL at 13:27

## 2019-05-06 ENCOUNTER — OFFICE VISIT (OUTPATIENT)
Dept: GASTROENTEROLOGY | Facility: MEDICAL CENTER | Age: 49
End: 2019-05-06
Payer: COMMERCIAL

## 2019-05-06 VITALS
HEIGHT: 68 IN | WEIGHT: 193 LBS | HEART RATE: 81 BPM | BODY MASS INDEX: 29.25 KG/M2 | TEMPERATURE: 98.7 F | DIASTOLIC BLOOD PRESSURE: 72 MMHG | SYSTOLIC BLOOD PRESSURE: 124 MMHG

## 2019-05-06 DIAGNOSIS — D64.9 ANEMIA, UNSPECIFIED TYPE: ICD-10-CM

## 2019-05-06 DIAGNOSIS — Z12.11 COLON CANCER SCREENING: ICD-10-CM

## 2019-05-06 DIAGNOSIS — K75.0 HEPATIC ABSCESS: ICD-10-CM

## 2019-05-06 DIAGNOSIS — K21.9 GASTROESOPHAGEAL REFLUX DISEASE WITHOUT ESOPHAGITIS: ICD-10-CM

## 2019-05-06 DIAGNOSIS — K70.31 ALCOHOLIC CIRRHOSIS OF LIVER WITH ASCITES (HCC): Primary | ICD-10-CM

## 2019-05-06 PROCEDURE — 99245 OFF/OP CONSLTJ NEW/EST HI 55: CPT | Performed by: INTERNAL MEDICINE

## 2019-05-08 ENCOUNTER — OFFICE VISIT (OUTPATIENT)
Dept: PHYSICAL THERAPY | Facility: MEDICAL CENTER | Age: 49
End: 2019-05-08
Payer: COMMERCIAL

## 2019-05-08 DIAGNOSIS — Z78.9 IMPAIRED MOBILITY AND ADLS: Primary | ICD-10-CM

## 2019-05-08 DIAGNOSIS — Z74.09 IMPAIRED MOBILITY AND ADLS: Primary | ICD-10-CM

## 2019-05-08 PROCEDURE — 97110 THERAPEUTIC EXERCISES: CPT | Performed by: PHYSICAL THERAPIST

## 2019-05-09 ENCOUNTER — PATIENT OUTREACH (OUTPATIENT)
Dept: CASE MANAGEMENT | Facility: OTHER | Age: 49
End: 2019-05-09

## 2019-05-10 PROBLEM — K29.30 CHRONIC SUPERFICIAL GASTRITIS WITHOUT BLEEDING: Status: RESOLVED | Noted: 2019-02-20 | Resolved: 2019-05-10

## 2019-05-10 RX ORDER — CEFAZOLIN SODIUM 2 G/50ML
2000 SOLUTION INTRAVENOUS ONCE
Status: CANCELLED | OUTPATIENT
Start: 2019-06-13 | End: 2019-06-13

## 2019-05-10 RX ORDER — SODIUM CHLORIDE, SODIUM LACTATE, POTASSIUM CHLORIDE, CALCIUM CHLORIDE 600; 310; 30; 20 MG/100ML; MG/100ML; MG/100ML; MG/100ML
125 INJECTION, SOLUTION INTRAVENOUS CONTINUOUS
Status: CANCELLED | OUTPATIENT
Start: 2019-06-13

## 2019-05-15 ENCOUNTER — OFFICE VISIT (OUTPATIENT)
Dept: PHYSICAL THERAPY | Facility: MEDICAL CENTER | Age: 49
End: 2019-05-15
Payer: COMMERCIAL

## 2019-05-15 ENCOUNTER — APPOINTMENT (OUTPATIENT)
Dept: LAB | Facility: MEDICAL CENTER | Age: 49
End: 2019-05-15
Attending: INTERNAL MEDICINE
Payer: COMMERCIAL

## 2019-05-15 DIAGNOSIS — K70.31 ALCOHOLIC CIRRHOSIS OF LIVER WITH ASCITES (HCC): ICD-10-CM

## 2019-05-15 DIAGNOSIS — Z78.9 IMPAIRED MOBILITY AND ADLS: Primary | ICD-10-CM

## 2019-05-15 DIAGNOSIS — Z74.09 IMPAIRED MOBILITY AND ADLS: Primary | ICD-10-CM

## 2019-05-15 LAB
FERRITIN SERPL-MCNC: 459 NG/ML (ref 8–388)
IRON SATN MFR SERPL: 30 %
IRON SERPL-MCNC: 79 UG/DL (ref 65–175)
TIBC SERPL-MCNC: 263 UG/DL (ref 250–450)

## 2019-05-15 PROCEDURE — 83540 ASSAY OF IRON: CPT

## 2019-05-15 PROCEDURE — 36415 COLL VENOUS BLD VENIPUNCTURE: CPT

## 2019-05-15 PROCEDURE — 97110 THERAPEUTIC EXERCISES: CPT | Performed by: PHYSICAL THERAPIST

## 2019-05-15 PROCEDURE — 87340 HEPATITIS B SURFACE AG IA: CPT

## 2019-05-15 PROCEDURE — 86705 HEP B CORE ANTIBODY IGM: CPT

## 2019-05-15 PROCEDURE — 83550 IRON BINDING TEST: CPT

## 2019-05-15 PROCEDURE — 82728 ASSAY OF FERRITIN: CPT

## 2019-05-15 PROCEDURE — 86704 HEP B CORE ANTIBODY TOTAL: CPT

## 2019-05-15 PROCEDURE — 86803 HEPATITIS C AB TEST: CPT

## 2019-05-16 LAB
HBV CORE AB SER QL: NORMAL
HBV CORE IGM SER QL: NORMAL
HBV SURFACE AG SER QL: NORMAL
HCV AB SER QL: NORMAL

## 2019-05-20 ENCOUNTER — OFFICE VISIT (OUTPATIENT)
Dept: PHYSICAL THERAPY | Facility: MEDICAL CENTER | Age: 49
End: 2019-05-20
Payer: COMMERCIAL

## 2019-05-20 DIAGNOSIS — Z74.09 IMPAIRED MOBILITY AND ADLS: Primary | ICD-10-CM

## 2019-05-20 DIAGNOSIS — Z78.9 IMPAIRED MOBILITY AND ADLS: Primary | ICD-10-CM

## 2019-05-20 PROCEDURE — 97110 THERAPEUTIC EXERCISES: CPT | Performed by: PHYSICAL THERAPIST

## 2019-05-22 ENCOUNTER — ANESTHESIA EVENT (OUTPATIENT)
Dept: PERIOP | Facility: HOSPITAL | Age: 49
End: 2019-05-22
Payer: COMMERCIAL

## 2019-05-22 ENCOUNTER — APPOINTMENT (OUTPATIENT)
Dept: LAB | Facility: HOSPITAL | Age: 49
End: 2019-05-22
Attending: SURGERY
Payer: COMMERCIAL

## 2019-05-22 ENCOUNTER — APPOINTMENT (OUTPATIENT)
Dept: PREADMISSION TESTING | Facility: HOSPITAL | Age: 49
End: 2019-05-22
Payer: COMMERCIAL

## 2019-05-22 DIAGNOSIS — K35.33 APPENDICITIS WITH ABSCESS: ICD-10-CM

## 2019-05-22 LAB
ALBUMIN SERPL BCP-MCNC: 3.5 G/DL (ref 3.5–5)
ALP SERPL-CCNC: 159 U/L (ref 46–116)
ALT SERPL W P-5'-P-CCNC: 26 U/L (ref 12–78)
ANION GAP SERPL CALCULATED.3IONS-SCNC: 6 MMOL/L (ref 4–13)
APTT PPP: 36 SECONDS (ref 26–38)
AST SERPL W P-5'-P-CCNC: 55 U/L (ref 5–45)
BASOPHILS # BLD AUTO: 0.03 THOUSANDS/ΜL (ref 0–0.1)
BASOPHILS NFR BLD AUTO: 1 % (ref 0–1)
BILIRUB DIRECT SERPL-MCNC: 0.36 MG/DL (ref 0–0.2)
BILIRUB SERPL-MCNC: 0.88 MG/DL (ref 0.2–1)
BUN SERPL-MCNC: 13 MG/DL (ref 5–25)
CALCIUM SERPL-MCNC: 9.9 MG/DL (ref 8.3–10.1)
CHLORIDE SERPL-SCNC: 105 MMOL/L (ref 100–108)
CO2 SERPL-SCNC: 26 MMOL/L (ref 21–32)
CREAT SERPL-MCNC: 0.77 MG/DL (ref 0.6–1.3)
EOSINOPHIL # BLD AUTO: 0.3 THOUSAND/ΜL (ref 0–0.61)
EOSINOPHIL NFR BLD AUTO: 10 % (ref 0–6)
ERYTHROCYTE [DISTWIDTH] IN BLOOD BY AUTOMATED COUNT: 13.4 % (ref 11.6–15.1)
GFR SERPL CREATININE-BSD FRML MDRD: 107 ML/MIN/1.73SQ M
GLUCOSE SERPL-MCNC: 95 MG/DL (ref 65–140)
HCT VFR BLD AUTO: 41.6 % (ref 36.5–49.3)
HGB BLD-MCNC: 13.6 G/DL (ref 12–17)
IMM GRANULOCYTES # BLD AUTO: 0.01 THOUSAND/UL (ref 0–0.2)
IMM GRANULOCYTES NFR BLD AUTO: 0 % (ref 0–2)
INR PPP: 1.17 (ref 0.86–1.17)
LYMPHOCYTES # BLD AUTO: 0.68 THOUSANDS/ΜL (ref 0.6–4.47)
LYMPHOCYTES NFR BLD AUTO: 23 % (ref 14–44)
MCH RBC QN AUTO: 30.8 PG (ref 26.8–34.3)
MCHC RBC AUTO-ENTMCNC: 32.7 G/DL (ref 31.4–37.4)
MCV RBC AUTO: 94 FL (ref 82–98)
MONOCYTES # BLD AUTO: 0.29 THOUSAND/ΜL (ref 0.17–1.22)
MONOCYTES NFR BLD AUTO: 10 % (ref 4–12)
NEUTROPHILS # BLD AUTO: 1.62 THOUSANDS/ΜL (ref 1.85–7.62)
NEUTS SEG NFR BLD AUTO: 56 % (ref 43–75)
NRBC BLD AUTO-RTO: 0 /100 WBCS
PLATELET # BLD AUTO: 63 THOUSANDS/UL (ref 149–390)
PMV BLD AUTO: 10.4 FL (ref 8.9–12.7)
POTASSIUM SERPL-SCNC: 4.5 MMOL/L (ref 3.5–5.3)
PROT SERPL-MCNC: 9.3 G/DL (ref 6.4–8.2)
PROTHROMBIN TIME: 15 SECONDS (ref 11.8–14.2)
RBC # BLD AUTO: 4.42 MILLION/UL (ref 3.88–5.62)
SODIUM SERPL-SCNC: 137 MMOL/L (ref 136–145)
WBC # BLD AUTO: 2.93 THOUSAND/UL (ref 4.31–10.16)

## 2019-05-22 PROCEDURE — 85730 THROMBOPLASTIN TIME PARTIAL: CPT

## 2019-05-22 PROCEDURE — 36415 COLL VENOUS BLD VENIPUNCTURE: CPT

## 2019-05-22 PROCEDURE — 80076 HEPATIC FUNCTION PANEL: CPT

## 2019-05-22 PROCEDURE — 80048 BASIC METABOLIC PNL TOTAL CA: CPT

## 2019-05-22 PROCEDURE — 85025 COMPLETE CBC W/AUTO DIFF WBC: CPT

## 2019-05-22 PROCEDURE — 85610 PROTHROMBIN TIME: CPT

## 2019-05-22 RX ORDER — SODIUM CHLORIDE 9 MG/ML
125 INJECTION, SOLUTION INTRAVENOUS CONTINUOUS
Status: CANCELLED | OUTPATIENT
Start: 2019-06-13

## 2019-05-28 ENCOUNTER — TELEPHONE (OUTPATIENT)
Dept: SURGERY | Facility: MEDICAL CENTER | Age: 49
End: 2019-05-28

## 2019-06-04 ENCOUNTER — TELEPHONE (OUTPATIENT)
Dept: GASTROENTEROLOGY | Facility: MEDICAL CENTER | Age: 49
End: 2019-06-04

## 2019-06-04 DIAGNOSIS — K21.9 GASTROESOPHAGEAL REFLUX DISEASE WITHOUT ESOPHAGITIS: Primary | ICD-10-CM

## 2019-06-04 RX ORDER — PANTOPRAZOLE SODIUM 40 MG/1
40 TABLET, DELAYED RELEASE ORAL DAILY
Qty: 30 TABLET | Refills: 5 | Status: SHIPPED | OUTPATIENT
Start: 2019-06-04 | End: 2019-08-23 | Stop reason: ALTCHOICE

## 2019-06-05 ENCOUNTER — PREP FOR PROCEDURE (OUTPATIENT)
Dept: GASTROENTEROLOGY | Facility: MEDICAL CENTER | Age: 49
End: 2019-06-05

## 2019-06-05 DIAGNOSIS — K21.9 GASTROESOPHAGEAL REFLUX DISEASE WITHOUT ESOPHAGITIS: ICD-10-CM

## 2019-06-05 DIAGNOSIS — K70.31 ALCOHOLIC CIRRHOSIS OF LIVER WITH ASCITES (HCC): Primary | ICD-10-CM

## 2019-06-12 ENCOUNTER — TELEPHONE (OUTPATIENT)
Dept: FAMILY MEDICINE CLINIC | Facility: CLINIC | Age: 49
End: 2019-06-12

## 2019-06-13 ENCOUNTER — HOSPITAL ENCOUNTER (OUTPATIENT)
Facility: HOSPITAL | Age: 49
Setting detail: OUTPATIENT SURGERY
Discharge: HOME | End: 2019-06-13
Attending: SURGERY | Admitting: SURGERY
Payer: COMMERCIAL

## 2019-06-13 ENCOUNTER — ANESTHESIA (OUTPATIENT)
Dept: PERIOP | Facility: HOSPITAL | Age: 49
End: 2019-06-13
Payer: COMMERCIAL

## 2019-06-13 VITALS
HEIGHT: 68 IN | WEIGHT: 199.8 LBS | DIASTOLIC BLOOD PRESSURE: 61 MMHG | RESPIRATION RATE: 16 BRPM | BODY MASS INDEX: 30.28 KG/M2 | SYSTOLIC BLOOD PRESSURE: 115 MMHG | HEART RATE: 68 BPM | OXYGEN SATURATION: 95 % | TEMPERATURE: 98.4 F

## 2019-06-13 DIAGNOSIS — K35.33 APPENDICITIS WITH ABSCESS: ICD-10-CM

## 2019-06-13 PROCEDURE — 44204 LAPARO PARTIAL COLECTOMY: CPT | Performed by: SURGERY

## 2019-06-13 PROCEDURE — 88305 TISSUE EXAM BY PATHOLOGIST: CPT | Performed by: PATHOLOGY

## 2019-06-13 PROCEDURE — NC001 PR NO CHARGE: Performed by: SURGERY

## 2019-06-13 PROCEDURE — 44204 LAPARO PARTIAL COLECTOMY: CPT | Performed by: PHYSICIAN ASSISTANT

## 2019-06-13 RX ORDER — SODIUM CHLORIDE 9 MG/ML
125 INJECTION, SOLUTION INTRAVENOUS CONTINUOUS
Status: DISCONTINUED | OUTPATIENT
Start: 2019-06-13 | End: 2019-06-13 | Stop reason: HOSPADM

## 2019-06-13 RX ORDER — ROCURONIUM BROMIDE 10 MG/ML
INJECTION, SOLUTION INTRAVENOUS AS NEEDED
Status: DISCONTINUED | OUTPATIENT
Start: 2019-06-13 | End: 2019-06-13 | Stop reason: SURG

## 2019-06-13 RX ORDER — PROPOFOL 10 MG/ML
INJECTION, EMULSION INTRAVENOUS AS NEEDED
Status: DISCONTINUED | OUTPATIENT
Start: 2019-06-13 | End: 2019-06-13 | Stop reason: SURG

## 2019-06-13 RX ORDER — FENTANYL CITRATE 50 UG/ML
25 INJECTION, SOLUTION INTRAMUSCULAR; INTRAVENOUS
Status: DISCONTINUED | OUTPATIENT
Start: 2019-06-13 | End: 2019-06-13 | Stop reason: HOSPADM

## 2019-06-13 RX ORDER — BUPIVACAINE HYDROCHLORIDE AND EPINEPHRINE 2.5; 5 MG/ML; UG/ML
INJECTION, SOLUTION EPIDURAL; INFILTRATION; INTRACAUDAL; PERINEURAL AS NEEDED
Status: DISCONTINUED | OUTPATIENT
Start: 2019-06-13 | End: 2019-06-13 | Stop reason: HOSPADM

## 2019-06-13 RX ORDER — DEXAMETHASONE SODIUM PHOSPHATE 10 MG/ML
INJECTION, SOLUTION INTRAMUSCULAR; INTRAVENOUS AS NEEDED
Status: DISCONTINUED | OUTPATIENT
Start: 2019-06-13 | End: 2019-06-13 | Stop reason: SURG

## 2019-06-13 RX ORDER — FENTANYL CITRATE 50 UG/ML
INJECTION, SOLUTION INTRAMUSCULAR; INTRAVENOUS AS NEEDED
Status: DISCONTINUED | OUTPATIENT
Start: 2019-06-13 | End: 2019-06-13 | Stop reason: SURG

## 2019-06-13 RX ORDER — MAGNESIUM HYDROXIDE 1200 MG/15ML
LIQUID ORAL AS NEEDED
Status: DISCONTINUED | OUTPATIENT
Start: 2019-06-13 | End: 2019-06-13 | Stop reason: HOSPADM

## 2019-06-13 RX ORDER — LIDOCAINE HYDROCHLORIDE 10 MG/ML
INJECTION, SOLUTION INFILTRATION; PERINEURAL AS NEEDED
Status: DISCONTINUED | OUTPATIENT
Start: 2019-06-13 | End: 2019-06-13 | Stop reason: SURG

## 2019-06-13 RX ORDER — OXYCODONE HYDROCHLORIDE 5 MG/1
5 TABLET ORAL EVERY 4 HOURS PRN
Qty: 20 TABLET | Refills: 0 | Status: SHIPPED | OUTPATIENT
Start: 2019-06-13 | End: 2019-06-23

## 2019-06-13 RX ORDER — MIDAZOLAM HYDROCHLORIDE 1 MG/ML
INJECTION INTRAMUSCULAR; INTRAVENOUS AS NEEDED
Status: DISCONTINUED | OUTPATIENT
Start: 2019-06-13 | End: 2019-06-13 | Stop reason: SURG

## 2019-06-13 RX ORDER — CEFAZOLIN SODIUM 2 G/50ML
2000 SOLUTION INTRAVENOUS ONCE
Status: COMPLETED | OUTPATIENT
Start: 2019-06-13 | End: 2019-06-13

## 2019-06-13 RX ORDER — ONDANSETRON 2 MG/ML
4 INJECTION INTRAMUSCULAR; INTRAVENOUS EVERY 6 HOURS PRN
Status: DISCONTINUED | OUTPATIENT
Start: 2019-06-13 | End: 2019-06-13 | Stop reason: HOSPADM

## 2019-06-13 RX ORDER — HYDROMORPHONE HCL/PF 1 MG/ML
0.5 SYRINGE (ML) INJECTION
Status: DISCONTINUED | OUTPATIENT
Start: 2019-06-13 | End: 2019-06-13 | Stop reason: HOSPADM

## 2019-06-13 RX ORDER — MORPHINE SULFATE 10 MG/ML
2 INJECTION, SOLUTION INTRAMUSCULAR; INTRAVENOUS
Status: DISCONTINUED | OUTPATIENT
Start: 2019-06-13 | End: 2019-06-13 | Stop reason: HOSPADM

## 2019-06-13 RX ORDER — OXYCODONE HYDROCHLORIDE 5 MG/1
5 TABLET ORAL EVERY 4 HOURS PRN
Status: DISCONTINUED | OUTPATIENT
Start: 2019-06-13 | End: 2019-06-13 | Stop reason: HOSPADM

## 2019-06-13 RX ORDER — ONDANSETRON 2 MG/ML
INJECTION INTRAMUSCULAR; INTRAVENOUS AS NEEDED
Status: DISCONTINUED | OUTPATIENT
Start: 2019-06-13 | End: 2019-06-13 | Stop reason: SURG

## 2019-06-13 RX ADMIN — PROPOFOL 200 MG: 10 INJECTION, EMULSION INTRAVENOUS at 13:30

## 2019-06-13 RX ADMIN — FENTANYL CITRATE 50 MCG: 50 INJECTION, SOLUTION INTRAMUSCULAR; INTRAVENOUS at 13:30

## 2019-06-13 RX ADMIN — CEFAZOLIN SODIUM 2000 MG: 2 SOLUTION INTRAVENOUS at 13:34

## 2019-06-13 RX ADMIN — FENTANYL CITRATE 50 MCG: 50 INJECTION, SOLUTION INTRAMUSCULAR; INTRAVENOUS at 14:35

## 2019-06-13 RX ADMIN — MIDAZOLAM 4 MG: 1 INJECTION INTRAMUSCULAR; INTRAVENOUS at 13:27

## 2019-06-13 RX ADMIN — OXYCODONE HYDROCHLORIDE 5 MG: 5 TABLET ORAL at 16:45

## 2019-06-13 RX ADMIN — DEXAMETHASONE SODIUM PHOSPHATE 4 MG: 10 INJECTION, SOLUTION INTRAMUSCULAR; INTRAVENOUS at 13:30

## 2019-06-13 RX ADMIN — FENTANYL CITRATE 50 MCG: 50 INJECTION, SOLUTION INTRAMUSCULAR; INTRAVENOUS at 13:58

## 2019-06-13 RX ADMIN — SODIUM CHLORIDE: 0.9 INJECTION, SOLUTION INTRAVENOUS at 13:55

## 2019-06-13 RX ADMIN — FENTANYL CITRATE 50 MCG: 50 INJECTION, SOLUTION INTRAMUSCULAR; INTRAVENOUS at 15:07

## 2019-06-13 RX ADMIN — ONDANSETRON HYDROCHLORIDE 4 MG: 2 INJECTION, SOLUTION INTRAVENOUS at 13:30

## 2019-06-13 RX ADMIN — SODIUM CHLORIDE 125 ML/HR: 0.9 INJECTION, SOLUTION INTRAVENOUS at 11:35

## 2019-06-13 RX ADMIN — LIDOCAINE HYDROCHLORIDE 100 MG: 10 INJECTION, SOLUTION INFILTRATION; PERINEURAL at 13:30

## 2019-06-13 RX ADMIN — ROCURONIUM BROMIDE 40 MG: 10 INJECTION, SOLUTION INTRAVENOUS at 13:30

## 2019-06-13 RX ADMIN — FENTANYL CITRATE 50 MCG: 50 INJECTION, SOLUTION INTRAMUSCULAR; INTRAVENOUS at 14:51

## 2019-06-26 ENCOUNTER — TELEPHONE (OUTPATIENT)
Dept: GASTROENTEROLOGY | Facility: AMBULARY SURGERY CENTER | Age: 49
End: 2019-06-26

## 2019-06-26 ENCOUNTER — PREP FOR PROCEDURE (OUTPATIENT)
Dept: GASTROENTEROLOGY | Facility: MEDICAL CENTER | Age: 49
End: 2019-06-26

## 2019-06-26 ENCOUNTER — OFFICE VISIT (OUTPATIENT)
Dept: SURGERY | Facility: MEDICAL CENTER | Age: 49
End: 2019-06-26

## 2019-06-26 VITALS
HEIGHT: 68 IN | WEIGHT: 204 LBS | DIASTOLIC BLOOD PRESSURE: 78 MMHG | SYSTOLIC BLOOD PRESSURE: 136 MMHG | BODY MASS INDEX: 30.92 KG/M2 | TEMPERATURE: 97.6 F

## 2019-06-26 DIAGNOSIS — Z12.11 COLON CANCER SCREENING: Primary | ICD-10-CM

## 2019-06-26 DIAGNOSIS — Z90.49 S/P LAPAROSCOPIC APPENDECTOMY: Primary | ICD-10-CM

## 2019-06-26 PROCEDURE — 99024 POSTOP FOLLOW-UP VISIT: CPT | Performed by: SURGERY

## 2019-06-26 NOTE — TELEPHONE ENCOUNTER
Pt is scheduled with dr Hiram Bray at [facility] on 8/14/19 for both procedures, I rescheduled original egd with ryan to add to colon on 8/14/19

## 2019-06-26 NOTE — TELEPHONE ENCOUNTER
[address] patient      He has egd on 8-8-19 and wants to know if he can also have a colon on the same day      Call back # [phone]

## 2019-07-05 NOTE — PLAN OF CARE
Problem: DISCHARGE PLANNING  Goal: Discharge to home or other facility with appropriate resources  Description  INTERVENTIONS:  - Identify barriers to discharge w/patient and caregiver  - Arrange for needed discharge resources and transportation as appropriate  - Identify discharge learning needs (meds, wound care, etc )  - Arrange for interpretive services to assist at discharge as needed  - Refer to Case Management Department for coordinating discharge planning if the patient needs post-hospital services based on physician/advanced practitioner order or complex needs related to functional status, cognitive ability, or social support system  Outcome: Completed     D/c today to  [facility]  146

## 2019-07-18 ENCOUNTER — TELEPHONE (OUTPATIENT)
Dept: PULMONOLOGY | Facility: CLINIC | Age: 49
End: 2019-07-18

## 2019-07-18 DIAGNOSIS — J90 PLEURAL EFFUSION ON RIGHT: Primary | ICD-10-CM

## 2019-07-18 NOTE — TELEPHONE ENCOUNTER
Called and left message for patient explaining that we will be canceling his cat scan scheduled for 7/22/19 due to insurance not approving it  Dr Rosine Klinefelter will order chest xray to be done and then we will move forward to the next step  Told patient to contact our office and ask for Elizabet Martinez

## 2019-07-18 NOTE — TELEPHONE ENCOUNTER
See my note - we will cancel and get CXR   If CXR abnormal(which I suspect it still will be), I will order the CT then

## 2019-07-18 NOTE — PROGRESS NOTES
Interim note- Pulmonary Medicine   Willem Lopez 51 y o  male MRN: [id number]    Pertinent details:  Insurance denied CT scan since he has not been seen since April by me  I will therefore obtain a PA and lateral chest x-ray and obtain the CT scan if there is still abnormality on the CT scan  Unfortunately, chest x-ray is not the optimal study for this patient but limited by what his insurance will approve  Will be making arrangements for him to see me after the chest x-ray      Jannette Melissa MD

## 2019-07-18 NOTE — TELEPHONE ENCOUNTER
Patient called back and I explained to him about the insurance denying the CT and that Dr Ginger Busby would like for him to have a CXR instead  Patient was confused as to why the insurance would denied it  I explained to the patient to call the insurance if he would like more information   As I did not have the information to give him

## 2019-07-24 ENCOUNTER — APPOINTMENT (OUTPATIENT)
Dept: RADIOLOGY | Facility: MEDICAL CENTER | Age: 49
End: 2019-07-24
Payer: COMMERCIAL

## 2019-07-24 DIAGNOSIS — J90 PLEURAL EFFUSION ON RIGHT: ICD-10-CM

## 2019-07-24 PROCEDURE — 71046 X-RAY EXAM CHEST 2 VIEWS: CPT

## 2019-08-14 ENCOUNTER — HOSPITAL ENCOUNTER (OUTPATIENT)
Dept: GASTROENTEROLOGY | Facility: HOSPITAL | Age: 49
Setting detail: OUTPATIENT SURGERY
Discharge: HOME | End: 2019-08-14
Attending: INTERNAL MEDICINE | Admitting: INTERNAL MEDICINE
Payer: COMMERCIAL

## 2019-08-14 ENCOUNTER — ANESTHESIA EVENT (OUTPATIENT)
Dept: GASTROENTEROLOGY | Facility: HOSPITAL | Age: 49
End: 2019-08-14

## 2019-08-14 ENCOUNTER — ANESTHESIA (OUTPATIENT)
Dept: GASTROENTEROLOGY | Facility: HOSPITAL | Age: 49
End: 2019-08-14

## 2019-08-14 VITALS
DIASTOLIC BLOOD PRESSURE: 55 MMHG | BODY MASS INDEX: 30.92 KG/M2 | TEMPERATURE: 98.6 F | HEIGHT: 68 IN | RESPIRATION RATE: 16 BRPM | SYSTOLIC BLOOD PRESSURE: 108 MMHG | HEART RATE: 70 BPM | OXYGEN SATURATION: 97 % | WEIGHT: 204 LBS

## 2019-08-14 DIAGNOSIS — K21.9 GASTROESOPHAGEAL REFLUX DISEASE WITHOUT ESOPHAGITIS: ICD-10-CM

## 2019-08-14 DIAGNOSIS — Z12.11 COLON CANCER SCREENING: ICD-10-CM

## 2019-08-14 DIAGNOSIS — K70.31 ALCOHOLIC CIRRHOSIS OF LIVER WITH ASCITES (HCC): ICD-10-CM

## 2019-08-14 PROCEDURE — 88342 IMHCHEM/IMCYTCHM 1ST ANTB: CPT | Performed by: PATHOLOGY

## 2019-08-14 PROCEDURE — 88305 TISSUE EXAM BY PATHOLOGIST: CPT | Performed by: PATHOLOGY

## 2019-08-14 PROCEDURE — 45380 COLONOSCOPY AND BIOPSY: CPT | Performed by: INTERNAL MEDICINE

## 2019-08-14 PROCEDURE — 43239 EGD BIOPSY SINGLE/MULTIPLE: CPT | Performed by: INTERNAL MEDICINE

## 2019-08-14 RX ORDER — EPHEDRINE SULFATE 50 MG/ML
INJECTION INTRAVENOUS AS NEEDED
Status: DISCONTINUED | OUTPATIENT
Start: 2019-08-14 | End: 2019-08-14 | Stop reason: SURG

## 2019-08-14 RX ORDER — PROPOFOL 10 MG/ML
INJECTION, EMULSION INTRAVENOUS AS NEEDED
Status: DISCONTINUED | OUTPATIENT
Start: 2019-08-14 | End: 2019-08-14 | Stop reason: SURG

## 2019-08-14 RX ORDER — KETAMINE HYDROCHLORIDE 50 MG/ML
INJECTION, SOLUTION, CONCENTRATE INTRAMUSCULAR; INTRAVENOUS AS NEEDED
Status: DISCONTINUED | OUTPATIENT
Start: 2019-08-14 | End: 2019-08-14 | Stop reason: SURG

## 2019-08-14 RX ORDER — SODIUM CHLORIDE 9 MG/ML
125 INJECTION, SOLUTION INTRAVENOUS CONTINUOUS
Status: CANCELLED | OUTPATIENT
Start: 2019-08-14

## 2019-08-14 RX ORDER — SODIUM CHLORIDE 9 MG/ML
125 INJECTION, SOLUTION INTRAVENOUS CONTINUOUS
Status: DISCONTINUED | OUTPATIENT
Start: 2019-08-14 | End: 2019-08-18 | Stop reason: HOSPADM

## 2019-08-14 RX ORDER — GLYCOPYRROLATE 0.2 MG/ML
INJECTION INTRAMUSCULAR; INTRAVENOUS AS NEEDED
Status: DISCONTINUED | OUTPATIENT
Start: 2019-08-14 | End: 2019-08-14 | Stop reason: SURG

## 2019-08-14 RX ORDER — PROPOFOL 10 MG/ML
INJECTION, EMULSION INTRAVENOUS CONTINUOUS PRN
Status: DISCONTINUED | OUTPATIENT
Start: 2019-08-14 | End: 2019-08-14 | Stop reason: SURG

## 2019-08-14 RX ADMIN — PROPOFOL 130 MCG/KG/MIN: 10 INJECTION, EMULSION INTRAVENOUS at 09:32

## 2019-08-14 RX ADMIN — EPHEDRINE SULFATE 20 MG: 50 INJECTION, SOLUTION INTRAVENOUS at 09:51

## 2019-08-14 RX ADMIN — SODIUM CHLORIDE: 0.9 INJECTION, SOLUTION INTRAVENOUS at 09:59

## 2019-08-14 RX ADMIN — KETAMINE HYDROCHLORIDE 20 MG: 50 INJECTION, SOLUTION INTRAMUSCULAR; INTRAVENOUS at 09:47

## 2019-08-14 RX ADMIN — PHENYLEPHRINE HYDROCHLORIDE 200 MCG: 10 INJECTION INTRAVENOUS at 09:41

## 2019-08-14 RX ADMIN — GLYCOPYRROLATE 0.1 MG: 0.2 INJECTION, SOLUTION INTRAMUSCULAR; INTRAVENOUS at 09:32

## 2019-08-14 RX ADMIN — SODIUM CHLORIDE 125 ML/HR: 0.9 INJECTION, SOLUTION INTRAVENOUS at 09:20

## 2019-08-14 RX ADMIN — KETAMINE HYDROCHLORIDE 30 MG: 50 INJECTION, SOLUTION INTRAMUSCULAR; INTRAVENOUS at 09:32

## 2019-08-14 RX ADMIN — PROPOFOL 100 MG: 10 INJECTION, EMULSION INTRAVENOUS at 09:32

## 2019-08-14 RX ADMIN — PHENYLEPHRINE HYDROCHLORIDE 100 MCG: 10 INJECTION INTRAVENOUS at 09:45

## 2019-08-14 NOTE — ANESTHESIA POSTPROCEDURE EVALUATION
Post-Op Assessment Note    CV Status:  Stable       Mental Status:  Sleepy   Hydration Status:  Stable   PONV Controlled:  None   Airway Patency:  Patent   Post Op Vitals Reviewed: Yes      Staff: Anesthesiologist, with CRNAs           BP   101/56   Temp      Pulse  62   Resp   16   SpO2   98

## 2019-08-14 NOTE — H&P
History and Physical - [facility]  [facility] 51 y o  male MRN: [id number]                  HPI: [facility] is a 51y o  year old male who presents for reflux, anemia, and screening for esophageal varices  REVIEW OF SYSTEMS: Per the HPI, and otherwise unremarkable  Historical Information   Past Medical History:   Diagnosis Date    Alcoholic cirrhosis of liver with ascites ([facility] ) 2019    Anemia 2019    Chronic appendicitis 2019    Chronic superficial gastritis without bleeding 2019    Elevated alkaline phosphatase level 2019    GERD (gastroesophageal reflux disease) 2019    Heart murmur     History of alcohol abuse 2019    History of chronic back pain 2019    History of pleural empyema     History of transfusion     Liver abscess     Seasonal allergies     Severe sepsis ([facility] ) 2019    Weight loss 2019     Past Surgical History:   Procedure Laterality Date    IR ABSCESS/SEROMA DRAINAGE  2019    IR CHEST TUBE  3/6/2019    IR SUPRAPUBIC TUBE  3/14/2019    [place] LAP,APPENDECTOMY N/A 2019    Procedure: LAPAROSCOPIC APPENDECTOMY AND PARTIAL CECECTOMY;  Surgeon: Israel Montoya MD;  Location: [facility] OR;  Service: General     Social History   Social History     Substance and Sexual Activity   Alcohol Use Not Currently    Alcohol/week: 96 0 standard drinks    Types: 96 Shots of liquor per week    Frequency: 4 or more times a week    Drinks per session: 5 or 6    Binge frequency: Daily or almost daily    Comment: ALCOHOLIC CIRRHOSIS   Pt quit drinking in late december     Social History     Substance and Sexual Activity   Drug Use Never     Social History     Tobacco Use   Smoking Status Former Smoker    Packs/day: 0 25    Years: 7 00    Pack years: 1 75    Types: Cigarettes    Last attempt to quit: Dian Springer Years since quittin 6   Smokeless Tobacco Never Used     No family history on file     Meds/Allergies       (Not in a hospital admission)    No Known Allergies    Objective     There were no vitals taken for this visit  PHYSICAL EXAM    Gen: NAD  CV: RRR  CHEST: Clear  ABD: soft, NT/ND  EXT: no edema      ASSESSMENT/PLAN:  This is a 51y o  year old male here for upper endoscopy and colonoscopy, and he is stable and optimized for his procedure

## 2019-08-14 NOTE — ANESTHESIA PREPROCEDURE EVALUATION
Review of Systems/Medical History  Patient summary reviewed  Chart reviewed      Cardiovascular  Negative cardio ROS    Pulmonary  Negative pulmonary ROS        GI/Hepatic    GERD , Liver disease ,        Negative  ROS        Endo/Other  Negative endo/other ROS      GYN  Negative gynecology ROS          Hematology  Anemia ,     Musculoskeletal  Negative musculoskeletal ROS        Neurology  Negative neurology ROS      Psychology   Negative psychology ROS              Physical Exam    Airway    Mallampati score: II         Dental   No notable dental hx     Cardiovascular  Comment: Negative ROS, Rhythm: regular, Rate: normal,     Pulmonary  Pulmonary exam normal     Other Findings        Anesthesia Plan  ASA Score- 2     Anesthesia Type- IV sedation with anesthesia with ASA Monitors  Additional Monitors:   Airway Plan:         Plan Factors-Patient not instructed to abstain from smoking on day of procedure  Patient did not smoke on day of surgery  Induction- intravenous  Postoperative Plan- Plan for postoperative opioid use  Planned trial extubation    Informed Consent- Anesthetic plan and risks discussed with patient  I personally reviewed this patient with the CRNA  Discussed and agreed on the Anesthesia Plan with the CRNA  Alison Azul

## 2019-08-19 ENCOUNTER — TELEPHONE (OUTPATIENT)
Dept: GASTROENTEROLOGY | Facility: CLINIC | Age: 49
End: 2019-08-19

## 2019-08-20 ENCOUNTER — LAB (OUTPATIENT)
Dept: LAB | Facility: MEDICAL CENTER | Age: 49
End: 2019-08-20
Attending: INTERNAL MEDICINE
Payer: COMMERCIAL

## 2019-08-20 ENCOUNTER — TRANSCRIBE ORDERS (OUTPATIENT)
Dept: ADMINISTRATIVE | Facility: HOSPITAL | Age: 49
End: 2019-08-20

## 2019-08-20 DIAGNOSIS — K70.31 ALCOHOLIC CIRRHOSIS OF LIVER WITH ASCITES (HCC): ICD-10-CM

## 2019-08-20 LAB
ALBUMIN SERPL BCP-MCNC: 3.7 G/DL (ref 3.5–5)
ALP SERPL-CCNC: 168 U/L (ref 46–116)
ALT SERPL W P-5'-P-CCNC: 29 U/L (ref 12–78)
ANION GAP SERPL CALCULATED.3IONS-SCNC: 7 MMOL/L (ref 4–13)
AST SERPL W P-5'-P-CCNC: 40 U/L (ref 5–45)
BILIRUB SERPL-MCNC: 0.79 MG/DL (ref 0.2–1)
BUN SERPL-MCNC: 14 MG/DL (ref 5–25)
CALCIUM SERPL-MCNC: 9.3 MG/DL (ref 8.3–10.1)
CHLORIDE SERPL-SCNC: 106 MMOL/L (ref 100–108)
CO2 SERPL-SCNC: 25 MMOL/L (ref 21–32)
CREAT SERPL-MCNC: 0.71 MG/DL (ref 0.6–1.3)
ERYTHROCYTE [DISTWIDTH] IN BLOOD BY AUTOMATED COUNT: 14.5 % (ref 11.6–15.1)
GFR SERPL CREATININE-BSD FRML MDRD: 111 ML/MIN/1.73SQ M
GLUCOSE P FAST SERPL-MCNC: 87 MG/DL (ref 65–99)
HCT VFR BLD AUTO: 38.3 % (ref 36.5–49.3)
HGB BLD-MCNC: 12.8 G/DL (ref 12–17)
INR PPP: 1.19 (ref 0.84–1.19)
MCH RBC QN AUTO: 30.2 PG (ref 26.8–34.3)
MCHC RBC AUTO-ENTMCNC: 33.4 G/DL (ref 31.4–37.4)
MCV RBC AUTO: 90 FL (ref 82–98)
PLATELET # BLD AUTO: 59 THOUSANDS/UL (ref 149–390)
PMV BLD AUTO: 10.8 FL (ref 8.9–12.7)
POTASSIUM SERPL-SCNC: 3.9 MMOL/L (ref 3.5–5.3)
PROT SERPL-MCNC: 8.7 G/DL (ref 6.4–8.2)
PROTHROMBIN TIME: 14.7 SECONDS (ref 11.6–14.5)
RBC # BLD AUTO: 4.24 MILLION/UL (ref 3.88–5.62)
SODIUM SERPL-SCNC: 138 MMOL/L (ref 136–145)
WBC # BLD AUTO: 3.15 THOUSAND/UL (ref 4.31–10.16)

## 2019-08-20 PROCEDURE — 36415 COLL VENOUS BLD VENIPUNCTURE: CPT

## 2019-08-20 PROCEDURE — 85027 COMPLETE CBC AUTOMATED: CPT

## 2019-08-20 PROCEDURE — 85610 PROTHROMBIN TIME: CPT

## 2019-08-20 PROCEDURE — 80053 COMPREHEN METABOLIC PANEL: CPT

## 2019-08-21 NOTE — RESULT ENCOUNTER NOTE
I called him with his results and left a voicemail  His polyp was an adenoma so his next colonoscopy should be in five years  The rest of his biopsies were unremarkable

## 2019-08-23 ENCOUNTER — OFFICE VISIT (OUTPATIENT)
Dept: FAMILY MEDICINE CLINIC | Facility: CLINIC | Age: 49
End: 2019-08-23
Payer: COMMERCIAL

## 2019-08-23 VITALS
HEART RATE: 76 BPM | OXYGEN SATURATION: 96 % | SYSTOLIC BLOOD PRESSURE: 110 MMHG | DIASTOLIC BLOOD PRESSURE: 64 MMHG | WEIGHT: 212 LBS | HEIGHT: 68 IN | RESPIRATION RATE: 18 BRPM | BODY MASS INDEX: 32.13 KG/M2

## 2019-08-23 DIAGNOSIS — K75.0 HEPATIC ABSCESS: ICD-10-CM

## 2019-08-23 DIAGNOSIS — K70.31 ALCOHOLIC CIRRHOSIS OF LIVER WITH ASCITES (HCC): ICD-10-CM

## 2019-08-23 DIAGNOSIS — E80.6 HYPERBILIRUBINEMIA: ICD-10-CM

## 2019-08-23 DIAGNOSIS — J90 PLEURAL EFFUSION ON RIGHT: ICD-10-CM

## 2019-08-23 DIAGNOSIS — D61.818 PANCYTOPENIA (HCC): ICD-10-CM

## 2019-08-23 DIAGNOSIS — K21.9 GASTROESOPHAGEAL REFLUX DISEASE WITHOUT ESOPHAGITIS: Primary | ICD-10-CM

## 2019-08-23 DIAGNOSIS — E87.1 HYPONATREMIA: ICD-10-CM

## 2019-08-23 DIAGNOSIS — J30.2 SEASONAL ALLERGIC RHINITIS, UNSPECIFIED TRIGGER: ICD-10-CM

## 2019-08-23 DIAGNOSIS — L71.9 ROSACEA: ICD-10-CM

## 2019-08-23 DIAGNOSIS — E55.9 VITAMIN D INSUFFICIENCY: ICD-10-CM

## 2019-08-23 DIAGNOSIS — E66.9 OBESITY, CLASS I, BMI 30-34.9: ICD-10-CM

## 2019-08-23 PROBLEM — G89.29 CHRONIC BACK PAIN: Status: RESOLVED | Noted: 2019-03-23 | Resolved: 2019-08-23

## 2019-08-23 PROBLEM — K36 CHRONIC APPENDICITIS: Status: RESOLVED | Noted: 2019-02-26 | Resolved: 2019-08-23

## 2019-08-23 PROBLEM — Z74.09 IMPAIRED MOBILITY AND ADLS: Status: RESOLVED | Noted: 2019-03-22 | Resolved: 2019-08-23

## 2019-08-23 PROBLEM — M54.9 CHRONIC BACK PAIN: Status: RESOLVED | Noted: 2019-03-23 | Resolved: 2019-08-23

## 2019-08-23 PROBLEM — J30.9 ALLERGIC RHINITIS: Status: ACTIVE | Noted: 2019-08-23

## 2019-08-23 PROBLEM — Z78.9 IMPAIRED MOBILITY AND ADLS: Status: RESOLVED | Noted: 2019-03-22 | Resolved: 2019-08-23

## 2019-08-23 PROBLEM — R33.9 URINARY RETENTION: Status: RESOLVED | Noted: 2019-04-10 | Resolved: 2019-08-23

## 2019-08-23 PROCEDURE — 1036F TOBACCO NON-USER: CPT | Performed by: FAMILY MEDICINE

## 2019-08-23 PROCEDURE — 99214 OFFICE O/P EST MOD 30 MIN: CPT | Performed by: FAMILY MEDICINE

## 2019-08-23 PROCEDURE — 3725F SCREEN DEPRESSION PERFORMED: CPT | Performed by: FAMILY MEDICINE

## 2019-08-23 PROCEDURE — 3008F BODY MASS INDEX DOCD: CPT | Performed by: FAMILY MEDICINE

## 2019-08-23 RX ORDER — METRONIDAZOLE 7.5 MG/G
GEL TOPICAL 2 TIMES DAILY
Qty: 45 G | Refills: 0 | Status: SHIPPED | OUTPATIENT
Start: 2019-08-23 | End: 2021-04-28 | Stop reason: ALTCHOICE

## 2019-08-23 NOTE — ASSESSMENT & PLAN NOTE
He is having frontal headache which may be consistent with some frontal sinus inflammation  He has symptoms consistent with allergic rhinitis and he can try Claritin 10 mg daily

## 2019-08-23 NOTE — ASSESSMENT & PLAN NOTE
He continues to refrain from alcohol  Platelets remain a bit low and have been trending down so I am going to have him repeat a CBC within a month  Also repeat a CMP  INR seems to be normalizing

## 2019-08-23 NOTE — PROGRESS NOTES
Assessment/Plan:       Problem List Items Addressed This Visit        Digestive    GERD (gastroesophageal reflux disease) - Primary     Reflux is pretty well controlled  He is having what sounds like some rebound reflux after stopping his pantoprazole  I would suggest picking up some Pepcid AC to utilize as needed  Hepatic abscess     I will speak to GI regarding any further need for follow-up in regards to his hepatic abscess  Alcoholic cirrhosis of liver with ascites ([address] )     He continues to refrain from alcohol  Platelets remain a bit low and have been trending down so I am going to have him repeat a CBC within a month  Also repeat a CMP  INR seems to be normalizing  Relevant Orders    CBC and differential    Comprehensive metabolic panel       Respiratory    Pleural effusion on right     He is clinically stable  I will touch base with Pulmonary to see if they would like any further follow-up in this regard  Allergic rhinitis     He is having frontal headache which may be consistent with some frontal sinus inflammation  He has symptoms consistent with allergic rhinitis and he can try Claritin 10 mg daily  Musculoskeletal and Integument    Rosacea      trial of Metrogel b i d  P r n  Misha Glass Relevant Medications    metroNIDAZOLE (METROGEL) 0 75 % gel       Hematopoietic and Hemostatic    Pancytopenia (HCC)    Relevant Orders    CBC and differential       Other    Hyperbilirubinemia    Relevant Orders    Comprehensive metabolic panel    Hyponatremia    Relevant Orders    Comprehensive metabolic panel    Vitamin D insufficiency    Relevant Orders    Vitamin D 25 hydroxy      Other Visit Diagnoses     Obesity, Class I, BMI 30-34 9            BMI Counseling: Body mass index is 32 23 kg/m²  Discussed the patient's BMI with him  The BMI is above average  BMI counseling and education was provided to the patient   Nutrition recommendations include reducing portion sizes, decreasing overall calorie intake and 3-5 servings of fruits/vegetables daily  Exercise recommendations include moderate aerobic physical activity for 150 minutes/week  Subjective:      Patient ID: Marixa Rehman is a 51 y o  male  HPI     Patient presents today for follow-up for chronic health issues  He was quite ill back in March with hepatic abscess and sepsis  He also developed an empyema in his right lung  Fortunately, he seems to be improving  Unfortunately, he appears to have cirrhosis  He has not drank since March  He is following with GI  Recent blood work did show a normalizing INR but persistent thrombocytopenia as well as mildly elevated alkaline phosphatase  He does have an occasional sharp pain in his right lower quadrant but denies any persistent pain, fever or chills  He did have an appendectomy 2 months ago  In regards to his empyema, he had a recent chest x-ray which shows some persistent fluid which appears to be loculated at this time  He has been exercising and feels that he is very mildly short of breath but is having no persistent coughing or wheezing  He feels he is probably just out of shape and this seems to be improving the more he exercises      The following portions of the patient's history were reviewed and updated as appropriate: allergies, current medications, past family history, past medical history, past social history, past surgical history and problem list       Current Outpatient Medications:     CALCIUM PO, Take by mouth daily 2 chewables daily, Disp: , Rfl:     cholecalciferol 2000 units TABS, Take 1 tablet (2,000 Units total) by mouth daily, Disp: 100 tablet, Rfl: 0    melatonin 5 MG TABS, Take 1 tablet (5 mg total) by mouth daily at bedtime as needed (Insomnia), Disp: 30 tablet, Rfl: 0    Multiple Vitamins-Minerals (MULTIVITAMIN ADULT PO), Take by mouth daily, Disp: , Rfl:     metroNIDAZOLE (METROGEL) 0 75 % gel, Apply topically 2 (two) times a day, Disp: 45 g, Rfl: 0     Review of Systems   Constitutional: Negative for appetite change, chills, fatigue, fever and unexpected weight change  HENT: Negative for trouble swallowing  Eyes: Negative for visual disturbance  Respiratory: Negative for cough, chest tightness, shortness of breath and wheezing  Cardiovascular: Negative for chest pain  Gastrointestinal: Positive for abdominal pain  Negative for abdominal distention, blood in stool, constipation and diarrhea  Endocrine: Negative for polyuria  Genitourinary: Negative for difficulty urinating and flank pain  Musculoskeletal: Negative for arthralgias and myalgias  Skin: Negative for rash  Neurological: Negative for dizziness and light-headedness  Hematological: Negative for adenopathy  Does not bruise/bleed easily  Psychiatric/Behavioral: Negative for sleep disturbance  Objective:      /64   Pulse 76   Resp 18   Ht 5' 8" (1 727 m)   Wt 96 2 kg (212 lb)   SpO2 96%   BMI 32 23 kg/m²          Physical Exam   Constitutional: He is oriented to person, place, and time  He appears well-developed and well-nourished  No distress  HENT:   Head: Normocephalic  Eyes: Pupils are equal, round, and reactive to light  Right eye exhibits no discharge  Left eye exhibits no discharge  Neck: No tracheal deviation present  No thyromegaly present  Cardiovascular: Normal rate, regular rhythm and normal heart sounds  No murmur heard  Pulmonary/Chest: Effort normal  No respiratory distress  He has no wheezes  He has no rales  Abdominal: Soft  He exhibits no distension and no mass  There is no tenderness  There is no rebound and no guarding  No hernia  Musculoskeletal: Normal range of motion  He exhibits no edema  Lymphadenopathy:     He has no cervical adenopathy  Neurological: He is alert and oriented to person, place, and time  No cranial nerve deficit  Skin: Skin is warm  No rash noted  He is not diaphoretic   No erythema  No pallor  Psychiatric: He has a normal mood and affect   Judgment and thought content normal          Akanksha Flores MD

## 2019-08-23 NOTE — ASSESSMENT & PLAN NOTE
He is clinically stable  I will touch base with Pulmonary to see if they would like any further follow-up in this regard

## 2019-08-23 NOTE — ASSESSMENT & PLAN NOTE
Reflux is pretty well controlled  He is having what sounds like some rebound reflux after stopping his pantoprazole  I would suggest picking up some Pepcid AC to utilize as needed

## 2019-08-29 ENCOUNTER — APPOINTMENT (OUTPATIENT)
Dept: LAB | Facility: MEDICAL CENTER | Age: 49
End: 2019-08-29
Payer: COMMERCIAL

## 2019-08-29 DIAGNOSIS — E55.9 VITAMIN D INSUFFICIENCY: ICD-10-CM

## 2019-08-29 DIAGNOSIS — K70.31 ALCOHOLIC CIRRHOSIS OF LIVER WITH ASCITES (HCC): ICD-10-CM

## 2019-08-29 DIAGNOSIS — E87.1 HYPONATREMIA: ICD-10-CM

## 2019-08-29 DIAGNOSIS — D61.818 PANCYTOPENIA (HCC): ICD-10-CM

## 2019-08-29 DIAGNOSIS — E80.6 HYPERBILIRUBINEMIA: ICD-10-CM

## 2019-08-29 LAB
25(OH)D3 SERPL-MCNC: 37.7 NG/ML (ref 30–100)
ALBUMIN SERPL BCP-MCNC: 3.9 G/DL (ref 3.5–5)
ALP SERPL-CCNC: 150 U/L (ref 46–116)
ALT SERPL W P-5'-P-CCNC: 32 U/L (ref 12–78)
ANION GAP SERPL CALCULATED.3IONS-SCNC: 6 MMOL/L (ref 4–13)
AST SERPL W P-5'-P-CCNC: 44 U/L (ref 5–45)
BASOPHILS # BLD AUTO: 0.03 THOUSANDS/ΜL (ref 0–0.1)
BASOPHILS NFR BLD AUTO: 1 % (ref 0–1)
BILIRUB SERPL-MCNC: 1.06 MG/DL (ref 0.2–1)
BUN SERPL-MCNC: 18 MG/DL (ref 5–25)
CALCIUM SERPL-MCNC: 9.9 MG/DL (ref 8.3–10.1)
CHLORIDE SERPL-SCNC: 106 MMOL/L (ref 100–108)
CO2 SERPL-SCNC: 26 MMOL/L (ref 21–32)
CREAT SERPL-MCNC: 0.79 MG/DL (ref 0.6–1.3)
EOSINOPHIL # BLD AUTO: 0.35 THOUSAND/ΜL (ref 0–0.61)
EOSINOPHIL NFR BLD AUTO: 11 % (ref 0–6)
ERYTHROCYTE [DISTWIDTH] IN BLOOD BY AUTOMATED COUNT: 14.4 % (ref 11.6–15.1)
GFR SERPL CREATININE-BSD FRML MDRD: 106 ML/MIN/1.73SQ M
GLUCOSE P FAST SERPL-MCNC: 94 MG/DL (ref 65–99)
HCT VFR BLD AUTO: 42 % (ref 36.5–49.3)
HGB BLD-MCNC: 13.9 G/DL (ref 12–17)
IMM GRANULOCYTES # BLD AUTO: 0.01 THOUSAND/UL (ref 0–0.2)
IMM GRANULOCYTES NFR BLD AUTO: 0 % (ref 0–2)
LYMPHOCYTES # BLD AUTO: 0.74 THOUSANDS/ΜL (ref 0.6–4.47)
LYMPHOCYTES NFR BLD AUTO: 24 % (ref 14–44)
MCH RBC QN AUTO: 30 PG (ref 26.8–34.3)
MCHC RBC AUTO-ENTMCNC: 33.1 G/DL (ref 31.4–37.4)
MCV RBC AUTO: 91 FL (ref 82–98)
MONOCYTES # BLD AUTO: 0.31 THOUSAND/ΜL (ref 0.17–1.22)
MONOCYTES NFR BLD AUTO: 10 % (ref 4–12)
NEUTROPHILS # BLD AUTO: 1.68 THOUSANDS/ΜL (ref 1.85–7.62)
NEUTS SEG NFR BLD AUTO: 54 % (ref 43–75)
NRBC BLD AUTO-RTO: 0 /100 WBCS
PLATELET # BLD AUTO: 66 THOUSANDS/UL (ref 149–390)
PMV BLD AUTO: 11 FL (ref 8.9–12.7)
POTASSIUM SERPL-SCNC: 4.5 MMOL/L (ref 3.5–5.3)
PROT SERPL-MCNC: 9.1 G/DL (ref 6.4–8.2)
RBC # BLD AUTO: 4.64 MILLION/UL (ref 3.88–5.62)
SODIUM SERPL-SCNC: 138 MMOL/L (ref 136–145)
WBC # BLD AUTO: 3.12 THOUSAND/UL (ref 4.31–10.16)

## 2019-08-29 PROCEDURE — 82306 VITAMIN D 25 HYDROXY: CPT

## 2019-08-29 PROCEDURE — 85025 COMPLETE CBC W/AUTO DIFF WBC: CPT

## 2019-08-29 PROCEDURE — 36415 COLL VENOUS BLD VENIPUNCTURE: CPT

## 2019-08-29 PROCEDURE — 80053 COMPREHEN METABOLIC PANEL: CPT

## 2019-10-09 NOTE — PROGRESS NOTES
10/11/2019      Chief Complaint   Patient presents with    Urinary Retention       Assessment and Plan    51 y o  male     1  Urinary retention s/p SPT insertion and subsequent revmoval  - the patient presents today doing well and denies any difficulty emptying his bladder  - his postvoid residual 32mL  - discussed since that the patient is emptying his bladder and is not having any significant urinary complaints, we will re-evaluate him in 1 year with a PVR  - the patient is aware to call in the meantime if he is experiencing weakening stream and having difficulty emptying his bladder    2  Prostate cancer screening in the setting of a strong family history  - discussed with the patient that given his strong family history of prostate cancer recommend we start screening now  - patient deferred a digital rectal examination the office today  - will obtain a PSA at his convenience and again in 1 year as long as this is normal     FU 1 year with PVR      History of Present Illness  Leela Monahan is a 51 y o  male here for follow up evaluation of urinary retention  Initial Raymundo catheter placement was attempted while the patient was hospitalized in the ICU  This was 3/1/2019 and there was unsuccessful attempts at urethral catheterization  Because of this, the patient underwent bedside suprapubic tube placement  Unfortunately, the patient pulled this catheter out  He presented back to interventional Radiology for replacement  At this time he was found to have a decompressed bladder and abdominal ascites  Because of this and the fact that the patient was voiding no catheter was replaced  He presents today doing very well  He has no urinary complaints  He is emptying his bladder with a postvoid residual 32mL  The patient does state he has a family history of prostate cancer  Both his father and his uncle  His father within the 76s and his uncle was age 71 with diagnosed    He does not know which treatment they had       Review of Systems   Constitutional: Negative for activity change, chills and fever  Gastrointestinal: Negative for abdominal distention and abdominal pain  Musculoskeletal: Negative for back pain and gait problem  Psychiatric/Behavioral: Negative for behavioral problems and confusion  Past Medical History  Past Medical History:   Diagnosis Date    Alcoholic cirrhosis [facility] liver with ascites [facility] ) 2019    Anemia 2019    Chronic appendicitis 2019    Chronic superficial gastritis without bleeding 2019    Elevated alkaline phosphatase level 2019    GERD (gastroesophageal reflux disease) 2019    Heart murmur     History of alcohol abuse 2019    History of chronic back pain 2019    History of pleural empyema     History of transfusion     Liver abscess     Seasonal allergies     Severe sepsis ([facility] ) 2019    Weight loss 2019       Past Social History  Past Surgical History:   Procedure Laterality Date    IR ABSCESS/SEROMA DRAINAGE  2019    IR CHEST TUBE  3/6/2019    IR SUPRAPUBIC TUBE  3/14/2019    [place] LAP,APPENDECTOMY N/A 2019    Procedure: LAPAROSCOPIC APPENDECTOMY AND PARTIAL CECECTOMY;  Surgeon: Peter Hearn MD;  Location: [facility] OR;  Service: General     Social History     Tobacco Use   Smoking Status Former Smoker    Packs/day: 0 25    Years: 7 00    Pack years: 1 75    Types: Cigarettes    Last attempt to quit:     Years since quittin 7   Smokeless Tobacco Never Used       Past Family History  No family history on file      Past Social history  Social History     Socioeconomic History    Marital status: Single     Spouse name: Not on file    Number of children: Not on file    Years of education: Not on file    Highest education level: Not on file   Occupational History    Not on file   Social Needs    Financial resource strain: Not on file    Food insecurity:     Worry: Not on file Inability: Not on file    Transportation needs:     Medical: Not on file     Non-medical: Not on file   Tobacco Use    Smoking status: Former Smoker     Packs/day: 0 25     Years: 7 00     Pack years: 1 75     Types: Cigarettes     Last attempt to quit:      Years since quittin 7    Smokeless tobacco: Never Used   Substance and Sexual Activity    Alcohol use: Not Currently     Alcohol/week: 96 0 standard drinks     Types: 96 Shots of liquor per week     Frequency: 4 or more times a week     Drinks per session: 5 or 6     Binge frequency: Daily or almost daily     Comment: ALCOHOLIC CIRRHOSIS   Pt quit drinking in late december    Drug use: Never    Sexual activity: Not on file   Lifestyle    Physical activity:     Days per week: Not on file     Minutes per session: Not on file    Stress: Not on file   Relationships    Social connections:     Talks on phone: Not on file     Gets together: Not on file     Attends [employer] service: Not on file     Active member of club or organization: Not on file     Attends meetings of clubs or organizations: Not on file     Relationship status: Not on file    Intimate partner violence:     Fear of current or ex partner: Not on file     Emotionally abused: Not on file     Physically abused: Not on file     Forced sexual activity: Not on file   Other Topics Concern    Not on file   Social History Narrative    Not on file       Current Medications  Current Outpatient Medications   Medication Sig Dispense Refill    CALCIUM PO Take by mouth daily 2 chewables daily      cholecalciferol 2000 units TABS Take 1 tablet (2,000 Units total) by mouth daily 100 tablet 0    Multiple Vitamins-Minerals (MULTIVITAMIN ADULT PO) Take by mouth daily      melatonin 5 MG TABS Take 1 tablet (5 mg total) by mouth daily at bedtime as needed (Insomnia) (Patient not taking: Reported on 10/11/2019) 30 tablet 0    metroNIDAZOLE (METROGEL) 0 75 % gel Apply topically 2 (two) times a day (Patient not taking: Reported on 10/11/2019) 45 g 0     No current facility-administered medications for this visit  Allergies  No Known Allergies      The following portions of the patient's history were reviewed and updated as appropriate: allergies, current medications, past medical history, past social history, past surgical history and problem list       Vitals  Vitals:    10/11/19 1405   BP: 124/72   Pulse: 75   Weight: 99 8 kg (220 lb)   Height: 5' 8" (1 727 m)       Physical Exam  Constitutional   General appearance: Patient is seated and in no acute distress, well appearing and well nourished  Head and Face   Head and face: Normal     Eyes   Conjunctiva and lids: No erythema, swelling or discharge  Ears, Nose, Mouth, and Throat   Hearing: Normal     Pulmonary   Respiratory effort: No increased work of breathing or signs of respiratory distress  Cardiovascular   Examination of extremities for edema and/or varicosities: Normal     Abdomen   Abdomen: Non-tender, no masses  Musculoskeletal   Gait and station: normal     Skin   Skin and subcutaneous tissue: Warm, dry, and intact  No visible lesions or rashes  Psychiatric   Judgment and insight: Normal  Recent and remote memory:  Normal  Mood and affect: Normal      Results  Recent Results (from the past 1 hour(s))   POCT Measure PVR    Collection Time: 10/11/19  2:15 PM   Result Value Ref Range    POST-VOID RESIDUAL VOLUME, ML POC 32 mL   ]  No results found for: PSA  Lab Results   Component Value Date    CALCIUM 9 9 08/29/2019    K 4 5 08/29/2019    CO2 26 08/29/2019     08/29/2019    BUN 18 08/29/2019    CREATININE 0 79 08/29/2019     Lab Results   Component Value Date    WBC 3 12 (L) 08/29/2019    HGB 13 9 08/29/2019    HCT 42 0 08/29/2019    MCV 91 08/29/2019    PLT 66 (L) 08/29/2019           Orders  Orders Placed This Encounter   Procedures    PSA, Total Screen     This is a patient instruction: This test is non-fasting   Please drink two glasses of water morning of bloodwork  Standing Status:   Future     Standing Expiration Date:   4/11/2021    PSA, Total Screen     This is a patient instruction: This test is non-fasting  Please drink two glasses of water morning of bloodwork          Standing Status:   Future     Standing Expiration Date:   10/11/2020    POCT Measure PVR

## 2019-10-11 ENCOUNTER — OFFICE VISIT (OUTPATIENT)
Dept: UROLOGY | Facility: CLINIC | Age: 49
End: 2019-10-11
Payer: COMMERCIAL

## 2019-10-11 VITALS
SYSTOLIC BLOOD PRESSURE: 124 MMHG | WEIGHT: 220 LBS | DIASTOLIC BLOOD PRESSURE: 72 MMHG | BODY MASS INDEX: 33.34 KG/M2 | HEART RATE: 75 BPM | HEIGHT: 68 IN

## 2019-10-11 DIAGNOSIS — C61 PROSTATE CANCER (HCC): ICD-10-CM

## 2019-10-11 DIAGNOSIS — Z87.898 HISTORY OF URINARY RETENTION: Primary | ICD-10-CM

## 2019-10-11 LAB — POST-VOID RESIDUAL VOLUME, ML POC: 32 ML

## 2019-10-11 PROCEDURE — 99213 OFFICE O/P EST LOW 20 MIN: CPT | Performed by: PHYSICIAN ASSISTANT

## 2019-10-11 PROCEDURE — 51798 US URINE CAPACITY MEASURE: CPT | Performed by: PHYSICIAN ASSISTANT

## 2020-03-03 ENCOUNTER — TELEPHONE (OUTPATIENT)
Dept: GASTROENTEROLOGY | Facility: MEDICAL CENTER | Age: 50
End: 2020-03-03

## 2020-03-03 NOTE — TELEPHONE ENCOUNTER
----- Message from Jodi Cintron MD sent at 3/1/2020  7:33 PM EST -----  Regarding: Office appointment  Please schedule him for an appointment with me in the next month  OK to overbook me at 740am or lunchtime on one of my office days  ----- Message -----  From: Avni Lord MD  Sent: 2/24/2020   6:15 PM EST  To: MD Julito Jasso was wondering when this patient should follow up with us  I believe you have seen him and will have you schedule a visit with him as you feel necessary  Le tme know if I can help  Thanks     Ashlyn Mclean

## 2020-03-05 ENCOUNTER — TELEPHONE (OUTPATIENT)
Dept: GASTROENTEROLOGY | Facility: MEDICAL CENTER | Age: 50
End: 2020-03-05

## 2020-03-05 NOTE — TELEPHONE ENCOUNTER
----- Message from Segun Loo MD sent at 3/1/2020  7:33 PM EST -----  Regarding: Office appointment  Please schedule him for an appointment with me in the next month  OK to overbook me at 740am or lunchtime on one of my office days  ----- Message -----  From: Colleen Hastings MD  Sent: 2/24/2020   6:15 PM EST  To: Rickey Lieu, MD Leopoldo Booty Aimee Curl was wondering when this patient should follow up with us  I believe you have seen him and will have you schedule a visit with him as you feel necessary  Le tme know if I can help  Thanks     Cortes Rashid

## 2020-04-20 ENCOUNTER — TELEPHONE (OUTPATIENT)
Dept: GASTROENTEROLOGY | Facility: MEDICAL CENTER | Age: 50
End: 2020-04-20

## 2021-04-01 ENCOUNTER — IMMUNIZATIONS (OUTPATIENT)
Dept: FAMILY MEDICINE CLINIC | Facility: HOSPITAL | Age: 51
End: 2021-04-01

## 2021-04-01 DIAGNOSIS — Z23 ENCOUNTER FOR IMMUNIZATION: Primary | ICD-10-CM

## 2021-04-01 PROCEDURE — 91301 SARS-COV-2 / COVID-19 MRNA VACCINE (MODERNA) 100 MCG: CPT

## 2021-04-01 PROCEDURE — 0011A SARS-COV-2 / COVID-19 MRNA VACCINE (MODERNA) 100 MCG: CPT

## 2021-04-28 ENCOUNTER — OFFICE VISIT (OUTPATIENT)
Dept: FAMILY MEDICINE CLINIC | Facility: CLINIC | Age: 51
End: 2021-04-28

## 2021-04-28 VITALS
WEIGHT: 193 LBS | OXYGEN SATURATION: 97 % | SYSTOLIC BLOOD PRESSURE: 120 MMHG | TEMPERATURE: 98.9 F | HEIGHT: 68 IN | HEART RATE: 70 BPM | BODY MASS INDEX: 29.25 KG/M2 | DIASTOLIC BLOOD PRESSURE: 76 MMHG

## 2021-04-28 DIAGNOSIS — E80.6 HYPERBILIRUBINEMIA: ICD-10-CM

## 2021-04-28 DIAGNOSIS — K21.9 GASTROESOPHAGEAL REFLUX DISEASE WITHOUT ESOPHAGITIS: ICD-10-CM

## 2021-04-28 DIAGNOSIS — E55.9 VITAMIN D INSUFFICIENCY: ICD-10-CM

## 2021-04-28 DIAGNOSIS — J90 PLEURAL EFFUSION ON RIGHT: ICD-10-CM

## 2021-04-28 DIAGNOSIS — D61.818 PANCYTOPENIA (HCC): ICD-10-CM

## 2021-04-28 DIAGNOSIS — N52.39 OTHER POST-PROCEDURAL ERECTILE DYSFUNCTION: ICD-10-CM

## 2021-04-28 DIAGNOSIS — E87.1 HYPONATREMIA: ICD-10-CM

## 2021-04-28 DIAGNOSIS — K70.30 ALCOHOLIC CIRRHOSIS OF LIVER WITHOUT ASCITES (HCC): Primary | ICD-10-CM

## 2021-04-28 PROBLEM — R01.1 HEART MURMUR: Status: RESOLVED | Noted: 2019-02-06 | Resolved: 2021-04-28

## 2021-04-28 RX ORDER — TADALAFIL 20 MG/1
20 TABLET ORAL DAILY PRN
Qty: 10 TABLET | Refills: 0 | Status: SHIPPED | OUTPATIENT
Start: 2021-04-28

## 2021-04-28 NOTE — PROGRESS NOTES
Assessment/Plan:       Problem List Items Addressed This Visit        Digestive    GERD (gastroesophageal reflux disease)     Symptoms are stable  Alcoholic cirrhosis of liver without ascites ([facility] ) - Primary     I encouraged GI follow-up  He really does not drink alcohol at all anymore but needs further imaging as well as labs to perform another meld score  Respiratory    Pleural effusion on right     He sounds clear and is not experiencing persistent shortness of breath  Last x-ray did show a loculated pleural effusion  Again, I would like further follow-up  The patient will be looking into getting insurance  Hematopoietic and Hemostatic    Pancytopenia ([facility] )     Repeat CBC when patient is willing to perform labs  Other    Hyperbilirubinemia    Hyponatremia    Vitamin D insufficiency    Post-procedural erectile dysfunction     He has had some intermittent ED since his traumatic catheterization in 2019  I did give him a prescription for Cialis as needed  Relevant Medications    tadalafil (CIALIS) 20 MG tablet            Subjective:      Patient ID: Bryant Waddell is a 53 y o  male  HPI patient presents today after follow-up for his chronic health issues  Unfortunately, he has not been compliant with routine follow-up  He had a very significant illness in March of 2019 from a hepatic abscess that required multiple days of intubation, a loculated pleural of effusion drainage as well as catheterization for acute urinary retention  He had initial follow-up but has not continued with routine GI follow-up  He notes he feels relatively well  He denies excessive shortness of breath or coughing  He does have some chronic fatigue  He notes some erectile dysfunction since his hospitalization  He drinks alcohol on a very rare basis and only has 1 glass of wine  This may occur once per month    He knows he is not supposed to drink alcohol at all due to his diagnosis of cirrhosis  He notes he is in the process of trying to get insurance at this time again    The following portions of the patient's history were reviewed and updated as appropriate: allergies, current medications, past family history, past medical history, past social history, past surgical history and problem list       Current Outpatient Medications:     cholecalciferol 2000 units TABS, Take 1 tablet (2,000 Units total) by mouth daily, Disp: 100 tablet, Rfl: 0    Multiple Vitamins-Minerals (MULTIVITAMIN ADULT PO), Take by mouth daily, Disp: , Rfl:     tadalafil (CIALIS) 20 MG tablet, Take 1 tablet (20 mg total) by mouth daily as needed for erectile dysfunction, Disp: 10 tablet, Rfl: 0     Review of Systems   Constitutional: Positive for fatigue  Negative for appetite change, chills, fever and unexpected weight change  HENT: Negative for trouble swallowing  Eyes: Negative for visual disturbance  Respiratory: Negative for cough, chest tightness, shortness of breath and wheezing  Cardiovascular: Negative for chest pain, palpitations and leg swelling  Gastrointestinal: Negative for abdominal distention, abdominal pain, blood in stool, constipation and diarrhea  Endocrine: Negative for polyuria  Genitourinary: Negative for difficulty urinating and flank pain  Musculoskeletal: Negative for arthralgias and myalgias  Skin: Negative for rash  Neurological: Negative for dizziness and light-headedness  Hematological: Negative for adenopathy  Does not bruise/bleed easily  Psychiatric/Behavioral: Negative for dysphoric mood and sleep disturbance  The patient is not nervous/anxious  Objective:      /76 (BP Location: Right arm, Patient Position: Sitting, Cuff Size: Standard)   Pulse 70   Temp 98 9 °F (37 2 °C)   Ht 5' 8" (1 727 m)   Wt 87 5 kg (193 lb)   SpO2 97%   BMI 29 35 kg/m²          Physical Exam  Constitutional:       General: He is not in acute distress  Appearance: He is well-developed  He is not diaphoretic  HENT:      Head: Normocephalic  Right Ear: Tympanic membrane and ear canal normal  There is no impacted cerumen  Left Ear: Tympanic membrane and ear canal normal  There is no impacted cerumen  Eyes:      General:         Right eye: No discharge  Left eye: No discharge  Pupils: Pupils are equal, round, and reactive to light  Neck:      Thyroid: No thyromegaly  Trachea: No tracheal deviation  Cardiovascular:      Rate and Rhythm: Normal rate and regular rhythm  Heart sounds: Normal heart sounds  No murmur  Pulmonary:      Effort: Pulmonary effort is normal  No respiratory distress  Breath sounds: No wheezing or rales  Abdominal:      General: There is no distension  Palpations: Abdomen is soft  There is no mass  Tenderness: There is no abdominal tenderness  There is no right CVA tenderness, left CVA tenderness, guarding or rebound  Hernia: No hernia is present  Comments: Spleen seems enlarged   Musculoskeletal: Normal range of motion  Lymphadenopathy:      Cervical: No cervical adenopathy  Skin:     General: Skin is warm  Findings: No erythema  Neurological:      Mental Status: He is alert and oriented to person, place, and time  Cranial Nerves: No cranial nerve deficit  Psychiatric:         Thought Content:  Thought content normal          Judgment: Judgment normal            Rhiannon Robertson MD

## 2021-04-29 ENCOUNTER — IMMUNIZATIONS (OUTPATIENT)
Dept: FAMILY MEDICINE CLINIC | Facility: HOSPITAL | Age: 51
End: 2021-04-29

## 2021-04-29 DIAGNOSIS — Z23 ENCOUNTER FOR IMMUNIZATION: Primary | ICD-10-CM

## 2021-04-29 PROCEDURE — 0012A SARS-COV-2 / COVID-19 MRNA VACCINE (MODERNA) 100 MCG: CPT

## 2021-04-29 PROCEDURE — 91301 SARS-COV-2 / COVID-19 MRNA VACCINE (MODERNA) 100 MCG: CPT

## 2021-04-29 NOTE — ASSESSMENT & PLAN NOTE
I encouraged GI follow-up  He really does not drink alcohol at all anymore but needs further imaging as well as labs to perform another meld score

## 2021-04-29 NOTE — ASSESSMENT & PLAN NOTE
He sounds clear and is not experiencing persistent shortness of breath  Last x-ray did show a loculated pleural effusion  Again, I would like further follow-up  The patient will be looking into getting insurance

## 2021-04-29 NOTE — ASSESSMENT & PLAN NOTE
He certainly remains clinically stable from this perspective  Unfortunately, he has not followed through with GI follow-up or any repeat labs or imaging  I encouraged him to consider repeat labs in imaging    He notes he will not

## 2021-04-29 NOTE — ASSESSMENT & PLAN NOTE
He has had some intermittent ED since his traumatic catheterization in 2019  I did give him a prescription for Cialis as needed

## 2022-02-04 NOTE — NURSING NOTE
Received a critical lab value from the lab  Hemoglobin 6 8  Paged [facility] to report the value and did not receive a call back  Dayshift nurse aware  day(s)

## 2023-09-13 NOTE — TELEPHONE ENCOUNTER
Phone call from [facility] regarding auth request for CT scan on [employer]  They are not approving the request and have offered a peer to peer discussion, if Dr Jonathan Motta would like to call and discuss the case    [facility] phone  option 3  [id number] lightheaded

## 2024-02-27 NOTE — H&P
History and Physical - [address] [place] [address] Internal Medicine    Patient Information: Capri Rose 53 y o  male MRN: [id number]  Unit/Bed#: [facility] 2 [address] 226-01 Encounter: [id number]  Admitting Physician: Aman Campo PA-C  PCP: Guillermo Collins MD  Date of Admission:  02/25/19    Assessment/Plan:    Hospital Problem List:     Principal Problem:    Failure to thrive in adult  Active Problems:    Heart murmur    Anemia    Weight loss    Elevated alkaline phosphatase level    GERD (gastroesophageal reflux disease)      Primary Problem(s):  · Failure to thrive  · Presenting with recent weight loss of 30 lbs over the course of 2 months  · Also with poor appetite and diarrhea  · Infectious workup ongoing-blood cultures, lactic acid, C diff, stool culture, fecal leukocytes  · Recent urine culture negative  · CT chest abdomen pelvis pending to rule out acute processes and malignancy    Additional Problems:   · New murmur: noted by PCP  Undergoing workup  Imaging with CT chest abdomen pelvis, blood cultures, and echocardiogram pending  No elevated troponin  · Lactic acidosis:  Lactic acid elevated at 2 2  Infectious workup ongoing  Will continue to trend until less than 2  Continue IV fluids  · Anemia: Noted to have hgb of 10 7 on outpt lab work from 2/19/19  Today is 10 0  Denies any bright red blood or dark tarry stool  Hemoccult pending  Iron panel pending  Consult GI for further workup and evaluation  · Elevated alk phos: 237 noted on outpt lab work from 2/19/19  Today is 201  · Hyponatremia:  Sodium 134  Will continue to monitor in setting of IV fluids  · Thrombocytopenia:  Platelets 911      VTE Prophylaxis: Enoxaparin (Lovenox)  / sequential compression device   Code Status: full code  Anticipated Length of Stay:  Patient will be admitted on an Inpatient basis with an anticipated length of stay of  Greater than 2 midnights   Justification for Hospital Stay: failure to thrive/anemia/weight loss with need for further workup and evaluation  Chief Complaint:   Direct admission from Dr Kaur Smith for failure to thrive    History of Present Illness:    Catalina Wood is a 53 y o  male with no significant past medical history and only medication that patient takes is Protonix which he was recently started on last week  Does not appear to receive routine medical care  He presents as a direct admission from Dr Kaur Smith for failure to thrive  Pt has been avoid seeking medical care attention given his lack of insurance at the moment  Dr Whitney Tello convinced the patient to get outpatient lab work on 2/19/19 which revealed anemia with a hemoglobin of 10 7, MCV 92, thrombocytopenia-platelets 266, hyponatremia sodium 131, negative Lyme antibodies IgG and IgM  He was seen in the office today for follow-up and given his been ongoing weight loss (lost 30 lbs since Christmas), decreased appetite, diarrhea, abdominal pain and recent lab work listed above, it was recommended he be evaluated in the hospital     Patient with multiple related complaints, all started around [place] time  Patient states he had a productive cough which is no longer productive  He was given a zpack and PCP reports he was feeling better  Patient also reports he was given oral steroids  He feels like his abdomen has been "bloated"  He has had intermittent diarrhea, first at [place] time for 1 week and then now again for the last week  He denies melena or blood in stool  He feels somewhat better after starting prilosec  He has decreased appetite due to the bloating  He has lost 30 pounds since Christmas  He has had intermittent fevers, highest he reports is 100 9  Review of Systems:    General:   No Fever or chills; + weight loss, + weakness   EENT:   No ear pain, facial swelling; No sneezing, sore throat  Skin:   No rashes, color changes  Respiratory:     No shortness of breath, cough, wheezing, stridor     Cardiovascular: No chest pain, palpitations  Gastrointestinal:    No nausea, vomiting, + diarrhea; No abdominal pain  Musculoskeletal:     No arthralgias, myalgias, swelling  Neurologic:   No dizziness, numbness, weakness  No speech difficulties  Psych:   No agitation, suicidal ideations  Otherwise, All other twelve-point review of systems normal      Past Medical and Surgical History:     No past medical history on file  No past surgical history on file  Meds/Allergies:    No current facility-administered medications for this encounter  No Known Allergies    Allergies: No Known Allergies    Social History:     Marital Status: Single     Substance Use History:   Social History     Substance and Sexual Activity   Alcohol Use Yes     Social History     Tobacco Use   Smoking Status Former Smoker   Smokeless Tobacco Never Used     Social History     Substance and Sexual Activity   Drug Use Not on file       Family History:    non-contributory    Physical Exam:     Vitals:   Blood Pressure: 145/81 (02/25/19 1900)  Pulse: 72 (02/25/19 1900)  Temperature: 97 7 °F (36 5 °C) (02/25/19 1900)  Temp Source: Temporal (02/25/19 1900)  Respirations: 18 (02/25/19 1900)  SpO2: 98 % (02/25/19 1900)    Physical Exam    Additional Data:     Lab Results: I have personally reviewed pertinent reports  Results from last 7 days   Lab Units 02/19/19  1412   WBC Thousand/uL 9 03   HEMOGLOBIN g/dL 10 7*   HEMATOCRIT % 32 5*   PLATELETS Thousands/uL 136*   LYMPHO PCT % 2*   MONO PCT % 4   EOS PCT % 0     Results from last 7 days   Lab Units 02/19/19  1412   POTASSIUM mmol/L 3 7   CHLORIDE mmol/L 100   CO2 mmol/L 23   BUN mg/dL 13   CREATININE mg/dL 0 94   CALCIUM mg/dL 8 5   ALK PHOS U/L 237*   ALT U/L 25   AST U/L 49*           Imaging: I have personally reviewed pertinent reports  Xr Chest Pa & Lateral    Result Date: 2/20/2019  Narrative: CHEST INDICATION:   R05: Cough    dry cough 12/25/18, within the last two weeks, cough [Time Spent: ___ minutes] : I have spent [unfilled] minutes of time on the encounter. has become productive, intermittent fever, shortness of breath, loss of energy, fatigue and slight chest tightness, no sx COMPARISON:  None EXAM PERFORMED/VIEWS:  XR CHEST PA & LATERAL FINDINGS: Cardiomediastinal silhouette appears unremarkable  The lungs are clear  No pneumothorax or pleural effusion  Osseous structures appear within normal limits for patient age  Impression: No acute cardiopulmonary disease  Workstation performed: [id number]       EKG, Pathology, and Other Studies Reviewed on Admission:   · EKG: none    [employer] Records Reviewed: Yes     Total Time for Visit, including Counseling / Coordination of Care: 45 minutes  Greater than 50% of this total time spent on direct patient counseling and coordination of care  ** Please Note: This note has been constructed using a voice recognition system   **

## (undated) DEVICE — GLOVE INDICATOR PI UNDERGLOVE SZ 8 BLUE

## (undated) DEVICE — TROCAR: Brand: KII FIOS FIRST ENTRY

## (undated) DEVICE — IRRIG ENDO FLO TUBING

## (undated) DEVICE — ALLENTOWN LAP CHOLE APP PACK: Brand: CARDINAL HEALTH

## (undated) DEVICE — 3000CC GUARDIAN II: Brand: GUARDIAN

## (undated) DEVICE — SUT VICRYL 0 UR-6 27 IN J603H

## (undated) DEVICE — ECHELON FLEX POWERED PLUS ARTICULATING ENDOSCOPIC LINEAR CUTTER , 60MM: Brand: ECHELON FLEX

## (undated) DEVICE — SCD SEQUENTIAL COMPRESSION COMFORT SLEEVE MEDIUM KNEE LENGTH: Brand: KENDALL SCD

## (undated) DEVICE — SPONGE 4 X 4 XRAY 16 PLY STRL LF RFD

## (undated) DEVICE — GLOVE SRG BIOGEL 6.5

## (undated) DEVICE — ENDOPOUCH RETRIEVER SPECIMEN RETRIEVAL BAGS: Brand: ENDOPOUCH RETRIEVER

## (undated) DEVICE — INTENDED FOR TISSUE SEPARATION, AND OTHER PROCEDURES THAT REQUIRE A SHARP SURGICAL BLADE TO PUNCTURE OR CUT.: Brand: BARD-PARKER SAFETY BLADES SIZE 11, STERILE

## (undated) DEVICE — PENCIL ELECTROSURG E-Z CLEAN -0035H

## (undated) DEVICE — SPONGE LAP 18 X 18 IN STRL RFD

## (undated) DEVICE — GLOVE INDICATOR PI UNDERGLOVE SZ 6.5 BLUE

## (undated) DEVICE — BLUE HEAT SCOPE WARMER

## (undated) DEVICE — ENDOPATH ECHELON ENDOSCOPIC LINEAR CUTTER RELOADS, BLUE, 60MM: Brand: ECHELON ENDOPATH

## (undated) DEVICE — [HIGH FLOW INSUFFLATOR,  DO NOT USE IF PACKAGE IS DAMAGED,  KEEP DRY,  KEEP AWAY FROM SUNLIGHT,  PROTECT FROM HEAT AND RADIOACTIVE SOURCES.]: Brand: PNEUMOSURE

## (undated) DEVICE — CHLORAPREP HI-LITE 26ML ORANGE

## (undated) DEVICE — GLOVE SRG BIOGEL 7

## (undated) DEVICE — PMI DISPOSABLE PUNCTURE CLOSURE DEVICE / SUTURE GRASPER: Brand: PMI

## (undated) DEVICE — GLOVE INDICATOR PI UNDERGLOVE SZ 7 BLUE

## (undated) DEVICE — GLOVE SRG BIOGEL ECLIPSE 7.5

## (undated) DEVICE — ADHESIVE SKN CLSR HISTOACRYL FLEX 0.5ML LF

## (undated) DEVICE — TROCAR: Brand: KII SLEEVE

## (undated) DEVICE — 2000CC GUARDIAN II: Brand: GUARDIAN

## (undated) DEVICE — ENDOPATH PNEUMONEEDLE INSUFFLATION NEEDLES WITH LUER LOCK CONNECTORS 120MM: Brand: ENDOPATH

## (undated) DEVICE — TRAY FOLEY 16FR URIMETER SURESTEP

## (undated) DEVICE — SUT MONOCRYL 4-0 PS-2 27 IN Y426H

## (undated) DEVICE — HARMONIC ACE 5MM DIAMETER SHEARS 36CM SHAFT LENGTH + ADAPTIVE TISSUE TECHNOLOGY FOR USE WITH GENERATOR G11: Brand: HARMONIC ACE